# Patient Record
Sex: FEMALE | Race: WHITE | NOT HISPANIC OR LATINO | Employment: OTHER | URBAN - METROPOLITAN AREA
[De-identification: names, ages, dates, MRNs, and addresses within clinical notes are randomized per-mention and may not be internally consistent; named-entity substitution may affect disease eponyms.]

---

## 2009-03-06 LAB
EXTERNAL HIV CONFIRMATION: NORMAL
EXTERNAL HIV SCREEN: NORMAL
HCV AB SER-ACNC: NEGATIVE

## 2017-01-03 ENCOUNTER — HOSPITAL ENCOUNTER (OUTPATIENT)
Dept: NON INVASIVE DIAGNOSTICS | Facility: CLINIC | Age: 62
Discharge: HOME | End: 2017-01-03
Payer: COMMERCIAL

## 2017-01-03 DIAGNOSIS — I05.9 RHEUMATIC MITRAL VALVE DISEASE: ICD-10-CM

## 2017-01-03 DIAGNOSIS — Z01.810 ENCOUNTER FOR PREPROCEDURAL CARDIOVASCULAR EXAMINATION: ICD-10-CM

## 2017-01-03 PROCEDURE — 93306 TTE W/DOPPLER COMPLETE: CPT

## 2017-01-07 ENCOUNTER — APPOINTMENT (OUTPATIENT)
Dept: URGENT CARE | Facility: MEDICAL CENTER | Age: 62
End: 2017-01-07
Payer: COMMERCIAL

## 2017-01-07 PROCEDURE — 99213 OFFICE O/P EST LOW 20 MIN: CPT

## 2017-01-12 ENCOUNTER — OFFICE VISIT (OUTPATIENT)
Dept: URGENT CARE | Facility: MEDICAL CENTER | Age: 62
End: 2017-01-12
Payer: COMMERCIAL

## 2017-01-12 PROCEDURE — 99213 OFFICE O/P EST LOW 20 MIN: CPT

## 2017-01-13 ENCOUNTER — GENERIC CONVERSION - ENCOUNTER (OUTPATIENT)
Dept: OTHER | Facility: OTHER | Age: 62
End: 2017-01-13

## 2017-01-19 ENCOUNTER — HOSPITAL ENCOUNTER (OUTPATIENT)
Dept: INTERVENTIONAL RADIOLOGY/VASCULAR | Facility: HOSPITAL | Age: 62
Discharge: HOME | End: 2017-01-19
Attending: INTERNAL MEDICINE | Admitting: INTERNAL MEDICINE
Payer: COMMERCIAL

## 2017-01-19 ENCOUNTER — ANESTHESIA EVENT (OUTPATIENT)
Dept: INTERVENTIONAL RADIOLOGY/VASCULAR | Facility: HOSPITAL | Age: 62
End: 2017-01-19

## 2017-01-19 ENCOUNTER — ANESTHESIA (OUTPATIENT)
Dept: INTERVENTIONAL RADIOLOGY/VASCULAR | Facility: HOSPITAL | Age: 62
End: 2017-01-19

## 2017-01-19 VITALS
DIASTOLIC BLOOD PRESSURE: 64 MMHG | OXYGEN SATURATION: 100 % | HEART RATE: 66 BPM | HEIGHT: 60 IN | TEMPERATURE: 97.1 F | WEIGHT: 101 LBS | SYSTOLIC BLOOD PRESSURE: 147 MMHG | RESPIRATION RATE: 20 BRPM | BODY MASS INDEX: 19.83 KG/M2

## 2017-01-19 DIAGNOSIS — I34.1 MITRAL VALVE PROLAPSE: ICD-10-CM

## 2017-01-19 PROCEDURE — 93312 ECHO TRANSESOPHAGEAL: CPT

## 2017-01-19 RX ORDER — PROPOFOL 10 MG/ML
INJECTION, EMULSION INTRAVENOUS CONTINUOUS PRN
Status: DISCONTINUED | OUTPATIENT
Start: 2017-01-19 | End: 2017-01-19 | Stop reason: SURG

## 2017-01-19 RX ORDER — SODIUM CHLORIDE 9 MG/ML
INJECTION, SOLUTION INTRAVENOUS CONTINUOUS PRN
Status: DISCONTINUED | OUTPATIENT
Start: 2017-01-19 | End: 2017-01-19 | Stop reason: SURG

## 2017-01-19 RX ORDER — PROPOFOL 10 MG/ML
INJECTION, EMULSION INTRAVENOUS AS NEEDED
Status: DISCONTINUED | OUTPATIENT
Start: 2017-01-19 | End: 2017-01-19 | Stop reason: SURG

## 2017-01-19 RX ORDER — KETAMINE HYDROCHLORIDE 100 MG/ML
INJECTION, SOLUTION INTRAMUSCULAR; INTRAVENOUS AS NEEDED
Status: DISCONTINUED | OUTPATIENT
Start: 2017-01-19 | End: 2017-01-19 | Stop reason: SURG

## 2017-01-19 RX ADMIN — SODIUM CHLORIDE: 0.9 INJECTION, SOLUTION INTRAVENOUS at 09:31

## 2017-01-19 RX ADMIN — LIDOCAINE HYDROCHLORIDE 80 MG: 20 INJECTION, SOLUTION INTRAVENOUS at 09:31

## 2017-01-19 RX ADMIN — PROPOFOL 150 MCG/KG/MIN: 10 INJECTION, EMULSION INTRAVENOUS at 09:31

## 2017-01-19 RX ADMIN — KETAMINE HYDROCHLORIDE 25 MG: 100 INJECTION, SOLUTION, CONCENTRATE INTRAMUSCULAR; INTRAVENOUS at 09:31

## 2017-01-19 RX ADMIN — PROPOFOL 75 MG: 10 INJECTION, EMULSION INTRAVENOUS at 09:31

## 2017-02-03 ENCOUNTER — HOSPITAL ENCOUNTER (OUTPATIENT)
Dept: RADIOLOGY | Facility: MEDICAL CENTER | Age: 62
Discharge: HOME | End: 2017-02-03
Payer: COMMERCIAL

## 2017-02-03 ENCOUNTER — ALLSCRIPTS OFFICE VISIT (OUTPATIENT)
Dept: OTHER | Facility: OTHER | Age: 62
End: 2017-02-03

## 2017-02-03 DIAGNOSIS — I34.0 NONRHEUMATIC MITRAL VALVE INSUFFICIENCY: ICD-10-CM

## 2017-02-03 DIAGNOSIS — F41.9 ANXIETY DISORDER: ICD-10-CM

## 2017-02-03 DIAGNOSIS — Z47.89 ENCOUNTER FOR OTHER ORTHOPEDIC AFTERCARE: ICD-10-CM

## 2017-02-03 PROCEDURE — 73100 X-RAY EXAM OF WRIST: CPT

## 2017-02-21 ENCOUNTER — ALLSCRIPTS OFFICE VISIT (OUTPATIENT)
Dept: OTHER | Facility: OTHER | Age: 62
End: 2017-02-21

## 2017-02-28 ENCOUNTER — TRANSCRIBE ORDERS (OUTPATIENT)
Dept: LAB | Facility: OTHER | Age: 62
End: 2017-02-28

## 2017-03-02 ENCOUNTER — LAB (OUTPATIENT)
Dept: LAB | Facility: OTHER | Age: 62
End: 2017-03-02
Payer: COMMERCIAL

## 2017-03-02 ENCOUNTER — TRANSCRIBE ORDERS (OUTPATIENT)
Dept: LAB | Facility: OTHER | Age: 62
End: 2017-03-02

## 2017-03-02 DIAGNOSIS — I34.0 NONRHEUMATIC MITRAL VALVE INSUFFICIENCY: ICD-10-CM

## 2017-03-02 DIAGNOSIS — F41.9 ANXIETY DISORDER: ICD-10-CM

## 2017-03-02 LAB
ALBUMIN SERPL BCP-MCNC: 3.8 G/DL (ref 3.5–5)
ALP SERPL-CCNC: 77 U/L (ref 46–116)
ALT SERPL W P-5'-P-CCNC: 20 U/L (ref 12–78)
ANION GAP SERPL CALCULATED.3IONS-SCNC: 7 MMOL/L (ref 4–13)
AST SERPL W P-5'-P-CCNC: 12 U/L (ref 5–45)
BASOPHILS # BLD AUTO: 0.02 THOUSANDS/ΜL (ref 0–0.1)
BASOPHILS NFR BLD AUTO: 0 % (ref 0–1)
BILIRUB SERPL-MCNC: 0.48 MG/DL (ref 0.2–1)
BUN SERPL-MCNC: 16 MG/DL (ref 5–25)
CALCIUM SERPL-MCNC: 9.2 MG/DL (ref 8.3–10.1)
CHLORIDE SERPL-SCNC: 103 MMOL/L (ref 100–108)
CHOLEST SERPL-MCNC: 214 MG/DL (ref 50–200)
CO2 SERPL-SCNC: 30 MMOL/L (ref 21–32)
CREAT SERPL-MCNC: 0.81 MG/DL (ref 0.6–1.3)
EOSINOPHIL # BLD AUTO: 0.05 THOUSAND/ΜL (ref 0–0.61)
EOSINOPHIL NFR BLD AUTO: 1 % (ref 0–6)
ERYTHROCYTE [DISTWIDTH] IN BLOOD BY AUTOMATED COUNT: 13 % (ref 11.6–15.1)
GFR SERPL CREATININE-BSD FRML MDRD: >60 ML/MIN/1.73SQ M
GLUCOSE SERPL-MCNC: 96 MG/DL (ref 65–140)
HCT VFR BLD AUTO: 41.8 % (ref 34.8–46.1)
HDLC SERPL-MCNC: 59 MG/DL (ref 40–60)
HGB BLD-MCNC: 14.1 G/DL (ref 11.5–15.4)
LDLC SERPL CALC-MCNC: 131 MG/DL (ref 0–100)
LYMPHOCYTES # BLD AUTO: 2.44 THOUSANDS/ΜL (ref 0.6–4.47)
LYMPHOCYTES NFR BLD AUTO: 41 % (ref 14–44)
MCH RBC QN AUTO: 30.1 PG (ref 26.8–34.3)
MCHC RBC AUTO-ENTMCNC: 33.7 G/DL (ref 31.4–37.4)
MCV RBC AUTO: 89 FL (ref 82–98)
MONOCYTES # BLD AUTO: 0.55 THOUSAND/ΜL (ref 0.17–1.22)
MONOCYTES NFR BLD AUTO: 9 % (ref 4–12)
NEUTROPHILS # BLD AUTO: 2.83 THOUSANDS/ΜL (ref 1.85–7.62)
NEUTS SEG NFR BLD AUTO: 49 % (ref 43–75)
NRBC BLD AUTO-RTO: 0 /100 WBCS
PLATELET # BLD AUTO: 198 THOUSANDS/UL (ref 149–390)
PMV BLD AUTO: 11.8 FL (ref 8.9–12.7)
POTASSIUM SERPL-SCNC: 4.2 MMOL/L (ref 3.5–5.3)
PROT SERPL-MCNC: 7.5 G/DL (ref 6.4–8.2)
RBC # BLD AUTO: 4.69 MILLION/UL (ref 3.81–5.12)
SODIUM SERPL-SCNC: 140 MMOL/L (ref 136–145)
TRIGL SERPL-MCNC: 121 MG/DL
TSH SERPL DL<=0.05 MIU/L-ACNC: 1.09 UIU/ML (ref 0.36–3.74)
WBC # BLD AUTO: 5.9 THOUSAND/UL (ref 4.31–10.16)

## 2017-03-02 PROCEDURE — 36415 COLL VENOUS BLD VENIPUNCTURE: CPT

## 2017-03-02 PROCEDURE — 80053 COMPREHEN METABOLIC PANEL: CPT

## 2017-03-02 PROCEDURE — 85025 COMPLETE CBC W/AUTO DIFF WBC: CPT

## 2017-03-02 PROCEDURE — 84443 ASSAY THYROID STIM HORMONE: CPT

## 2017-03-02 PROCEDURE — 80061 LIPID PANEL: CPT

## 2017-06-16 DIAGNOSIS — Z00.00 ENCOUNTER FOR GENERAL ADULT MEDICAL EXAMINATION WITHOUT ABNORMAL FINDINGS: ICD-10-CM

## 2017-06-16 DIAGNOSIS — E78.5 HYPERLIPIDEMIA: ICD-10-CM

## 2017-06-16 DIAGNOSIS — I10 ESSENTIAL (PRIMARY) HYPERTENSION: ICD-10-CM

## 2017-06-20 ENCOUNTER — LAB (OUTPATIENT)
Dept: LAB | Facility: OTHER | Age: 62
End: 2017-06-20
Payer: COMMERCIAL

## 2017-06-20 ENCOUNTER — ALLSCRIPTS OFFICE VISIT (OUTPATIENT)
Dept: OTHER | Facility: OTHER | Age: 62
End: 2017-06-20

## 2017-06-20 PROCEDURE — 86901 BLOOD TYPING SEROLOGIC RH(D): CPT | Performed by: INTERNAL MEDICINE

## 2017-06-20 PROCEDURE — 36415 COLL VENOUS BLD VENIPUNCTURE: CPT | Performed by: INTERNAL MEDICINE

## 2017-06-20 PROCEDURE — 86900 BLOOD TYPING SEROLOGIC ABO: CPT | Performed by: INTERNAL MEDICINE

## 2017-06-20 PROCEDURE — 80061 LIPID PANEL: CPT | Performed by: INTERNAL MEDICINE

## 2017-09-14 ENCOUNTER — GENERIC CONVERSION - ENCOUNTER (OUTPATIENT)
Dept: OTHER | Facility: OTHER | Age: 62
End: 2017-09-14

## 2017-12-19 ENCOUNTER — ALLSCRIPTS OFFICE VISIT (OUTPATIENT)
Dept: OTHER | Facility: OTHER | Age: 62
End: 2017-12-19
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
## 2018-01-03 ENCOUNTER — OFFICE VISIT (OUTPATIENT)
Dept: URGENT CARE | Facility: MEDICAL CENTER | Age: 63
End: 2018-01-03
Payer: COMMERCIAL

## 2018-01-03 PROCEDURE — 99213 OFFICE O/P EST LOW 20 MIN: CPT
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
## 2018-01-13 VITALS
BODY MASS INDEX: 20.03 KG/M2 | DIASTOLIC BLOOD PRESSURE: 82 MMHG | HEIGHT: 60 IN | WEIGHT: 102 LBS | HEART RATE: 75 BPM | SYSTOLIC BLOOD PRESSURE: 137 MMHG

## 2018-01-14 VITALS
SYSTOLIC BLOOD PRESSURE: 112 MMHG | WEIGHT: 104.25 LBS | BODY MASS INDEX: 20.47 KG/M2 | HEIGHT: 60 IN | HEART RATE: 80 BPM | DIASTOLIC BLOOD PRESSURE: 76 MMHG

## 2018-01-14 VITALS
SYSTOLIC BLOOD PRESSURE: 132 MMHG | BODY MASS INDEX: 20.86 KG/M2 | WEIGHT: 106.25 LBS | DIASTOLIC BLOOD PRESSURE: 74 MMHG | HEART RATE: 68 BPM | HEIGHT: 60 IN

## 2018-01-23 VITALS
HEART RATE: 82 BPM | TEMPERATURE: 97.9 F | RESPIRATION RATE: 18 BRPM | BODY MASS INDEX: 20.81 KG/M2 | WEIGHT: 106 LBS | HEIGHT: 60 IN | DIASTOLIC BLOOD PRESSURE: 84 MMHG | OXYGEN SATURATION: 99 % | SYSTOLIC BLOOD PRESSURE: 126 MMHG

## 2018-01-23 VITALS
HEIGHT: 60 IN | DIASTOLIC BLOOD PRESSURE: 82 MMHG | HEART RATE: 88 BPM | BODY MASS INDEX: 20.91 KG/M2 | OXYGEN SATURATION: 95 % | SYSTOLIC BLOOD PRESSURE: 124 MMHG | WEIGHT: 106.5 LBS

## 2018-04-16 RX ORDER — BENZONATATE 100 MG/1
1 CAPSULE ORAL
COMMUNITY
Start: 2018-01-03 | End: 2018-04-17

## 2018-04-17 ENCOUNTER — TELEPHONE (OUTPATIENT)
Dept: CARDIOLOGY CLINIC | Facility: CLINIC | Age: 63
End: 2018-04-17

## 2018-04-17 ENCOUNTER — OFFICE VISIT (OUTPATIENT)
Dept: CARDIOLOGY CLINIC | Facility: CLINIC | Age: 63
End: 2018-04-17
Payer: COMMERCIAL

## 2018-04-17 ENCOUNTER — TELEPHONE (OUTPATIENT)
Dept: INTERNAL MEDICINE CLINIC | Facility: CLINIC | Age: 63
End: 2018-04-17

## 2018-04-17 VITALS
DIASTOLIC BLOOD PRESSURE: 80 MMHG | SYSTOLIC BLOOD PRESSURE: 126 MMHG | HEART RATE: 84 BPM | WEIGHT: 107 LBS | BODY MASS INDEX: 21.01 KG/M2 | OXYGEN SATURATION: 96 % | HEIGHT: 60 IN

## 2018-04-17 DIAGNOSIS — I34.1 MVP (MITRAL VALVE PROLAPSE): Primary | ICD-10-CM

## 2018-04-17 DIAGNOSIS — I34.0 MITRAL VALVE INSUFFICIENCY, UNSPECIFIED ETIOLOGY: ICD-10-CM

## 2018-04-17 PROCEDURE — 99214 OFFICE O/P EST MOD 30 MIN: CPT | Performed by: INTERNAL MEDICINE

## 2018-04-17 RX ORDER — ATENOLOL 25 MG/1
25 TABLET ORAL DAILY
Qty: 90 TABLET | Refills: 3 | Status: SHIPPED | OUTPATIENT
Start: 2018-04-17 | End: 2018-04-17 | Stop reason: SDUPTHER

## 2018-04-17 RX ORDER — CHLORAL HYDRATE 500 MG
1000 CAPSULE ORAL DAILY
COMMUNITY

## 2018-04-17 RX ORDER — ATENOLOL 25 MG/1
25 TABLET ORAL DAILY
Qty: 90 TABLET | Refills: 0 | Status: SHIPPED | OUTPATIENT
Start: 2018-04-17

## 2018-04-17 RX ORDER — LANOLIN ALCOHOL/MO/W.PET/CERES
CREAM (GRAM) TOPICAL DAILY
COMMUNITY

## 2018-04-17 NOTE — TELEPHONE ENCOUNTER
Left detailed message for patient - advised that atenolol was sent to [facility]  S/w Dr Meron Hook to verify Xanax - patient was advised by providers that we are not refilling Xanax and that medication needs to come from PCP, which she told me she sees one

## 2018-04-17 NOTE — TELEPHONE ENCOUNTER
PT WANTS TO KNOW IF HER ATENOLOL WENT TO [facility] AND HER XANEX WENT TO [facility] IN [place]  PT WOULD LIKE A CALL BACK TO LET HER KNOW   Winsome Saucedo

## 2018-04-17 NOTE — PROGRESS NOTES
[facility]  [address] [address]  Cardiology Consultation     Antonia Nuno  [id number]  1955      1  MVP (mitral valve prolapse)     2  Mitral valve insufficiency, unspecified etiology         Chief Complaint   Patient presents with    Follow-up     MVP       HPI:  Patient with history of MVP and mitral regurgitation presents for follow up visit  Was previously Dr Molina Jones patient  C/o palpitations occasionally and gets SOB with exertion  Denies chest pain, lightheadedness, leg swelling, syncope  Denies history of CAD  MVP- ECHO Jan 2017 shows moderately dilated LA, redundant MV with marked prolapse of posterior leaflet and severe MR   KWAME afterwards shows EF 60%, mild to moderate MVP of posterior leaflet  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, moderate MR seen  Mitral regurgitation- ECHO and KWAME findings as above  Past Medical History:   Diagnosis Date    Anxiety     Cardiac disease     mitral valve regurgitation    Mitral valve disorder      Social History     Social History    Marital status:      Spouse name: N/A    Number of children: N/A    Years of education: N/A     Occupational History    Not on file  Social History Main Topics    Smoking status: Never Smoker    Smokeless tobacco: Not on file    Alcohol use Yes      Comment: occ    Drug use: No    Sexual activity: Not on file     Other Topics Concern    Not on file     Social History Narrative    No narrative on file      No family history on file    Past Surgical History:   Procedure Laterality Date    ANKLE FRACTURE SURGERY      ORIF WRIST FRACTURE Right 11/21/2016    Procedure: DISTAL RADIUS O/R I/F;  Surgeon: Lucinda Izquierdo MD;  [facility];  Service:     WRIST SURGERY         Current Outpatient Prescriptions:     ALPRAZolam (XANAX) 2 MG tablet, Take 2 mg by mouth 2 (two) times a day, Disp: , Rfl:     atenolol (TENORMIN) 25 mg tablet, Take 25 mg by mouth daily, Disp: , Rfl:     Calcium-Vitamin D-Vitamin K (VIACTIV PO), Take by mouth, Disp: , Rfl:     cyanocobalamin (VITAMIN B-12) 1,000 mcg tablet, Take by mouth daily, Disp: , Rfl:     Omega-3 Fatty Acids (FISH OIL) 1,000 mg, Take 1,000 mg by mouth daily, Disp: , Rfl:   Allergies   Allergen Reactions    Codeine Other (See Comments)     Hallucinations    Sulfa Antibiotics Other (See Comments)     Yeast infections     Vitals:    04/17/18 1316   BP: 126/80   Pulse: 84   SpO2: 96%   Weight: 48 5 kg (107 lb)   Height: 5' (1 524 m)       Labs:  No visits with results within 2 Month(s) from this visit  Latest known visit with results is: Ancillary Orders on 06/16/2017   Component Date Value    Cholesterol 06/20/2017 212*    Triglycerides 06/20/2017 137     HDL, Direct 06/20/2017 49     LDL Calculated 06/20/2017 136*    ABO Grouping 06/20/2017 A     Rh Factor 06/20/2017 Positive      Imaging: No results found  Review of Systems:  Review of Systems   Constitutional: Negative for diaphoresis, fatigue and fever  HENT: Negative for congestion, ear discharge, hearing loss, sinus pain and sore throat  Eyes: Negative for pain, redness and visual disturbance  Respiratory: Negative for cough, chest tightness, shortness of breath and wheezing  Cardiovascular: Negative for chest pain, palpitations and leg swelling  Gastrointestinal: Negative for abdominal distention, abdominal pain, constipation, diarrhea, nausea and vomiting  Endocrine: Negative for cold intolerance and heat intolerance  Genitourinary: Negative for difficulty urinating and hematuria  Musculoskeletal: Negative for arthralgias, back pain, gait problem, joint swelling, myalgias, neck pain and neck stiffness  Skin: Negative for color change, pallor, rash and wound  Neurological: Negative for dizziness, syncope, weakness, numbness and headaches  Hematological: Does not bruise/bleed easily     Psychiatric/Behavioral: Negative for agitation, behavioral problems and confusion  The patient is not nervous/anxious  /80   Pulse 84   Ht 5' (1 524 m)   Wt 48 5 kg (107 lb)   SpO2 96%   BMI 20 90 kg/m²     Physical Exam:  Physical Exam   Constitutional: She is oriented to person, place, and time  She appears well-developed and well-nourished  HENT:   Head: Normocephalic and atraumatic  Eyes: Conjunctivae and EOM are normal  Pupils are equal, round, and reactive to light  Neck: Normal range of motion  Neck supple  Cardiovascular: Normal rate, regular rhythm and intact distal pulses  Exam reveals no gallop and no friction rub  Murmur heard   +midsystolic click   Pulmonary/Chest: Effort normal and breath sounds normal  No respiratory distress  She has no wheezes  She has no rales  She exhibits no tenderness  Abdominal: Soft  Bowel sounds are normal  She exhibits no distension  There is no rebound  Musculoskeletal: Normal range of motion  She exhibits no edema  Neurological: She is alert and oriented to person, place, and time  She has normal reflexes  Skin: Skin is warm and dry  Psychiatric: She has a normal mood and affect  Her behavior is normal  Judgment and thought content normal        Discussion/Summary:  1  Mitral valve prolapse, Mitral regurgitation:  -ECHO Jan 2017 shows moderately dilated LA, redundant MV with marked prolapse of posterior leaflet and severe MR     -KWAME afterwards shows EF 60%, mild to moderate MVP of posterior leaflet  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, moderate MR seen   -Will order ECHO to reassess current valve status  Recommend aggressive risk factor modification and therapeutic lifestyle changes  Low salt, low calorie, low fat, low cholesterol diet with regular exercise and to optimize weight  I will defer the ordering and monitoring of necessary lab studies to you, but I am available and happy to review and manage any of that data at your request in the future    Discussed concepts of atherosclerosis, including signs and symptoms of cardiac disease  Previous studies were reviewed  Safety measures were reviewed  Questions were entertained and answered  Patient was advised to report any problem requiring medical attention  Follow up with appropriate specialists and lab work as discussed  Return for follow up visit as scheduled or earlier, if needed  Further recommendations will be forthcoming after the above testing is performed and results available  Thank you for allowing me to participate in the care and evaluation of your patient  Should you have any questions, please feel free to contact me

## 2018-05-04 ENCOUNTER — OFFICE VISIT (OUTPATIENT)
Dept: URGENT CARE | Facility: MEDICAL CENTER | Age: 63
End: 2018-05-04
Payer: COMMERCIAL

## 2018-05-04 VITALS
OXYGEN SATURATION: 100 % | HEART RATE: 83 BPM | HEIGHT: 60 IN | TEMPERATURE: 98.1 F | DIASTOLIC BLOOD PRESSURE: 70 MMHG | WEIGHT: 102 LBS | BODY MASS INDEX: 20.03 KG/M2 | SYSTOLIC BLOOD PRESSURE: 140 MMHG | RESPIRATION RATE: 20 BRPM

## 2018-05-04 DIAGNOSIS — R25.1 TREMOR: Primary | ICD-10-CM

## 2018-05-04 PROCEDURE — 99213 OFFICE O/P EST LOW 20 MIN: CPT | Performed by: FAMILY MEDICINE

## 2018-05-04 RX ORDER — ALPRAZOLAM 2 MG/1
4 TABLET ORAL
Qty: 60 TABLET | Refills: 0 | Status: SHIPPED | OUTPATIENT
Start: 2018-05-04

## 2018-05-04 NOTE — PROGRESS NOTES
[facility]        NAME: Tatyana Guo is a 65 y o  female  : 1955    MRN: [id number]  DATE: May 4, 2018  TIME: 11:42 AM    Assessment and Plan   Tremor [R25 1]  1  Tremor  ALPRAZolam (XANAX) 2 MG tablet         Patient Instructions       Follow up with PCP in 3-5 days  Proceed to  ER if symptoms worsen  Chief Complaint     Chief Complaint   Patient presents with    Medication Refill         History of Present Illness         This is a 36-year-old female presenting for medication refill  She states that she has been on 4 milligrams of Xanax at night for sleep for "years"  This prescription came from Dr Destiny Garzon, who recently retired  The patient states that she tried to contact his office for refill and they refused  She is currently switching to a new primary care, was not able to get appointment till   She has been off her Xanax for about 2 weeks, she did try to taper on her own but did not have enough pills  Current symptoms include muscle spasms including left eye spasm which started about 2-3 days ago,  Nausea, intermittent dizziness, increased tremor, 1 episode of vomiting  She denies any confusion, decreased level of consciousness  She states that her plan is to be weaned off the Xanax, but she is concerned because she knows that she should not stop it abruptly  She uses the xanax for sleep and states that she has not been sleeping without it  We discussed at length that urgent care generally does not prescribe controlled medications  Given that she has a gap in her availability to see primary care, we will fill her prescription for 39 days to get her through safely until she has her first appointment with her new PCP  GLO PERRY checked along with Bassam Proctor  Medication Refill   Associated symptoms include fatigue, nausea, vertigo and vomiting   Pertinent negatives include no abdominal pain, anorexia, arthralgias, change in bowel habit, chest pain, chills, congestion, coughing, diaphoresis, fever, headaches, joint swelling, myalgias, neck pain, numbness, rash, sore throat, swollen glands, urinary symptoms, visual change or weakness  Review of Systems   Review of Systems   Constitutional: Positive for fatigue  Negative for chills, diaphoresis and fever  HENT: Negative for congestion and sore throat  Respiratory: Negative for cough  Cardiovascular: Negative for chest pain  Gastrointestinal: Positive for nausea and vomiting  Negative for abdominal pain, anorexia and change in bowel habit  Musculoskeletal: Negative for arthralgias, joint swelling, myalgias and neck pain  Skin: Negative for rash  Neurological: Positive for vertigo and tremors  Negative for dizziness, seizures, syncope, facial asymmetry, speech difficulty, weakness, light-headedness, numbness and headaches           Current Medications       Current Outpatient Prescriptions:     atenolol (TENORMIN) 25 mg tablet, Take 1 tablet (25 mg total) by mouth daily, Disp: 90 tablet, Rfl: 0    Calcium-Vitamin D-Vitamin K (VIACTIV PO), Take by mouth, Disp: , Rfl:     cyanocobalamin (VITAMIN B-12) 1,000 mcg tablet, Take by mouth daily, Disp: , Rfl:     Omega-3 Fatty Acids (FISH OIL) 1,000 mg, Take 1,000 mg by mouth daily, Disp: , Rfl:     ALPRAZolam (XANAX) 2 MG tablet, Take 2 mg by mouth 2 (two) times a day, Disp: , Rfl:     ALPRAZolam (XANAX) 2 MG tablet, Take 2 tablets (4 mg total) by mouth daily at bedtime as needed for anxiety or sleep, Disp: 60 tablet, Rfl: 0    Current Allergies     Allergies as of 05/04/2018 - Reviewed 05/04/2018   Allergen Reaction Noted    Codeine Other (See Comments) 01/22/2014    Penicillins Hives 05/04/2018    Sulfa antibiotics Other (See Comments) 11/18/2016            The following portions of the patient's history were reviewed and updated as appropriate: allergies, current medications, past family history, past medical history, past social history, past surgical history and problem list      Past Medical History:   Diagnosis Date    Anxiety     Cardiac disease     mitral valve regurgitation    Mitral valve disorder        Past Surgical History:   Procedure Laterality Date    ANKLE FRACTURE SURGERY      ORIF WRIST FRACTURE Right 11/21/2016    Procedure: DISTAL RADIUS O/R I/F;  Surgeon: Margaret Wilcox MD;  [facility];  Service:     WRIST SURGERY         No family history on file  Medications have been verified  Objective   /70   Pulse 83   Temp 98 1 °F (36 7 °C) (Temporal)   Resp 20   Ht 5' (1 524 m)   Wt 46 3 kg (102 lb)   SpO2 100%   BMI 19 92 kg/m²        Physical Exam     Physical Exam   Constitutional: She is oriented to person, place, and time  She appears well-developed and well-nourished  No distress  HENT:   Head: Normocephalic and atraumatic  Right Ear: External ear normal    Left Ear: External ear normal    Nose: Nose normal    Mouth/Throat: Oropharynx is clear and moist  No oropharyngeal exudate  Eyes: Conjunctivae and EOM are normal  Pupils are equal, round, and reactive to light  Cardiovascular: Normal rate, regular rhythm and normal heart sounds  Pulmonary/Chest: Effort normal and breath sounds normal  No respiratory distress  She has no wheezes  She has no rales  Musculoskeletal: Normal range of motion  Neurological: She is alert and oriented to person, place, and time  No cranial nerve deficit  Tremor of  Bilateral hands   Skin: Skin is warm and dry  She is not diaphoretic  Psychiatric: She has a normal mood and affect  Nursing note and vitals reviewed

## 2018-05-04 NOTE — PATIENT INSTRUCTIONS
Continue Xanax as you have been taking for the past few years  Keep your appointment with your new PCP for June and discuss tapering the medication at that time  For any worsening symptoms, go to the ER

## 2018-05-04 NOTE — PROGRESS NOTES
Pt stated that lorna her Cardiologist prescribed her Xanax  However he retired and her new cardiologist will not refill Xanax  Pt has been to PCP for 2 years and wont prescribe her xanax either  Pt also stated that she feels her left eye is drooping  c/o dizzy and nausea

## 2019-04-11 ENCOUNTER — OFFICE VISIT (OUTPATIENT)
Dept: URGENT CARE | Facility: MEDICAL CENTER | Age: 64
End: 2019-04-11
Payer: COMMERCIAL

## 2019-04-11 VITALS
TEMPERATURE: 98.6 F | SYSTOLIC BLOOD PRESSURE: 149 MMHG | BODY MASS INDEX: 22.45 KG/M2 | HEART RATE: 57 BPM | DIASTOLIC BLOOD PRESSURE: 79 MMHG | WEIGHT: 114.38 LBS | OXYGEN SATURATION: 99 % | RESPIRATION RATE: 18 BRPM | HEIGHT: 60 IN

## 2019-04-11 DIAGNOSIS — H61.23 BILATERAL IMPACTED CERUMEN: Primary | ICD-10-CM

## 2019-04-11 PROCEDURE — S9088 SERVICES PROVIDED IN URGENT: HCPCS | Performed by: PHYSICIAN ASSISTANT

## 2019-04-11 PROCEDURE — 99213 OFFICE O/P EST LOW 20 MIN: CPT | Performed by: PHYSICIAN ASSISTANT

## 2019-04-11 RX ORDER — PRAVASTATIN SODIUM 10 MG
10 TABLET ORAL
COMMUNITY
Start: 2018-09-18

## 2019-04-11 RX ORDER — SERTRALINE HCL 25 MG
25 TABLET ORAL
COMMUNITY
Start: 2018-11-01

## 2019-08-09 ENCOUNTER — OFFICE VISIT (OUTPATIENT)
Dept: URGENT CARE | Facility: MEDICAL CENTER | Age: 64
End: 2019-08-09
Payer: COMMERCIAL

## 2019-08-09 VITALS
OXYGEN SATURATION: 96 % | WEIGHT: 115.2 LBS | HEART RATE: 72 BPM | HEIGHT: 60 IN | TEMPERATURE: 98.3 F | SYSTOLIC BLOOD PRESSURE: 142 MMHG | DIASTOLIC BLOOD PRESSURE: 80 MMHG | BODY MASS INDEX: 22.62 KG/M2 | RESPIRATION RATE: 18 BRPM

## 2019-08-09 DIAGNOSIS — T26.90XA CHEMICAL BURN OF EYE: Primary | ICD-10-CM

## 2019-08-09 PROCEDURE — 99213 OFFICE O/P EST LOW 20 MIN: CPT | Performed by: PHYSICIAN ASSISTANT

## 2019-08-09 PROCEDURE — S9088 SERVICES PROVIDED IN URGENT: HCPCS | Performed by: PHYSICIAN ASSISTANT

## 2019-08-10 NOTE — PROGRESS NOTES
[facility]        NAME: Tatyana Guo is a 59 y o  female  : 1955    MRN: [id number]  DATE: 2019  TIME: 8:33 PM    Assessment and Plan   Chemical burn of eye [T26 90XA]  1  Chemical burn of eye           Patient Instructions     Chemical eye burn  Referred to ER for further evaluation  Follow up with PCP in 3-5 days  Proceed to  ER if symptoms worsen  Chief Complaint     Chief Complaint   Patient presents with    Eye Problem     Pt believes she got hair dye in her right eye today  Area around the eye is red and swollen  Pt states she normally has 20/20 vision  Pt's vision is currently 20/100  History of Present Illness       58 y/o female presents c/o chemical burn to eye  Patient states she was applying a dye to her hair and the dye came into her right eye  Complains of pain and decreased vision      Review of Systems   Review of Systems   Constitutional: Negative  HENT: Negative  Eyes: Positive for pain, redness and visual disturbance  Negative for photophobia, discharge and itching  Respiratory: Negative  Cardiovascular: Negative            Current Medications       Current Outpatient Medications:     ALPRAZolam (XANAX) 2 MG tablet, Take 1 5 mg by mouth daily at bedtime as needed , Disp: , Rfl:     atenolol (TENORMIN) 25 mg tablet, Take 1 tablet (25 mg total) by mouth daily, Disp: 90 tablet, Rfl: 0    Calcium-Vitamin D-Vitamin K (VIACTIV PO), Take by mouth, Disp: , Rfl:     Omega-3 Fatty Acids (FISH OIL) 1,000 mg, Take 1,000 mg by mouth daily, Disp: , Rfl:     pravastatin (PRAVACHOL) 10 mg tablet, Take 10 mg by mouth, Disp: , Rfl:     sertraline (ZOLOFT) 25 mg tablet, Take 25 mg by mouth , Disp: , Rfl:     ALPRAZolam (XANAX) 2 MG tablet, Take 2 tablets (4 mg total) by mouth daily at bedtime as needed for anxiety or sleep (Patient not taking: Reported on 2019), Disp: 60 tablet, Rfl: 0    cyanocobalamin (VITAMIN B-12) 1,000 mcg tablet, Take by mouth daily, Disp: , Rfl:     Current Allergies     Allergies as of 08/09/2019 - Reviewed 08/09/2019   Allergen Reaction Noted    Morphine and related Other (See Comments) 06/18/2008    Codeine Other (See Comments) 01/22/2014    Penicillins Hives 05/04/2018    Sulfa antibiotics Other (See Comments) 11/18/2016            The following portions of the patient's history were reviewed and updated as appropriate: allergies, current medications, past family history, past medical history, past social history, past surgical history and problem list      Past Medical History:   Diagnosis Date    Anxiety     Cardiac disease     mitral valve regurgitation    Mitral valve disorder        Past Surgical History:   Procedure Laterality Date    ANKLE FRACTURE SURGERY      ORIF WRIST FRACTURE Right 11/21/2016    Procedure: DISTAL RADIUS O/R I/F;  Surgeon: Barry Marie MD;  [facility];  Service:     WRIST SURGERY         History reviewed  No pertinent family history  Medications have been verified  Objective   /80   Pulse 72   Temp 98 3 °F (36 8 °C) (Temporal)   Resp 18   Ht 5' (1 524 m)   Wt 52 3 kg (115 lb 3 2 oz)   LMP  (LMP Unknown)   SpO2 96%   BMI 22 50 kg/m²          Physical Exam     Physical Exam   Constitutional: She appears well-developed and well-nourished  No distress  HENT:   Head: Normocephalic and atraumatic  Eyes: Pupils are equal, round, and reactive to light  EOM and lids are normal  Lids are everted and swept, no foreign bodies found  Right eye exhibits chemosis  Right eye exhibits no discharge, no exudate and no hordeolum  No foreign body present in the right eye  Left eye exhibits no chemosis, no discharge, no exudate and no hordeolum  No foreign body present in the left eye  Right conjunctiva is injected  Right conjunctiva has no hemorrhage  Left conjunctiva is not injected  Left conjunctiva has no hemorrhage     Pulmonary/Chest: Effort normal and breath sounds normal    Skin: She is not diaphoretic  Eye was irrigated with saline 100cc and patient referred to ER for further evaluation   [facility]

## 2019-08-10 NOTE — PATIENT INSTRUCTIONS
Chemical eye burn  Referred to ER for further evaluation  Follow up with PCP in 3-5 days  Proceed to  ER if symptoms worsen  Chemical Eye Burns   WHAT YOU NEED TO KNOW:   A chemical eye burn is an injury to any part of your eye that is exposed to chemicals  DISCHARGE INSTRUCTIONS:   Medicines:   · Antibiotic medicine: This medicine helps prevent infection caused by bacteria  It may be given as an eyedrop or ointment  · Pain medicine: You may be given medicine to take away or decrease pain  Do not wait until the pain is severe before you take your medicine  This medicine may be given as an eyedrop or pill  · Cycloplegic medicine: This medicine dilates your pupil and relaxes your eye muscles to help decrease pain and twitching  · Steroids: This medicine may be given to decrease inflammation  · Take your medicine as directed  Contact your healthcare provider if you think your medicine is not helping or if you have side effects  Tell him of her if you are allergic to any medicine  Keep a list of the medicines, vitamins, and herbs you take  Include the amounts, and when and why you take them  Bring the list or the pill bottles to follow-up visits  Carry your medicine list with you in case of an emergency  Follow up with your healthcare provider or ophthalmologist as directed: You will need to return to have your eye and vision checked  Write down your questions so you remember to ask them during your visits  Eye care: Your healthcare provider or ophthalmologist may give you artificial tears or an eye patch to protect your eye and help it heal    If you get chemicals in your eye:  Rinse your eye immediately  The sooner you begin to rinse your eye, the better your chances of healing  · Rinse your eye with a steady stream of water for at least 30 minutes   Use the cleanest water you can get to quickly  Never use other chemicals to rinse out your eye   Move your eyeball in all directions to make sure that all parts of your eye are rinsed  If possible, continue to rinse out your eye with water until you reach the treatment center  · Remove clothing that may still contain chemicals  Do not take out your contact lenses  · Bring the container to show healthcare providers, if possible  Do not bring the container if the chemical may burn you again  Prevent another chemical eye burn:   · Always wear protective eyewear, such as goggles, that fit closely around your eyes  · Do not touch your eyes when you work with chemicals  · Follow the instructions on the container when you use chemicals that may hurt your eyes  · Make a plan in case you or someone else gets burned  Know where the best water or liquid is located for rinsing your eyes  Check to see if your company has an eye wash station  Contact your healthcare provider or eye specialist if:   · Your eye feels dry  · Your eye is watery  · You have questions or concerns about your condition or care  Return to the emergency department if:   · Your eyesight is blurry or you lose vision  · You have cuts, bumps, or other damage on your eyeball  · Your eye becomes red or cloudy  © 2017 [employer]  Information is for End User's use only and may not be sold, redistributed or otherwise used for commercial purposes  All illustrations and images included in CareNotes® are the copyrighted property of [employer]  or [employer]  The above information is an  only  It is not intended as medical advice for individual conditions or treatments  Talk to your doctor, nurse or pharmacist before following any medical regimen to see if it is safe and effective for you

## 2020-10-19 ENCOUNTER — TELEPHONE (OUTPATIENT)
Dept: FAMILY MEDICINE CLINIC | Facility: CLINIC | Age: 65
End: 2020-10-19

## 2021-03-10 DIAGNOSIS — Z23 ENCOUNTER FOR IMMUNIZATION: ICD-10-CM

## 2021-03-19 ENCOUNTER — IMMUNIZATIONS (OUTPATIENT)
Dept: FAMILY MEDICINE CLINIC | Facility: HOSPITAL | Age: 66
End: 2021-03-19

## 2021-03-19 DIAGNOSIS — Z23 ENCOUNTER FOR IMMUNIZATION: Primary | ICD-10-CM

## 2021-03-19 PROCEDURE — 0011A SARS-COV-2 / COVID-19 MRNA VACCINE (MODERNA) 100 MCG: CPT

## 2021-03-19 PROCEDURE — 91301 SARS-COV-2 / COVID-19 MRNA VACCINE (MODERNA) 100 MCG: CPT

## 2021-04-21 ENCOUNTER — IMMUNIZATIONS (OUTPATIENT)
Dept: FAMILY MEDICINE CLINIC | Facility: HOSPITAL | Age: 66
End: 2021-04-21

## 2021-04-21 DIAGNOSIS — Z23 ENCOUNTER FOR IMMUNIZATION: Primary | ICD-10-CM

## 2021-04-21 PROCEDURE — 91301 SARS-COV-2 / COVID-19 MRNA VACCINE (MODERNA) 100 MCG: CPT

## 2021-04-21 PROCEDURE — 0012A SARS-COV-2 / COVID-19 MRNA VACCINE (MODERNA) 100 MCG: CPT

## 2021-05-22 ENCOUNTER — OFFICE VISIT (OUTPATIENT)
Dept: URGENT CARE | Facility: MEDICAL CENTER | Age: 66
End: 2021-05-22
Payer: COMMERCIAL

## 2021-05-22 VITALS
BODY MASS INDEX: 23.16 KG/M2 | DIASTOLIC BLOOD PRESSURE: 62 MMHG | SYSTOLIC BLOOD PRESSURE: 138 MMHG | RESPIRATION RATE: 18 BRPM | OXYGEN SATURATION: 96 % | HEIGHT: 60 IN | HEART RATE: 72 BPM | WEIGHT: 118 LBS | TEMPERATURE: 97.7 F

## 2021-05-22 DIAGNOSIS — T22.111A SUPERFICIAL BURN OF RIGHT FOREARM, INITIAL ENCOUNTER: Primary | ICD-10-CM

## 2021-05-22 PROCEDURE — 99213 OFFICE O/P EST LOW 20 MIN: CPT | Performed by: PHYSICIAN ASSISTANT

## 2021-05-22 PROCEDURE — S9088 SERVICES PROVIDED IN URGENT: HCPCS | Performed by: PHYSICIAN ASSISTANT

## 2021-05-22 RX ORDER — BACITRACIN ZINC AND POLYMYXIN B SULFATE 500; 1000 [USP'U]/G; [USP'U]/G
OINTMENT TOPICAL 2 TIMES DAILY
Qty: 15 G | Refills: 0 | Status: SHIPPED | OUTPATIENT
Start: 2021-05-22

## 2021-05-22 NOTE — PATIENT INSTRUCTIONS
1  Apply Bacitracin to skin twice daily until resolved  2  Keep ski clean and dry  3  Watch for S&S of infection (redness, swelling, drainage, fever)  4   Follow up with PCP in 3-5 days if symptoms persist

## 2021-05-22 NOTE — PROGRESS NOTES
[facility]        NAME: Guido Leslie is a 60 y o  female  : 1955    MRN: [id number]  DATE: May 22, 2021  TIME: 2:24 PM    Assessment and Plan   Superficial burn of right forearm, initial encounter [T22 111A]  1  Superficial burn of right forearm, initial encounter  bacitracin-polymyxin b (POLYSPORIN) ointment         Patient Instructions     1  Apply Bacitracin to skin twice daily until resolved  2  Keep ski clean and dry  3  Watch for S&S of infection (redness, swelling, drainage, fever)  4  Follow up with PCP in 3-5 days if symptoms persist       Chief Complaint     Chief Complaint   Patient presents with    burn     Pt  burned her right arm on an iron two days ago  History of Present Illness         Leigthon Wells is a 81-year-old female presents with a burn on her right forearm that developed 2 days prior  Patient reports she was using and iron when it slipped and struck her on the dorsal aspect of the right forearm  Patient denies any significant pain, numbness, tingling or weakness into her wrist and hand  Review of Systems   Review of Systems   Constitutional: Negative  HENT: Negative  Respiratory: Negative  Skin: Positive for wound           Current Medications       Current Outpatient Medications:     ALPRAZolam (XANAX) 2 MG tablet, Take 1 5 mg by mouth daily at bedtime as needed , Disp: , Rfl:     atenolol (TENORMIN) 25 mg tablet, Take 1 tablet (25 mg total) by mouth daily, Disp: 90 tablet, Rfl: 0    Calcium-Vitamin D-Vitamin K (VIACTIV PO), Take by mouth, Disp: , Rfl:     Omega-3 Fatty Acids (FISH OIL) 1,000 mg, Take 1,000 mg by mouth daily, Disp: , Rfl:     pravastatin (PRAVACHOL) 10 mg tablet, Take 10 mg by mouth, Disp: , Rfl:     sertraline (ZOLOFT) 25 mg tablet, Take 25 mg by mouth , Disp: , Rfl:     ALPRAZolam (XANAX) 2 MG tablet, Take 2 tablets (4 mg total) by mouth daily at bedtime as needed for anxiety or sleep (Patient not taking: Reported on 2019), Disp: 60 tablet, Rfl: 0    bacitracin-polymyxin b (POLYSPORIN) ointment, Apply topically 2 (two) times a day, Disp: 15 g, Rfl: 0    cyanocobalamin (VITAMIN B-12) 1,000 mcg tablet, Take by mouth daily, Disp: , Rfl:     Current Allergies     Allergies as of 05/22/2021 - Reviewed 05/22/2021   Allergen Reaction Noted    Morphine and related Other (See Comments) 06/18/2008    Codeine Other (See Comments) 01/22/2014    Penicillins Hives 05/04/2018    Sulfa antibiotics Other (See Comments) 11/18/2016            The following portions of the patient's history were reviewed and updated as appropriate: allergies, current medications, past family history, past medical history, past social history, past surgical history and problem list      Past Medical History:   Diagnosis Date    Anxiety     Cardiac disease     mitral valve regurgitation    Mitral valve disorder        Past Surgical History:   Procedure Laterality Date    ANKLE FRACTURE SURGERY      ORIF WRIST FRACTURE Right 11/21/2016    Procedure: DISTAL RADIUS O/R I/F;  Surgeon: Allie Anderson MD;  [facility]: [facility];  Service:     WRIST SURGERY         No family history on file  Medications have been verified  Objective   /62   Pulse 72   Temp 97 7 °F (36 5 °C)   Resp 18   Ht 5' (1 524 m)   Wt 53 5 kg (118 lb)   LMP  (LMP Unknown)   SpO2 96%   BMI 23 05 kg/m²   No LMP recorded (lmp unknown)  Patient is postmenopausal        Physical Exam     Physical Exam  Constitutional:       General: She is not in acute distress  Appearance: Normal appearance  She is not ill-appearing  Cardiovascular:      Rate and Rhythm: Normal rate and regular rhythm  Heart sounds: Normal heart sounds  No murmur  Pulmonary:      Effort: Pulmonary effort is normal       Breath sounds: Normal breath sounds  Skin:         Neurological:      Mental Status: She is alert

## 2022-10-05 ENCOUNTER — OFFICE VISIT (OUTPATIENT)
Dept: URGENT CARE | Facility: MEDICAL CENTER | Age: 67
End: 2022-10-05
Payer: COMMERCIAL

## 2022-10-05 ENCOUNTER — APPOINTMENT (OUTPATIENT)
Dept: RADIOLOGY | Facility: MEDICAL CENTER | Age: 67
End: 2022-10-05
Payer: COMMERCIAL

## 2022-10-05 VITALS
DIASTOLIC BLOOD PRESSURE: 72 MMHG | RESPIRATION RATE: 14 BRPM | SYSTOLIC BLOOD PRESSURE: 144 MMHG | HEART RATE: 115 BPM | WEIGHT: 126 LBS | BODY MASS INDEX: 24.61 KG/M2 | OXYGEN SATURATION: 95 %

## 2022-10-05 DIAGNOSIS — M94.0 COSTOCHONDRITIS: ICD-10-CM

## 2022-10-05 DIAGNOSIS — R07.9 CHEST PAIN, UNSPECIFIED TYPE: Primary | ICD-10-CM

## 2022-10-05 LAB
ATRIAL RATE: 76 BPM
ATRIAL RATE: 85 BPM
P AXIS: 46 DEGREES
P AXIS: 51 DEGREES
PR INTERVAL: 118 MS
PR INTERVAL: 120 MS
QRS AXIS: -10 DEGREES
QRS AXIS: -10 DEGREES
QRSD INTERVAL: 90 MS
QRSD INTERVAL: 94 MS
QT INTERVAL: 388 MS
QT INTERVAL: 394 MS
QTC INTERVAL: 443 MS
QTC INTERVAL: 461 MS
T WAVE AXIS: 25 DEGREES
T WAVE AXIS: 38 DEGREES
VENTRICULAR RATE: 76 BPM
VENTRICULAR RATE: 85 BPM

## 2022-10-05 PROCEDURE — 71046 X-RAY EXAM CHEST 2 VIEWS: CPT

## 2022-10-05 PROCEDURE — 93010 ELECTROCARDIOGRAM REPORT: CPT | Performed by: PHYSICIAN ASSISTANT

## 2022-10-05 PROCEDURE — 93005 ELECTROCARDIOGRAM TRACING: CPT | Performed by: PHYSICIAN ASSISTANT

## 2022-10-05 PROCEDURE — 99213 OFFICE O/P EST LOW 20 MIN: CPT | Performed by: PHYSICIAN ASSISTANT

## 2022-10-05 PROCEDURE — S9088 SERVICES PROVIDED IN URGENT: HCPCS | Performed by: PHYSICIAN ASSISTANT
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
## 2022-10-05 NOTE — PATIENT INSTRUCTIONS
Chest pain/ costochondritis  Over the counter pain medication  Go to ER if no improvement  Follow up with PCP in 3-5 days  Proceed to  ER if symptoms worsen

## 2023-10-25 ENCOUNTER — TELEPHONE (OUTPATIENT)
Age: 68
End: 2023-10-25

## 2023-11-18 ENCOUNTER — APPOINTMENT (OUTPATIENT)
Dept: RADIOLOGY | Facility: CLINIC | Age: 68
End: 2023-11-18
Payer: COMMERCIAL

## 2023-11-18 DIAGNOSIS — M79.674 PAIN IN TOE OF RIGHT FOOT: ICD-10-CM

## 2023-11-18 PROCEDURE — 73630 X-RAY EXAM OF FOOT: CPT

## 2024-01-08 ENCOUNTER — HOSPITAL ENCOUNTER (OUTPATIENT)
Age: 69
Discharge: HOME | End: 2024-01-08
Payer: COMMERCIAL

## 2024-01-08 VITALS — WEIGHT: 126 LBS | HEIGHT: 59 IN | BODY MASS INDEX: 25.4 KG/M2

## 2024-01-08 DIAGNOSIS — M81.0 AGE-RELATED OSTEOPOROSIS WITHOUT CURRENT PATHOLOGICAL FRACTURE: ICD-10-CM

## 2024-01-08 DIAGNOSIS — Z12.31 ENCOUNTER FOR SCREENING MAMMOGRAM FOR BREAST CANCER: ICD-10-CM

## 2024-01-08 PROCEDURE — 77063 BREAST TOMOSYNTHESIS BI: CPT

## 2024-01-08 PROCEDURE — 77067 SCR MAMMO BI INCL CAD: CPT

## 2024-01-08 PROCEDURE — 77080 DXA BONE DENSITY AXIAL: CPT

## 2024-01-10 ENCOUNTER — TELEPHONE (OUTPATIENT)
Age: 69
End: 2024-01-10

## 2024-05-08 RX ORDER — ALENDRONATE SODIUM 70 MG/1
70 TABLET ORAL
COMMUNITY
Start: 2024-01-31

## 2024-05-08 RX ORDER — DICLOFENAC SODIUM 75 MG/1
TABLET, DELAYED RELEASE ORAL
COMMUNITY
Start: 2023-12-05 | End: 2024-05-09 | Stop reason: ALTCHOICE

## 2024-05-08 RX ORDER — TRAZODONE HYDROCHLORIDE 100 MG/1
100 TABLET ORAL DAILY
COMMUNITY
Start: 2024-03-28

## 2024-05-08 RX ORDER — PROPYLENE GLYCOL 0.06 MG/ML
EMULSION OPHTHALMIC
COMMUNITY

## 2024-05-08 RX ORDER — RAMELTEON 8 MG/1
8 TABLET ORAL DAILY PRN
COMMUNITY
Start: 2024-03-28

## 2024-05-08 RX ORDER — EPINEPHRINE 0.3 MG/.3ML
INJECTION SUBCUTANEOUS
COMMUNITY
Start: 2023-11-18

## 2024-05-09 ENCOUNTER — OFFICE VISIT (OUTPATIENT)
Dept: FAMILY MEDICINE CLINIC | Facility: CLINIC | Age: 69
End: 2024-05-09
Payer: COMMERCIAL

## 2024-05-09 VITALS
WEIGHT: 132 LBS | HEIGHT: 59 IN | SYSTOLIC BLOOD PRESSURE: 144 MMHG | OXYGEN SATURATION: 95 % | BODY MASS INDEX: 26.61 KG/M2 | DIASTOLIC BLOOD PRESSURE: 72 MMHG | RESPIRATION RATE: 16 BRPM | HEART RATE: 82 BPM

## 2024-05-09 DIAGNOSIS — N18.31 STAGE 3A CHRONIC KIDNEY DISEASE (HCC): ICD-10-CM

## 2024-05-09 DIAGNOSIS — E78.5 HYPERLIPIDEMIA, UNSPECIFIED HYPERLIPIDEMIA TYPE: ICD-10-CM

## 2024-05-09 DIAGNOSIS — M80.00XS AGE-RELATED OSTEOPOROSIS WITH CURRENT PATHOLOGICAL FRACTURE, SEQUELA: ICD-10-CM

## 2024-05-09 DIAGNOSIS — I34.1 MITRAL VALVE PROLAPSE: ICD-10-CM

## 2024-05-09 DIAGNOSIS — F51.04 CHRONIC INSOMNIA: Primary | ICD-10-CM

## 2024-05-09 DIAGNOSIS — F41.0 PANIC DISORDER (EPISODIC PAROXYSMAL ANXIETY): ICD-10-CM

## 2024-05-09 DIAGNOSIS — R01.1 MURMUR, CARDIAC: ICD-10-CM

## 2024-05-09 DIAGNOSIS — L98.9 NODULAR LESION ON SURFACE OF SKIN: ICD-10-CM

## 2024-05-09 DIAGNOSIS — I10 BENIGN ESSENTIAL HYPERTENSION: ICD-10-CM

## 2024-05-09 PROBLEM — M80.00XA AGE-RELATED OSTEOPOROSIS WITH CURRENT PATHOLOGICAL FRACTURE: Status: ACTIVE | Noted: 2024-05-09

## 2024-05-09 PROBLEM — N18.30 KIDNEY DISEASE, CHRONIC, STAGE III (GFR 30-59 ML/MIN) (HCC): Chronic | Status: ACTIVE | Noted: 2020-06-11

## 2024-05-09 PROCEDURE — 99204 OFFICE O/P NEW MOD 45 MIN: CPT | Performed by: FAMILY MEDICINE

## 2024-05-09 RX ORDER — HYDROXYZINE HYDROCHLORIDE 25 MG/1
TABLET, FILM COATED ORAL
COMMUNITY
Start: 2024-02-19

## 2024-05-09 RX ORDER — ALPRAZOLAM 1 MG/1
1 TABLET, EXTENDED RELEASE ORAL
COMMUNITY
Start: 2024-04-25

## 2024-05-09 RX ORDER — PRAVASTATIN SODIUM 10 MG
10 TABLET ORAL DAILY
Qty: 90 TABLET | Refills: 1 | Status: SHIPPED | OUTPATIENT
Start: 2024-05-09 | End: 2024-11-05

## 2024-05-09 NOTE — ASSESSMENT & PLAN NOTE
Lab Results   Component Value Date    EGFR 65 10/19/2023    EGFR 70 01/06/2023    EGFR 67 04/28/2022    CREATININE 1.0 10/19/2023    CREATININE 0.9 01/06/2023    CREATININE 0.9 04/28/2022   She has seen the nephrologist in the past and was told this is due to dehydration

## 2024-05-09 NOTE — ASSESSMENT & PLAN NOTE
She has a heart murmur and mitral valve prolapse seen she was in her 20s, she had previous evaluation by cardiologist and she said everything was fine  , Advised to have establish care with a cardiologist

## 2024-05-09 NOTE — ASSESSMENT & PLAN NOTE
Stable, no recent panic attack and she take alprazolam and hydroxyzine and she says it was given by her sleep specialist

## 2024-05-09 NOTE — PROGRESS NOTES
Name: Carley Bonilla      : 1955      MRN: [id number]  Encounter Provider: Kerrie Barrios MD  Encounter Date: 2024   Encounter department: [facility]    Assessment & Plan     1. Chronic insomnia  Assessment & Plan:  Managed by sleep specialist      2. Benign essential hypertension  Assessment & Plan:  Blood pressure is stable, continue same medication    Orders:  -     CBC and differential; Future; Expected date: 2024  -     Comprehensive metabolic panel; Future; Expected date: 2024  -     Lipid panel; Future; Expected date: 2024  -     TSH, 3rd generation; Future; Expected date: 2024    3. Mitral valve prolapse  Assessment & Plan:  She has a heart murmur and mitral valve prolapse seen she was in her 20s, she had previous evaluation by cardiologist and she said everything was fine  , Advised to have establish care with a cardiologist    Orders:  -     Ambulatory Referral to Cardiology; Future    4. Murmur, cardiac  Assessment & Plan:  Chronic asymptomatic cardiac murmur    Orders:  -     Ambulatory Referral to Cardiology; Future    5. Age-related osteoporosis with current pathological fracture, sequela    6. Nodular lesion on surface of skin  -     Ambulatory Referral to Dermatology; Future    7. Stage 3a chronic kidney disease (HCC)  Assessment & Plan:  Lab Results   Component Value Date    EGFR 65 10/19/2023    EGFR 70 2023    EGFR 67 2022    CREATININE 1.0 10/19/2023    CREATININE 0.9 2023    CREATININE 0.9 2022   She has seen the nephrologist in the past and was told this is due to dehydration      8. Hyperlipidemia, unspecified hyperlipidemia type  -     Lipid panel; Future; Expected date: 2024  -     pravastatin (PRAVACHOL) 10 mg tablet; Take 1 tablet (10 mg total) by mouth daily    9. Panic disorder (episodic paroxysmal anxiety)  Assessment & Plan:  Stable, no recent panic attack and she take alprazolam and hydroxyzine and  she says it was given by her sleep specialist             Subjective     New patient, she says she has been seeing sleep specialist and she takes alprazolam 1 mg daily trazodone, ramelteon and hydroxyzine, and she does virtual visits with him and he gives the prescriptions,  She is concerned about her skin she has a raised rough skin on the left side of the forehead and she wants to be checked and she wants screening of her all skin.  She has hyperlipidemia on statins, Fosamax for osteoporosis.  Hypertension and on atenolol,  She lives with her boyfriend, she is not concerned about any hepatitis C      Review of Systems   Constitutional:  Negative for activity change, chills and fever.   HENT:  Negative for ear pain and sore throat.    Eyes:  Negative for pain and visual disturbance.   Respiratory:  Negative for cough and shortness of breath.    Cardiovascular:  Negative for chest pain and palpitations.   Gastrointestinal:  Negative for abdominal pain and vomiting.   Genitourinary:  Negative for dysuria and hematuria.   Musculoskeletal:  Negative for arthralgias and back pain.   Skin:  Negative for color change and rash.   Neurological: Negative.  Negative for seizures and syncope.   Psychiatric/Behavioral: Negative.     All other systems reviewed and are negative.      Past Medical History:   Diagnosis Date   • Anxiety    • Cardiac disease     mitral valve regurgitation   • Mitral valve disorder      Past Surgical History:   Procedure Laterality Date   • ANKLE FRACTURE SURGERY     • ORIF WRIST FRACTURE Right 11/21/2016    Procedure: DISTAL RADIUS O/R I/F;  Surgeon: Chuckie Nova MD;  [facility];  Service:    • WRIST SURGERY       Family History   Problem Relation Age of Onset   • No Known Problems Mother    • No Known Problems Daughter    • No Known Problems Maternal Grandmother    • No Known Problems Paternal Grandmother    • No Known Problems Maternal Aunt    • No Known Problems Paternal Aunt    •  Breast cancer Neg Hx      Social History     Socioeconomic History   • Marital status:      Spouse name: None   • Number of children: None   • Years of education: None   • Highest education level: None   Occupational History   • None   Tobacco Use   • Smoking status: Never   • Smokeless tobacco: Never   Substance and Sexual Activity   • Alcohol use: Yes     Comment: occ   • Drug use: No   • Sexual activity: Yes     Partners: Male     Comment: boy friend   Other Topics Concern   • None   Social History Narrative   • None     Social Determinants of Health     Financial Resource Strain: Not on file   Food Insecurity: No Food Insecurity (6/11/2020)    Received from [facility]    Hunger Vital Sign    • Worried About Running Out of Food in the Last Year: Never true    • Ran Out of Food in the Last Year: Never true   Transportation Needs: Not on file   Physical Activity: Not on file   Stress: Not on file   Social Connections: Not on file   Intimate Partner Violence: Not on file   Housing Stability: Not on file     Current Outpatient Medications on File Prior to Visit   Medication Sig   • alendronate (FOSAMAX) 70 mg tablet Take 70 mg by mouth   • ALPRAZolam ER (XANAX XR) 1 MG 24 hr tablet Take 1 mg by mouth daily at bedtime   • atenolol (TENORMIN) 25 mg tablet Take 1 tablet (25 mg total) by mouth daily   • Calcium-Vitamin D-Vitamin K (VIACTIV PO) Take by mouth   • cyanocobalamin (VITAMIN B-12) 1,000 mcg tablet Take by mouth daily   • EPINEPHrine (EpiPen 2-Miguel) 0.3 mg/0.3 mL SOAJ For a severe reaction: Place orange end against the outer thigh, press firmly,  hold in place for 10 seconds and go to the Emergency room.   • hydrOXYzine HCL (ATARAX) 25 mg tablet TAKE 1 TABLET BY MOUTH 3 TIMES A DAY AS NEEDED FOR ANXIETY OR OTHER (SLEEP).   • Omega-3 Fatty Acids (FISH OIL) 1,000 mg Take 1,000 mg by mouth daily   • Propylene Glycol (Systane Balance) 0.6 % SOLN drops: 0.6% 1 drop six times a day into both eyes   • ramelteon  (ROZEREM) 8 mg tablet Take 8 mg by mouth daily as needed   • traZODone (DESYREL) 100 mg tablet Take 100 mg by mouth daily   • [DISCONTINUED] pravastatin (PRAVACHOL) 10 mg tablet Take 10 mg by mouth   • ALPRAZolam (XANAX) 2 MG tablet Take 1.5 mg by mouth daily at bedtime as needed  (Patient not taking: Reported on 5/9/2024)   • ALPRAZolam (XANAX) 2 MG tablet Take 2 tablets (4 mg total) by mouth daily at bedtime as needed for anxiety or sleep (Patient not taking: Reported on 4/11/2019)   • [DISCONTINUED] bacitracin-polymyxin b (POLYSPORIN) ointment Apply topically 2 (two) times a day (Patient not taking: Reported on 10/5/2022)   • [DISCONTINUED] diclofenac (VOLTAREN) 75 mg EC tablet TAKE 1 TABLET ORALLY EVERY 12 HOURS AS NEEDED FOR PAIN. (Patient not taking: Reported on 5/9/2024)   • [DISCONTINUED] sertraline (ZOLOFT) 25 mg tablet Take 25 mg by mouth  (Patient not taking: Reported on 5/9/2024)     Allergies   Allergen Reactions   • Morphine And Related Other (See Comments)     hallucinations     • Bee Venom Hives   • Codeine Other (See Comments)     Hallucinations   • Penicillins Hives   • Sulfa Antibiotics Other (See Comments)     Yeast infections     Immunization History   Administered Date(s) Administered   • COVID-19 MODERNA VACC 0.5 ML IM 03/19/2021, 04/21/2021, 11/02/2021   • COVID-19 Pfizer Vac BIVALENT Aditya-sucrose 12 Yr+ IM 11/10/2022   • INFLUENZA 10/01/2020   • Influenza Injectable, MDCK, Preservative Free, Quadrivalent, 0.5 mL 10/24/2019   • Influenza Split High Dose Preservative Free IM 10/05/2021   • Influenza, Seasonal Vaccine, Quadrivalent, Adjuvanted, .5e 11/01/2022, 10/11/2023   • Influenza, injectable, quadrivalent, preservative free 0.5 mL 10/12/2018   • Influenza, seasonal, injectable 09/16/2015, 10/21/2018   • Pneumococcal Conjugate 13-Valent 12/09/2021   • Pneumococcal Conjugate Vaccine 20-valent (Pcv20), Polysace 02/02/2023   • Tdap 05/02/2019   • Zoster Vaccine Recombinant 06/13/2020,  "08/17/2020       Objective     /72   Pulse 82   Resp 16   Ht 4' 10.5\" (1.486 m)   Wt 59.9 kg (132 lb)   LMP  (LMP Unknown)   SpO2 95%   BMI 27.12 kg/m²     Physical Exam  Vitals and nursing note reviewed.   Constitutional:       Appearance: Normal appearance. She is well-developed. She is not ill-appearing.   Eyes:      Extraocular Movements: Extraocular movements intact.   Neck:      Thyroid: No thyromegaly.   Cardiovascular:      Rate and Rhythm: Normal rate and regular rhythm.      Heart sounds: Murmur heard.   Pulmonary:      Effort: Pulmonary effort is normal.      Breath sounds: Normal breath sounds. No wheezing or rales.   Abdominal:      Palpations: Abdomen is soft.   Musculoskeletal:      Cervical back: Normal range of motion and neck supple.   Skin:     Findings: Lesion present. No erythema or rash.      Comments: Half centimeter raised oval-shaped rough surface skin lesion on the left side of the forehead   Neurological:      General: No focal deficit present.      Mental Status: She is alert.   Psychiatric:         Mood and Affect: Mood normal.       Kerrie Barrios MD    "

## 2024-05-10 ENCOUNTER — APPOINTMENT (OUTPATIENT)
Dept: LAB | Facility: CLINIC | Age: 69
End: 2024-05-10
Payer: COMMERCIAL

## 2024-05-10 DIAGNOSIS — E78.5 HYPERLIPIDEMIA, UNSPECIFIED HYPERLIPIDEMIA TYPE: ICD-10-CM

## 2024-05-10 DIAGNOSIS — I10 BENIGN ESSENTIAL HYPERTENSION: ICD-10-CM

## 2024-05-10 LAB
ALBUMIN SERPL BCP-MCNC: 4.2 G/DL (ref 3.5–5)
ALP SERPL-CCNC: 54 U/L (ref 34–104)
ALT SERPL W P-5'-P-CCNC: 15 U/L (ref 7–52)
ANION GAP SERPL CALCULATED.3IONS-SCNC: 8 MMOL/L (ref 4–13)
AST SERPL W P-5'-P-CCNC: 21 U/L (ref 13–39)
BASOPHILS # BLD AUTO: 0.03 THOUSANDS/ÂΜL (ref 0–0.1)
BASOPHILS NFR BLD AUTO: 0 % (ref 0–1)
BILIRUB SERPL-MCNC: 0.51 MG/DL (ref 0.2–1)
BUN SERPL-MCNC: 28 MG/DL (ref 5–25)
CALCIUM SERPL-MCNC: 9.4 MG/DL (ref 8.4–10.2)
CHLORIDE SERPL-SCNC: 102 MMOL/L (ref 96–108)
CHOLEST SERPL-MCNC: 188 MG/DL
CO2 SERPL-SCNC: 31 MMOL/L (ref 21–32)
CREAT SERPL-MCNC: 0.94 MG/DL (ref 0.6–1.3)
EOSINOPHIL # BLD AUTO: 0.14 THOUSAND/ÂΜL (ref 0–0.61)
EOSINOPHIL NFR BLD AUTO: 2 % (ref 0–6)
ERYTHROCYTE [DISTWIDTH] IN BLOOD BY AUTOMATED COUNT: 13.2 % (ref 11.6–15.1)
GFR SERPL CREATININE-BSD FRML MDRD: 62 ML/MIN/1.73SQ M
GLUCOSE P FAST SERPL-MCNC: 102 MG/DL (ref 65–99)
HCT VFR BLD AUTO: 43.5 % (ref 34.8–46.1)
HDLC SERPL-MCNC: 52 MG/DL
HGB BLD-MCNC: 13.9 G/DL (ref 11.5–15.4)
IMM GRANULOCYTES # BLD AUTO: 0.03 THOUSAND/UL (ref 0–0.2)
IMM GRANULOCYTES NFR BLD AUTO: 0 % (ref 0–2)
LDLC SERPL CALC-MCNC: 108 MG/DL (ref 0–100)
LYMPHOCYTES # BLD AUTO: 2.87 THOUSANDS/ÂΜL (ref 0.6–4.47)
LYMPHOCYTES NFR BLD AUTO: 40 % (ref 14–44)
MCH RBC QN AUTO: 28.8 PG (ref 26.8–34.3)
MCHC RBC AUTO-ENTMCNC: 32 G/DL (ref 31.4–37.4)
MCV RBC AUTO: 90 FL (ref 82–98)
MONOCYTES # BLD AUTO: 0.7 THOUSAND/ÂΜL (ref 0.17–1.22)
MONOCYTES NFR BLD AUTO: 10 % (ref 4–12)
NEUTROPHILS # BLD AUTO: 3.39 THOUSANDS/ÂΜL (ref 1.85–7.62)
NEUTS SEG NFR BLD AUTO: 48 % (ref 43–75)
NONHDLC SERPL-MCNC: 136 MG/DL
NRBC BLD AUTO-RTO: 0 /100 WBCS
PLATELET # BLD AUTO: 198 THOUSANDS/UL (ref 149–390)
PMV BLD AUTO: 11.4 FL (ref 8.9–12.7)
POTASSIUM SERPL-SCNC: 4 MMOL/L (ref 3.5–5.3)
PROT SERPL-MCNC: 7.3 G/DL (ref 6.4–8.4)
RBC # BLD AUTO: 4.82 MILLION/UL (ref 3.81–5.12)
SODIUM SERPL-SCNC: 141 MMOL/L (ref 135–147)
TRIGL SERPL-MCNC: 138 MG/DL
TSH SERPL DL<=0.05 MIU/L-ACNC: 1.55 UIU/ML (ref 0.45–4.5)
WBC # BLD AUTO: 7.16 THOUSAND/UL (ref 4.31–10.16)

## 2024-05-10 PROCEDURE — 80061 LIPID PANEL: CPT

## 2024-05-10 PROCEDURE — 84443 ASSAY THYROID STIM HORMONE: CPT

## 2024-05-10 PROCEDURE — 36415 COLL VENOUS BLD VENIPUNCTURE: CPT

## 2024-05-10 PROCEDURE — 80053 COMPREHEN METABOLIC PANEL: CPT

## 2024-05-10 PROCEDURE — 85025 COMPLETE CBC W/AUTO DIFF WBC: CPT

## 2024-05-20 ENCOUNTER — TELEPHONE (OUTPATIENT)
Age: 69
End: 2024-05-20

## 2024-05-20 NOTE — TELEPHONE ENCOUNTER
Carley is calling in regards to her lab results form 5/10.  I advised that Dr. Vides will discuss her results with her at her visit in June. Carley is requesting a call back - she would like the results before her appointment.     Please advise,   Thank you!

## 2024-06-03 ENCOUNTER — RA CDI HCC (OUTPATIENT)
Dept: OTHER | Facility: HOSPITAL | Age: 69
End: 2024-06-03

## 2024-06-05 ENCOUNTER — RA CDI HCC (OUTPATIENT)
Dept: OTHER | Facility: HOSPITAL | Age: 69
End: 2024-06-05

## 2024-06-13 ENCOUNTER — OFFICE VISIT (OUTPATIENT)
Dept: FAMILY MEDICINE CLINIC | Facility: CLINIC | Age: 69
End: 2024-06-13
Payer: COMMERCIAL

## 2024-06-13 VITALS
DIASTOLIC BLOOD PRESSURE: 70 MMHG | HEIGHT: 59 IN | SYSTOLIC BLOOD PRESSURE: 120 MMHG | BODY MASS INDEX: 27.01 KG/M2 | OXYGEN SATURATION: 96 % | HEART RATE: 60 BPM | WEIGHT: 134 LBS | RESPIRATION RATE: 16 BRPM

## 2024-06-13 DIAGNOSIS — I34.0 MITRAL VALVE INSUFFICIENCY, UNSPECIFIED ETIOLOGY: ICD-10-CM

## 2024-06-13 DIAGNOSIS — I10 BENIGN ESSENTIAL HYPERTENSION: ICD-10-CM

## 2024-06-13 DIAGNOSIS — M80.00XS AGE-RELATED OSTEOPOROSIS WITH CURRENT PATHOLOGICAL FRACTURE, SEQUELA: ICD-10-CM

## 2024-06-13 DIAGNOSIS — Z00.00 MEDICARE ANNUAL WELLNESS VISIT, SUBSEQUENT: Primary | ICD-10-CM

## 2024-06-13 DIAGNOSIS — E78.5 HYPERLIPIDEMIA, UNSPECIFIED HYPERLIPIDEMIA TYPE: ICD-10-CM

## 2024-06-13 DIAGNOSIS — I34.1 MVP (MITRAL VALVE PROLAPSE): ICD-10-CM

## 2024-06-13 PROCEDURE — 99213 OFFICE O/P EST LOW 20 MIN: CPT | Performed by: FAMILY MEDICINE

## 2024-06-13 PROCEDURE — G0439 PPPS, SUBSEQ VISIT: HCPCS | Performed by: FAMILY MEDICINE

## 2024-06-13 RX ORDER — ATENOLOL 25 MG/1
25 TABLET ORAL DAILY
Qty: 90 TABLET | Refills: 1 | Status: SHIPPED | OUTPATIENT
Start: 2024-06-13

## 2024-06-13 RX ORDER — ALENDRONATE SODIUM 70 MG/1
70 TABLET ORAL
Qty: 12 TABLET | Refills: 1 | Status: SHIPPED | OUTPATIENT
Start: 2024-06-13

## 2024-06-13 NOTE — PATIENT INSTRUCTIONS
Medicare Preventive Visit Patient Instructions  Thank you for completing your Welcome to Medicare Visit or Medicare Annual Wellness Visit today. Your next wellness visit will be due in one year (6/14/2025).  The screening/preventive services that you may require over the next 5-10 years are detailed below. Some tests may not apply to you based off risk factors and/or age. Screening tests ordered at today's visit but not completed yet may show as past due. Also, please note that scanned in results may not display below.  Preventive Screenings:  Service Recommendations Previous Testing/Comments   Colorectal Cancer Screening  * Colonoscopy    * Fecal Occult Blood Test (FOBT)/Fecal Immunochemical Test (FIT)  * Fecal DNA/Cologuard Test  * Flexible Sigmoidoscopy Age: 45-75 years old   Colonoscopy: every 10 years (may be performed more frequently if at higher risk)  OR  FOBT/FIT: every 1 year  OR  Cologuard: every 3 years  OR  Sigmoidoscopy: every 5 years  Screening may be recommended earlier than age 45 if at higher risk for colorectal cancer. Also, an individualized decision between you and your healthcare provider will decide whether screening between the ages of 76-85 would be appropriate. Colonoscopy: Not on file  FOBT/FIT: Not on file  Cologuard: 05/16/2022  Sigmoidoscopy: Not on file    Screening Current     Breast Cancer Screening Age: 40+ years old  Frequency: every 1-2 years  Not required if history of left and right mastectomy Mammogram: 01/08/2024    Screening Current   Cervical Cancer Screening Between the ages of 21-29, pap smear recommended once every 3 years.   Between the ages of 30-65, can perform pap smear with HPV co-testing every 5 years.   Recommendations may differ for women with a history of total hysterectomy, cervical cancer, or abnormal pap smears in past. Pap Smear: Not on file    Screening Not Indicated   Hepatitis C Screening Once for adults born between 1945 and 1965  More frequently in  patients at high risk for Hepatitis C Hep C Antibody: Not on file        Diabetes Screening 1-2 times per year if you're at risk for diabetes or have pre-diabetes Fasting glucose: 102 mg/dL (5/10/2024)  A1C: No results in last 5 years (No results in last 5 years)  Screening Current   Cholesterol Screening Once every 5 years if you don't have a lipid disorder. May order more often based on risk factors. Lipid panel: 05/10/2024    Screening Not Indicated  History Lipid Disorder     Other Preventive Screenings Covered by Medicare:  Abdominal Aortic Aneurysm (AAA) Screening: covered once if your at risk. You're considered to be at risk if you have a family history of AAA.  Lung Cancer Screening: covers low dose CT scan once per year if you meet all of the following conditions: (1) Age 55-77; (2) No signs or symptoms of lung cancer; (3) Current smoker or have quit smoking within the last 15 years; (4) You have a tobacco smoking history of at least 20 pack years (packs per day multiplied by number of years you smoked); (5) You get a written order from a healthcare provider.  Glaucoma Screening: covered annually if you're considered high risk: (1) You have diabetes OR (2) Family history of glaucoma OR (3)  aged 50 and older OR (4)  American aged 65 and older  Osteoporosis Screening: covered every 2 years if you meet one of the following conditions: (1) You're estrogen deficient and at risk for osteoporosis based off medical history and other findings; (2) Have a vertebral abnormality; (3) On glucocorticoid therapy for more than 3 months; (4) Have primary hyperparathyroidism; (5) On osteoporosis medications and need to assess response to drug therapy.   Last bone density test (DXA Scan): 01/08/2024.  HIV Screening: covered annually if you're between the age of 15-65. Also covered annually if you are younger than 15 and older than 65 with risk factors for HIV infection. For pregnant patients, it is  covered up to 3 times per pregnancy.    Immunizations:  Immunization Recommendations   Influenza Vaccine Annual influenza vaccination during flu season is recommended for all persons aged >= 6 months who do not have contraindications   Pneumococcal Vaccine   * Pneumococcal conjugate vaccine = PCV13 (Prevnar 13), PCV15 (Vaxneuvance), PCV20 (Prevnar 20)  * Pneumococcal polysaccharide vaccine = PPSV23 (Pneumovax) Adults 19-63 yo with certain risk factors or if 65+ yo  If never received any pneumonia vaccine: recommend Prevnar 20 (PCV20)  Give PCV20 if previously received 1 dose of PCV13 or PPSV23   Hepatitis B Vaccine 3 dose series if at intermediate or high risk (ex: diabetes, end stage renal disease, liver disease)   Respiratory syncytial virus (RSV) Vaccine - COVERED BY MEDICARE PART D  * RSVPreF3 (Arexvy) CDC recommends that adults 60 years of age and older may receive a single dose of RSV vaccine using shared clinical decision-making (SCDM)   Tetanus (Td) Vaccine - COST NOT COVERED BY MEDICARE PART B Following completion of primary series, a booster dose should be given every 10 years to maintain immunity against tetanus. Td may also be given as tetanus wound prophylaxis.   Tdap Vaccine - COST NOT COVERED BY MEDICARE PART B Recommended at least once for all adults. For pregnant patients, recommended with each pregnancy.   Shingles Vaccine (Shingrix) - COST NOT COVERED BY MEDICARE PART B  2 shot series recommended in those 19 years and older who have or will have weakened immune systems or those 50 years and older     Health Maintenance Due:      Topic Date Due   • Hepatitis C Screening  Never done   • Breast Cancer Screening: Mammogram  01/08/2025   • Colorectal Cancer Screening  05/16/2025     Immunizations Due:      Topic Date Due   • COVID-19 Vaccine (5 - 2023-24 season) 09/01/2023     Advance Directives   What are advance directives?  Advance directives are legal documents that state your wishes and plans for  medical care. These plans are made ahead of time in case you lose your ability to make decisions for yourself. Advance directives can apply to any medical decision, such as the treatments you want, and if you want to donate organs.   What are the types of advance directives?  There are many types of advance directives, and each state has rules about how to use them. You may choose a combination of any of the following:  Living will:  This is a written record of the treatment you want. You can also choose which treatments you do not want, which to limit, and which to stop at a certain time. This includes surgery, medicine, IV fluid, and tube feedings.   Durable power of  for healthcare (DPAHC):  This is a written record that states who you want to make healthcare choices for you when you are unable to make them for yourself. This person, called a proxy, is usually a family member or a friend. You may choose more than 1 proxy.  Do not resuscitate (DNR) order:  A DNR order is used in case your heart stops beating or you stop breathing. It is a request not to have certain forms of treatment, such as CPR. A DNR order may be included in other types of advance directives.  Medical directive:  This covers the care that you want if you are in a coma, near death, or unable to make decisions for yourself. You can list the treatments you want for each condition. Treatment may include pain medicine, surgery, blood transfusions, dialysis, IV or tube feedings, and a ventilator (breathing machine).  Values history:  This document has questions about your views, beliefs, and how you feel and think about life. This information can help others choose the care that you would choose.  Why are advance directives important?  An advance directive helps you control your care. Although spoken wishes may be used, it is better to have your wishes written down. Spoken wishes can be misunderstood, or not followed. Treatments may be given  even if you do not want them. An advance directive may make it easier for your family to make difficult choices about your care.   Weight Management   Why it is important to manage your weight:  Being overweight increases your risk of health conditions such as heart disease, high blood pressure, type 2 diabetes, and certain types of cancer. It can also increase your risk for osteoarthritis, sleep apnea, and other respiratory problems. Aim for a slow, steady weight loss. Even a small amount of weight loss can lower your risk of health problems.  How to lose weight safely:  A safe and healthy way to lose weight is to eat fewer calories and get regular exercise. You can lose up about 1 pound a week by decreasing the number of calories you eat by 500 calories each day.   Healthy meal plan for weight management:  A healthy meal plan includes a variety of foods, contains fewer calories, and helps you stay healthy. A healthy meal plan includes the following:  Eat whole-grain foods more often.  A healthy meal plan should contain fiber. Fiber is the part of grains, fruits, and vegetables that is not broken down by your body. Whole-grain foods are healthy and provide extra fiber in your diet. Some examples of whole-grain foods are whole-wheat breads and pastas, oatmeal, brown rice, and bulgur.  Eat a variety of vegetables every day.  Include dark, leafy greens such as spinach, kale, halle greens, and mustard greens. Eat yellow and orange vegetables such as carrots, sweet potatoes, and winter squash.   Eat a variety of fruits every day.  Choose fresh or canned fruit (canned in its own juice or light syrup) instead of juice. Fruit juice has very little or no fiber.  Eat low-fat dairy foods.  Drink fat-free (skim) milk or 1% milk. Eat fat-free yogurt and low-fat cottage cheese. Try low-fat cheeses such as mozzarella and other reduced-fat cheeses.  Choose meat and other protein foods that are low in fat.  Choose beans or other  legumes such as split peas or lentils. Choose fish, skinless poultry (chicken or turkey), or lean cuts of red meat (beef or pork). Before you cook meat or poultry, cut off any visible fat.   Use less fat and oil.  Try baking foods instead of frying them. Add less fat, such as margarine, sour cream, regular salad dressing and mayonnaise to foods. Eat fewer high-fat foods. Some examples of high-fat foods include french fries, doughnuts, ice cream, and cakes.  Eat fewer sweets.  Limit foods and drinks that are high in sugar. This includes candy, cookies, regular soda, and sweetened drinks.  Exercise:  Exercise at least 30 minutes per day on most days of the week. Some examples of exercise include walking, biking, dancing, and swimming. You can also fit in more physical activity by taking the stairs instead of the elevator or parking farther away from stores. Ask your healthcare provider about the best exercise plan for you.      © Copyright [employer] 2018 Information is for End User's use only and may not be sold, redistributed or otherwise used for commercial purposes. All illustrations and images included in CareNotes® are the copyrighted property of [employer]., Inc. or [employer]

## 2024-06-13 NOTE — ASSESSMENT & PLAN NOTE
She had fracture on the right wrist and on the right ankle in the past, healed well, she is on Fosamax and up-to-date on DEXA scan, she says Prolia is not covered by her insurance

## 2024-06-13 NOTE — PROGRESS NOTES
Ambulatory Visit  Name: Carley Bonilla      : 1955      MRN: [id number]  Encounter Provider: Kerrie Barrios MD  Encounter Date: 2024   Encounter department: [facility]    Assessment & Plan   1. Medicare annual wellness visit, subsequent  2. MVP (mitral valve prolapse)  -     atenolol (TENORMIN) 25 mg tablet; Take 1 tablet (25 mg total) by mouth daily  3. Mitral valve insufficiency, unspecified etiology  Assessment & Plan:  Made appointment with cardiologist, she is asymptomatic, she has a heart murmur  Orders:  -     atenolol (TENORMIN) 25 mg tablet; Take 1 tablet (25 mg total) by mouth daily  4. Age-related osteoporosis with current pathological fracture, sequela  Assessment & Plan:  She had fracture on the right wrist and on the right ankle in the past, healed well, she is on Fosamax and up-to-date on DEXA scan, she says Prolia is not covered by her insurance  Orders:  -     alendronate (FOSAMAX) 70 mg tablet; Take 1 tablet (70 mg total) by mouth every 7 days  5. Benign essential hypertension  Assessment & Plan:  Continue atenolol, blood pressure is normal 120/70  Orders:  -     Lipid panel; Future; Expected date: 2024  -     Comprehensive metabolic panel; Future; Expected date: 2024  -     CBC and differential; Future; Expected date: 2024  6. Hyperlipidemia, unspecified hyperlipidemia type  Assessment & Plan:  Continue Pravachol and cholesterol improving  Orders:  -     Lipid panel; Future; Expected date: 2024  -     Comprehensive metabolic panel; Future; Expected date: 2024  -     CBC and differential; Future; Expected date: 2024       Preventive health issues were discussed with patient, and age appropriate screening tests were ordered as noted in patient's After Visit Summary. Personalized health advice and appropriate referrals for health education or preventive services given if needed, as noted in patient's After Visit Summary.    History  of Present Illness     AWV, follow-up on her labs, she is taking Fosamax every week, she has a mitral valve prolapse and heart murmur and she made appointment with the cardiologist,, she is asymptomatic  In 2022 she had positive Cologuard and then she said she had colonoscopy in [facility] and it was normal polyp was removed       Patient Care Team:  Kerrie Barrios MD as PCP - General (Family Medicine)  Carmen Leon MD as PCP - [facility]CP-[facility]  [facility]  Patricio Choi MD    Review of Systems   Constitutional:  Negative for activity change, appetite change, chills, fatigue, fever and unexpected weight change.   HENT:  Negative for congestion, ear discharge, ear pain, nosebleeds, postnasal drip, rhinorrhea, sinus pressure, sneezing, sore throat, trouble swallowing and voice change.    Eyes:  Negative for photophobia, pain, discharge, redness and itching.   Respiratory:  Negative for cough, chest tightness, shortness of breath and wheezing.    Cardiovascular:  Negative for chest pain, palpitations and leg swelling.   Gastrointestinal:  Negative for abdominal pain, constipation, diarrhea, nausea and vomiting.   Endocrine: Negative for polyuria.   Genitourinary:  Negative for dysuria, frequency and urgency.   Musculoskeletal:  Negative for arthralgias, back pain, myalgias and neck pain.   Skin:  Negative for color change, pallor and rash.   Allergic/Immunologic: Negative for environmental allergies and food allergies.   Neurological:  Negative for dizziness, weakness, light-headedness and headaches.   Hematological:  Negative for adenopathy. Does not bruise/bleed easily.   Psychiatric/Behavioral:  Negative for behavioral problems. The patient is not nervous/anxious.      Medical History Reviewed by provider this encounter:  Tobacco  Allergies  Meds  Problems  Med Hx  Surg Hx  Fam Hx       Annual Wellness Visit Questionnaire   Carley is here  for her Subsequent Wellness visit.     Health Risk Assessment:   Patient rates overall health as good. Patient feels that their physical health rating is same. Patient is satisfied with their life. Eyesight was rated as same. Hearing was rated as same. Patient feels that their emotional and mental health rating is same. Patients states they are never, rarely angry. Patient states they are never, rarely unusually tired/fatigued.     Depression Screening:   PHQ-2 Score: 0      Fall Risk Screening:   In the past year, patient has experienced: no history of falling in past year      Urinary Incontinence Screening:   Patient has not leaked urine accidently in the last six months.     Home Safety:  Patient does not have trouble with stairs inside or outside of their home. Patient has working smoke alarms and has working carbon monoxide detector. Home safety hazards include: none.     Nutrition:   Current diet is Regular.     Medications:   Patient is currently taking over-the-counter supplements. OTC medications include: see medication list. Patient is able to manage medications.     Activities of Daily Living (ADLs)/Instrumental Activities of Daily Living (IADLs):   Walk and transfer into and out of bed and chair?: Yes  Dress and groom yourself?: Yes    Bathe or shower yourself?: Yes    Feed yourself? Yes  Do your laundry/housekeeping?: Yes  Manage your money, pay your bills and track your expenses?: Yes  Make your own meals?: Yes    Do your own shopping?: Yes    Previous Hospitalizations:   Any hospitalizations or ED visits within the last 12 months?: No      Advance Care Planning:   Living will: Yes    Durable POA for healthcare: Yes    Advanced directive: Yes      Comments: Daughter has power of      Cognitive Screening:   Provider or family/friend/caregiver concerned regarding cognition?: No    PREVENTIVE SCREENINGS      Cardiovascular Screening:    General: Screening Not Indicated and History Lipid  "Disorder      Diabetes Screening:     General: Screening Current      Colorectal Cancer Screening:     General: Screening Current      Breast Cancer Screening:     General: Screening Current      Cervical Cancer Screening:    General: Screening Not Indicated      Osteoporosis Screening:    General: Screening Not Indicated and History Osteoporosis      Lung Cancer Screening:     General: Screening Not Indicated    Screening, Brief Intervention, and Referral to Treatment (SBIRT)    Screening  Typical number of drinks in a day: 0  Typical number of drinks in a week: 2  Interpretation: Low risk drinking behavior.    AUDIT-C Screenin) How often did you have a drink containing alcohol in the past year? never  2) How many drinks did you have on a typical day when you were drinking in the past year? 0  3) How often did you have 6 or more drinks on one occasion in the past year? never    AUDIT-C Score: 0  Interpretation: Score 0-2 (female): Negative screen for alcohol misuse    Other Counseling Topics:   Sunscreen and calcium and vitamin D intake and regular weightbearing exercise.     Social Determinants of Health     Food Insecurity: Unknown (2024)    Hunger Vital Sign    • Worried About Running Out of Food in the Last Year: Never true    • Ran Out of Food in the Last Year: Patient declined   Transportation Needs: No Transportation Needs (2024)    PRAPARE - Transportation    • Lack of Transportation (Medical): No    • Lack of Transportation (Non-Medical): No   Housing Stability: Unknown (2024)    Housing Stability Vital Sign    • Unable to Pay for Housing in the Last Year: No    • Homeless in the Last Year: No   Utilities: Not At Risk (2024)    [facility] Utilities    • Threatened with loss of utilities: No     No results found.    Objective     /70   Pulse 60   Resp 16   Ht 4' 10.5\" (1.486 m)   Wt 60.8 kg (134 lb)   LMP  (LMP Unknown)   SpO2 96%   BMI 27.53 kg/m²     Physical Exam  Vitals " and nursing note reviewed.   Constitutional:       General: She is not in acute distress.     Appearance: Normal appearance. She is not ill-appearing.   HENT:      Head: Normocephalic and atraumatic.      Nose: Nose normal. No congestion or rhinorrhea.      Mouth/Throat:      Pharynx: No posterior oropharyngeal erythema.   Eyes:      General:         Right eye: No discharge.   Cardiovascular:      Rate and Rhythm: Normal rate and regular rhythm.      Heart sounds: Murmur heard.   Pulmonary:      Effort: Pulmonary effort is normal.      Breath sounds: Normal breath sounds. No wheezing or rales.   Musculoskeletal:         General: No swelling, tenderness or deformity. Normal range of motion.      Cervical back: Normal range of motion and neck supple. No muscular tenderness.      Right lower leg: No edema.      Left lower leg: No edema.   Lymphadenopathy:      Cervical: No cervical adenopathy.   Skin:     Coloration: Skin is not jaundiced or pale.      Findings: No erythema, lesion or rash.   Neurological:      General: No focal deficit present.      Mental Status: She is alert and oriented to person, place, and time.   Psychiatric:         Mood and Affect: Mood normal.         Behavior: Behavior normal.       Administrative Statements

## 2024-06-18 ENCOUNTER — OFFICE VISIT (OUTPATIENT)
Dept: DERMATOLOGY | Facility: CLINIC | Age: 69
End: 2024-06-18
Payer: COMMERCIAL

## 2024-06-18 ENCOUNTER — TELEPHONE (OUTPATIENT)
Age: 69
End: 2024-06-18

## 2024-06-18 VITALS — BODY MASS INDEX: 22.69 KG/M2 | WEIGHT: 136.2 LBS | HEIGHT: 65 IN | TEMPERATURE: 96.1 F

## 2024-06-18 DIAGNOSIS — L82.1 SEBORRHEIC KERATOSIS: Primary | ICD-10-CM

## 2024-06-18 DIAGNOSIS — L98.9 NODULAR LESION ON SURFACE OF SKIN: ICD-10-CM

## 2024-06-18 PROCEDURE — 99203 OFFICE O/P NEW LOW 30 MIN: CPT | Performed by: DERMATOLOGY

## 2024-06-18 NOTE — PATIENT INSTRUCTIONS
"SEBORRHEIC KERATOSIS    Plan:  Reviewed that these lesions are NOT cancers and cannot harm a person directly.  Under Medicare guidelines, the removal of a seborrheic keratosis is NOT covered unless the specific lesion is of medical necessity (interferes with vision, hearing, breathing), or is symptomatic (bleeding, itching, infected, inflamed). Medicare does NOT cover removal simply if the lesions are unsightly. Medicare does cover the evaluation of any lesion to determine if a lesion is or is not cancerous (i.e. skin biopsy).  Asymptomatic.  No treatment is required.    Discussed Cryotherapy for cosmetic removal      MELANOCYTIC NEVI (\"Moles\")        Assessment and Plan:  Based on a thorough discussion of this condition and the management approach to it (including a comprehensive discussion of the known risks, side effects and potential benefits of treatment), the patient (family) agrees to implement the following specific plan:  When outside we recommend using a wide brim hat, sunglasses, long sleeve and pants, sunscreen with SPF 30+ with reapplication every 2 hours, or SPF specific clothing   Benign, reassured  Annual skin check        LENTIGO        Assessment and Plan:  Based on a thorough discussion of this condition and the management approach to it (including a comprehensive discussion of the known risks, side effects and potential benefits of treatment), the patient (family) agrees to implement the following specific plan:  When outside we recommend using a wide brim hat, sunglasses, long sleeve and pants, sunscreen with SPF 30+ with reapplication every 2 hours, or SPF specific clothing     What is a lentigo?  A lentigo is a pigmented flat or slightly raised lesion with a clearly defined edge. Unlike an ephelis (freckle), it does not fade in the winter months. There are several kinds of lentigo.  The name lentigo originally referred to its appearance resembling a small lentil. The plural of lentigo is " lentigines, although “lentigos” is also in common use.    CHERRY ANGIOMAS        Assessment and Plan:  Based on a thorough discussion of this condition and the management approach to it (including a comprehensive discussion of the known risks, side effects and potential benefits of treatment), the patient (family) agrees to implement the following specific plan:  Monitor for changes  Benign, reassured

## 2024-06-18 NOTE — PROGRESS NOTES
"[facility] Dermatology Clinic Note     Patient Name: Carley Bonilla  Encounter Date: 6/18/2024     Have you been cared for by a [facility] Dermatologist in the last 3 years and, if so, which description applies to you?    NO.   I am considered a \"new\" patient and must complete all patient intake questions. I am FEMALE/of child-bearing potential.    REVIEW OF SYSTEMS:  Have you recently had or currently have any of the following? Recent fever or chills? No  Any non-healing wound? No  Are you pregnant or planning to become pregnant? No  Are you currently or planning to be nursing or breast feeding? No   PAST MEDICAL HISTORY:  Have you personally ever had or currently have any of the following?  If \"YES,\" then please provide more detail. Skin cancer (such as Melanoma, Basal Cell Carcinoma, Squamous Cell Carcinoma?  No  Tuberculosis, HIV/AIDS, Hepatitis B or C: No  Radiation Treatment No   HISTORY OF IMMUNOSUPPRESSION:   Do you have a history of any of the following:  Systemic Immunosuppression such as Diabetes, Biologic or Immunotherapy, Chemotherapy, Organ Transplantation, Bone Marrow Transplantation?  No    Answering \"YES\" requires the addition of the dotphrase \"IMMUNOSUPPRESSED\" as the first diagnosis of the patient's visit.   FAMILY HISTORY:  Any \"first degree relatives\" (parent, brother, sister, or child) with the following?    Skin Cancer, Pancreatic or Other Cancer? YES, Mother and Father both have had SCC and BCC   PATIENT EXPERIENCE:    Do you want the Dermatologist to perform a COMPLETE skin exam today including a clinical examination under the \"bra and underwear\" areas?  Yes, Patient has makeup on face. Denied exam of underwear and feet  If necessary, do we have your permission to call and leave a detailed message on your Preferred Phone number that includes your specific medical information?  Yes      Allergies   Allergen Reactions    Morphine And Codeine Other (See Comments)     hallucinations      Bee " Venom Hives    Codeine Other (See Comments)     Hallucinations    Penicillins Hives    Sulfa Antibiotics Other (See Comments)     Yeast infections      Current Outpatient Medications:     ALPRAZolam (XANAX) 2 MG tablet, Take 2 tablets (4 mg total) by mouth daily at bedtime as needed for anxiety or sleep, Disp: 60 tablet, Rfl: 0    ALPRAZolam ER (XANAX XR) 1 MG 24 hr tablet, Take 1 mg by mouth daily at bedtime, Disp: , Rfl:     Calcium-Vitamin D-Vitamin K (VIACTIV PO), Take by mouth, Disp: , Rfl:     cyanocobalamin (VITAMIN B-12) 1,000 mcg tablet, Take by mouth daily, Disp: , Rfl:     EPINEPHrine (EpiPen 2-Miguel) 0.3 mg/0.3 mL SOAJ, For a severe reaction: Place orange end against the outer thigh, press firmly,  hold in place for 10 seconds and go to the Emergency room., Disp: , Rfl:     hydrOXYzine HCL (ATARAX) 25 mg tablet, TAKE 1 TABLET BY MOUTH 3 TIMES A DAY AS NEEDED FOR ANXIETY OR OTHER (SLEEP)., Disp: , Rfl:     Omega-3 Fatty Acids (FISH OIL) 1,000 mg, Take 1,000 mg by mouth daily, Disp: , Rfl:     pravastatin (PRAVACHOL) 10 mg tablet, Take 1 tablet (10 mg total) by mouth daily, Disp: 90 tablet, Rfl: 1    Propylene Glycol (Systane Balance) 0.6 % SOLN, drops: 0.6% 1 drop six times a day into both eyes, Disp: , Rfl:     ramelteon (ROZEREM) 8 mg tablet, Take 8 mg by mouth daily as needed, Disp: , Rfl:     traZODone (DESYREL) 100 mg tablet, Take 100 mg by mouth daily, Disp: , Rfl:     alendronate (FOSAMAX) 70 mg tablet, Take 1 tablet (70 mg total) by mouth every 7 days, Disp: 12 tablet, Rfl: 1    ALPRAZolam (XANAX) 2 MG tablet, Take 1.5 mg by mouth daily at bedtime as needed  (Patient not taking: Reported on 5/9/2024), Disp: , Rfl:     atenolol (TENORMIN) 25 mg tablet, Take 1 tablet (25 mg total) by mouth daily, Disp: 90 tablet, Rfl: 1          Whom besides the patient is providing clinical information about today's encounter?   NO ADDITIONAL HISTORIAN (patient alone provided history)    Physical Exam and  "Assessment/Plan by Diagnosis:    Patient is present with moles on her left and right temple as well as on her back; she states the moles on her back are itchy and do not bleed. She states they have been present for a long time.    SEBORRHEIC KERATOSIS    Physical Exam:  Anatomic Location: Right and left temple, back and extremities   Morphologic Description:  Waxy \"stuck-on\" discrete papule  Any active signs of \"inflamed\" status:  NONE  Pertinent Positives:  Pertinent Negatives:    Additional History of Present Condition:    Patient notes itchiness is present on her back,  areas of temple and back have carol present for years.    Plan:  Reviewed that these lesions are NOT cancers and cannot harm a person directly.  Under Medicare guidelines, the removal of a seborrheic keratosis is NOT covered unless the specific lesion is of medical necessity (interferes with vision, hearing, breathing), or is symptomatic (bleeding, itching, infected, inflamed). Medicare does NOT cover removal simply if the lesions are unsightly. Medicare does cover the evaluation of any lesion to determine if a lesion is or is not cancerous (i.e. skin biopsy).  Asymptomatic.  No treatment is required.    Discussed Cryotherapy for cosmetic removal    Medical Complexity:    SELF-LIMITED OR MINOR PROBLEM.  Problem runs a definite and prescribed course, is transient in nature, and is not likely to permanently alter health status.      MELANOCYTIC NEVI (\"Moles\")    Physical Exam:  Anatomic Location Affected:   Mostly on sun-exposed areas of the trunk and extremities  Morphological Description:  Scattered, 1-4mm round to ovoid, symmetrical-appearing, even bordered, skin colored to dark brown macules/papules, mostly in sun-exposed areas  Pertinent Positives:  Pertinent Negatives:    Additional History of Present Condition:  present on exam     Assessment and Plan:  Based on a thorough discussion of this condition and the management approach to it " "(including a comprehensive discussion of the known risks, side effects and potential benefits of treatment), the patient (family) agrees to implement the following specific plan:  When outside we recommend using a wide brim hat, sunglasses, long sleeve and pants, sunscreen with SPF 30+ with reapplication every 2 hours, or SPF specific clothing   Benign, reassured  Annual skin check     Melanocytic Nevi  Melanocytic nevi (\"moles\") are tan or brown, raised or flat areas of the skin which have an increased number of melanocytes. Melanocytes are the cells in our body which make pigment and account for skin color.    Some moles are present at birth (I.e., \"congenital nevi\"), while others come up later in life (i.e., \"acquired nevi\").  The sun can stimulate the body to make more moles.  Sunburns are not the only thing that triggers more moles.  Chronic sun exposure can do it too.     Clinically distinguishing a healthy mole from melanoma may be difficult, even for experienced dermatologists. The \"ABCDE's\" of moles have been suggested as a means of helping to alert a person to a suspicious mole and the possible increased risk of melanoma.  The suggestions for raising alert are as follows:    Asymmetry: Healthy moles tend to be symmetric, while melanomas are often asymmetric.  Asymmetry means if you draw a line through the mole, the two halves do not match in color, size, shape, or surface texture. Asymmetry can be a result of rapid enlargement of a mole, the development of a raised area on a previously flat lesion, scaling, ulceration, bleeding or scabbing within the mole.  Any mole that starts to demonstrate \"asymmetry\" should be examined promptly by a board certified dermatologist.     Border: Healthy moles tend to have discrete, even borders.  The border of a melanoma often blends into the normal skin and does not sharply delineate the mole from normal skin.  Any mole that starts to demonstrate \"uneven borders\" should " "be examined promptly by a board certified dermatologist.     Color: Healthy moles tend to be one color throughout.  Melanomas tend to be made up of different colors ranging from dark black, blue, white, or red.  Any mole that demonstrates a color change should be examined promptly by a board certified dermatologist.     Diameter: Healthy moles tend to be smaller than 0.6 cm in size; an exception are \"congenital nevi\" that can be larger.  Melanomas tend to grow and can often be greater than 0.6 cm (1/4 of an inch, or the size of a pencil eraser). This is only a guideline, and many normal moles may be larger than 0.6 cm without being unhealthy.  Any mole that starts to change in size (small to bigger or bigger to smaller) should be examined promptly by a board certified dermatologist.     Evolving: Healthy moles tend to \"stay the same.\"  Melanomas may often show signs of change or evolution such as a change in size, shape, color, or elevation.  Any mole that starts to itch, bleed, crust, burn, hurt, or ulcerate or demonstrate a change or evolution should be examined promptly by a board certified dermatologist.        LENTIGO    Physical Exam:  Anatomic Location Affected:  trunk, arms  Morphological Description:  Light brown macules  Pertinent Positives:  Pertinent Negatives:    Additional History of Present Condition:  present on exam    Assessment and Plan:  Based on a thorough discussion of this condition and the management approach to it (including a comprehensive discussion of the known risks, side effects and potential benefits of treatment), the patient (family) agrees to implement the following specific plan:  When outside we recommend using a wide brim hat, sunglasses, long sleeve and pants, sunscreen with SPF 30+ with reapplication every 2 hours, or SPF specific clothing       What is a lentigo?  A lentigo is a pigmented flat or slightly raised lesion with a clearly defined edge. Unlike an ephelis (freckle), " it does not fade in the winter months. There are several kinds of lentigo.  The name lentigo originally referred to its appearance resembling a small lentil. The plural of lentigo is lentigines, although “lentigos” is also in common use.    Who gets lentigines?  Lentigines can affect males and females of all ages and races. Solar lentigines are especially prevalent in fair skinned adults. Lentigines associated with syndromes are present at birth or arise during childhood.    What causes lentigines?  Common forms of lentigo are due to exposure to ultraviolet radiation:  Sun damage including sunburn   Indoor tanning   Phototherapy, especially photochemotherapy (PUVA)    Ionizing radiation, eg radiation therapy, can also cause lentigines.  Several familial syndromes associated with widespread lentigines originate from mutations in Brock-MAP kinase, mTOR signaling and PTEN pathways.    What is the treatment for lentigines?  Most lentigines are left alone. Attempts to lighten them may not be successful. The following approaches are used:  SPF 50+ broad-spectrum sunscreen   Hydroquinone bleaching cream   Alpha hydroxy acids   Vitamin C   Retinoids   Azelaic acid   Chemical peels  Individual lesions can be permanently removed using:  Cryotherapy   Intense pulsed light   Pigment lasers    How can lentigines be prevented?  Lentigines associated with exposure ultraviolet radiation can be prevented by very careful sun protection. Clothing is more successful at preventing new lentigines than are sunscreens.    What is the outlook for lentigines?  Lentigines usually persist. They may increase in number with age and sun exposure. Some in sun-protected sites may fade and disappear.    SMITH ANGIOMAS    Physical Exam:  Anatomic Location Affected:  trunk  Morphological Description:  Scattered cherry red, 1-4 mm papules.  Pertinent Positives:  Pertinent Negatives:    Additional History of Present Condition:   present on  "exam    Assessment and Plan:  Based on a thorough discussion of this condition and the management approach to it (including a comprehensive discussion of the known risks, side effects and potential benefits of treatment), the patient (family) agrees to implement the following specific plan:  Monitor for changes  Benign, reassured      Assessment and Plan:    Cherry angioma, also known as Nur de Jim spots, are benign vascular skin lesions. A \"cherry angioma\" is a firm red, blue or purple papule, 0.1-1 cm in diameter. When thrombosed, they can appear black in colour until evaluated with a dermatoscope when the red or purple colour is more easily seen. Cherry angioma may develop on any part of the body but most often appear on the scalp, face, lips and trunk.  An angioma is due to proliferating endothelial cells; these are the cells that line the inside of a blood vessel.    Angiomas can arise in early life or later in life; the most common type of angioma is a cherry angioma.  Cherry angiomas are very common in males and females of any age or race. They are more noticeable in white skin than in skin of colour. They markedly increase in number from about the age of 40. There may be a family history of similar lesions. Eruptive cherry angiomas have been rarely reported to be associated with internal malignancy. The cause of angiomas is unknown. Genetic analysis of cherry angiomas has shown that they frequently carry specific somatic missense mutations in the GNAQ and GNA11 (Q209H) genes, which are involved in other vascular and melanocytic proliferations.    Ruben Haddad MD  PGY-II Dermatology Resident     Scribe Attestation      I,:  Ninoska Siu am acting as a scribe while in the presence of the attending physician.:       I,:  Yolanda Campos MD personally performed the services described in this documentation    as scribed in my presence.:              "

## 2024-07-13 PROBLEM — Z00.00 MEDICARE ANNUAL WELLNESS VISIT, SUBSEQUENT: Status: RESOLVED | Noted: 2024-06-13 | Resolved: 2024-07-13

## 2024-07-24 ENCOUNTER — NURSE TRIAGE (OUTPATIENT)
Age: 69
End: 2024-07-24

## 2024-07-24 NOTE — TELEPHONE ENCOUNTER
"Reason for Disposition   Last bowel movement (BM) > 4 days ago    Answer Assessment - Initial Assessment Questions  1. STOOL PATTERN OR FREQUENCY: \"How often do you pass bowel movements (BMs)?\"  (Normal range: tid to q 3 days)  \"When was the last BM passed?\"        Everyday ,last BM 7/21  2. STRAINING: \"Do you have to strain to have a BM?\"       NO   3. RECTAL PAIN: \"Does your rectum hurt when the stool comes out?\" If Yes, ask: \"Do you have hemorrhoids? How bad is the pain?\"  (Scale 1-10; or mild, moderate, severe)      NO   4. STOOL COMPOSITION: \"Are the stools hard?\"       FORMED   5. BLOOD ON STOOLS: \"Has there been any blood on the toilet tissue or on the surface of the BM?\" If Yes, ask: \"When was the last time?\"       NO   6. CHRONIC CONSTIPATION: \"Is this a new problem for you?\"  If no, ask: \"How long have you had this problem?\" (days, weeks, months)       YES   7. CHANGES IN DIET OR HYDRATION: \"Have there been any recent changes in your diet?\" \"How much fluids are you drinking consuming on a daily basis?\"  \"How much have you had to drink today?\"      PATIENT STATES SHE IS DRINKING MORE FLUIDS,HAS BEEN EATING MODERATE AMOUNT OF CHEESE   8. MEDICATIONS: \"Have you been taking any new medications?\" \"Are you taking any narcotic pain medications?\" (e.g., Vicoden, Percocet, morphine, dilaudid)      NO NEW MEDICATIONS OR NARCOTICS   9. LAXATIVES: \"Have you been using any stool softeners, laxatives, or enemas?\"  If yes, ask \"What, how often, and when was the last time?\"  10.ACTIVITY:  \"How much walking do you do every day? on a daily basis?\"  \"Has your activity level decreased in the past week?\"         STOOL SOFTENER LAST NIGHT ,DUCULAX THIS MORNING    11. CAUSE: \"What do you think is causing the constipation?\"         UNKNOWN   12. OTHER SYMPTOMS: \"Do you have any other symptoms?\" (e.g., abdominal pain, bloating, fever, vomiting)       MILD ABD PAIN   13. MEDICAL HISTORY: \"Do you have a history of hemorrhoids, " "rectal fissures, or rectal surgery or rectal abscess?\"          NO  PATIENT DECLINED AN APPT WILL CALL BACK TOMORROW IF NO BM  CARE ADVISE GIVEN TO PATIENT ,WARM PRUNE JUICE,MOM,MIRALAX    Protocols used: Constipation-ADULT-[place]    "

## 2024-08-22 ENCOUNTER — OFFICE VISIT (OUTPATIENT)
Dept: FAMILY MEDICINE CLINIC | Facility: CLINIC | Age: 69
End: 2024-08-22
Payer: COMMERCIAL

## 2024-08-22 VITALS
OXYGEN SATURATION: 96 % | DIASTOLIC BLOOD PRESSURE: 78 MMHG | WEIGHT: 140 LBS | HEART RATE: 67 BPM | SYSTOLIC BLOOD PRESSURE: 132 MMHG | HEIGHT: 65 IN | BODY MASS INDEX: 23.32 KG/M2

## 2024-08-22 DIAGNOSIS — M80.00XS AGE-RELATED OSTEOPOROSIS WITH CURRENT PATHOLOGICAL FRACTURE, SEQUELA: ICD-10-CM

## 2024-08-22 DIAGNOSIS — K59.00 CONSTIPATION, UNSPECIFIED CONSTIPATION TYPE: Primary | ICD-10-CM

## 2024-08-22 PROCEDURE — 99214 OFFICE O/P EST MOD 30 MIN: CPT | Performed by: FAMILY MEDICINE

## 2024-08-22 PROCEDURE — G2211 COMPLEX E/M VISIT ADD ON: HCPCS | Performed by: FAMILY MEDICINE

## 2024-08-22 NOTE — ASSESSMENT & PLAN NOTE
For 1 month she has constipation she tried magnesium, Colace and Dulcolax, still problem going on she says Dulcolax gives diarrhea, advised to eat more fiber and take Metamucil daily basis, and take MiraLAX every other day and she will have follow-up in 2 to 3 weeks,  Her abdominal exam is normal she has normal bowel movements and her recent TSH was normal

## 2024-08-22 NOTE — PROGRESS NOTES
Ambulatory Visit  Name: Carley Bonilla      : 1955      MRN: [id number]  Encounter Provider: Kerrie Barrios MD  Encounter Date: 2024   Encounter department: [facility]    Assessment & Plan   1. Constipation, unspecified constipation type  Assessment & Plan:  For 1 month she has constipation she tried magnesium, Colace and Dulcolax, still problem going on she says Dulcolax gives diarrhea, advised to eat more fiber and take Metamucil daily basis, and take MiraLAX every other day and she will have follow-up in 2 to 3 weeks,  Her abdominal exam is normal she has normal bowel movements and her recent TSH was normal  2. Age-related osteoporosis with current pathological fracture, sequela  Assessment & Plan:  Her dentist told her she has some bone loss in her jaw, she is on Fosamax, advised to take calcium 1200 mg daily and vitamin D thousand daily and continue Fosamax  She says Prolia is not covered by insurance, advised to see the rheumatologist if they can do different medication but she is not interested, will repeat DEXA when it is due         History of Present Illness     She says for 1 month she has problem of constipation, she takes  Dulcolax and then she goes to bathroom but then his diarrhea, the Colace does not work,  She also was seen by dentist and has been told she has some bone loss, she has osteopenia and she had DEXA scan, she takes Fosamax and she says Prolia was not covered by the insurance    Constipation  This is a new problem. The current episode started more than 1 month ago. The problem is unchanged. Her stool frequency is 2 to 3 times per week. The stool is described as formed. She does not have bowel incontinence. She has a low fiber diet. She exercises regularly. She has adequate water intake. Pertinent negatives include no abdominal pain, anorexia, back pain, bloating, diarrhea, fever, melena, nausea, rectal pain, vomiting or weight loss. Past treatments  include stool softeners and laxatives. The treatment provided mild relief. There is no history of abdominal surgery. She has been eating and drinking normally. Urine output has been normal. She does not have a gait problem.     Review of Systems   Constitutional: Negative.  Negative for fever and weight loss.   HENT: Negative.     Eyes: Negative.    Respiratory: Negative.     Cardiovascular: Negative.    Gastrointestinal:  Positive for constipation. Negative for abdominal pain, anorexia, bloating, diarrhea, melena, nausea, rectal pain and vomiting.   Musculoskeletal:  Negative for back pain.     Past Medical History:   Diagnosis Date   • Anxiety    • Cardiac disease     mitral valve regurgitation   • Mitral valve disorder      Past Surgical History:   Procedure Laterality Date   • ANKLE FRACTURE SURGERY     • ORIF WRIST FRACTURE Right 11/21/2016    Procedure: DISTAL RADIUS O/R I/F;  Surgeon: Chuckie Nova MD;  Location: [facility];  Service:    • WRIST SURGERY       Family History   Problem Relation Age of Onset   • No Known Problems Mother    • No Known Problems Daughter    • No Known Problems Maternal Grandmother    • No Known Problems Paternal Grandmother    • No Known Problems Maternal Aunt    • No Known Problems Paternal Aunt    • Breast cancer Neg Hx      Social History     Tobacco Use   • Smoking status: Never   • Smokeless tobacco: Never   Substance and Sexual Activity   • Alcohol use: Yes     Comment: occ   • Drug use: No   • Sexual activity: Yes     Partners: Male     Comment: boy friend     Current Outpatient Medications on File Prior to Visit   Medication Sig   • alendronate (FOSAMAX) 70 mg tablet Take 1 tablet (70 mg total) by mouth every 7 days   • ALPRAZolam ER (XANAX XR) 1 MG 24 hr tablet Take 1 mg by mouth daily at bedtime   • atenolol (TENORMIN) 25 mg tablet Take 1 tablet (25 mg total) by mouth daily   • Calcium-Vitamin D-Vitamin K (VIACTIV PO) Take by mouth   • cyanocobalamin (VITAMIN B-12)  1,000 mcg tablet Take by mouth daily   • EPINEPHrine (EpiPen 2-Miguel) 0.3 mg/0.3 mL SOAJ For a severe reaction: Place orange end against the outer thigh, press firmly,  hold in place for 10 seconds and go to the Emergency room.   • hydrOXYzine HCL (ATARAX) 25 mg tablet TAKE 1 TABLET BY MOUTH 3 TIMES A DAY AS NEEDED FOR ANXIETY OR OTHER (SLEEP).   • Omega-3 Fatty Acids (FISH OIL) 1,000 mg Take 1,000 mg by mouth daily   • pravastatin (PRAVACHOL) 10 mg tablet Take 1 tablet (10 mg total) by mouth daily   • Propylene Glycol (Systane Balance) 0.6 % SOLN drops: 0.6% 1 drop six times a day into both eyes   • ramelteon (ROZEREM) 8 mg tablet Take 8 mg by mouth daily as needed   • traZODone (DESYREL) 100 mg tablet Take 100 mg by mouth daily   • ALPRAZolam (XANAX) 2 MG tablet Take 1.5 mg by mouth daily at bedtime as needed  (Patient not taking: Reported on 5/9/2024)   • ALPRAZolam (XANAX) 2 MG tablet Take 2 tablets (4 mg total) by mouth daily at bedtime as needed for anxiety or sleep (Patient not taking: Reported on 8/22/2024)     Allergies   Allergen Reactions   • Morphine And Codeine Other (See Comments)     hallucinations     • Bee Venom Hives   • Codeine Other (See Comments)     Hallucinations   • Penicillins Hives   • Sulfa Antibiotics Other (See Comments)     Yeast infections     Immunization History   Administered Date(s) Administered   • COVID-19 MODERNA VACC 0.5 ML IM 03/19/2021, 04/21/2021, 11/02/2021   • COVID-19 Pfizer Vac BIVALENT Aditya-sucrose 12 Yr+ IM 11/10/2022   • INFLUENZA 10/01/2020   • Influenza Injectable, MDCK, Preservative Free, Quadrivalent, 0.5 mL 10/24/2019   • Influenza Split High Dose Preservative Free IM 10/05/2021   • Influenza, Seasonal Vaccine, Quadrivalent, Adjuvanted, .5e 11/01/2022, 10/11/2023   • Influenza, injectable, quadrivalent, preservative free 0.5 mL 10/12/2018   • Influenza, seasonal, injectable 09/16/2015, 10/21/2018   • Pneumococcal Conjugate 13-Valent 12/09/2021   • Pneumococcal  "Conjugate Vaccine 20-valent (Pcv20), Polysace 02/02/2023   • Tdap 05/02/2019   • Zoster Vaccine Recombinant 06/13/2020, 08/17/2020     Objective     /78   Pulse 67   Ht 5' 5\" (1.651 m)   Wt 63.5 kg (140 lb)   LMP  (LMP Unknown)   SpO2 96%   BMI 23.30 kg/m²     Physical Exam  Vitals and nursing note reviewed.   Constitutional:       Appearance: Normal appearance.   HENT:      Mouth/Throat:      Mouth: Mucous membranes are moist.   Cardiovascular:      Rate and Rhythm: Normal rate.   Pulmonary:      Effort: Pulmonary effort is normal. No respiratory distress.   Abdominal:      General: There is no distension.      Palpations: Abdomen is soft. There is no mass.      Tenderness: There is no abdominal tenderness. There is no guarding.   Musculoskeletal:         General: Normal range of motion.      Cervical back: Normal range of motion.         "

## 2024-08-22 NOTE — ASSESSMENT & PLAN NOTE
Her dentist told her she has some bone loss in her jaw, she is on Fosamax, advised to take calcium 1200 mg daily and vitamin D thousand daily and continue Fosamax  She says Prolia is not covered by insurance, advised to see the rheumatologist if they can do different medication but she is not interested, will repeat DEXA when it is due

## 2024-08-26 DIAGNOSIS — E78.5 HYPERLIPIDEMIA, UNSPECIFIED HYPERLIPIDEMIA TYPE: ICD-10-CM

## 2024-08-27 RX ORDER — PRAVASTATIN SODIUM 10 MG
10 TABLET ORAL DAILY
Qty: 90 TABLET | Refills: 1 | Status: SHIPPED | OUTPATIENT
Start: 2024-08-27

## 2024-08-29 ENCOUNTER — NURSE TRIAGE (OUTPATIENT)
Age: 69
End: 2024-08-29

## 2024-08-29 NOTE — TELEPHONE ENCOUNTER
"Reason for Disposition   MILD constipation    Answer Assessment - Initial Assessment Questions  1. STOOL PATTERN OR FREQUENCY: \"How often do you pass bowel movements (BMs)?\"  (Normal range: tid to q 3 days)  \"When was the last BM passed?\"        Everyday ,last bm 8/27  2. STRAINING: \"Do you have to strain to have a BM?\"       No   3. RECTAL PAIN: \"Does your rectum hurt when the stool comes out?\" If Yes, ask: \"Do you have hemorrhoids? How bad is the pain?\"  (Scale 1-10; or mild, moderate, severe)      No   4. STOOL COMPOSITION: \"Are the stools hard?\"       Yes   5. BLOOD ON STOOLS: \"Has there been any blood on the toilet tissue or on the surface of the BM?\" If Yes, ask: \"When was the last time?\"       No   6. CHRONIC CONSTIPATION: \"Is this a new problem for you?\"  If no, ask: \"How long have you had this problem?\" (days, weeks, months)       Yes   7. CHANGES IN DIET OR HYDRATION: \"Have there been any recent changes in your diet?\" \"How much fluids are you drinking consuming on a daily basis?\"  \"How much have you had to drink today?\"      No   8. MEDICATIONS: \"Have you been taking any new medications?\" \"Are you taking any narcotic pain medications?\" (e.g., Vicoden, Percocet, morphine, dilaudid)      No   9. LAXATIVES: \"Have you been using any stool softeners, laxatives, or enemas?\"  If yes, ask \"What, how often, and when was the last time?\"  Patient states she takes Metamucil and  MiraLAX daily.   10.ACTIVITY:  \"How much walking do you do every day? on a daily basis?\"  \"Has your activity level decreased in the past week?\"         Yes walks daily   11. CAUSE: \"What do you think is causing the constipation?\"         Unknown   12. OTHER SYMPTOMS: \"Do you have any other symptoms?\" (e.g., abdominal pain, bloating, fever, vomiting)        Mild abd.pain ,patient denies bloating ,fever and vomiting  13. MEDICAL HISTORY: \"Do you have a history of hemorrhoids, rectal fissures, or rectal surgery or rectal abscess?\"          No "   Patient was seen in the office on 8/22 for constipation,patient stated that the recommendations of  fiber ,Metamucil  and  MiraLAX daily was working until two days  ago.   Patient states she is taking in enough fluids.   Patient called for additional  recommendations.    Protocols used: Constipation-ADULT-[place]

## 2024-08-30 ENCOUNTER — CONSULT (OUTPATIENT)
Dept: CARDIOLOGY CLINIC | Facility: CLINIC | Age: 69
End: 2024-08-30
Payer: COMMERCIAL

## 2024-08-30 VITALS
BODY MASS INDEX: 23.56 KG/M2 | HEART RATE: 68 BPM | DIASTOLIC BLOOD PRESSURE: 86 MMHG | WEIGHT: 141.4 LBS | SYSTOLIC BLOOD PRESSURE: 132 MMHG | TEMPERATURE: 96.5 F | OXYGEN SATURATION: 94 % | HEIGHT: 65 IN

## 2024-08-30 DIAGNOSIS — I34.1 MVP (MITRAL VALVE PROLAPSE): Primary | ICD-10-CM

## 2024-08-30 DIAGNOSIS — R01.1 MURMUR, CARDIAC: ICD-10-CM

## 2024-08-30 DIAGNOSIS — I34.0 NONRHEUMATIC MITRAL VALVE REGURGITATION: ICD-10-CM

## 2024-08-30 DIAGNOSIS — I10 BENIGN ESSENTIAL HYPERTENSION: ICD-10-CM

## 2024-08-30 DIAGNOSIS — I34.1 MITRAL VALVE PROLAPSE: ICD-10-CM

## 2024-08-30 PROCEDURE — 99204 OFFICE O/P NEW MOD 45 MIN: CPT | Performed by: INTERNAL MEDICINE

## 2024-08-30 PROCEDURE — 93000 ELECTROCARDIOGRAM COMPLETE: CPT | Performed by: INTERNAL MEDICINE

## 2024-08-30 NOTE — PROGRESS NOTES
Cardiology Consultation     Carley Bonilla  [id number]  1955  [facility] [place]  [address] [id number] [phone]      1. MVP (mitral valve prolapse)  POCT ECG    Echo complete w/ contrast if indicated      2. Nonrheumatic mitral valve regurgitation  POCT ECG    Echo complete w/ contrast if indicated      3. Benign essential hypertension  POCT ECG      4. Mitral valve prolapse  Ambulatory Referral to Cardiology    POCT ECG      5. Murmur, cardiac  Ambulatory Referral to Cardiology          Discussion/Summary:    History of mitral valve prolapse and mitral regurgitation which on her last KWAME in February 2022 was moderate. Does sound like the murmur is very prominent. She recently reported symptoms of shortness of breath with climbing up the hill in [place].    Discussed mitral regurgitation and mitral valve prolapse in detail. She is quite knowledgeable about this. She is also aware of indications for surgery. We specifically reviewed the indication regarding mitral valve repair in the absence of symptoms if the valve was felt to be high likelihood of repair versus replacement.    I will plan to get another transthoracic echo. If the valve appears severely regurgitant, would plan to do a KWAME for further evaluation.    Continue atenolol for hypertension. Blood pressure is controlled.    Continue pravastatin and over-the-counter fish oil for dyslipidemia.          History of Present Illness:      68-year-old female. Seeing me for the first time today.    She has a long history of mitral valve prolapse. She said it was diagnosed in her 20s by a dermatologist. She is followed with surveillance since that time. In total, it appears she has had 2 transesophageal echocardiograms. The most recent one was in early 2022. Does not think she saw her cardiologist last year.    She is well versed about mitral  regurgitation/MVP. She has been told based on the last KWAME that the MR was moderate, but it seems to be reported as worse on transthoracic.    LVEF has been preserved.    She has generally been asymptomatic. She does get symptoms of shortness of breath when walking up an incline, which was similar to the case when she was at music fast this summer and walking up [address].    She denies any PND, orthopnea, edema. She has been on atenolol 25 mg for many years and her blood pressure is typically controlled.    She takes pravastatin for dyslipidemia.      Patient Active Problem List   Diagnosis    Anxiety    Benign essential hypertension    Hyperlipidemia    Kidney disease, chronic, stage III (GFR 30-59 ml/min) ([facility])    MVP (mitral valve prolapse)    Panic disorder (episodic paroxysmal anxiety)    Chronic insomnia    Murmur, cardiac    Age-related osteoporosis with current pathological fracture    Nodular lesion on surface of skin    Mitral valve insufficiency    Constipation     Past Medical History:   Diagnosis Date    Anxiety     Cardiac disease     mitral valve regurgitation    Mitral valve disorder      Social History     Tobacco Use    Smoking status: Never    Smokeless tobacco: Never   Substance Use Topics    Alcohol use: Yes     Comment: occ    Drug use: No      Family History   Problem Relation Age of Onset    No Known Problems Mother     No Known Problems Daughter     No Known Problems Maternal Grandmother     No Known Problems Paternal Grandmother     No Known Problems Maternal Aunt     No Known Problems Paternal Aunt     Breast cancer Neg Hx      Past Surgical History:   Procedure Laterality Date    ANKLE FRACTURE SURGERY      ORIF WRIST FRACTURE Right 11/21/2016    Procedure: DISTAL RADIUS O/R I/F;  Surgeon: Chuckie Nova MD;  Location: [facility];  Service:     WRIST SURGERY         Current Outpatient Medications:     alendronate (FOSAMAX) 70 mg tablet, Take 1 tablet (70 mg total) by mouth every 7  days, Disp: 12 tablet, Rfl: 1    atenolol (TENORMIN) 25 mg tablet, Take 1 tablet (25 mg total) by mouth daily, Disp: 90 tablet, Rfl: 1    Calcium-Vitamin D-Vitamin K (VIACTIV PO), Take by mouth, Disp: , Rfl:     cyanocobalamin (VITAMIN B-12) 1,000 mcg tablet, Take by mouth daily, Disp: , Rfl:     hydrOXYzine HCL (ATARAX) 25 mg tablet, daily, Disp: , Rfl:     Omega-3 Fatty Acids (FISH OIL) 1,000 mg, Take 1,000 mg by mouth daily, Disp: , Rfl:     pravastatin (PRAVACHOL) 10 mg tablet, TAKE 1 TABLET BY MOUTH EVERY DAY, Disp: 90 tablet, Rfl: 1    Propylene Glycol (Systane Balance) 0.6 % SOLN, drops: 0.6% 1 drop six times a day into both eyes, Disp: , Rfl:     ramelteon (ROZEREM) 8 mg tablet, Take 8 mg by mouth daily, Disp: , Rfl:     traZODone (DESYREL) 100 mg tablet, Take 100 mg by mouth daily, Disp: , Rfl:     ALPRAZolam (XANAX) 2 MG tablet, Take 1.5 mg by mouth daily at bedtime as needed  (Patient not taking: Reported on 8/30/2024), Disp: , Rfl:     ALPRAZolam (XANAX) 2 MG tablet, Take 2 tablets (4 mg total) by mouth daily at bedtime as needed for anxiety or sleep (Patient not taking: Reported on 8/22/2024), Disp: 60 tablet, Rfl: 0    ALPRAZolam ER (XANAX XR) 1 MG 24 hr tablet, Take 1 mg by mouth daily at bedtime, Disp: , Rfl:     EPINEPHrine (EpiPen 2-Miguel) 0.3 mg/0.3 mL SOAJ, For a severe reaction: Place orange end against the outer thigh, press firmly,  hold in place for 10 seconds and go to the Emergency room. (Patient not taking: Reported on 8/30/2024), Disp: , Rfl:   Allergies   Allergen Reactions    Morphine And Codeine Other (See Comments)     hallucinations      Bee Venom Hives    Codeine Other (See Comments)     Hallucinations    Penicillins Hives    Sulfa Antibiotics Other (See Comments)     Yeast infections       Vitals:    08/30/24 1254   BP: 132/86   BP Location: Left arm   Patient Position: Sitting   Cuff Size: Adult   Pulse: 68   Temp: (!) 96.5 °F (35.8 °C)   TempSrc: Tympanic   SpO2: 94%   Weight: 64.1  "kg (141 lb 6.4 oz)   Height: 5' 5\" (1.651 m)     Vitals:    08/30/24 1254   Weight: 64.1 kg (141 lb 6.4 oz)      Height: 5' 5\" (165.1 cm)   Body mass index is 23.53 kg/m².    Physical Exam:  GENERAL: Alert, well appearing, and in no distress  HEENT:  PERRL, EOMI, no scleral icterus, no conjunctival pallor  NECK:  Supple, No elevated JVP, no thyromegaly, no carotid bruits  HEART:  regular. Loud, late peaking systolic murmur> heard loudest LUSB, but also heard posteriorly.  LUNGS:  Clear to auscultation bilaterally  ABDOMEN:  Soft, non-tender, positive bowel sounds, no rebound or guarding  EXTREMITIES:  no pitting edema  VASCULAR:  Normal pedal pulses   NEURO: No focal deficits,  SKIN: Normal without suspicious lesions on exposed skin      ROS:  Positive for shortness of breath.  Except as noted in HPI, is otherwise reviewed in detail and a 12 point review of systems is negative.    Labs:  Lab Results   Component Value Date    SODIUM 141 05/10/2024    K 4.0 05/10/2024     05/10/2024    CREATININE 0.94 05/10/2024    BUN 28 (H) 05/10/2024    CO2 31 05/10/2024    ALT 15 05/10/2024    AST 21 05/10/2024    TSH 1.06 10/19/2023    GLUF 102 (H) 05/10/2024    WBC 7.16 05/10/2024    HGB 13.9 05/10/2024    HCT 43.5 05/10/2024     05/10/2024       Lab Results   Component Value Date    CHOL 217 03/19/2015    CHOL 203 05/29/2014    CHOL 228 01/08/2014     Lab Results   Component Value Date    HDL 52 05/10/2024    HDL 49 06/20/2017    HDL 59 03/02/2017     Lab Results   Component Value Date    LDLCALC 108 (H) 05/10/2024    LDLCALC 136 (H) 06/20/2017    LDLCALC 131 (H) 03/02/2017     Lab Results   Component Value Date    TRIG 138 05/10/2024    TRIG 137 06/20/2017    TRIG 121 03/02/2017     Testing:  Reviewed in Care everywhere    EKG:  Sinus rhythm. 68 bpm. Left atrial enlargement.  "

## 2024-08-30 NOTE — TELEPHONE ENCOUNTER
Patient stated she received a call from the office today. No voicemail was left. Advised patient of Dr. Mccoy comments from yesterday. Please advise if patient needed further instructions. Thank you.

## 2024-10-02 ENCOUNTER — HOSPITAL ENCOUNTER (OUTPATIENT)
Dept: NON INVASIVE DIAGNOSTICS | Facility: CLINIC | Age: 69
Discharge: HOME | End: 2024-10-02
Payer: COMMERCIAL

## 2024-10-02 VITALS
SYSTOLIC BLOOD PRESSURE: 132 MMHG | HEIGHT: 65 IN | WEIGHT: 141 LBS | HEART RATE: 71 BPM | BODY MASS INDEX: 23.49 KG/M2 | DIASTOLIC BLOOD PRESSURE: 86 MMHG

## 2024-10-02 DIAGNOSIS — I34.0 NONRHEUMATIC MITRAL VALVE REGURGITATION: ICD-10-CM

## 2024-10-02 DIAGNOSIS — I34.1 MVP (MITRAL VALVE PROLAPSE): ICD-10-CM

## 2024-10-02 PROCEDURE — 93306 TTE W/DOPPLER COMPLETE: CPT | Performed by: INTERNAL MEDICINE

## 2024-10-02 PROCEDURE — 93306 TTE W/DOPPLER COMPLETE: CPT

## 2024-10-03 ENCOUNTER — TELEPHONE (OUTPATIENT)
Age: 69
End: 2024-10-03

## 2024-10-03 DIAGNOSIS — I34.1 MVP (MITRAL VALVE PROLAPSE): Primary | ICD-10-CM

## 2024-10-03 LAB
AORTIC ROOT: 2.5 CM
APICAL FOUR CHAMBER EJECTION FRACTION: 68 %
ASCENDING AORTA: 2.9 CM
AV PEAK GRADIENT: 10 MMHG
BSA FOR ECHO PROCEDURE: 1.71 M2
DOP CALC MV VTI: 30.68 CM
E WAVE DECELERATION TIME: 164 MS
E/A RATIO: 1.42
FRACTIONAL SHORTENING: 36 (ref 28–44)
INTERVENTRICULAR SEPTUM IN DIASTOLE (PARASTERNAL SHORT AXIS VIEW): 1.2 CM
INTERVENTRICULAR SEPTUM: 1.2 CM (ref 0.6–1.1)
LAAS-AP2: 32.3 CM2
LAAS-AP4: 31.7 CM2
LEFT ATRIUM SIZE: 4.5 CM
LEFT ATRIUM VOLUME (MOD BIPLANE): 119 ML
LEFT ATRIUM VOLUME INDEX (MOD BIPLANE): 69.6 ML/M2
LEFT INTERNAL DIMENSION IN SYSTOLE: 2.9 CM (ref 2.1–4)
LEFT VENTRICULAR INTERNAL DIMENSION IN DIASTOLE: 4.5 CM (ref 3.5–6)
LEFT VENTRICULAR POSTERIOR WALL IN END DIASTOLE: 1 CM
LEFT VENTRICULAR STROKE VOLUME: 61 ML
LVSV (TEICH): 61 ML
MV E'TISSUE VEL-SEP: 7 CM/S
MV MEAN GRADIENT: 2 MMHG
MV PEAK A VEL: 0.69 M/S
MV PEAK E VEL: 98 CM/S
MV PEAK GRADIENT: 7 MMHG
MV STENOSIS PRESSURE HALF TIME: 47 MS
MV VALVE AREA P 1/2 METHOD: 4.68
RA PRESSURE ESTIMATED: 5 MMHG
RIGHT ATRIUM AREA SYSTOLE A4C: 12.6 CM2
RIGHT VENTRICLE ID DIMENSION: 3.2 CM
RV PSP: 40 MMHG
SINOTUBULAR JUNCTION: 2.4 CM
SL CV LEFT ATRIUM LENGTH A2C: 6.8 CM
SL CV LV EF: 65
SL CV PED ECHO LEFT VENTRICLE DIASTOLIC VOLUME (MOD BIPLANE) 2D: 92 ML
SL CV PED ECHO LEFT VENTRICLE SYSTOLIC VOLUME (MOD BIPLANE) 2D: 31 ML
SL CV SINUS OF VALSALVA 2D: 2.8 CM
STJ: 2.4 CM
TR MAX PG: 35 MMHG
TR PEAK VELOCITY: 3 M/S
TRICUSPID ANNULAR PLANE SYSTOLIC EXCURSION: 2.3 CM
TRICUSPID VALVE PEAK REGURGITATION VELOCITY: 2.97 M/S

## 2024-10-03 NOTE — TELEPHONE ENCOUNTER
Received call from pt asking if the results of her echo were reviewed by the provider yet. Informed pt the results were not reviewed yet but will call pt once finalized. She voiced understanding.

## 2024-10-04 ENCOUNTER — TELEPHONE (OUTPATIENT)
Age: 69
End: 2024-10-04

## 2024-10-04 NOTE — TELEPHONE ENCOUNTER
Received call from pt wanting to set up the scheduled KWAME ordered by Dr. Hong. She stated she will be out most of the day but requested a voicemail be left with a scheduled date. Pt is requesting [facility]. She stated she is free any day this month except for 10/16. Please review and advise. Thanks!

## 2024-10-08 ENCOUNTER — ANESTHESIA EVENT (OUTPATIENT)
Dept: ANESTHESIOLOGY | Facility: HOSPITAL | Age: 69
End: 2024-10-08

## 2024-10-08 ENCOUNTER — ANESTHESIA (OUTPATIENT)
Dept: ANESTHESIOLOGY | Facility: HOSPITAL | Age: 69
End: 2024-10-08

## 2024-10-09 ENCOUNTER — ANESTHESIA (OUTPATIENT)
Dept: NON INVASIVE DIAGNOSTICS | Facility: HOSPITAL | Age: 69
End: 2024-10-09
Payer: COMMERCIAL

## 2024-10-09 ENCOUNTER — ANESTHESIA EVENT (OUTPATIENT)
Dept: NON INVASIVE DIAGNOSTICS | Facility: HOSPITAL | Age: 69
End: 2024-10-09
Payer: COMMERCIAL

## 2024-10-09 ENCOUNTER — HOSPITAL ENCOUNTER (OUTPATIENT)
Dept: NON INVASIVE DIAGNOSTICS | Facility: HOSPITAL | Age: 69
Discharge: HOME | End: 2024-10-09
Attending: INTERNAL MEDICINE
Payer: COMMERCIAL

## 2024-10-09 ENCOUNTER — TELEPHONE (OUTPATIENT)
Age: 69
End: 2024-10-09

## 2024-10-09 VITALS
SYSTOLIC BLOOD PRESSURE: 127 MMHG | HEIGHT: 60 IN | RESPIRATION RATE: 18 BRPM | TEMPERATURE: 96.8 F | WEIGHT: 141 LBS | DIASTOLIC BLOOD PRESSURE: 62 MMHG | OXYGEN SATURATION: 98 % | HEART RATE: 68 BPM | BODY MASS INDEX: 27.68 KG/M2

## 2024-10-09 DIAGNOSIS — I34.0 NONRHEUMATIC MITRAL VALVE REGURGITATION: Primary | ICD-10-CM

## 2024-10-09 DIAGNOSIS — I34.1 MVP (MITRAL VALVE PROLAPSE): ICD-10-CM

## 2024-10-09 PROBLEM — R11.2 PONV (POSTOPERATIVE NAUSEA AND VOMITING): Status: ACTIVE | Noted: 2024-10-09

## 2024-10-09 PROBLEM — Z98.890 PONV (POSTOPERATIVE NAUSEA AND VOMITING): Status: ACTIVE | Noted: 2024-10-09

## 2024-10-09 LAB — SL CV LV EF: 65

## 2024-10-09 PROCEDURE — 76376 3D RENDER W/INTRP POSTPROCES: CPT

## 2024-10-09 PROCEDURE — 93312 ECHO TRANSESOPHAGEAL: CPT | Performed by: INTERNAL MEDICINE

## 2024-10-09 PROCEDURE — 76376 3D RENDER W/INTRP POSTPROCES: CPT | Performed by: INTERNAL MEDICINE

## 2024-10-09 PROCEDURE — 93320 DOPPLER ECHO COMPLETE: CPT | Performed by: INTERNAL MEDICINE

## 2024-10-09 PROCEDURE — 93312 ECHO TRANSESOPHAGEAL: CPT

## 2024-10-09 PROCEDURE — 93325 DOPPLER ECHO COLOR FLOW MAPG: CPT | Performed by: INTERNAL MEDICINE

## 2024-10-09 RX ORDER — HYDROMORPHONE HCL IN WATER/PF 6 MG/30 ML
0.2 PATIENT CONTROLLED ANALGESIA SYRINGE INTRAVENOUS
Status: CANCELLED | OUTPATIENT
Start: 2024-10-09

## 2024-10-09 RX ORDER — PROPOFOL 10 MG/ML
INJECTION, EMULSION INTRAVENOUS CONTINUOUS PRN
Status: DISCONTINUED | OUTPATIENT
Start: 2024-10-09 | End: 2024-10-09

## 2024-10-09 RX ORDER — PROPOFOL 10 MG/ML
INJECTION, EMULSION INTRAVENOUS AS NEEDED
Status: DISCONTINUED | OUTPATIENT
Start: 2024-10-09 | End: 2024-10-09

## 2024-10-09 RX ORDER — ONDANSETRON 2 MG/ML
4 INJECTION INTRAMUSCULAR; INTRAVENOUS ONCE AS NEEDED
Status: CANCELLED | OUTPATIENT
Start: 2024-10-09

## 2024-10-09 RX ORDER — MIDAZOLAM HYDROCHLORIDE 2 MG/2ML
INJECTION, SOLUTION INTRAMUSCULAR; INTRAVENOUS AS NEEDED
Status: DISCONTINUED | OUTPATIENT
Start: 2024-10-09 | End: 2024-10-09

## 2024-10-09 RX ORDER — SODIUM CHLORIDE, SODIUM LACTATE, POTASSIUM CHLORIDE, CALCIUM CHLORIDE 600; 310; 30; 20 MG/100ML; MG/100ML; MG/100ML; MG/100ML
INJECTION, SOLUTION INTRAVENOUS CONTINUOUS PRN
Status: DISCONTINUED | OUTPATIENT
Start: 2024-10-09 | End: 2024-10-09

## 2024-10-09 RX ORDER — ONDANSETRON 2 MG/ML
INJECTION INTRAMUSCULAR; INTRAVENOUS AS NEEDED
Status: DISCONTINUED | OUTPATIENT
Start: 2024-10-09 | End: 2024-10-09

## 2024-10-09 RX ORDER — SODIUM CHLORIDE, SODIUM LACTATE, POTASSIUM CHLORIDE, CALCIUM CHLORIDE 600; 310; 30; 20 MG/100ML; MG/100ML; MG/100ML; MG/100ML
100 INJECTION, SOLUTION INTRAVENOUS CONTINUOUS
Status: DISCONTINUED | OUTPATIENT
Start: 2024-10-09 | End: 2024-10-10 | Stop reason: HOSPADM

## 2024-10-09 RX ADMIN — MIDAZOLAM 2 MG: 1 INJECTION INTRAMUSCULAR; INTRAVENOUS at 10:00

## 2024-10-09 RX ADMIN — ONDANSETRON 4 MG: 2 INJECTION INTRAMUSCULAR; INTRAVENOUS at 10:03

## 2024-10-09 RX ADMIN — SODIUM CHLORIDE, SODIUM LACTATE, POTASSIUM CHLORIDE, AND CALCIUM CHLORIDE: .6; .31; .03; .02 INJECTION, SOLUTION INTRAVENOUS at 10:00

## 2024-10-09 RX ADMIN — PROPOFOL 100 MCG/KG/MIN: 10 INJECTION, EMULSION INTRAVENOUS at 10:08

## 2024-10-09 RX ADMIN — PROPOFOL 80 MG: 10 INJECTION, EMULSION INTRAVENOUS at 10:06

## 2024-10-09 NOTE — ANESTHESIA POSTPROCEDURE EVALUATION
Post-Op Assessment Note    CV Status:  Stable  Pain Score: 0    Pain management: adequate       Mental Status:  Alert and awake   Hydration Status:  Euvolemic   PONV Controlled:  Controlled   Airway Patency:  Patent     Post Op Vitals Reviewed: Yes    No anethesia notable event occurred.    Staff: Anesthesiologist, CRNA           Last Filed PACU Vitals:  Vitals Value Taken Time   Temp 96.8 °F (36 °C) 10/09/24 1026   Pulse 57 10/09/24 1026   /58 10/09/24 1026   Resp 18 10/09/24 1026   SpO2 97 % 10/09/24 1026       Modified Vanessa:  Activity: 2 (10/9/2024 10:26 AM)  Respiration: 2 (10/9/2024 10:26 AM)  Circulation: 2 (10/9/2024 10:26 AM)  Consciousness: 1 (10/9/2024 10:26 AM)  Oxygen Saturation: 1 (10/9/2024 10:26 AM)  Modified Vanessa Score: 8 (10/9/2024 10:26 AM)

## 2024-10-09 NOTE — ANESTHESIA POSTPROCEDURE EVALUATION
Post-Op Assessment Note    CV Status:  Stable  Pain Score: 0    Pain management: adequate       Mental Status:  Alert and awake   Hydration Status:  Stable   PONV Controlled:  None   Airway Patency:  Patent     Post Op Vitals Reviewed: Yes    No anethesia notable event occurred.    Staff: Anesthesiologist           Last Filed PACU Vitals:  Vitals Value Taken Time   Temp     Pulse     BP     Resp     SpO2         Modified Vanessa:  Activity: 2 (10/9/2024 10:49 AM)  Respiration: 2 (10/9/2024 10:49 AM)  Circulation: 2 (10/9/2024 10:49 AM)  Consciousness: 2 (10/9/2024 10:49 AM)  Oxygen Saturation: 2 (10/9/2024 10:49 AM)  Modified Vanessa Score: 10 (10/9/2024 10:49 AM)

## 2024-10-09 NOTE — ANESTHESIA PREPROCEDURE EVALUATION
Procedure:  KWAME    Relevant Problems   ANESTHESIA   (+) PONV (postoperative nausea and vomiting)      CARDIO   (+) Benign essential hypertension   (+) Hyperlipidemia   (+) MVP (mitral valve prolapse)   (+) Mitral valve insufficiency   (+) Murmur, cardiac      /RENAL   (+) Kidney disease, chronic, stage III (GFR 30-59 ml/min) ([facility])      NEURO/PSYCH   (+) Anxiety   (+) Panic disorder (episodic paroxysmal anxiety)    ?potentially mild LANCE (some snoring, inconclusive sleep study)    Physical Exam    Airway    Mallampati score: II  TM Distance: >3 FB  Neck ROM: full     Dental   Comment: None loose, No notable dental hx     Cardiovascular      Pulmonary      Other Findings  post-pubertal.      ECHO (10/2024)    Left Ventricle: Left ventricular cavity size is normal. Wall thickness is normal. The left ventricular ejection fraction is 65%. Systolic function is normal. Wall motion is normal. Diastolic function is moderately abnormal, consistent with grade II (pseudonormal) relaxation.    Left Atrium: The atrium is severely dilated.    Atrial Septum: The septum bows into the right atrium, suggesting increased left atrial pressure.    Mitral Valve: The valve is myxomatous. There is moderate diffuse thickening. There is bileaflet prolapse, posterior greater than anterior. There is moderate to severe regurgitation with an eccentrically directed jet.    Tricuspid Valve: There is mild regurgitation. The right ventricular systolic pressure is mildly elevated. The estimated right ventricular systolic pressure is 40.00 mmHg.    Anesthesia Plan  ASA Score- 2     Anesthesia Type- IV sedation with anesthesia with ASA Monitors.         Additional Monitors:     Airway Plan:     Comment: NPO after MN  Sip of water with meds this morning.       Plan Factors-Exercise tolerance (METS): >4 METS.    Chart reviewed.    Patient summary reviewed.    Patient is not a current smoker.              Induction- intravenous.    Postoperative Plan-      Perioperative Resuscitation Plan - Level 1 - Full Code.       Informed Consent- Anesthetic plan and risks discussed with patient.  I personally reviewed this patient with the CRNA. Discussed and agreed on the Anesthesia Plan with the CRNA..

## 2024-10-09 NOTE — CONSULTS
History and Physical   General Cardiology  Carley Bonilla 69 y.o. female MRN: [id number]  Unit/Bed#:  Encounter: [id number]    OP Cardiologist:  PCP:    Principal Problem: mitral valve prolapse     OUTPATIENT NO CHARGE BED    HPI: Carley Bonilla is a 69 y.o. year old female who presents with a history of mitral valve prolapse here for KWAME for further evaluation.     Review of Systems   Constitutional: Negative.   HENT: Negative.     Eyes: Negative.    Cardiovascular: Negative.    Respiratory: Negative.     Endocrine: Negative.    Hematologic/Lymphatic: Negative.    Skin: Negative.    Musculoskeletal: Negative.    Gastrointestinal: Negative.    Genitourinary: Negative.    Neurological: Negative.    Psychiatric/Behavioral: Negative.     Allergic/Immunologic: Negative.          Historical Information   Past Medical History:   Diagnosis Date    Anxiety     Cardiac disease     mitral valve regurgitation    Mitral valve disorder      Past Surgical History:   Procedure Laterality Date    ANKLE FRACTURE SURGERY      ORIF WRIST FRACTURE Right 11/21/2016    Procedure: DISTAL RADIUS O/R I/F;  Surgeon: Chuckie Nova MD;  Location: [facility];  Service:     WRIST SURGERY       Social History     Substance and Sexual Activity   Alcohol Use Yes    Comment: occ     Social History     Substance and Sexual Activity   Drug Use No     Social History     Tobacco Use   Smoking Status Never   Smokeless Tobacco Never     Social History     Socioeconomic History    Marital status:      Spouse name: Not on file    Number of children: Not on file    Years of education: Not on file    Highest education level: Not on file   Occupational History    Not on file   Tobacco Use    Smoking status: Never    Smokeless tobacco: Never   Vaping Use    Vaping status: Never Used   Substance and Sexual Activity    Alcohol use: Yes     Comment: occ    Drug use: No    Sexual activity: Yes     Partners: Male     Comment: boy friend   Other  Topics Concern    Not on file   Social History Narrative    Not on file     Social Determinants of Health     Financial Resource Strain: Not on file   Food Insecurity: Unknown (2024)    Hunger Vital Sign     Worried About Running Out of Food in the Last Year: Never true     Ran Out of Food in the Last Year: Patient declined   Transportation Needs: No Transportation Needs (2024)    PRAPARE - Transportation     Lack of Transportation (Medical): No     Lack of Transportation (Non-Medical): No   Physical Activity: Not on file   Stress: Not on file   Social Connections: Not on file   Intimate Partner Violence: Not on file   Housing Stability: Unknown (2024)    Housing Stability Vital Sign     Unable to Pay for Housing in the Last Year: No     Number of Times Moved in the Last Year: Not on file     Homeless in the Last Year: No     .  Family History:  Family History   Problem Relation Age of Onset    No Known Problems Mother     No Known Problems Daughter     No Known Problems Maternal Grandmother     No Known Problems Paternal Grandmother     No Known Problems Maternal Aunt     No Known Problems Paternal Aunt     Breast cancer Neg Hx        Meds/Allergies    No current facility-administered medications for this encounter.     Cannot display prior to admission medications because the patient has not been admitted in this contact.       No current facility-administered medications for this encounter.    Allergies   Allergen Reactions    Morphine And Codeine Other (See Comments)     hallucinations      Bee Venom Hives    Codeine Other (See Comments)     Hallucinations    Penicillins Hives    Sulfa Antibiotics Other (See Comments)     Yeast infections           Objective   Vitals: Blood pressure 139/65, pulse 62, temperature (!) 97.3 °F (36.3 °C), temperature source Temporal, resp. rate 18, height 5' (1.524 m), weight 64 kg (141 lb), SpO2 95%.    Systolic (24hrs), Av , Min:139 , Max:139     Diastolic  "(24hrs), Av, Min:65, Max:65        No intake or output data in the 24 hours ending 10/09/24 0952    Invasive Devices       Peripheral Intravenous Line  Duration             Peripheral IV 10/09/24 Proximal;Right;Ventral (anterior) Forearm <1 day                  Weight (last 2 days)       Date/Time Weight    10/09/24 0923 64 (141)            Physical Exam  Vitals and nursing note reviewed.   Constitutional:       Appearance: Normal appearance.   HENT:      Head: Normocephalic.      Nose: Nose normal.      Mouth/Throat:      Mouth: Mucous membranes are moist.   Eyes:      General: No scleral icterus.     Conjunctiva/sclera: Conjunctivae normal.   Cardiovascular:      Rate and Rhythm: Normal rate and regular rhythm.      Heart sounds: No murmur heard.     No gallop.   Pulmonary:      Effort: Pulmonary effort is normal. No respiratory distress.      Breath sounds: Normal breath sounds. No wheezing or rales.   Abdominal:      General: Abdomen is flat. Bowel sounds are normal. There is no distension.      Palpations: Abdomen is soft.      Tenderness: There is no abdominal tenderness. There is no guarding.   Musculoskeletal:      Cervical back: Normal range of motion and neck supple.      Right lower leg: No edema.      Left lower leg: No edema.   Skin:     General: Skin is warm and dry.   Neurological:      General: No focal deficit present.      Mental Status: She is alert and oriented to person, place, and time.   Psychiatric:         Mood and Affect: Mood normal.         Behavior: Behavior normal.         LABORATORY RESULTS:        CBC with diff:       Invalid input(s): \"TOTALCELLSCOUNTED\", \"SEGS%\", \"GRANS%\", \"LYMPHS%\", \"EOS%\", \"BASO%\", \"ABNEUT\", \"ABGRANS\", \"ABLYMPHS\", \"ABMOMOS\", \"ABEOS\", \"ABBASO\"    CMP:      Invalid input(s): \"ALBUMIN\"    BMP:      Invalid input(s): \"LABGLOM\"      No results found for: \"NTBNP\"                                      Lipid Profile:   Lab Results   Component Value Date    CHOL 217 " 2015    CHOL 203 2014    CHOL 228 2014     Lab Results   Component Value Date    HDL 52 05/10/2024    HDL 49 2017    HDL 59 2017     Lab Results   Component Value Date    LDLCALC 108 (H) 05/10/2024    LDLCALC 136 (H) 2017    LDLCALC 131 (H) 2017     Lab Results   Component Value Date    TRIG 138 05/10/2024    TRIG 137 2017    TRIG 121 2017         Cardiac Testing:   Results for orders placed during the hospital encounter of 17    Echo complete with contrast if indicated    [facility]  [address], [place] [id number] [phone]    Transthoracic Echocardiogram  2D, M-mode, Doppler, and Color Doppler    Study date:  2017    Patient: BRENNA PUGH  MR number: [id number]  Account number: [id number]  : 1955  Age: 61 years  Gender: Female  Status: Outpatient  Location: [facility]  Height: 60 in  Weight: 101 lb  BP: 127/ 71 mmHg    Indications: MV disorder.    Diagnoses: I34.9 - Nonrheumatic mitral valve disorder, unspecified    Sonographer:  G[place] RCS  Primary Physician:  Parker Zuluaga MD  Referring Physician:  Patricio Choi MD  Group:  [facility]  Interpreting Physician:  Patricio Choi MD    SUMMARY    LEFT VENTRICLE:  There were no regional wall motion abnormalities.  Doppler parameters were consistent with high ventricular filling pressure.    LEFT ATRIUM:  The atrium was moderately dilated.    MITRAL VALVE:  redundant valve with marked prolapse of the posterior leaflet and severe  regurgitation    TRICUSPID VALVE:  There was trace regurgitation.    SUMMARY MEASUREMENTS  Unspecified Scan Mode measurements:  Aortic Valve:   HR was 68 /min.  Left Ventricle:   HR was 73 /min.    COMPARISONS:  left ventricular sized has increased slightly since     HISTORY: PRIOR HISTORY: Risk factors: hypertension and a family history of  coronary artery  disease.    PROCEDURE: The study was performed in the [facility]. This  was a routine study. The transthoracic approach was used. The study included  complete 2D imaging, M-mode, complete spectral Doppler, and color Doppler.  Image quality was adequate.    LEFT VENTRICLE: Size was normal. Systolic function was by visual assessment.  Ejection fraction was estimated to be 65 %. There were no regional wall motion  abnormalities. Wall thickness was normal. DOPPLER: Doppler parameters were  consistent with high ventricular filling pressure.    RIGHT VENTRICLE: The size was normal. Systolic function was normal. Wall  thickness was normal.    LEFT ATRIUM: The atrium was moderately dilated.    RIGHT ATRIUM: Size was normal.    MITRAL VALVE: redundant valve with marked prolapse of the posterior leaflet and  severe regurgitation    AORTIC VALVE: The valve was trileaflet. Leaflets exhibited normal thickness and  normal cuspal separation. DOPPLER: Transaortic velocity was within the normal  range. There was no evidence for stenosis. There was no regurgitation.    TRICUSPID VALVE: DOPPLER: There was trace regurgitation.    PERICARDIUM: There was no pericardial effusion. The pericardium was normal in  appearance.    AORTA: The root exhibited normal size.    PULMONARY ARTERY: DOPPLER: Systolic pressure was within the normal range.    SYSTEM MEASUREMENT TABLES    Apical four chamber  4 chamber Left Atrium Volume Index; Planimetry; End Systole; Apical four  chamber;: 24 cm2 Left Ventricular End Diastolic Volume; Method of Disks, Single  Plane; Apical four chamber;: 77.1 ml Left Ventricular End Systolic Volume;  Method of Disks, Single Plane; Apical four chamber;: 21.3 ml Right Atrium  Systolic Area; Planimetry; End Systole; Apical four chamber;: 11.52 cm2 Right  Ventricular Internal Diastolic Dimension; End Diastole; Apical four chamber;:  33.6 mm  TAPSE: 27.4 mm    Unspecified Scan Mode  Aortic Root Diameter;  End Systole;: 24.2 mm  Gradient Pressure, Peak; Simplified Bernoulli; Antegrade Flow; Systole;: 10.5  mm[Hg] Gradient pressure, average; Simplified Bernoulli; Antegrade Flow;  Systole;: 5.5 mm[Hg]  HR: 68 /min  Left atrial diameter; End Diastole;: 41.7 mm  Cardiac Output; Teichholz; Systole;: 6.76 L/min  HR: 73 /min  Interventricular Septum Diastolic Thickness; Teichholz; End Diastole;: 8.7 mm  Left Ventricle Internal End Diastolic Dimension; Teichholz;: 48.2 mm  Left Ventricle Internal Systolic Dimension; Teichholz; End Systole;: 24.3 mm  Left Ventricle Mass; Mass AVCube with Teichholz; End Diastole;: 137 g  Left Ventricle Posterior Wall Diastolic Thickness; Teichholz; End Diastole;:  8.2 mm  Left Ventricular Ejection Fraction; Teichholz;: 80.8 %  Left Ventricular End Diastolic Volume; Teichholz;: 108.6 ml  Left Ventricular End Systolic Volume; Teichholz;: 20.8 ml  Stroke volume; Teichholz; Systole;: 87.8 ml  Mitral Valve Area; Area by Pressure Half-Time; Systole;: 2.89 cm2  Mitral Valve E to A Ratio; Systole;: 1.72  Maximum Tricuspid valve regurgitation pressure gradient; Regurgitant Flow;  Systole;: 20.6 mm[Hg]    [facility]    Prepared and electronically signed by    Patricio Choi MD  Signed 04-Jan-2017 13:30:45    No valid procedures specified.  No results found for this or any previous visit.    Echo complete w/ contrast if indicated    Result Date: 10/3/2024  Narrative:   Left Ventricle: Left ventricular cavity size is normal. Wall thickness is normal. The left ventricular ejection fraction is 65%. Systolic function is normal. Wall motion is normal. Diastolic function is moderately abnormal, consistent with grade II (pseudonormal) relaxation.   Left Atrium: The atrium is severely dilated.   Atrial Septum: The septum bows into the right atrium, suggesting increased left atrial pressure.   Mitral Valve: The valve is myxomatous. There is moderate diffuse  thickening. There is bileaflet prolapse, posterior greater than anterior. There is moderate to severe regurgitation with an eccentrically directed jet.   Tricuspid Valve: There is mild regurgitation. The right ventricular systolic pressure is mildly elevated. The estimated right ventricular systolic pressure is 40.00 mmHg. Consider KWAME to further evaluate the severity of mitral regurgitation, if clinically indicated.       Results Review Statement: I personally reviewed the following image studies/reports in PACS and discussed pertinent findings with Radiology: Echocardiogram. My interpretation of the radiology images/reports is:  .    EKG:     Assessment & Plan     Mitral valve prolapse: plan for KWAME today.           VTE Prophylaxis:   Expected length of stay:        Code Status: No Order  Advance Directive and Living Will:      Power of :    POLST:      Counseling / Coordination of Care  Total floor / unit time spent today 25 minutes.  Greater than 50% of total time was spent with the patient and / or family counseling and / or coordination of care.      Lukas Myers MD      ** Please Note: Dragon 360 Dictation voice to text software may have been used in the creation of this document. **

## 2024-10-09 NOTE — TELEPHONE ENCOUNTER
Patient would like to know what Dr Tracey advises on micro valve repair ?     Pt had a KWAME today and Dr. Myers said it was up to  if the pt needs micro valve repair Sx or not, pt would like to know if needed and if it could be setup sooner then later if so. Please advise and give her a call back. Thanks        [phone] (Mobile)       initiation of breastfeeding/breast milk feeding

## 2024-10-09 NOTE — ANESTHESIA PREPROCEDURE EVALUATION
Procedure:  PRE-OP ONLY    Relevant Problems   CARDIO   (+) Benign essential hypertension   (+) Hyperlipidemia   (+) MVP (mitral valve prolapse)   (+) Mitral valve insufficiency   (+) Murmur, cardiac      /RENAL   (+) Kidney disease, chronic, stage III (GFR 30-59 ml/min) (HCC)      NEURO/PSYCH   (+) Anxiety   (+) Panic disorder (episodic paroxysmal anxiety)             Anesthesia Plan  ASA Score- 2     Anesthesia Type- IV sedation with anesthesia with ASA Monitors.         Additional Monitors:     Airway Plan:            Plan Factors-    Chart reviewed.    Patient summary reviewed.                  Induction- intravenous.    Postoperative Plan-         Informed Consent- Anesthetic plan and risks discussed with patient.  I personally reviewed this patient with the CRNA. Discussed and agreed on the Anesthesia Plan with the CRNA..

## 2024-10-09 NOTE — TELEPHONE ENCOUNTER
Patient had KWAME, severe MR. She has had MVP for years, been monitored and now symptoms. Saw me for first time just recently.    Could you evaluate her for surgery?      Thanks.  Zain

## 2024-10-10 ENCOUNTER — TELEPHONE (OUTPATIENT)
Age: 69
End: 2024-10-10

## 2024-10-17 ENCOUNTER — TELEPHONE (OUTPATIENT)
Dept: CARDIAC SURGERY | Facility: CLINIC | Age: 69
End: 2024-10-17

## 2024-10-17 ENCOUNTER — CONSULT (OUTPATIENT)
Dept: CARDIAC SURGERY | Facility: CLINIC | Age: 69
End: 2024-10-17
Payer: COMMERCIAL

## 2024-10-17 VITALS
WEIGHT: 146 LBS | HEART RATE: 81 BPM | OXYGEN SATURATION: 100 % | HEIGHT: 60 IN | DIASTOLIC BLOOD PRESSURE: 65 MMHG | BODY MASS INDEX: 28.66 KG/M2 | SYSTOLIC BLOOD PRESSURE: 138 MMHG

## 2024-10-17 DIAGNOSIS — Z13.6 ENCOUNTER FOR SCREENING FOR STENOSIS OF CAROTID ARTERY: ICD-10-CM

## 2024-10-17 DIAGNOSIS — I34.0 NONRHEUMATIC MITRAL VALVE REGURGITATION: Primary | ICD-10-CM

## 2024-10-17 DIAGNOSIS — I34.1 MVP (MITRAL VALVE PROLAPSE): ICD-10-CM

## 2024-10-17 DIAGNOSIS — E78.2 MIXED HYPERLIPIDEMIA: ICD-10-CM

## 2024-10-17 DIAGNOSIS — Z13.1 ENCOUNTER FOR SCREENING FOR DIABETES MELLITUS: ICD-10-CM

## 2024-10-17 PROCEDURE — 99205 OFFICE O/P NEW HI 60 MIN: CPT | Performed by: THORACIC SURGERY (CARDIOTHORACIC VASCULAR SURGERY)

## 2024-10-17 RX ORDER — CIPROFLOXACIN 2 MG/ML
400 INJECTION, SOLUTION INTRAVENOUS ONCE
OUTPATIENT
Start: 2024-10-17 | End: 2024-10-17

## 2024-10-17 RX ORDER — CHLORHEXIDINE GLUCONATE ORAL RINSE 1.2 MG/ML
15 SOLUTION DENTAL ONCE
OUTPATIENT
Start: 2024-10-17 | End: 2024-10-17

## 2024-10-17 RX ORDER — VANCOMYCIN HYDROCHLORIDE 1 G/200ML
1000 INJECTION, SOLUTION INTRAVENOUS ONCE
OUTPATIENT
Start: 2024-10-17 | End: 2024-10-17

## 2024-10-17 RX ORDER — MUPIROCIN 20 MG/G
OINTMENT TOPICAL 2 TIMES DAILY
Qty: 22 G | Refills: 0 | Status: SHIPPED | OUTPATIENT
Start: 2024-10-17 | End: 2024-10-22

## 2024-10-17 NOTE — H&P (VIEW-ONLY)
Pre-Op History & Physical - Cardiothoracic Surgery   Carley Bonilla 69 y.o. female MRN: [id number]    Physician Requesting Consult: Dr. Tracey  PCP: Dr. Barrios    Reason for Consult / Principal Problem: Mitral regurgitation    History of Present Illness: Carley Bonilla is a 69 y.o. year old female referred for surgical consultation for severe mitral regurgitation.  Her PMHx is notable for MVP, HTN, HLD, CKD2, panic disorder and chronic insomnia.   Patient gives a history of MVP, discovered in her 20's by her dermatologist and kept under surveillance over the years. Recent echo demonstrates a myxomatous mitral valve with bileaflet prolapse, moderate leaflet thickening, severe regurgitation, severely dilated left atrium and an EF 65%.    Patient is accompanied by her significant other. She reports developing HILL over the past few months., She normally takes daily walks and now needs to stop frequently to rest and catch her breath. She denies chest pian, palpitations, lightheadedness, weight gain or edema.     Patient lives in a condo with her significant other. She is independent with ADL's.    She denies tobacco uses, drinks occasionally, denies drug use.    She was last seen by her dentist in August for a cleaning and Panore[employer] films.     Past Medical History:  Past Medical History:   Diagnosis Date    Anxiety     Cardiac disease     mitral valve regurgitation    Mitral valve disorder          Past Surgical History:   Past Surgical History:   Procedure Laterality Date    ANKLE FRACTURE SURGERY      ORIF WRIST FRACTURE Right 11/21/2016    Procedure: DISTAL RADIUS O/R I/F;  Surgeon: Chuckie Nova MD;  Location: [facility];  Service:     WRIST SURGERY           Family History:  Family History   Problem Relation Age of Onset    No Known Problems Mother     No Known Problems Daughter     No Known Problems Maternal Grandmother     No Known Problems Paternal Grandmother     No Known Problems Maternal Aunt     No Known  Problems Paternal Aunt     Breast cancer Neg Hx          Social History:    Social History     Substance and Sexual Activity   Alcohol Use Yes    Comment: occ     Social History     Substance and Sexual Activity   Drug Use No     Social History     Tobacco Use   Smoking Status Never   Smokeless Tobacco Never       Home Medications:   Prior to Admission medications    Medication Sig Start Date End Date Taking? Authorizing Provider   alendronate (FOSAMAX) 70 mg tablet Take 1 tablet (70 mg total) by mouth every 7 days 6/13/24  Yes Kerrie Barrios MD   ALPRAZolam ER (XANAX XR) 1 MG 24 hr tablet Take 1 mg by mouth daily at bedtime 4/25/24  Yes Historical Provider, MD   atenolol (TENORMIN) 25 mg tablet Take 1 tablet (25 mg total) by mouth daily 6/13/24  Yes Kerrie Barrios MD   Calcium-Vitamin D-Vitamin K (VIACTIV PO) Take by mouth   Yes Historical Provider, MD   EPINEPHrine (EpiPen 2-Miguel) 0.3 mg/0.3 mL SOAJ  11/18/23  Yes Historical Provider, MD   hydrOXYzine HCL (ATARAX) 25 mg tablet daily 2/19/24  Yes Historical Provider, MD   Omega-3 Fatty Acids (FISH OIL) 1,000 mg Take 1,000 mg by mouth daily   Yes Historical Provider, MD   pravastatin (PRAVACHOL) 10 mg tablet TAKE 1 TABLET BY MOUTH EVERY DAY 8/27/24  Yes Kerrie Barrios MD   Propylene Glycol (Systane Balance) 0.6 % SOLN drops: 0.6% 1 drop six times a day into both eyes   Yes Historical Provider, MD   ramelteon (ROZEREM) 8 mg tablet Take 8 mg by mouth daily 3/28/24  Yes Historical Provider, MD   traZODone (DESYREL) 100 mg tablet Take 100 mg by mouth daily 3/28/24  Yes Historical Provider, MD   ALPRAZolam (XANAX) 2 MG tablet Take 1.5 mg by mouth daily at bedtime as needed   Patient not taking: Reported on 10/17/2024    Historical Provider, MD   ALPRAZolam (XANAX) 2 MG tablet Take 2 tablets (4 mg total) by mouth daily at bedtime as needed for anxiety or sleep  Patient not taking: Reported on 10/17/2024 5/4/18   Evelyn Avilez PA-C   cyanocobalamin (VITAMIN B-12) 1,000 mcg  tablet Take by mouth daily  Patient not taking: Reported on 10/17/2024    Historical Provider, MD       Allergies:  Allergies   Allergen Reactions    Morphine And Codeine Other (See Comments)     hallucinations      Bee Venom Hives    Codeine Other (See Comments)     Hallucinations    Penicillins Hives    Sulfa Antibiotics Other (See Comments)     Yeast infections       Review of Systems:  Review of Systems - History obtained from chart review and the patient  General ROS: positive for  - fatigue and change in activity tolerance   negative for - chills or fever  Psychological ROS: negative  Ophthalmic ROS: negative  ENT ROS: negative  Allergy and Immunology ROS: negative  Hematological and Lymphatic ROS: negative  Endocrine ROS: negative  Breast ROS: negative  Respiratory ROS: no cough, shortness of breath, or wheezing  Cardiovascular ROS: positive for - dyspnea on exertion and murmur  negative for - chest pain, edema, irregular heartbeat, orthopnea, palpitations, paroxysmal nocturnal dyspnea, or rapid heart rate  Gastrointestinal ROS: no abdominal pain, change in bowel habits, or black or bloody stools  Genito-Urinary ROS: no dysuria, trouble voiding, or hematuria  Musculoskeletal ROS: negative  Neurological ROS: no TIA or stroke symptoms  Dermatological ROS: negative    Vital Signs:     Vitals:    10/17/24 1042 10/17/24 1053   BP: 127/64 138/65   BP Location: Left arm Right arm   Patient Position: Sitting Sitting   Cuff Size: Standard Standard   Pulse: 81    SpO2: 100%    Weight: 66.2 kg (146 lb)    Height: 5' (1.524 m)        Physical Exam:    General: Alert, oriented, well developed, no acute distress  HEENT/NECK:  PERRLA.  No jugular venous distention.    Cardiac:Regular rate and rhythm, III holosystolic murmur.  Carotid arteries: 2+ pulses,  bruits vs radiation of cardiac murmur  Pulmonary:  Breath sounds clear bilaterally.  Abdomen:  Non-tender, Non-distended.  Positive bowel sounds.   Upper extremities: 2+  radial pulses; brisk capillary refill; RIGHT hand dominance  Lower extremities: Extremities warm/dry. PT/DP pulses 2+ bilaterally.  No edema B/L  Neuro: Alert and oriented X 3.  Sensation is grossly intact.  No focal deficits.  Musculoskeletal: MAEE, stable gait  Skin: Warm/Dry, without rashes or lesions.    Lab Results:     Lab Results   Component Value Date    WBC 7.16 05/10/2024    HGB 13.9 05/10/2024    HCT 43.5 05/10/2024    MCV 90 05/10/2024     05/10/2024     Lab Results   Component Value Date     03/19/2015    SODIUM 141 05/10/2024    K 4.0 05/10/2024     05/10/2024    CO2 31 05/10/2024    ANIONGAP 6 03/19/2015    AGAP 8 05/10/2024    BUN 28 (H) 05/10/2024    CREATININE 0.94 05/10/2024    GLUC 91 10/19/2023    GLUF 102 (H) 05/10/2024    CALCIUM 9.4 05/10/2024    AST 21 05/10/2024    ALT 15 05/10/2024    ALKPHOS 54 05/10/2024    PROT 7.6 03/19/2015    TP 7.3 05/10/2024    BILITOT 0.52 03/19/2015    TBILI 0.51 05/10/2024    EGFR 62 05/10/2024     Lab Results   Component Value Date    CHOLESTEROL 188 05/10/2024    CHOLESTEROL 212 (H) 06/20/2017    CHOLESTEROL 214 (H) 03/02/2017     Lab Results   Component Value Date    HDL 52 05/10/2024    HDL 49 06/20/2017    HDL 59 03/02/2017     Lab Results   Component Value Date    TRIG 138 05/10/2024    TRIG 137 06/20/2017    TRIG 121 03/02/2017     Lab Results   Component Value Date    NONHDLC 136 05/10/2024       Imaging Studies:     3D KWAME: 10/9/24      Left Ventricle: Left ventricular cavity size is normal. Wall thickness is normal. The left ventricular ejection fraction is 65%. Systolic function is vigorous. Wall motion is normal.    Left Atrium: The atrium is severely dilated.    Atrial Septum: No patent foramen ovale detected, confirmed at rest using color doppler.    Left Atrial Appendage: There is normal function. There is no thrombus.    Mitral Valve: The valve is myxomatous. There is moderate diffuse thickening. There is bileaflet prolapse.  There is severe regurgitation.    Tricuspid Valve: There is mild regurgitation.     3D was performed for further investigation, better visualization, and additional quantification of the mitral valve. Results from the utilization of 3D are listed in the report below.     Findings    Left Ventricle Left ventricular cavity size is normal. Wall thickness is normal. The left ventricular ejection fraction is 65%. Systolic function is vigorous. Wall motion is normal.   Right Ventricle Right ventricular cavity size is normal. Systolic function is normal. Wall thickness is normal.   Left Atrium The atrium is severely dilated.   Right Atrium The atrium is normal in size.   Atrial Septum No patent foramen ovale detected, confirmed at rest using color doppler.   Left Atrial Appendage Left atrial appendage is normal in size. There is normal function. There is no thrombus.   Aortic Valve The aortic valve is trileaflet. The leaflets are not thickened. The leaflets are not calcified. The leaflets exhibit normal mobility. There is no evidence of regurgitation. The aortic valve has no significant stenosis.   Mitral Valve The valve is myxomatous. There is moderate diffuse thickening. There is bileaflet prolapse. There is severe regurgitation. There is no evidence of stenosis.   Tricuspid Valve Tricuspid valve structure is normal. There is mild regurgitation. There is no evidence of stenosis.   Pulmonic Valve Pulmonic valve structure is normal. There is trace regurgitation. There is no evidence of stenosis.   Ascending Aorta The aortic root is normal in size. There is no evidence of aortic dissection. There is no aneurysm in the aorta.     Echocardiogram: 10/2/24      Left Ventricle: Left ventricular cavity size is normal. Wall thickness is normal. The left ventricular ejection fraction is 65%. Systolic function is normal. Wall motion is normal. Diastolic function is moderately abnormal, consistent with grade II (pseudonormal)  relaxation.    Left Atrium: The atrium is severely dilated.    Atrial Septum: The septum bows into the right atrium, suggesting increased left atrial pressure.    Mitral Valve: The valve is myxomatous. There is moderate diffuse thickening. There is bileaflet prolapse, posterior greater than anterior. There is moderate to severe regurgitation with an eccentrically directed jet.    Tricuspid Valve: There is mild regurgitation. The right ventricular systolic pressure is mildly elevated. The estimated right ventricular systolic pressure is 40.00 mmHg.     Consider KWAME to further evaluate the severity of mitral regurgitation, if clinically indicated.       EC24    Sinus rhythm. 68 bpm. Left atrial enlargement.     Results Review Statement: I personally reviewed the following image studies in PACS and associated radiology reports: Echocardiogram. My interpretation of the radiology images/reports is: severe MR.      Cardiology notes reviewed      Assessment:  Patient Active Problem List    Diagnosis Date Noted    PONV (postoperative nausea and vomiting) 10/09/2024    Constipation 2024    Mitral valve insufficiency 2024    Murmur, cardiac 2024    Age-related osteoporosis with current pathological fracture 2024    Nodular lesion on surface of skin 2024    Chronic insomnia 2023    Panic disorder (episodic paroxysmal anxiety) 2022    Kidney disease, chronic, stage III (GFR 30-59 ml/min) ([facility]) 2020    Hyperlipidemia 2014    Anxiety 2014    Benign essential hypertension 2014    MVP (mitral valve prolapse) 2008         Impression/Plan:    Severe mitral regurgitation, myxomatous mitral valve    Risks and benefits of mitral valve repair vs replacement (tissue) were discussed in detail today with the patient.  They understand and wish to proceed with further workup and ultimately surgical intervention.  We have ordered routine preoperative cardiac cath,  ECG, non-contrast chest CT scan, carotid duplex, dental clearance and  laboratory studies.  Pending the results of these tests, they will be scheduled for surgery on 11/11/24 with Dr. Parker Reddy M.D.    Pre-op instructions reviewed with patient and she was instructed to stop Omega-3 supplement now.    Carley Bonilla was comfortable with our recommendations, and their questions were answered to their satisfaction.  Thank you for allowing us to participate in the care of this patient.     LAURA 5/16/22  - abnormal- GI referral ordered by PCP    SIGNATURE: AZUL Mathias  DATE: October 17, 2024  TIME: 11:44 AM

## 2024-10-17 NOTE — LETTER
10/18/2024       Carley Bonilla              : 1955        MRN: [id number]  [address] [facility] [id number]       Procedure Name: RIGHT & LEFT  HEART CATHETERIZATION    Procedure date: 2024    Location: [facility]      The hospital will contact you the day prior to your procedure, usually between 4PM - 6PM to instruct you on the time and place to report. If you do not hear from a [facility]  by 6PM the evening prior to your procedure, please contact the campus that you are scheduled at.      [place]: [address] [place] [address] - [address] [place] [phone]    You may have NOTHING to eat or drink from midnight the night prior to your procedure. You may have a minimal amount of water with your morning medications.     DO NOT stop taking Plavix or Aspirin unless advised otherwise.    Arrange for a responsible person to drive you to and from the hospital.    Please shower/bathe the night before your procedure and do not use powders or lotions.    Please notify us if you have been admitted to the hospital within the past 30 days.    Bring a list of daily medications, vitamins, minerals, herbals and nutritional supplements you take. Include dosage and time you take them each day.    If packing an overnight bag, pack minimal clothing, you will be given hospital sleepwear. Do not bring money, valuables or jewelry. Wedding band is OK.    If you use CPAP machine, bring it to the hospital.      Have your Photo ID and Insurance cards with you.    DO NOT take any diabetic medication, including insulin, the morning of the procedure. Oral diabetic medications may include: Glucophage, Prandin, Glyburide, Micronase, Avandia, Glocovance, Precose, Glynase y Starlix.                You should continue to take your morning dose of heart and/or blood pressure medications with a sip of water UNLESS ADVISED OTHERWISE.    Special Instructions:    Medication  hold:   NO MEDICATION HOLDS         Blood work to be done NO LATER THAN 10/25/24   (FASTING)      Thank you,  aZ Baez  Surgery Coordinator  [facility]   [facility] [address] [facility]  [place] [address]  Teams: [phone]

## 2024-10-17 NOTE — H&P
Pre-Op History & Physical - Cardiothoracic Surgery   Carley Bonilla 69 y.o. female MRN: [id number]    Physician Requesting Consult: Dr. Tracey  PCP: Dr. Barrios    Reason for Consult / Principal Problem: Mitral regurgitation    History of Present Illness: Carley Bonilla is a 69 y.o. year old female referred for surgical consultation for severe mitral regurgitation.  Her PMHx is notable for MVP, HTN, HLD, CKD2, panic disorder and chronic insomnia.   Patient gives a history of MVP, discovered in her 20's by her dermatologist and kept under surveillance over the years. Recent echo demonstrates a myxomatous mitral valve with bileaflet prolapse, moderate leaflet thickening, severe regurgitation, severely dilated left atrium and an EF 65%.    Patient is accompanied by her significant other. She reports developing HILL over the past few months., She normally takes daily walks and now needs to stop frequently to rest and catch her breath. She denies chest pian, palpitations, lightheadedness, weight gain or edema.     Patient lives in a condo with her significant other. She is independent with ADL's.    She denies tobacco uses, drinks occasionally, denies drug use.    She was last seen by her dentist in August for a cleaning and [employer] films.     Past Medical History:  Past Medical History:   Diagnosis Date    Anxiety     Cardiac disease     mitral valve regurgitation    Mitral valve disorder          Past Surgical History:   Past Surgical History:   Procedure Laterality Date    ANKLE FRACTURE SURGERY      ORIF WRIST FRACTURE Right 11/21/2016    Procedure: DISTAL RADIUS O/R I/F;  Surgeon: Chuckie Nova MD;  Location: [facility];  Service:     WRIST SURGERY           Family History:  Family History   Problem Relation Age of Onset    No Known Problems Mother     No Known Problems Daughter     No Known Problems Maternal Grandmother     No Known Problems Paternal Grandmother     No Known Problems Maternal Aunt     No Known  Problems Paternal Aunt     Breast cancer Neg Hx          Social History:    Social History     Substance and Sexual Activity   Alcohol Use Yes    Comment: occ     Social History     Substance and Sexual Activity   Drug Use No     Social History     Tobacco Use   Smoking Status Never   Smokeless Tobacco Never       Home Medications:   Prior to Admission medications    Medication Sig Start Date End Date Taking? Authorizing Provider   alendronate (FOSAMAX) 70 mg tablet Take 1 tablet (70 mg total) by mouth every 7 days 6/13/24  Yes Kerrie Barrios MD   ALPRAZolam ER (XANAX XR) 1 MG 24 hr tablet Take 1 mg by mouth daily at bedtime 4/25/24  Yes Historical Provider, MD   atenolol (TENORMIN) 25 mg tablet Take 1 tablet (25 mg total) by mouth daily 6/13/24  Yes Kerrie Barrios MD   Calcium-Vitamin D-Vitamin K (VIACTIV PO) Take by mouth   Yes Historical Provider, MD   EPINEPHrine (EpiPen 2-Miguel) 0.3 mg/0.3 mL SOAJ  11/18/23  Yes Historical Provider, MD   hydrOXYzine HCL (ATARAX) 25 mg tablet daily 2/19/24  Yes Historical Provider, MD   Omega-3 Fatty Acids (FISH OIL) 1,000 mg Take 1,000 mg by mouth daily   Yes Historical Provider, MD   pravastatin (PRAVACHOL) 10 mg tablet TAKE 1 TABLET BY MOUTH EVERY DAY 8/27/24  Yes Kerrie Barrios MD   Propylene Glycol (Systane Balance) 0.6 % SOLN drops: 0.6% 1 drop six times a day into both eyes   Yes Historical Provider, MD   ramelteon (ROZEREM) 8 mg tablet Take 8 mg by mouth daily 3/28/24  Yes Historical Provider, MD   traZODone (DESYREL) 100 mg tablet Take 100 mg by mouth daily 3/28/24  Yes Historical Provider, MD   ALPRAZolam (XANAX) 2 MG tablet Take 1.5 mg by mouth daily at bedtime as needed   Patient not taking: Reported on 10/17/2024    Historical Provider, MD   ALPRAZolam (XANAX) 2 MG tablet Take 2 tablets (4 mg total) by mouth daily at bedtime as needed for anxiety or sleep  Patient not taking: Reported on 10/17/2024 5/4/18   Evelyn Avilez PA-C   cyanocobalamin (VITAMIN B-12) 1,000 mcg  tablet Take by mouth daily  Patient not taking: Reported on 10/17/2024    Historical Provider, MD       Allergies:  Allergies   Allergen Reactions    Morphine And Codeine Other (See Comments)     hallucinations      Bee Venom Hives    Codeine Other (See Comments)     Hallucinations    Penicillins Hives    Sulfa Antibiotics Other (See Comments)     Yeast infections       Review of Systems:  Review of Systems - History obtained from chart review and the patient  General ROS: positive for  - fatigue and change in activity tolerance   negative for - chills or fever  Psychological ROS: negative  Ophthalmic ROS: negative  ENT ROS: negative  Allergy and Immunology ROS: negative  Hematological and Lymphatic ROS: negative  Endocrine ROS: negative  Breast ROS: negative  Respiratory ROS: no cough, shortness of breath, or wheezing  Cardiovascular ROS: positive for - dyspnea on exertion and murmur  negative for - chest pain, edema, irregular heartbeat, orthopnea, palpitations, paroxysmal nocturnal dyspnea, or rapid heart rate  Gastrointestinal ROS: no abdominal pain, change in bowel habits, or black or bloody stools  Genito-Urinary ROS: no dysuria, trouble voiding, or hematuria  Musculoskeletal ROS: negative  Neurological ROS: no TIA or stroke symptoms  Dermatological ROS: negative    Vital Signs:     Vitals:    10/17/24 1042 10/17/24 1053   BP: 127/64 138/65   BP Location: Left arm Right arm   Patient Position: Sitting Sitting   Cuff Size: Standard Standard   Pulse: 81    SpO2: 100%    Weight: 66.2 kg (146 lb)    Height: 5' (1.524 m)        Physical Exam:    General: Alert, oriented, well developed, no acute distress  HEENT/NECK:  PERRLA.  No jugular venous distention.    Cardiac:Regular rate and rhythm, III holosystolic murmur.  Carotid arteries: 2+ pulses,  bruits vs radiation of cardiac murmur  Pulmonary:  Breath sounds clear bilaterally.  Abdomen:  Non-tender, Non-distended.  Positive bowel sounds.   Upper extremities: 2+  radial pulses; brisk capillary refill; RIGHT hand dominance  Lower extremities: Extremities warm/dry. PT/DP pulses 2+ bilaterally.  No edema B/L  Neuro: Alert and oriented X 3.  Sensation is grossly intact.  No focal deficits.  Musculoskeletal: MAEE, stable gait  Skin: Warm/Dry, without rashes or lesions.    Lab Results:     Lab Results   Component Value Date    WBC 7.16 05/10/2024    HGB 13.9 05/10/2024    HCT 43.5 05/10/2024    MCV 90 05/10/2024     05/10/2024     Lab Results   Component Value Date     03/19/2015    SODIUM 141 05/10/2024    K 4.0 05/10/2024     05/10/2024    CO2 31 05/10/2024    ANIONGAP 6 03/19/2015    AGAP 8 05/10/2024    BUN 28 (H) 05/10/2024    CREATININE 0.94 05/10/2024    GLUC 91 10/19/2023    GLUF 102 (H) 05/10/2024    CALCIUM 9.4 05/10/2024    AST 21 05/10/2024    ALT 15 05/10/2024    ALKPHOS 54 05/10/2024    PROT 7.6 03/19/2015    TP 7.3 05/10/2024    BILITOT 0.52 03/19/2015    TBILI 0.51 05/10/2024    EGFR 62 05/10/2024     Lab Results   Component Value Date    CHOLESTEROL 188 05/10/2024    CHOLESTEROL 212 (H) 06/20/2017    CHOLESTEROL 214 (H) 03/02/2017     Lab Results   Component Value Date    HDL 52 05/10/2024    HDL 49 06/20/2017    HDL 59 03/02/2017     Lab Results   Component Value Date    TRIG 138 05/10/2024    TRIG 137 06/20/2017    TRIG 121 03/02/2017     Lab Results   Component Value Date    NONHDLC 136 05/10/2024       Imaging Studies:     3D KWAME: 10/9/24      Left Ventricle: Left ventricular cavity size is normal. Wall thickness is normal. The left ventricular ejection fraction is 65%. Systolic function is vigorous. Wall motion is normal.    Left Atrium: The atrium is severely dilated.    Atrial Septum: No patent foramen ovale detected, confirmed at rest using color doppler.    Left Atrial Appendage: There is normal function. There is no thrombus.    Mitral Valve: The valve is myxomatous. There is moderate diffuse thickening. There is bileaflet prolapse.  There is severe regurgitation.    Tricuspid Valve: There is mild regurgitation.     3D was performed for further investigation, better visualization, and additional quantification of the mitral valve. Results from the utilization of 3D are listed in the report below.     Findings    Left Ventricle Left ventricular cavity size is normal. Wall thickness is normal. The left ventricular ejection fraction is 65%. Systolic function is vigorous. Wall motion is normal.   Right Ventricle Right ventricular cavity size is normal. Systolic function is normal. Wall thickness is normal.   Left Atrium The atrium is severely dilated.   Right Atrium The atrium is normal in size.   Atrial Septum No patent foramen ovale detected, confirmed at rest using color doppler.   Left Atrial Appendage Left atrial appendage is normal in size. There is normal function. There is no thrombus.   Aortic Valve The aortic valve is trileaflet. The leaflets are not thickened. The leaflets are not calcified. The leaflets exhibit normal mobility. There is no evidence of regurgitation. The aortic valve has no significant stenosis.   Mitral Valve The valve is myxomatous. There is moderate diffuse thickening. There is bileaflet prolapse. There is severe regurgitation. There is no evidence of stenosis.   Tricuspid Valve Tricuspid valve structure is normal. There is mild regurgitation. There is no evidence of stenosis.   Pulmonic Valve Pulmonic valve structure is normal. There is trace regurgitation. There is no evidence of stenosis.   Ascending Aorta The aortic root is normal in size. There is no evidence of aortic dissection. There is no aneurysm in the aorta.     Echocardiogram: 10/2/24      Left Ventricle: Left ventricular cavity size is normal. Wall thickness is normal. The left ventricular ejection fraction is 65%. Systolic function is normal. Wall motion is normal. Diastolic function is moderately abnormal, consistent with grade II (pseudonormal)  relaxation.    Left Atrium: The atrium is severely dilated.    Atrial Septum: The septum bows into the right atrium, suggesting increased left atrial pressure.    Mitral Valve: The valve is myxomatous. There is moderate diffuse thickening. There is bileaflet prolapse, posterior greater than anterior. There is moderate to severe regurgitation with an eccentrically directed jet.    Tricuspid Valve: There is mild regurgitation. The right ventricular systolic pressure is mildly elevated. The estimated right ventricular systolic pressure is 40.00 mmHg.     Consider KWAME to further evaluate the severity of mitral regurgitation, if clinically indicated.       EC24    Sinus rhythm. 68 bpm. Left atrial enlargement.     Results Review Statement: I personally reviewed the following image studies in PACS and associated radiology reports: Echocardiogram. My interpretation of the radiology images/reports is: severe MR.      Cardiology notes reviewed      Assessment:  Patient Active Problem List    Diagnosis Date Noted    PONV (postoperative nausea and vomiting) 10/09/2024    Constipation 2024    Mitral valve insufficiency 2024    Murmur, cardiac 2024    Age-related osteoporosis with current pathological fracture 2024    Nodular lesion on surface of skin 2024    Chronic insomnia 2023    Panic disorder (episodic paroxysmal anxiety) 2022    Kidney disease, chronic, stage III (GFR 30-59 ml/min) ([facility]) 2020    Hyperlipidemia 2014    Anxiety 2014    Benign essential hypertension 2014    MVP (mitral valve prolapse) 2008         Impression/Plan:    Severe mitral regurgitation, myxomatous mitral valve    Risks and benefits of mitral valve repair vs replacement (tissue) were discussed in detail today with the patient.  They understand and wish to proceed with further workup and ultimately surgical intervention.  We have ordered routine preoperative cardiac cath,  ECG, non-contrast chest CT scan, carotid duplex, dental clearance and  laboratory studies.  Pending the results of these tests, they will be scheduled for surgery on 11/11/24 with Dr. Parker Reddy M.D.    Pre-op instructions reviewed with patient and she was instructed to stop Omega-3 supplement now.    Carley Bonilla was comfortable with our recommendations, and their questions were answered to their satisfaction.  Thank you for allowing us to participate in the care of this patient.     LAURA 5/16/22  - abnormal- GI referral ordered by PCP    SIGNATURE: AZUL Mathias  DATE: October 17, 2024  TIME: 11:44 AM

## 2024-10-17 NOTE — TELEPHONE ENCOUNTER
Patient was seen today by Dr. Salcedo and scheduled for MVR on 11/11/24.  She will need a Cardiac cath done prior to surgery.    Please contact the patient to schedule.

## 2024-10-17 NOTE — PROGRESS NOTES
Consultation - Cardiothoracic Surgery   Carley Bonilla 69 y.o. female MRN: [id number]    Physician Requesting Consult: Dr. Tracey  PCP: Dr. Barrios    Reason for Consult / Principal Problem: Mitral regurgitation    History of Present Illness: Carley Bonilla is a 69 y.o. year old female referred for surgical consultation for severe mitral regurgitation.  Her PMHx is notable for MVP, HTN, HLD, CKD2, panic disorder and chronic insomnia.   Patient gives a history of MVP, discovered in her 20's by her dermatologist and kept under surveillance over the years. Recent echo demonstrates a myxomatous mitral valve with bileaflet prolapse, moderate leaflet thickening, severe regurgitation, severely dilated left atrium and an EF 65%.    Patient is accompanied by her significant other. She reports developing HILL over the past few months., She normally takes daily walks and now needs to stop frequently to rest and catch her breath. She denies chest pian, palpitations, lightheadedness, weight gain or edema.     Patient lives in a condo with her significant other. She is independent with ADL's.    She denies tobacco uses, drinks occasionally, denies drug use.    She was last seen by her dentist in August for a cleaning and Panore[employer] films.     Past Medical History:  Past Medical History:   Diagnosis Date    Anxiety     Cardiac disease     mitral valve regurgitation    Mitral valve disorder          Past Surgical History:   Past Surgical History:   Procedure Laterality Date    ANKLE FRACTURE SURGERY      ORIF WRIST FRACTURE Right 11/21/2016    Procedure: DISTAL RADIUS O/R I/F;  Surgeon: Chuckie Nova MD;  Location: [facility];  Service:     WRIST SURGERY           Family History:  Family History   Problem Relation Age of Onset    No Known Problems Mother     No Known Problems Daughter     No Known Problems Maternal Grandmother     No Known Problems Paternal Grandmother     No Known Problems Maternal Aunt     No Known Problems Paternal  Aunt     Breast cancer Neg Hx          Social History:    Social History     Substance and Sexual Activity   Alcohol Use Yes    Comment: occ     Social History     Substance and Sexual Activity   Drug Use No     Social History     Tobacco Use   Smoking Status Never   Smokeless Tobacco Never       Home Medications:   Prior to Admission medications    Medication Sig Start Date End Date Taking? Authorizing Provider   alendronate (FOSAMAX) 70 mg tablet Take 1 tablet (70 mg total) by mouth every 7 days 6/13/24  Yes Kerrie Barrios MD   ALPRAZolam ER (XANAX XR) 1 MG 24 hr tablet Take 1 mg by mouth daily at bedtime 4/25/24  Yes Historical Provider, MD   atenolol (TENORMIN) 25 mg tablet Take 1 tablet (25 mg total) by mouth daily 6/13/24  Yes Kerrie Barrios MD   Calcium-Vitamin D-Vitamin K (VIACTIV PO) Take by mouth   Yes Historical Provider, MD   EPINEPHrine (EpiPen 2-Miguel) 0.3 mg/0.3 mL SOAJ  11/18/23  Yes Historical Provider, MD   hydrOXYzine HCL (ATARAX) 25 mg tablet daily 2/19/24  Yes Historical Provider, MD   Omega-3 Fatty Acids (FISH OIL) 1,000 mg Take 1,000 mg by mouth daily   Yes Historical Provider, MD   pravastatin (PRAVACHOL) 10 mg tablet TAKE 1 TABLET BY MOUTH EVERY DAY 8/27/24  Yes Kerrie Barrios MD   Propylene Glycol (Systane Balance) 0.6 % SOLN drops: 0.6% 1 drop six times a day into both eyes   Yes Historical Provider, MD   ramelteon (ROZEREM) 8 mg tablet Take 8 mg by mouth daily 3/28/24  Yes Historical Provider, MD   traZODone (DESYREL) 100 mg tablet Take 100 mg by mouth daily 3/28/24  Yes Historical Provider, MD   ALPRAZolam (XANAX) 2 MG tablet Take 1.5 mg by mouth daily at bedtime as needed   Patient not taking: Reported on 10/17/2024    Historical Provider, MD   ALPRAZolam (XANAX) 2 MG tablet Take 2 tablets (4 mg total) by mouth daily at bedtime as needed for anxiety or sleep  Patient not taking: Reported on 10/17/2024 5/4/18   Evelyn Avilez PA-C   cyanocobalamin (VITAMIN B-12) 1,000 mcg tablet Take by mouth  daily  Patient not taking: Reported on 10/17/2024    Historical Provider, MD       Allergies:  Allergies   Allergen Reactions    Morphine And Codeine Other (See Comments)     hallucinations      Bee Venom Hives    Codeine Other (See Comments)     Hallucinations    Penicillins Hives    Sulfa Antibiotics Other (See Comments)     Yeast infections       Review of Systems:  Review of Systems - History obtained from chart review and the patient  General ROS: positive for  - fatigue and change in activity tolerance   negative for - chills or fever  Psychological ROS: negative  Ophthalmic ROS: negative  ENT ROS: negative  Allergy and Immunology ROS: negative  Hematological and Lymphatic ROS: negative  Endocrine ROS: negative  Breast ROS: negative  Respiratory ROS: no cough, shortness of breath, or wheezing  Cardiovascular ROS: positive for - dyspnea on exertion and murmur  negative for - chest pain, edema, irregular heartbeat, orthopnea, palpitations, paroxysmal nocturnal dyspnea, or rapid heart rate  Gastrointestinal ROS: no abdominal pain, change in bowel habits, or black or bloody stools  Genito-Urinary ROS: no dysuria, trouble voiding, or hematuria  Musculoskeletal ROS: negative  Neurological ROS: no TIA or stroke symptoms  Dermatological ROS: negative    Vital Signs:     Vitals:    10/17/24 1042 10/17/24 1053   BP: 127/64 138/65   BP Location: Left arm Right arm   Patient Position: Sitting Sitting   Cuff Size: Standard Standard   Pulse: 81    SpO2: 100%    Weight: 66.2 kg (146 lb)    Height: 5' (1.524 m)        Physical Exam:    General: Alert, oriented, well developed, no acute distress  HEENT/NECK:  PERRLA.  No jugular venous distention.    Cardiac:Regular rate and rhythm, III holosystolic murmur.  Carotid arteries: 2+ pulses,  bruits vs radiation of cardiac murmur  Pulmonary:  Breath sounds clear bilaterally.  Abdomen:  Non-tender, Non-distended.  Positive bowel sounds.   Upper extremities: 2+ radial pulses; brisk  capillary refill; RIGHT hand dominance  Lower extremities: Extremities warm/dry. PT/DP pulses 2+ bilaterally.  No edema B/L  Neuro: Alert and oriented X 3.  Sensation is grossly intact.  No focal deficits.  Musculoskeletal: MAEE, stable gait  Skin: Warm/Dry, without rashes or lesions.    Lab Results:     Lab Results   Component Value Date    WBC 7.16 05/10/2024    HGB 13.9 05/10/2024    HCT 43.5 05/10/2024    MCV 90 05/10/2024     05/10/2024     Lab Results   Component Value Date     03/19/2015    SODIUM 141 05/10/2024    K 4.0 05/10/2024     05/10/2024    CO2 31 05/10/2024    ANIONGAP 6 03/19/2015    AGAP 8 05/10/2024    BUN 28 (H) 05/10/2024    CREATININE 0.94 05/10/2024    GLUC 91 10/19/2023    GLUF 102 (H) 05/10/2024    CALCIUM 9.4 05/10/2024    AST 21 05/10/2024    ALT 15 05/10/2024    ALKPHOS 54 05/10/2024    PROT 7.6 03/19/2015    TP 7.3 05/10/2024    BILITOT 0.52 03/19/2015    TBILI 0.51 05/10/2024    EGFR 62 05/10/2024     Lab Results   Component Value Date    CHOLESTEROL 188 05/10/2024    CHOLESTEROL 212 (H) 06/20/2017    CHOLESTEROL 214 (H) 03/02/2017     Lab Results   Component Value Date    HDL 52 05/10/2024    HDL 49 06/20/2017    HDL 59 03/02/2017     Lab Results   Component Value Date    TRIG 138 05/10/2024    TRIG 137 06/20/2017    TRIG 121 03/02/2017     Lab Results   Component Value Date    NONHDLC 136 05/10/2024       Imaging Studies:     3D KWAME: 10/9/24      Left Ventricle: Left ventricular cavity size is normal. Wall thickness is normal. The left ventricular ejection fraction is 65%. Systolic function is vigorous. Wall motion is normal.    Left Atrium: The atrium is severely dilated.    Atrial Septum: No patent foramen ovale detected, confirmed at rest using color doppler.    Left Atrial Appendage: There is normal function. There is no thrombus.    Mitral Valve: The valve is myxomatous. There is moderate diffuse thickening. There is bileaflet prolapse. There is severe  regurgitation.    Tricuspid Valve: There is mild regurgitation.     3D was performed for further investigation, better visualization, and additional quantification of the mitral valve. Results from the utilization of 3D are listed in the report below.     Findings    Left Ventricle Left ventricular cavity size is normal. Wall thickness is normal. The left ventricular ejection fraction is 65%. Systolic function is vigorous. Wall motion is normal.   Right Ventricle Right ventricular cavity size is normal. Systolic function is normal. Wall thickness is normal.   Left Atrium The atrium is severely dilated.   Right Atrium The atrium is normal in size.   Atrial Septum No patent foramen ovale detected, confirmed at rest using color doppler.   Left Atrial Appendage Left atrial appendage is normal in size. There is normal function. There is no thrombus.   Aortic Valve The aortic valve is trileaflet. The leaflets are not thickened. The leaflets are not calcified. The leaflets exhibit normal mobility. There is no evidence of regurgitation. The aortic valve has no significant stenosis.   Mitral Valve The valve is myxomatous. There is moderate diffuse thickening. There is bileaflet prolapse. There is severe regurgitation. There is no evidence of stenosis.   Tricuspid Valve Tricuspid valve structure is normal. There is mild regurgitation. There is no evidence of stenosis.   Pulmonic Valve Pulmonic valve structure is normal. There is trace regurgitation. There is no evidence of stenosis.   Ascending Aorta The aortic root is normal in size. There is no evidence of aortic dissection. There is no aneurysm in the aorta.     Echocardiogram: 10/2/24      Left Ventricle: Left ventricular cavity size is normal. Wall thickness is normal. The left ventricular ejection fraction is 65%. Systolic function is normal. Wall motion is normal. Diastolic function is moderately abnormal, consistent with grade II (pseudonormal) relaxation.    Left  Atrium: The atrium is severely dilated.    Atrial Septum: The septum bows into the right atrium, suggesting increased left atrial pressure.    Mitral Valve: The valve is myxomatous. There is moderate diffuse thickening. There is bileaflet prolapse, posterior greater than anterior. There is moderate to severe regurgitation with an eccentrically directed jet.    Tricuspid Valve: There is mild regurgitation. The right ventricular systolic pressure is mildly elevated. The estimated right ventricular systolic pressure is 40.00 mmHg.     Consider KWAME to further evaluate the severity of mitral regurgitation, if clinically indicated.       EC24    Sinus rhythm. 68 bpm. Left atrial enlargement.     Results Review Statement: I personally reviewed the following image studies in PACS and associated radiology reports: Echocardiogram. My interpretation of the radiology images/reports is: severe MR.      Cardiology notes reviewed      Assessment:  Patient Active Problem List    Diagnosis Date Noted    PONV (postoperative nausea and vomiting) 10/09/2024    Constipation 2024    Mitral valve insufficiency 2024    Murmur, cardiac 2024    Age-related osteoporosis with current pathological fracture 2024    Nodular lesion on surface of skin 2024    Chronic insomnia 2023    Panic disorder (episodic paroxysmal anxiety) 2022    Kidney disease, chronic, stage III (GFR 30-59 ml/min) ([facility]) 2020    Hyperlipidemia 2014    Anxiety 2014    Benign essential hypertension 2014    MVP (mitral valve prolapse) 2008         Impression/Plan:    Severe mitral regurgitation, myxomatous mitral valve    Risks and benefits of mitral valve repair vs replacement (tissue) were discussed in detail today with the patient.  They understand and wish to proceed with further workup and ultimately surgical intervention.  We have ordered routine preoperative cardiac cath, ECG, non-contrast  chest CT scan, carotid duplex, dental clearance and  laboratory studies.  Pending the results of these tests, they will be scheduled for surgery on 11/11/24 with Dr. Parker Reddy M.D.    Pre-op instructions reviewed with patient and she was instructed to stop Omega-3 supplement now.    Carley KARIMI Irene was comfortable with our recommendations, and their questions were answered to their satisfaction.  Thank you for allowing us to participate in the care of this patient.     LAURA 5/16/22  - abnormal- GI referral ordered by PCP    SIGNATURE: AZUL Mathias  DATE: October 17, 2024  TIME: 11:44 AM

## 2024-10-17 NOTE — H&P (VIEW-ONLY)
Pre-Op History & Physical - Cardiothoracic Surgery   Carley Bonilla 69 y.o. female MRN: [id number]    Physician Requesting Consult: Dr. Tracey  PCP: Dr. Barrios    Reason for Consult / Principal Problem: Mitral regurgitation    History of Present Illness: Carley Bonilla is a 69 y.o. year old female referred for surgical consultation for severe mitral regurgitation.  Her PMHx is notable for MVP, HTN, HLD, CKD2, panic disorder and chronic insomnia.   Patient gives a history of MVP, discovered in her 20's by her dermatologist and kept under surveillance over the years. Recent echo demonstrates a myxomatous mitral valve with bileaflet prolapse, moderate leaflet thickening, severe regurgitation, severely dilated left atrium and an EF 65%.    Patient is accompanied by her significant other. She reports developing HILL over the past few months., She normally takes daily walks and now needs to stop frequently to rest and catch her breath. She denies chest pian, palpitations, lightheadedness, weight gain or edema.     Patient lives in a condo with her significant other. She is independent with ADL's.    She denies tobacco uses, drinks occasionally, denies drug use.    She was last seen by her dentist in August for a cleaning and Panore[employer] films.     Past Medical History:  Past Medical History:   Diagnosis Date    Anxiety     Cardiac disease     mitral valve regurgitation    Mitral valve disorder          Past Surgical History:   Past Surgical History:   Procedure Laterality Date    ANKLE FRACTURE SURGERY      ORIF WRIST FRACTURE Right 11/21/2016    Procedure: DISTAL RADIUS O/R I/F;  Surgeon: Chuckie Nova MD;  Location: [facility];  Service:     WRIST SURGERY           Family History:  Family History   Problem Relation Age of Onset    No Known Problems Mother     No Known Problems Daughter     No Known Problems Maternal Grandmother     No Known Problems Paternal Grandmother     No Known Problems Maternal Aunt     No Known  Problems Paternal Aunt     Breast cancer Neg Hx          Social History:    Social History     Substance and Sexual Activity   Alcohol Use Yes    Comment: occ     Social History     Substance and Sexual Activity   Drug Use No     Social History     Tobacco Use   Smoking Status Never   Smokeless Tobacco Never       Home Medications:   Prior to Admission medications    Medication Sig Start Date End Date Taking? Authorizing Provider   alendronate (FOSAMAX) 70 mg tablet Take 1 tablet (70 mg total) by mouth every 7 days 6/13/24  Yes Kerrie Barrios MD   ALPRAZolam ER (XANAX XR) 1 MG 24 hr tablet Take 1 mg by mouth daily at bedtime 4/25/24  Yes Historical Provider, MD   atenolol (TENORMIN) 25 mg tablet Take 1 tablet (25 mg total) by mouth daily 6/13/24  Yes Kerrie Barrios MD   Calcium-Vitamin D-Vitamin K (VIACTIV PO) Take by mouth   Yes Historical Provider, MD   EPINEPHrine (EpiPen 2-Miguel) 0.3 mg/0.3 mL SOAJ  11/18/23  Yes Historical Provider, MD   hydrOXYzine HCL (ATARAX) 25 mg tablet daily 2/19/24  Yes Historical Provider, MD   Omega-3 Fatty Acids (FISH OIL) 1,000 mg Take 1,000 mg by mouth daily   Yes Historical Provider, MD   pravastatin (PRAVACHOL) 10 mg tablet TAKE 1 TABLET BY MOUTH EVERY DAY 8/27/24  Yes Kerrie Barrios MD   Propylene Glycol (Systane Balance) 0.6 % SOLN drops: 0.6% 1 drop six times a day into both eyes   Yes Historical Provider, MD   ramelteon (ROZEREM) 8 mg tablet Take 8 mg by mouth daily 3/28/24  Yes Historical Provider, MD   traZODone (DESYREL) 100 mg tablet Take 100 mg by mouth daily 3/28/24  Yes Historical Provider, MD   ALPRAZolam (XANAX) 2 MG tablet Take 1.5 mg by mouth daily at bedtime as needed   Patient not taking: Reported on 10/17/2024    Historical Provider, MD   ALPRAZolam (XANAX) 2 MG tablet Take 2 tablets (4 mg total) by mouth daily at bedtime as needed for anxiety or sleep  Patient not taking: Reported on 10/17/2024 5/4/18   Evelyn Avilez PA-C   cyanocobalamin (VITAMIN B-12) 1,000 mcg  tablet Take by mouth daily  Patient not taking: Reported on 10/17/2024    Historical Provider, MD       Allergies:  Allergies   Allergen Reactions    Morphine And Codeine Other (See Comments)     hallucinations      Bee Venom Hives    Codeine Other (See Comments)     Hallucinations    Penicillins Hives    Sulfa Antibiotics Other (See Comments)     Yeast infections       Review of Systems:  Review of Systems - History obtained from chart review and the patient  General ROS: positive for  - fatigue and change in activity tolerance   negative for - chills or fever  Psychological ROS: negative  Ophthalmic ROS: negative  ENT ROS: negative  Allergy and Immunology ROS: negative  Hematological and Lymphatic ROS: negative  Endocrine ROS: negative  Breast ROS: negative  Respiratory ROS: no cough, shortness of breath, or wheezing  Cardiovascular ROS: positive for - dyspnea on exertion and murmur  negative for - chest pain, edema, irregular heartbeat, orthopnea, palpitations, paroxysmal nocturnal dyspnea, or rapid heart rate  Gastrointestinal ROS: no abdominal pain, change in bowel habits, or black or bloody stools  Genito-Urinary ROS: no dysuria, trouble voiding, or hematuria  Musculoskeletal ROS: negative  Neurological ROS: no TIA or stroke symptoms  Dermatological ROS: negative    Vital Signs:     Vitals:    10/17/24 1042 10/17/24 1053   BP: 127/64 138/65   BP Location: Left arm Right arm   Patient Position: Sitting Sitting   Cuff Size: Standard Standard   Pulse: 81    SpO2: 100%    Weight: 66.2 kg (146 lb)    Height: 5' (1.524 m)        Physical Exam:    General: Alert, oriented, well developed, no acute distress  HEENT/NECK:  PERRLA.  No jugular venous distention.    Cardiac:Regular rate and rhythm, III holosystolic murmur.  Carotid arteries: 2+ pulses,  bruits vs radiation of cardiac murmur  Pulmonary:  Breath sounds clear bilaterally.  Abdomen:  Non-tender, Non-distended.  Positive bowel sounds.   Upper extremities: 2+  radial pulses; brisk capillary refill; RIGHT hand dominance  Lower extremities: Extremities warm/dry. PT/DP pulses 2+ bilaterally.  No edema B/L  Neuro: Alert and oriented X 3.  Sensation is grossly intact.  No focal deficits.  Musculoskeletal: MAEE, stable gait  Skin: Warm/Dry, without rashes or lesions.    Lab Results:     Lab Results   Component Value Date    WBC 7.16 05/10/2024    HGB 13.9 05/10/2024    HCT 43.5 05/10/2024    MCV 90 05/10/2024     05/10/2024     Lab Results   Component Value Date     03/19/2015    SODIUM 141 05/10/2024    K 4.0 05/10/2024     05/10/2024    CO2 31 05/10/2024    ANIONGAP 6 03/19/2015    AGAP 8 05/10/2024    BUN 28 (H) 05/10/2024    CREATININE 0.94 05/10/2024    GLUC 91 10/19/2023    GLUF 102 (H) 05/10/2024    CALCIUM 9.4 05/10/2024    AST 21 05/10/2024    ALT 15 05/10/2024    ALKPHOS 54 05/10/2024    PROT 7.6 03/19/2015    TP 7.3 05/10/2024    BILITOT 0.52 03/19/2015    TBILI 0.51 05/10/2024    EGFR 62 05/10/2024     Lab Results   Component Value Date    CHOLESTEROL 188 05/10/2024    CHOLESTEROL 212 (H) 06/20/2017    CHOLESTEROL 214 (H) 03/02/2017     Lab Results   Component Value Date    HDL 52 05/10/2024    HDL 49 06/20/2017    HDL 59 03/02/2017     Lab Results   Component Value Date    TRIG 138 05/10/2024    TRIG 137 06/20/2017    TRIG 121 03/02/2017     Lab Results   Component Value Date    NONHDLC 136 05/10/2024       Imaging Studies:     3D KWAME: 10/9/24      Left Ventricle: Left ventricular cavity size is normal. Wall thickness is normal. The left ventricular ejection fraction is 65%. Systolic function is vigorous. Wall motion is normal.    Left Atrium: The atrium is severely dilated.    Atrial Septum: No patent foramen ovale detected, confirmed at rest using color doppler.    Left Atrial Appendage: There is normal function. There is no thrombus.    Mitral Valve: The valve is myxomatous. There is moderate diffuse thickening. There is bileaflet prolapse.  There is severe regurgitation.    Tricuspid Valve: There is mild regurgitation.     3D was performed for further investigation, better visualization, and additional quantification of the mitral valve. Results from the utilization of 3D are listed in the report below.     Findings    Left Ventricle Left ventricular cavity size is normal. Wall thickness is normal. The left ventricular ejection fraction is 65%. Systolic function is vigorous. Wall motion is normal.   Right Ventricle Right ventricular cavity size is normal. Systolic function is normal. Wall thickness is normal.   Left Atrium The atrium is severely dilated.   Right Atrium The atrium is normal in size.   Atrial Septum No patent foramen ovale detected, confirmed at rest using color doppler.   Left Atrial Appendage Left atrial appendage is normal in size. There is normal function. There is no thrombus.   Aortic Valve The aortic valve is trileaflet. The leaflets are not thickened. The leaflets are not calcified. The leaflets exhibit normal mobility. There is no evidence of regurgitation. The aortic valve has no significant stenosis.   Mitral Valve The valve is myxomatous. There is moderate diffuse thickening. There is bileaflet prolapse. There is severe regurgitation. There is no evidence of stenosis.   Tricuspid Valve Tricuspid valve structure is normal. There is mild regurgitation. There is no evidence of stenosis.   Pulmonic Valve Pulmonic valve structure is normal. There is trace regurgitation. There is no evidence of stenosis.   Ascending Aorta The aortic root is normal in size. There is no evidence of aortic dissection. There is no aneurysm in the aorta.     Echocardiogram: 10/2/24      Left Ventricle: Left ventricular cavity size is normal. Wall thickness is normal. The left ventricular ejection fraction is 65%. Systolic function is normal. Wall motion is normal. Diastolic function is moderately abnormal, consistent with grade II (pseudonormal)  relaxation.    Left Atrium: The atrium is severely dilated.    Atrial Septum: The septum bows into the right atrium, suggesting increased left atrial pressure.    Mitral Valve: The valve is myxomatous. There is moderate diffuse thickening. There is bileaflet prolapse, posterior greater than anterior. There is moderate to severe regurgitation with an eccentrically directed jet.    Tricuspid Valve: There is mild regurgitation. The right ventricular systolic pressure is mildly elevated. The estimated right ventricular systolic pressure is 40.00 mmHg.     Consider KWAME to further evaluate the severity of mitral regurgitation, if clinically indicated.       EC24    Sinus rhythm. 68 bpm. Left atrial enlargement.     Results Review Statement: I personally reviewed the following image studies in PACS and associated radiology reports: Echocardiogram. My interpretation of the radiology images/reports is: severe MR.      Cardiology notes reviewed      Assessment:  Patient Active Problem List    Diagnosis Date Noted    PONV (postoperative nausea and vomiting) 10/09/2024    Constipation 2024    Mitral valve insufficiency 2024    Murmur, cardiac 2024    Age-related osteoporosis with current pathological fracture 2024    Nodular lesion on surface of skin 2024    Chronic insomnia 2023    Panic disorder (episodic paroxysmal anxiety) 2022    Kidney disease, chronic, stage III (GFR 30-59 ml/min) ([facility]) 2020    Hyperlipidemia 2014    Anxiety 2014    Benign essential hypertension 2014    MVP (mitral valve prolapse) 2008         Impression/Plan:    Severe mitral regurgitation, myxomatous mitral valve    Risks and benefits of mitral valve repair vs replacement (tissue) were discussed in detail today with the patient.  They understand and wish to proceed with further workup and ultimately surgical intervention.  We have ordered routine preoperative cardiac cath,  ECG, non-contrast chest CT scan, carotid duplex, dental clearance and  laboratory studies.  Pending the results of these tests, they will be scheduled for surgery on 11/11/24 with Dr. Parker Reddy M.D.    Pre-op instructions reviewed with patient and she was instructed to stop Omega-3 supplement now.    Carley Bonilla was comfortable with our recommendations, and their questions were answered to their satisfaction.  Thank you for allowing us to participate in the care of this patient.     LAURA 5/16/22  - abnormal- GI referral ordered by PCP    SIGNATURE: AZUL Mathias  DATE: October 17, 2024  TIME: 11:44 AM

## 2024-10-17 NOTE — LETTER
October 17, 2024     Kerrie Barrios MD  [address] [facility] [id number]    Patient: Carley Bonilla   YOB: 1955   Date of Visit: 10/17/2024       Dear Dr. Barrios:    Thank you for referring Carley Bonilla to me for evaluation. Below are my notes for this consultation.    If you have questions, please do not hesitate to call me. I look forward to following your patient along with you.         Sincerely,        Parker Reddy MD        CC: MD Hilary Pradhan CRNP  10/17/2024 12:20 PM  Attested  Consultation - Cardiothoracic Surgery   Carley Bonilla 69 y.o. female MRN: [id number]    Physician Requesting Consult: Dr. Tracey  PCP: Dr. Barrios    Reason for Consult / Principal Problem: Mitral regurgitation    History of Present Illness: Carley Bonilla is a 69 y.o. year old female referred for surgical consultation for severe mitral regurgitation.  Her PMHx is notable for MVP, HTN, HLD, CKD2, panic disorder and chronic insomnia.   Patient gives a history of MVP, discovered in her 20's by her dermatologist and kept under surveillance over the years. Recent echo demonstrates a myxomatous mitral valve with bileaflet prolapse, moderate leaflet thickening, severe regurgitation, severely dilated left atrium and an EF 65%.    Patient is accompanied by her significant other. She reports developing HILL over the past few months., She normally takes daily walks and now needs to stop frequently to rest and catch her breath. She denies chest pian, palpitations, lightheadedness, weight gain or edema.     Patient lives in a condo with her significant other. She is independent with ADL's.    She denies tobacco uses, drinks occasionally, denies drug use.    She was last seen by her dentist in August for a cleaning and Panorex films.     Past Medical History:  Past Medical History:   Diagnosis Date   • Anxiety    • Cardiac disease     mitral valve regurgitation   • Mitral valve disorder          Past Surgical  History:   Past Surgical History:   Procedure Laterality Date   • ANKLE FRACTURE SURGERY     • ORIF WRIST FRACTURE Right 11/21/2016    Procedure: DISTAL RADIUS O/R I/F;  Surgeon: Chuckie Nova MD;  Location: [facility];  Service:    • WRIST SURGERY           Family History:  Family History   Problem Relation Age of Onset   • No Known Problems Mother    • No Known Problems Daughter    • No Known Problems Maternal Grandmother    • No Known Problems Paternal Grandmother    • No Known Problems Maternal Aunt    • No Known Problems Paternal Aunt    • Breast cancer Neg Hx          Social History:    Social History     Substance and Sexual Activity   Alcohol Use Yes    Comment: occ     Social History     Substance and Sexual Activity   Drug Use No     Social History     Tobacco Use   Smoking Status Never   Smokeless Tobacco Never       Home Medications:   Prior to Admission medications    Medication Sig Start Date End Date Taking? Authorizing Provider   alendronate (FOSAMAX) 70 mg tablet Take 1 tablet (70 mg total) by mouth every 7 days 6/13/24  Yes Kerrie Barrios MD   ALPRAZolam ER (XANAX XR) 1 MG 24 hr tablet Take 1 mg by mouth daily at bedtime 4/25/24  Yes Historical Provider, MD   atenolol (TENORMIN) 25 mg tablet Take 1 tablet (25 mg total) by mouth daily 6/13/24  Yes Kerrie Barrios MD   Calcium-Vitamin D-Vitamin K (VIACTIV PO) Take by mouth   Yes Historical Provider, MD   EPINEPHrine (EpiPen 2-Miguel) 0.3 mg/0.3 mL SOAJ  11/18/23  Yes Historical Provider, MD   hydrOXYzine HCL (ATARAX) 25 mg tablet daily 2/19/24  Yes Historical Provider, MD   Omega-3 Fatty Acids (FISH OIL) 1,000 mg Take 1,000 mg by mouth daily   Yes Historical Provider, MD   pravastatin (PRAVACHOL) 10 mg tablet TAKE 1 TABLET BY MOUTH EVERY DAY 8/27/24  Yes Kerrie Barrios MD   Propylene Glycol (Systane Balance) 0.6 % SOLN drops: 0.6% 1 drop six times a day into both eyes   Yes Historical Provider, MD   ramelteon (ROZEREM) 8 mg tablet Take 8 mg by mouth daily  3/28/24  Yes Historical Provider, MD   traZODone (DESYREL) 100 mg tablet Take 100 mg by mouth daily 3/28/24  Yes Historical Provider, MD   ALPRAZolam (XANAX) 2 MG tablet Take 1.5 mg by mouth daily at bedtime as needed   Patient not taking: Reported on 10/17/2024    Historical Provider, MD   ALPRAZolam (XANAX) 2 MG tablet Take 2 tablets (4 mg total) by mouth daily at bedtime as needed for anxiety or sleep  Patient not taking: Reported on 10/17/2024 5/4/18   Evelyn Avilez PA-C   cyanocobalamin (VITAMIN B-12) 1,000 mcg tablet Take by mouth daily  Patient not taking: Reported on 10/17/2024    Historical Provider, MD       Allergies:  Allergies   Allergen Reactions   • Morphine And Codeine Other (See Comments)     hallucinations     • Bee Venom Hives   • Codeine Other (See Comments)     Hallucinations   • Penicillins Hives   • Sulfa Antibiotics Other (See Comments)     Yeast infections       Review of Systems:  Review of Systems - History obtained from chart review and the patient  General ROS: positive for  - fatigue and change in activity tolerance   negative for - chills or fever  Psychological ROS: negative  Ophthalmic ROS: negative  ENT ROS: negative  Allergy and Immunology ROS: negative  Hematological and Lymphatic ROS: negative  Endocrine ROS: negative  Breast ROS: negative  Respiratory ROS: no cough, shortness of breath, or wheezing  Cardiovascular ROS: positive for - dyspnea on exertion and murmur  negative for - chest pain, edema, irregular heartbeat, orthopnea, palpitations, paroxysmal nocturnal dyspnea, or rapid heart rate  Gastrointestinal ROS: no abdominal pain, change in bowel habits, or black or bloody stools  Genito-Urinary ROS: no dysuria, trouble voiding, or hematuria  Musculoskeletal ROS: negative  Neurological ROS: no TIA or stroke symptoms  Dermatological ROS: negative    Vital Signs:     Vitals:    10/17/24 1042 10/17/24 1053   BP: 127/64 138/65   BP Location: Left arm Right arm   Patient  Position: Sitting Sitting   Cuff Size: Standard Standard   Pulse: 81    SpO2: 100%    Weight: 66.2 kg (146 lb)    Height: 5' (1.524 m)        Physical Exam:    General: Alert, oriented, well developed, no acute distress  HEENT/NECK:  PERRLA.  No jugular venous distention.    Cardiac:Regular rate and rhythm, III holosystolic murmur.  Carotid arteries: 2+ pulses,  bruits vs radiation of cardiac murmur  Pulmonary:  Breath sounds clear bilaterally.  Abdomen:  Non-tender, Non-distended.  Positive bowel sounds.   Upper extremities: 2+ radial pulses; brisk capillary refill; RIGHT hand dominance  Lower extremities: Extremities warm/dry. PT/DP pulses 2+ bilaterally.  No edema B/L  Neuro: Alert and oriented X 3.  Sensation is grossly intact.  No focal deficits.  Musculoskeletal: MAEE, stable gait  Skin: Warm/Dry, without rashes or lesions.    Lab Results:     Lab Results   Component Value Date    WBC 7.16 05/10/2024    HGB 13.9 05/10/2024    HCT 43.5 05/10/2024    MCV 90 05/10/2024     05/10/2024     Lab Results   Component Value Date     03/19/2015    SODIUM 141 05/10/2024    K 4.0 05/10/2024     05/10/2024    CO2 31 05/10/2024    ANIONGAP 6 03/19/2015    AGAP 8 05/10/2024    BUN 28 (H) 05/10/2024    CREATININE 0.94 05/10/2024    GLUC 91 10/19/2023    GLUF 102 (H) 05/10/2024    CALCIUM 9.4 05/10/2024    AST 21 05/10/2024    ALT 15 05/10/2024    ALKPHOS 54 05/10/2024    PROT 7.6 03/19/2015    TP 7.3 05/10/2024    BILITOT 0.52 03/19/2015    TBILI 0.51 05/10/2024    EGFR 62 05/10/2024     Lab Results   Component Value Date    CHOLESTEROL 188 05/10/2024    CHOLESTEROL 212 (H) 06/20/2017    CHOLESTEROL 214 (H) 03/02/2017     Lab Results   Component Value Date    HDL 52 05/10/2024    HDL 49 06/20/2017    HDL 59 03/02/2017     Lab Results   Component Value Date    TRIG 138 05/10/2024    TRIG 137 06/20/2017    TRIG 121 03/02/2017     Lab Results   Component Value Date    NONHDLC 136 05/10/2024       Imaging  Studies:     3D KWAME: 10/9/24    •  Left Ventricle: Left ventricular cavity size is normal. Wall thickness is normal. The left ventricular ejection fraction is 65%. Systolic function is vigorous. Wall motion is normal.  •  Left Atrium: The atrium is severely dilated.  •  Atrial Septum: No patent foramen ovale detected, confirmed at rest using color doppler.  •  Left Atrial Appendage: There is normal function. There is no thrombus.  •  Mitral Valve: The valve is myxomatous. There is moderate diffuse thickening. There is bileaflet prolapse. There is severe regurgitation.  •  Tricuspid Valve: There is mild regurgitation.     3D was performed for further investigation, better visualization, and additional quantification of the mitral valve. Results from the utilization of 3D are listed in the report below.     Findings    Left Ventricle Left ventricular cavity size is normal. Wall thickness is normal. The left ventricular ejection fraction is 65%. Systolic function is vigorous. Wall motion is normal.   Right Ventricle Right ventricular cavity size is normal. Systolic function is normal. Wall thickness is normal.   Left Atrium The atrium is severely dilated.   Right Atrium The atrium is normal in size.   Atrial Septum No patent foramen ovale detected, confirmed at rest using color doppler.   Left Atrial Appendage Left atrial appendage is normal in size. There is normal function. There is no thrombus.   Aortic Valve The aortic valve is trileaflet. The leaflets are not thickened. The leaflets are not calcified. The leaflets exhibit normal mobility. There is no evidence of regurgitation. The aortic valve has no significant stenosis.   Mitral Valve The valve is myxomatous. There is moderate diffuse thickening. There is bileaflet prolapse. There is severe regurgitation. There is no evidence of stenosis.   Tricuspid Valve Tricuspid valve structure is normal. There is mild regurgitation. There is no evidence of stenosis.    Pulmonic Valve Pulmonic valve structure is normal. There is trace regurgitation. There is no evidence of stenosis.   Ascending Aorta The aortic root is normal in size. There is no evidence of aortic dissection. There is no aneurysm in the aorta.     Echocardiogram: 10/2/24    •  Left Ventricle: Left ventricular cavity size is normal. Wall thickness is normal. The left ventricular ejection fraction is 65%. Systolic function is normal. Wall motion is normal. Diastolic function is moderately abnormal, consistent with grade II (pseudonormal) relaxation.  •  Left Atrium: The atrium is severely dilated.  •  Atrial Septum: The septum bows into the right atrium, suggesting increased left atrial pressure.  •  Mitral Valve: The valve is myxomatous. There is moderate diffuse thickening. There is bileaflet prolapse, posterior greater than anterior. There is moderate to severe regurgitation with an eccentrically directed jet.  •  Tricuspid Valve: There is mild regurgitation. The right ventricular systolic pressure is mildly elevated. The estimated right ventricular systolic pressure is 40.00 mmHg.     Consider KWAME to further evaluate the severity of mitral regurgitation, if clinically indicated.       EC24    Sinus rhythm. 68 bpm. Left atrial enlargement.     Results Review Statement: I personally reviewed the following image studies in PACS and associated radiology reports: Echocardiogram. My interpretation of the radiology images/reports is: severe MR.      Cardiology notes reviewed      Assessment:  Patient Active Problem List    Diagnosis Date Noted   • PONV (postoperative nausea and vomiting) 10/09/2024   • Constipation 2024   • Mitral valve insufficiency 2024   • Murmur, cardiac 2024   • Age-related osteoporosis with current pathological fracture 2024   • Nodular lesion on surface of skin 2024   • Chronic insomnia 2023   • Panic disorder (episodic paroxysmal anxiety) 2022    • Kidney disease, chronic, stage III (GFR 30-59 ml/min) ([facility]) 06/11/2020   • Hyperlipidemia 05/27/2014   • Anxiety 01/22/2014   • Benign essential hypertension 01/22/2014   • MVP (mitral valve prolapse) 01/09/2008         Impression/Plan:    Severe mitral regurgitation, myxomatous mitral valve    Risks and benefits of mitral valve repair vs replacement (tissue) were discussed in detail today with the patient.  They understand and wish to proceed with further workup and ultimately surgical intervention.  We have ordered routine preoperative cardiac cath, ECG, non-contrast chest CT scan, carotid duplex, dental clearance and  laboratory studies.  Pending the results of these tests, they will be scheduled for surgery on 11/11/24 with Dr. Parker Reddy M.D.    Pre-op instructions reviewed with patient and she was instructed to stop Omega-3 supplement now.    Carley Bonilla was comfortable with our recommendations, and their questions were answered to their satisfaction.  Thank you for allowing us to participate in the care of this patient.     LAURA 5/16/22  - abnormal- GI referral ordered by PCP    SIGNATURE: AZUL Mathias  DATE: October 17, 2024  TIME: 11:44 AM  Attestation signed by Parker Reddy MD at 10/17/2024 12:30 PM:  Attending Attestation:    I supervised the Advanced Practitioner on 10/17/2024.  I discussed the case with the Advanced Practitioner, reviewed the note and agree.    The patient is a 69-year-old female referred for evaluation of symptomatic severe mitral regurgitation.  She has history of mitral valve prolapse, HTN, HLD and CKD2.  She complains of dyspnea with exertion.  I personally reviewed her diagnostic images, a TTE on 10/2/2024 and a KWAME on 10/9/2024 shows EF 65%, there is severe mitral regurgitation secondary to bileaflet prolapse in the setting of a myxomatous valve.  The prolapse is more significant in the posterior leaflet.  There is mild TR.  I reviewed the diagnosis  with the patient and the treatment options, I do recommend mitral valve surgery, in this case I think the probability of successful repair is greater than 90%.  I explained the procedure, benefits, risk and possible complications.  She understands and agrees to proceed with surgery.  She will return for an elective mitral valve repair.

## 2024-10-18 ENCOUNTER — PREP FOR PROCEDURE (OUTPATIENT)
Dept: CARDIOLOGY CLINIC | Facility: CLINIC | Age: 69
End: 2024-10-18

## 2024-10-18 DIAGNOSIS — I34.0 NONRHEUMATIC MITRAL VALVE REGURGITATION: Primary | ICD-10-CM

## 2024-10-18 NOTE — TELEPHONE ENCOUNTER
Patient scheduled for RIGHT & LEFT  Heart Cath on 10/30/24 at [facility] with Dr. SAINI.        10/18/24 Mailed patient instructions.     Patient aware of all general instructions.    Medication holds:  NO MED HOLDS     Labs to be done NO LATER THAN 10/25/24  CMP / CBC (fasting 8 hours)        Dr. Tracey, can you please place prep for procedure.     Thank you,   Za

## 2024-10-22 ENCOUNTER — HOSPITAL ENCOUNTER (OUTPATIENT)
Dept: VASCULAR ULTRASOUND | Facility: HOSPITAL | Age: 69
Discharge: HOME | End: 2024-10-22
Payer: COMMERCIAL

## 2024-10-22 DIAGNOSIS — I34.0 NONRHEUMATIC MITRAL VALVE REGURGITATION: ICD-10-CM

## 2024-10-22 DIAGNOSIS — Z13.6 ENCOUNTER FOR SCREENING FOR STENOSIS OF CAROTID ARTERY: ICD-10-CM

## 2024-10-22 DIAGNOSIS — I34.1 MVP (MITRAL VALVE PROLAPSE): ICD-10-CM

## 2024-10-22 PROCEDURE — 93880 EXTRACRANIAL BILAT STUDY: CPT | Performed by: INTERNAL MEDICINE

## 2024-10-22 PROCEDURE — 93880 EXTRACRANIAL BILAT STUDY: CPT

## 2024-10-23 ENCOUNTER — HOSPITAL ENCOUNTER (OUTPATIENT)
Dept: CT IMAGING | Facility: HOSPITAL | Age: 69
Discharge: HOME | End: 2024-10-23
Payer: COMMERCIAL

## 2024-10-23 DIAGNOSIS — I34.0 NONRHEUMATIC MITRAL VALVE REGURGITATION: ICD-10-CM

## 2024-10-23 DIAGNOSIS — I34.1 MVP (MITRAL VALVE PROLAPSE): ICD-10-CM

## 2024-10-23 PROCEDURE — 71250 CT THORAX DX C-: CPT

## 2024-10-24 ENCOUNTER — APPOINTMENT (OUTPATIENT)
Dept: LAB | Facility: CLINIC | Age: 69
End: 2024-10-24
Payer: COMMERCIAL

## 2024-10-24 ENCOUNTER — LAB REQUISITION (OUTPATIENT)
Dept: LAB | Facility: HOSPITAL | Age: 69
End: 2024-10-24
Payer: COMMERCIAL

## 2024-10-24 DIAGNOSIS — I34.0 NONRHEUMATIC MITRAL VALVE REGURGITATION: ICD-10-CM

## 2024-10-24 DIAGNOSIS — E78.2 MIXED HYPERLIPIDEMIA: ICD-10-CM

## 2024-10-24 DIAGNOSIS — I34.1 MVP (MITRAL VALVE PROLAPSE): ICD-10-CM

## 2024-10-24 DIAGNOSIS — I34.0 NONRHEUMATIC MITRAL (VALVE) INSUFFICIENCY: ICD-10-CM

## 2024-10-24 DIAGNOSIS — Z13.1 ENCOUNTER FOR SCREENING FOR DIABETES MELLITUS: ICD-10-CM

## 2024-10-24 LAB
ABO GROUP BLD: NORMAL
ALBUMIN SERPL BCG-MCNC: 3.9 G/DL (ref 3.5–5)
ALP SERPL-CCNC: 56 U/L (ref 34–104)
ALT SERPL W P-5'-P-CCNC: 18 U/L (ref 7–52)
ANION GAP SERPL CALCULATED.3IONS-SCNC: 5 MMOL/L (ref 4–13)
AST SERPL W P-5'-P-CCNC: 21 U/L (ref 13–39)
ATRIAL RATE: 71 BPM
BILIRUB SERPL-MCNC: 0.45 MG/DL (ref 0.2–1)
BLD GP AB SCN SERPL QL: NEGATIVE
BUN SERPL-MCNC: 23 MG/DL (ref 5–25)
CALCIUM SERPL-MCNC: 9.4 MG/DL (ref 8.4–10.2)
CHLORIDE SERPL-SCNC: 103 MMOL/L (ref 96–108)
CHOLEST SERPL-MCNC: 188 MG/DL
CO2 SERPL-SCNC: 30 MMOL/L (ref 21–32)
CREAT SERPL-MCNC: 0.92 MG/DL (ref 0.6–1.3)
ERYTHROCYTE [DISTWIDTH] IN BLOOD BY AUTOMATED COUNT: 13.2 % (ref 11.6–15.1)
EST. AVERAGE GLUCOSE BLD GHB EST-MCNC: 123 MG/DL
GFR SERPL CREATININE-BSD FRML MDRD: 64 ML/MIN/1.73SQ M
GLUCOSE P FAST SERPL-MCNC: 94 MG/DL (ref 65–99)
HBA1C MFR BLD: 5.9 %
HCT VFR BLD AUTO: 41.7 % (ref 34.8–46.1)
HDLC SERPL-MCNC: 47 MG/DL
HGB BLD-MCNC: 13.7 G/DL (ref 11.5–15.4)
INR PPP: 0.92 (ref 0.85–1.19)
LDLC SERPL CALC-MCNC: 115 MG/DL (ref 0–100)
MCH RBC QN AUTO: 29.5 PG (ref 26.8–34.3)
MCHC RBC AUTO-ENTMCNC: 32.9 G/DL (ref 31.4–37.4)
MCV RBC AUTO: 90 FL (ref 82–98)
NONHDLC SERPL-MCNC: 141 MG/DL
P AXIS: 59 DEGREES
PLATELET # BLD AUTO: 206 THOUSANDS/UL (ref 149–390)
PMV BLD AUTO: 10.7 FL (ref 8.9–12.7)
POTASSIUM SERPL-SCNC: 4.4 MMOL/L (ref 3.5–5.3)
PR INTERVAL: 138 MS
PROT SERPL-MCNC: 7.2 G/DL (ref 6.4–8.4)
PROTHROMBIN TIME: 13.1 SECONDS (ref 12.3–15)
QRS AXIS: -10 DEGREES
QRSD INTERVAL: 86 MS
QT INTERVAL: 406 MS
QTC INTERVAL: 441 MS
RBC # BLD AUTO: 4.64 MILLION/UL (ref 3.81–5.12)
RH BLD: POSITIVE
SODIUM SERPL-SCNC: 138 MMOL/L (ref 135–147)
SPECIMEN EXPIRATION DATE: NORMAL
T WAVE AXIS: 41 DEGREES
TRIGL SERPL-MCNC: 130 MG/DL
VENTRICULAR RATE: 71 BPM
WBC # BLD AUTO: 6.82 THOUSAND/UL (ref 4.31–10.16)

## 2024-10-24 PROCEDURE — 80061 LIPID PANEL: CPT

## 2024-10-24 PROCEDURE — 93005 ELECTROCARDIOGRAM TRACING: CPT

## 2024-10-24 PROCEDURE — 80053 COMPREHEN METABOLIC PANEL: CPT

## 2024-10-24 PROCEDURE — 93010 ELECTROCARDIOGRAM REPORT: CPT | Performed by: INTERNAL MEDICINE

## 2024-10-24 PROCEDURE — 87081 CULTURE SCREEN ONLY: CPT

## 2024-10-24 PROCEDURE — 86850 RBC ANTIBODY SCREEN: CPT | Performed by: NURSE PRACTITIONER

## 2024-10-24 PROCEDURE — 85610 PROTHROMBIN TIME: CPT

## 2024-10-24 PROCEDURE — 85027 COMPLETE CBC AUTOMATED: CPT

## 2024-10-24 PROCEDURE — 86900 BLOOD TYPING SEROLOGIC ABO: CPT | Performed by: NURSE PRACTITIONER

## 2024-10-24 PROCEDURE — 86901 BLOOD TYPING SEROLOGIC RH(D): CPT | Performed by: NURSE PRACTITIONER

## 2024-10-24 PROCEDURE — 36415 COLL VENOUS BLD VENIPUNCTURE: CPT

## 2024-10-24 PROCEDURE — 83036 HEMOGLOBIN GLYCOSYLATED A1C: CPT

## 2024-10-25 LAB — MRSA NOSE QL CULT: NORMAL

## 2024-10-30 ENCOUNTER — HOSPITAL ENCOUNTER (OUTPATIENT)
Facility: HOSPITAL | Age: 69
Setting detail: OUTPATIENT SURGERY
Discharge: HOME | End: 2024-10-30
Attending: INTERNAL MEDICINE | Admitting: INTERNAL MEDICINE
Payer: COMMERCIAL

## 2024-10-30 VITALS
TEMPERATURE: 97.9 F | BODY MASS INDEX: 26.5 KG/M2 | HEART RATE: 66 BPM | SYSTOLIC BLOOD PRESSURE: 97 MMHG | RESPIRATION RATE: 16 BRPM | WEIGHT: 135 LBS | OXYGEN SATURATION: 95 % | HEIGHT: 60 IN | DIASTOLIC BLOOD PRESSURE: 37 MMHG

## 2024-10-30 DIAGNOSIS — I34.0 NONRHEUMATIC MITRAL VALVE REGURGITATION: ICD-10-CM

## 2024-10-30 LAB
ANION GAP SERPL CALCULATED.3IONS-SCNC: 4 MMOL/L (ref 4–13)
ATRIAL RATE: 70 BPM
BUN SERPL-MCNC: 24 MG/DL (ref 5–25)
CALCIUM SERPL-MCNC: 9.2 MG/DL (ref 8.4–10.2)
CHLORIDE SERPL-SCNC: 104 MMOL/L (ref 96–108)
CO2 SERPL-SCNC: 31 MMOL/L (ref 21–32)
CREAT SERPL-MCNC: 0.97 MG/DL (ref 0.6–1.3)
GFR SERPL CREATININE-BSD FRML MDRD: 60 ML/MIN/1.73SQ M
GLUCOSE P FAST SERPL-MCNC: 90 MG/DL (ref 65–99)
GLUCOSE SERPL-MCNC: 90 MG/DL (ref 65–140)
P AXIS: 48 DEGREES
POTASSIUM SERPL-SCNC: 4.3 MMOL/L (ref 3.5–5.3)
PR INTERVAL: 134 MS
QRS AXIS: -7 DEGREES
QRSD INTERVAL: 92 MS
QT INTERVAL: 416 MS
QTC INTERVAL: 449 MS
SODIUM SERPL-SCNC: 139 MMOL/L (ref 135–147)
T WAVE AXIS: 31 DEGREES
VENTRICULAR RATE: 70 BPM

## 2024-10-30 PROCEDURE — 80048 BASIC METABOLIC PNL TOTAL CA: CPT | Performed by: INTERNAL MEDICINE

## 2024-10-30 PROCEDURE — 93458 L HRT ARTERY/VENTRICLE ANGIO: CPT | Performed by: INTERNAL MEDICINE

## 2024-10-30 PROCEDURE — C1769 GUIDE WIRE: HCPCS | Performed by: INTERNAL MEDICINE

## 2024-10-30 PROCEDURE — 99152 MOD SED SAME PHYS/QHP 5/>YRS: CPT | Performed by: INTERNAL MEDICINE

## 2024-10-30 PROCEDURE — 93005 ELECTROCARDIOGRAM TRACING: CPT

## 2024-10-30 PROCEDURE — 93010 ELECTROCARDIOGRAM REPORT: CPT | Performed by: INTERNAL MEDICINE

## 2024-10-30 PROCEDURE — 99153 MOD SED SAME PHYS/QHP EA: CPT | Performed by: INTERNAL MEDICINE

## 2024-10-30 PROCEDURE — C1894 INTRO/SHEATH, NON-LASER: HCPCS | Performed by: INTERNAL MEDICINE

## 2024-10-30 RX ORDER — SODIUM CHLORIDE 9 MG/ML
125 INJECTION, SOLUTION INTRAVENOUS CONTINUOUS
Status: DISCONTINUED | OUTPATIENT
Start: 2024-10-30 | End: 2024-10-30 | Stop reason: HOSPADM

## 2024-10-30 RX ORDER — ASPIRIN 81 MG/1
324 TABLET, CHEWABLE ORAL ONCE
Status: COMPLETED | OUTPATIENT
Start: 2024-10-30 | End: 2024-10-30

## 2024-10-30 RX ORDER — VERAPAMIL HYDROCHLORIDE 2.5 MG/ML
INJECTION, SOLUTION INTRAVENOUS CODE/TRAUMA/SEDATION MEDICATION
Status: DISCONTINUED | OUTPATIENT
Start: 2024-10-30 | End: 2024-10-30 | Stop reason: HOSPADM

## 2024-10-30 RX ORDER — ONDANSETRON 2 MG/ML
4 INJECTION INTRAMUSCULAR; INTRAVENOUS ONCE
Status: COMPLETED | OUTPATIENT
Start: 2024-10-30 | End: 2024-10-30

## 2024-10-30 RX ORDER — FENTANYL CITRATE 50 UG/ML
INJECTION, SOLUTION INTRAMUSCULAR; INTRAVENOUS CODE/TRAUMA/SEDATION MEDICATION
Status: DISCONTINUED | OUTPATIENT
Start: 2024-10-30 | End: 2024-10-30 | Stop reason: HOSPADM

## 2024-10-30 RX ORDER — MIDAZOLAM HYDROCHLORIDE 2 MG/2ML
INJECTION, SOLUTION INTRAMUSCULAR; INTRAVENOUS CODE/TRAUMA/SEDATION MEDICATION
Status: DISCONTINUED | OUTPATIENT
Start: 2024-10-30 | End: 2024-10-30 | Stop reason: HOSPADM

## 2024-10-30 RX ORDER — HEPARIN SODIUM 1000 [USP'U]/ML
INJECTION, SOLUTION INTRAVENOUS; SUBCUTANEOUS CODE/TRAUMA/SEDATION MEDICATION
Status: DISCONTINUED | OUTPATIENT
Start: 2024-10-30 | End: 2024-10-30 | Stop reason: HOSPADM

## 2024-10-30 RX ORDER — LIDOCAINE HYDROCHLORIDE 10 MG/ML
INJECTION, SOLUTION EPIDURAL; INFILTRATION; INTRACAUDAL; PERINEURAL CODE/TRAUMA/SEDATION MEDICATION
Status: DISCONTINUED | OUTPATIENT
Start: 2024-10-30 | End: 2024-10-30 | Stop reason: HOSPADM

## 2024-10-30 RX ORDER — NITROGLYCERIN 20 MG/100ML
INJECTION INTRAVENOUS CODE/TRAUMA/SEDATION MEDICATION
Status: DISCONTINUED | OUTPATIENT
Start: 2024-10-30 | End: 2024-10-30 | Stop reason: HOSPADM

## 2024-10-30 RX ADMIN — SODIUM CHLORIDE 125 ML/HR: 0.9 INJECTION, SOLUTION INTRAVENOUS at 09:31

## 2024-10-30 RX ADMIN — ASPIRIN 81 MG CHEWABLE TABLET 324 MG: 81 TABLET CHEWABLE at 09:29

## 2024-10-30 RX ADMIN — ONDANSETRON 4 MG: 2 INJECTION INTRAMUSCULAR; INTRAVENOUS at 09:30

## 2024-10-30 NOTE — INTERVAL H&P NOTE
H&P reviewed. After examining the patient, I find no changed to the H&P since it had been written.       /60 (BP Location: Left arm)   Pulse 73   Temp 97.9 °F (36.6 °C) (Temporal)   Resp 16   Ht 5' (1.524 m)   Wt 61.2 kg (135 lb)   LMP  (LMP Unknown)   SpO2 95%   BMI 26.37 kg/m²       CATH FOR PRE SURGICAL EVAL FOR MVR.  Patient re-evaluated. Accept as history and physical.    AZUL Church/October 30, 2024/9:22 AM

## 2024-10-30 NOTE — DISCHARGE INSTR - AVS FIRST PAGE
1. Please see the post cardiac catheterization dishcarge instructions.   No heavy lifting, greater than 10 lbs. or strenuous  activity for 48 hrs.    2.Remove band aid tomorrow.  Shower and wash area- wrist gently with soap and water- beginning tomorrow. Rinse and pat dry.  Apply new water seal band aid.  Repeat this process for 5 days. No powders, creams lotions or antibiotic ointments  for 5 days.  No tub baths, hot tubs or swimming for 5 days.     3. Please call our office ([phone]) if you have any fever, redness, swelling, discharge from your wrist access site.    4.No driving for 1 day

## 2024-10-30 NOTE — INTERVAL H&P NOTE
H&P reviewed. After examining the patient I find no changes in the patients condition since the H&P had been written.    Vitals:    10/30/24 0829   BP: 126/60   Pulse: 73   Resp: 16   Temp: 97.9 °F (36.6 °C)   SpO2: 95%     Patient seen and examined at bedside.    Brief overview of procedure discussed with patient.  Indication, risk, benefits and alternatives discussed with patient who verbalized understanding.  All questions addressed fully.  Patient elected to proceed with planned procedure, consent completed.    Patient remains clinically stable.  Renal function, coagulation profile, and hemoglobin all within normal limits.  Patient has no planned upcoming surgical procedures at this time.    We will proceed with planned procedure.    Terrell Gonzalez

## 2024-10-31 ENCOUNTER — TELEPHONE (OUTPATIENT)
Dept: CARDIAC SURGERY | Facility: CLINIC | Age: 69
End: 2024-10-31

## 2024-10-31 NOTE — TELEPHONE ENCOUNTER
Called patient and reviewed pre-op testing. Specifically, incidental findings of small pulmonary nodules that is recommended followup imaging in 6-12 months. Will notify PCP.

## 2024-11-05 DIAGNOSIS — I34.0 MITRAL VALVE INSUFFICIENCY, UNSPECIFIED ETIOLOGY: ICD-10-CM

## 2024-11-05 DIAGNOSIS — I34.1 MVP (MITRAL VALVE PROLAPSE): ICD-10-CM

## 2024-11-06 ENCOUNTER — TELEPHONE (OUTPATIENT)
Age: 69
End: 2024-11-06

## 2024-11-06 RX ORDER — ATENOLOL 25 MG/1
25 TABLET ORAL DAILY
Qty: 90 TABLET | Refills: 1 | Status: ON HOLD | OUTPATIENT
Start: 2024-11-06

## 2024-11-06 NOTE — TELEPHONE ENCOUNTER
Pt calling int to make sure the correct number was listed in her chart because she never received a pre-op phone call the night before her cardiac cath to let her know what time to report to the hospital.  Pt was told the wrong number was listed in the chart.  Verified pt's correct phone number as [phone].

## 2024-11-11 ENCOUNTER — TELEPHONE (OUTPATIENT)
Age: 69
End: 2024-11-11

## 2024-11-11 DIAGNOSIS — M80.00XS AGE-RELATED OSTEOPOROSIS WITH CURRENT PATHOLOGICAL FRACTURE, SEQUELA: Primary | ICD-10-CM

## 2024-11-11 RX ORDER — IBANDRONATE SODIUM 150 MG/1
150 TABLET, FILM COATED ORAL
Qty: 3 TABLET | Refills: 3 | Status: SHIPPED | OUTPATIENT
Start: 2024-11-11

## 2024-11-11 NOTE — TELEPHONE ENCOUNTER
Pt. Was prescribed Alendronate (FOSAMAX) AND it gives her headaches. [facility] from [employer] called and she said there are alternatives she can try. She named two you can try for her.  The first is Ibandronate 150 sodium Tablet and  Risterdronate 150 sodium tablet.

## 2024-11-14 ENCOUNTER — ANESTHESIA EVENT (INPATIENT)
Dept: PERIOP | Facility: HOSPITAL | Age: 69
DRG: 219 | End: 2024-11-14
Payer: COMMERCIAL

## 2024-11-14 LAB
ABO GROUP BLD: NORMAL
BLD GP AB SCN SERPL QL: NEGATIVE
RH BLD: POSITIVE
SPECIMEN EXPIRATION DATE: NORMAL

## 2024-11-14 NOTE — ANESTHESIA PREPROCEDURE EVALUATION
Procedure:  REPLACEMENT VALVE MITRAL (MVR) (Chest)    Relevant Problems   ANESTHESIA   (+) PONV (postoperative nausea and vomiting)      CARDIO   (+) Benign essential hypertension   (+) Hyperlipidemia   (+) MVP (mitral valve prolapse)   (+) Mitral valve insufficiency   (+) Murmur, cardiac      /RENAL   (+) Kidney disease, chronic, stage III (GFR 30-59 ml/min) ([facility])      NEURO/PSYCH   (+) Anxiety   (+) Panic disorder (episodic paroxysmal anxiety)      Findings    Left Ventricle Left ventricular cavity size is normal. Wall thickness is normal. The left ventricular ejection fraction is 65%. Systolic function is vigorous. Wall motion is normal.   Right Ventricle Right ventricular cavity size is normal. Systolic function is normal. Wall thickness is normal.   Left Atrium The atrium is severely dilated.   Right Atrium The atrium is normal in size.   Atrial Septum No patent foramen ovale detected, confirmed at rest using color doppler.   Left Atrial Appendage Left atrial appendage is normal in size. There is normal function. There is no thrombus.   Aortic Valve The aortic valve is trileaflet. The leaflets are not thickened. The leaflets are not calcified. The leaflets exhibit normal mobility. There is no evidence of regurgitation. The aortic valve has no significant stenosis.   Mitral Valve The valve is myxomatous. There is moderate diffuse thickening. There is bileaflet prolapse. There is severe regurgitation. There is no evidence of stenosis.   Tricuspid Valve Tricuspid valve structure is normal. There is mild regurgitation. There is no evidence of stenosis.   Pulmonic Valve Pulmonic valve structure is normal. There is trace regurgitation. There is no evidence of stenosis.   Ascending Aorta The aortic root is normal in size. There is no evidence of aortic dissection. There is no aneurysm in the aorta.       Normal sinus rhythm  Minimal voltage criteria for LVH, may be normal variant  Borderline ECG  When compared with  ECG of 24-OCT-2024 09:10,  No significant change was found    Left Main   The vessel is angiographically normal.   Left Anterior Descending   The vessel is angiographically normal. Focal short segment Mid portion appears to be intramyocardial.   Left Circumflex   The vessel is angiographically normal and dominant.   Right Coronary Artery   The vessel is angiographically normal and non-dominant.   Intervention    No interventions have been documented.  Physical Exam    Airway    Mallampati score: III  TM Distance: >3 FB  Neck ROM: full     Dental   Comment: Chipped teeth. No loose     Cardiovascular  Rhythm: regular, Rate: normal, Murmur    Pulmonary   Breath sounds clear to auscultation    Other Findings  post-pubertal.      Anesthesia Plan  ASA Score- 4     Anesthesia Type- general with ASA Monitors.         Additional Monitors: arterial line, central venous line and pulmonary artery catheter.    Airway Plan: ETT.    Comment: KWAME.       Plan Factors-Exercise tolerance (METS): <4 METS.    Chart reviewed. EKG reviewed.  Existing labs reviewed. Patient summary reviewed.    Patient is not a current smoker.              Induction- intravenous.    Postoperative Plan-         Informed Consent- Anesthetic plan and risks discussed with patient.

## 2024-11-15 ENCOUNTER — APPOINTMENT (OUTPATIENT)
Dept: NON INVASIVE DIAGNOSTICS | Facility: HOSPITAL | Age: 69
DRG: 219 | End: 2024-11-15
Attending: THORACIC SURGERY (CARDIOTHORACIC VASCULAR SURGERY)
Payer: COMMERCIAL

## 2024-11-15 ENCOUNTER — ANESTHESIA (INPATIENT)
Dept: PERIOP | Facility: HOSPITAL | Age: 69
DRG: 219 | End: 2024-11-15
Payer: COMMERCIAL

## 2024-11-15 ENCOUNTER — APPOINTMENT (INPATIENT)
Dept: RADIOLOGY | Facility: HOSPITAL | Age: 69
DRG: 219 | End: 2024-11-15
Payer: COMMERCIAL

## 2024-11-15 ENCOUNTER — HOSPITAL ENCOUNTER (INPATIENT)
Facility: HOSPITAL | Age: 69
LOS: 6 days | Discharge: HOME HEALTH | DRG: 219 | End: 2024-11-21
Attending: THORACIC SURGERY (CARDIOTHORACIC VASCULAR SURGERY) | Admitting: THORACIC SURGERY (CARDIOTHORACIC VASCULAR SURGERY)
Payer: COMMERCIAL

## 2024-11-15 DIAGNOSIS — I34.1 MVP (MITRAL VALVE PROLAPSE): ICD-10-CM

## 2024-11-15 DIAGNOSIS — I34.9 NONRHEUMATIC MITRAL VALVE DISORDER: ICD-10-CM

## 2024-11-15 DIAGNOSIS — Z98.890 S/P MITRAL VALVE REPAIR: Primary | ICD-10-CM

## 2024-11-15 DIAGNOSIS — I49.8 JUNCTIONAL CARDIAC ARRHYTHMIA: ICD-10-CM

## 2024-11-15 DIAGNOSIS — I48.0 PAF (PAROXYSMAL ATRIAL FIBRILLATION) (HCC): ICD-10-CM

## 2024-11-15 DIAGNOSIS — I34.0 NONRHEUMATIC MITRAL VALVE REGURGITATION: ICD-10-CM

## 2024-11-15 DIAGNOSIS — I48.92 ATRIAL FLUTTER, UNSPECIFIED TYPE (HCC): ICD-10-CM

## 2024-11-15 PROBLEM — Z95.3 S/P MITRAL VALVE REPLACEMENT WITH BIOPROSTHETIC VALVE: Status: ACTIVE | Noted: 2024-11-15

## 2024-11-15 LAB
ANION GAP SERPL CALCULATED.3IONS-SCNC: 6 MMOL/L (ref 4–13)
BASE EXCESS BLDA CALC-SCNC: -1 MMOL/L (ref -2–3)
BASE EXCESS BLDA CALC-SCNC: -2 MMOL/L (ref -2–3)
BASE EXCESS BLDA CALC-SCNC: -6 MMOL/L (ref -2–3)
BASE EXCESS BLDA CALC-SCNC: 0 MMOL/L (ref -2–3)
BASE EXCESS BLDA CALC-SCNC: 2 MMOL/L (ref -2–3)
BUN SERPL-MCNC: 22 MG/DL (ref 5–25)
CA-I BLD-SCNC: 0.88 MMOL/L (ref 1.12–1.32)
CA-I BLD-SCNC: 1 MMOL/L (ref 1.12–1.32)
CA-I BLD-SCNC: 1.01 MMOL/L (ref 1.12–1.32)
CA-I BLD-SCNC: 1.03 MMOL/L (ref 1.12–1.32)
CA-I BLD-SCNC: 1.03 MMOL/L (ref 1.12–1.32)
CA-I BLD-SCNC: 1.06 MMOL/L (ref 1.12–1.32)
CA-I BLD-SCNC: 1.07 MMOL/L (ref 1.12–1.32)
CA-I BLD-SCNC: 1.09 MMOL/L (ref 1.12–1.32)
CA-I BLD-SCNC: 1.17 MMOL/L (ref 1.12–1.32)
CA-I BLD-SCNC: 1.2 MMOL/L (ref 1.12–1.32)
CA-I BLD-SCNC: 1.28 MMOL/L (ref 1.12–1.32)
CA-I BLD-SCNC: 1.35 MMOL/L (ref 1.12–1.32)
CALCIUM SERPL-MCNC: 6.9 MG/DL (ref 8.4–10.2)
CHLORIDE SERPL-SCNC: 112 MMOL/L (ref 96–108)
CO2 SERPL-SCNC: 22 MMOL/L (ref 21–32)
CREAT SERPL-MCNC: 0.81 MG/DL (ref 0.6–1.3)
GFR SERPL CREATININE-BSD FRML MDRD: 74 ML/MIN/1.73SQ M
GLUCOSE SERPL-MCNC: 100 MG/DL (ref 65–140)
GLUCOSE SERPL-MCNC: 103 MG/DL (ref 65–140)
GLUCOSE SERPL-MCNC: 108 MG/DL (ref 65–140)
GLUCOSE SERPL-MCNC: 109 MG/DL (ref 65–140)
GLUCOSE SERPL-MCNC: 113 MG/DL (ref 65–140)
GLUCOSE SERPL-MCNC: 117 MG/DL (ref 65–140)
GLUCOSE SERPL-MCNC: 125 MG/DL (ref 65–140)
GLUCOSE SERPL-MCNC: 125 MG/DL (ref 65–140)
GLUCOSE SERPL-MCNC: 136 MG/DL (ref 65–140)
GLUCOSE SERPL-MCNC: 137 MG/DL (ref 65–140)
GLUCOSE SERPL-MCNC: 139 MG/DL (ref 65–140)
GLUCOSE SERPL-MCNC: 145 MG/DL (ref 65–140)
GLUCOSE SERPL-MCNC: 146 MG/DL (ref 65–140)
GLUCOSE SERPL-MCNC: 148 MG/DL (ref 65–140)
GLUCOSE SERPL-MCNC: 149 MG/DL (ref 65–140)
GLUCOSE SERPL-MCNC: 149 MG/DL (ref 65–140)
GLUCOSE SERPL-MCNC: 151 MG/DL (ref 65–140)
GLUCOSE SERPL-MCNC: 153 MG/DL (ref 65–140)
GLUCOSE SERPL-MCNC: 187 MG/DL (ref 65–140)
GLUCOSE SERPL-MCNC: 188 MG/DL (ref 65–140)
HCO3 BLDA-SCNC: 18 MMOL/L (ref 22–28)
HCO3 BLDA-SCNC: 22 MMOL/L (ref 22–28)
HCO3 BLDA-SCNC: 22.5 MMOL/L (ref 22–28)
HCO3 BLDA-SCNC: 23.6 MMOL/L (ref 22–28)
HCO3 BLDA-SCNC: 23.6 MMOL/L (ref 22–28)
HCO3 BLDA-SCNC: 23.7 MMOL/L (ref 22–28)
HCO3 BLDA-SCNC: 23.7 MMOL/L (ref 24–30)
HCO3 BLDA-SCNC: 23.8 MMOL/L (ref 22–28)
HCO3 BLDA-SCNC: 23.8 MMOL/L (ref 22–28)
HCO3 BLDA-SCNC: 24.7 MMOL/L (ref 22–28)
HCO3 BLDA-SCNC: 25.4 MMOL/L (ref 22–28)
HCO3 BLDA-SCNC: 28.5 MMOL/L (ref 24–30)
HCT VFR BLD AUTO: 39.7 % (ref 34.8–46.1)
HCT VFR BLD AUTO: 41.1 % (ref 34.8–46.1)
HCT VFR BLD CALC: 22 % (ref 34.8–46.1)
HCT VFR BLD CALC: 23 % (ref 34.8–46.1)
HCT VFR BLD CALC: 23 % (ref 34.8–46.1)
HCT VFR BLD CALC: 25 % (ref 34.8–46.1)
HCT VFR BLD CALC: 25 % (ref 34.8–46.1)
HCT VFR BLD CALC: 26 % (ref 34.8–46.1)
HCT VFR BLD CALC: 27 % (ref 34.8–46.1)
HCT VFR BLD CALC: 28 % (ref 34.8–46.1)
HCT VFR BLD CALC: 32 % (ref 34.8–46.1)
HCT VFR BLD CALC: 34 % (ref 34.8–46.1)
HCT VFR BLD CALC: 34 % (ref 34.8–46.1)
HCT VFR BLD CALC: 37 % (ref 34.8–46.1)
HGB BLD-MCNC: 13.5 G/DL (ref 11.5–15.4)
HGB BLD-MCNC: 14 G/DL (ref 11.5–15.4)
HGB BLDA-MCNC: 10.9 G/DL (ref 11.5–15.4)
HGB BLDA-MCNC: 11.6 G/DL (ref 11.5–15.4)
HGB BLDA-MCNC: 11.6 G/DL (ref 11.5–15.4)
HGB BLDA-MCNC: 12.6 G/DL (ref 11.5–15.4)
HGB BLDA-MCNC: 7.5 G/DL (ref 11.5–15.4)
HGB BLDA-MCNC: 7.8 G/DL (ref 11.5–15.4)
HGB BLDA-MCNC: 7.8 G/DL (ref 11.5–15.4)
HGB BLDA-MCNC: 8.5 G/DL (ref 11.5–15.4)
HGB BLDA-MCNC: 8.5 G/DL (ref 11.5–15.4)
HGB BLDA-MCNC: 8.8 G/DL (ref 11.5–15.4)
HGB BLDA-MCNC: 9.2 G/DL (ref 11.5–15.4)
HGB BLDA-MCNC: 9.5 G/DL (ref 11.5–15.4)
KCT BLD-ACNC: 116 SEC (ref 89–137)
KCT BLD-ACNC: 135 SEC (ref 89–137)
KCT BLD-ACNC: 409 SEC (ref 89–137)
KCT BLD-ACNC: 501 SEC (ref 89–137)
KCT BLD-ACNC: 508 SEC (ref 89–137)
KCT BLD-ACNC: 508 SEC (ref 89–137)
KCT BLD-ACNC: 527 SEC (ref 89–137)
KCT BLD-ACNC: 546 SEC (ref 89–137)
KCT BLD-ACNC: 553 SEC (ref 89–137)
KCT BLD-ACNC: 559 SEC (ref 89–137)
KCT BLD-ACNC: 584 SEC (ref 89–137)
KCT BLD-ACNC: 628 SEC (ref 89–137)
KCT BLD-ACNC: 646 SEC (ref 89–137)
PCO2 BLD: 19 MMOL/L (ref 21–32)
PCO2 BLD: 23 MMOL/L (ref 21–32)
PCO2 BLD: 24 MMOL/L (ref 21–32)
PCO2 BLD: 25 MMOL/L (ref 21–32)
PCO2 BLD: 26 MMOL/L (ref 21–32)
PCO2 BLD: 27 MMOL/L (ref 21–32)
PCO2 BLD: 28.6 MM HG (ref 36–44)
PCO2 BLD: 30 MMOL/L (ref 21–32)
PCO2 BLD: 32.5 MM HG (ref 36–44)
PCO2 BLD: 32.7 MM HG (ref 36–44)
PCO2 BLD: 33.7 MM HG (ref 36–44)
PCO2 BLD: 37.6 MM HG (ref 36–44)
PCO2 BLD: 37.9 MM HG (ref 36–44)
PCO2 BLD: 38.2 MM HG (ref 36–44)
PCO2 BLD: 39.7 MM HG (ref 36–44)
PCO2 BLD: 40.3 MM HG (ref 36–44)
PCO2 BLD: 40.8 MM HG (ref 42–50)
PCO2 BLD: 44.1 MM HG (ref 36–44)
PCO2 BLD: 52 MM HG (ref 42–50)
PH BLD: 7.35 [PH] (ref 7.3–7.4)
PH BLD: 7.37 [PH] (ref 7.35–7.45)
PH BLD: 7.37 [PH] (ref 7.3–7.4)
PH BLD: 7.38 [PH] (ref 7.35–7.45)
PH BLD: 7.38 [PH] (ref 7.35–7.45)
PH BLD: 7.4 [PH] (ref 7.35–7.45)
PH BLD: 7.41 [PH] (ref 7.35–7.45)
PH BLD: 7.41 [PH] (ref 7.35–7.45)
PH BLD: 7.43 [PH] (ref 7.35–7.45)
PH BLD: 7.43 [PH] (ref 7.35–7.45)
PH BLD: 7.44 [PH] (ref 7.35–7.45)
PH BLD: 7.47 [PH] (ref 7.35–7.45)
PLATELET # BLD AUTO: 121 THOUSANDS/UL (ref 149–390)
PLATELET # BLD AUTO: 91 THOUSANDS/UL (ref 149–390)
PMV BLD AUTO: 10.4 FL (ref 8.9–12.7)
PMV BLD AUTO: 10.6 FL (ref 8.9–12.7)
PO2 BLD: 124 MM HG (ref 75–129)
PO2 BLD: 140 MM HG (ref 75–129)
PO2 BLD: 163 MM HG (ref 75–129)
PO2 BLD: 300 MM HG (ref 75–129)
PO2 BLD: 328 MM HG (ref 75–129)
PO2 BLD: 329 MM HG (ref 75–129)
PO2 BLD: 342 MM HG (ref 75–129)
PO2 BLD: 352 MM HG (ref 75–129)
PO2 BLD: 358 MM HG (ref 75–129)
PO2 BLD: 37 MM HG (ref 35–45)
PO2 BLD: 373 MM HG (ref 75–129)
PO2 BLD: 42 MM HG (ref 35–45)
POTASSIUM BLD-SCNC: 3.7 MMOL/L (ref 3.5–5.3)
POTASSIUM BLD-SCNC: 4.2 MMOL/L (ref 3.5–5.3)
POTASSIUM BLD-SCNC: 4.2 MMOL/L (ref 3.5–5.3)
POTASSIUM BLD-SCNC: 4.3 MMOL/L (ref 3.5–5.3)
POTASSIUM BLD-SCNC: 4.3 MMOL/L (ref 3.5–5.3)
POTASSIUM BLD-SCNC: 4.4 MMOL/L (ref 3.5–5.3)
POTASSIUM BLD-SCNC: 4.5 MMOL/L (ref 3.5–5.3)
POTASSIUM BLD-SCNC: 4.9 MMOL/L (ref 3.5–5.3)
POTASSIUM BLD-SCNC: 5.4 MMOL/L (ref 3.5–5.3)
POTASSIUM SERPL-SCNC: 3.8 MMOL/L (ref 3.5–5.3)
POTASSIUM SERPL-SCNC: 4 MMOL/L (ref 3.5–5.3)
POTASSIUM SERPL-SCNC: 4.4 MMOL/L (ref 3.5–5.3)
SAO2 % BLD FROM PO2: 100 % (ref 60–85)
SAO2 % BLD FROM PO2: 67 % (ref 60–85)
SAO2 % BLD FROM PO2: 76 % (ref 60–85)
SAO2 % BLD FROM PO2: 99 % (ref 60–85)
SODIUM BLD-SCNC: 130 MMOL/L (ref 136–145)
SODIUM BLD-SCNC: 134 MMOL/L (ref 136–145)
SODIUM BLD-SCNC: 134 MMOL/L (ref 136–145)
SODIUM BLD-SCNC: 135 MMOL/L (ref 136–145)
SODIUM BLD-SCNC: 136 MMOL/L (ref 136–145)
SODIUM BLD-SCNC: 136 MMOL/L (ref 136–145)
SODIUM BLD-SCNC: 137 MMOL/L (ref 136–145)
SODIUM BLD-SCNC: 137 MMOL/L (ref 136–145)
SODIUM BLD-SCNC: 138 MMOL/L (ref 136–145)
SODIUM BLD-SCNC: 138 MMOL/L (ref 136–145)
SODIUM BLD-SCNC: 139 MMOL/L (ref 136–145)
SODIUM BLD-SCNC: 143 MMOL/L (ref 136–145)
SODIUM SERPL-SCNC: 140 MMOL/L (ref 135–147)
SPECIMEN SOURCE: ABNORMAL
SPECIMEN SOURCE: NORMAL
SPECIMEN SOURCE: NORMAL

## 2024-11-15 PROCEDURE — 85049 AUTOMATED PLATELET COUNT: CPT | Performed by: THORACIC SURGERY (CARDIOTHORACIC VASCULAR SURGERY)

## 2024-11-15 PROCEDURE — 99223 1ST HOSP IP/OBS HIGH 75: CPT | Performed by: STUDENT IN AN ORGANIZED HEALTH CARE EDUCATION/TRAINING PROGRAM

## 2024-11-15 PROCEDURE — 02UG0JZ SUPPLEMENT MITRAL VALVE WITH SYNTHETIC SUBSTITUTE, OPEN APPROACH: ICD-10-PCS | Performed by: THORACIC SURGERY (CARDIOTHORACIC VASCULAR SURGERY)

## 2024-11-15 PROCEDURE — 76376 3D RENDER W/INTRP POSTPROCES: CPT

## 2024-11-15 PROCEDURE — 85018 HEMOGLOBIN: CPT | Performed by: PHYSICIAN ASSISTANT

## 2024-11-15 PROCEDURE — 82948 REAGENT STRIP/BLOOD GLUCOSE: CPT

## 2024-11-15 PROCEDURE — 80048 BASIC METABOLIC PNL TOTAL CA: CPT | Performed by: PHYSICIAN ASSISTANT

## 2024-11-15 PROCEDURE — 84132 ASSAY OF SERUM POTASSIUM: CPT | Performed by: PHYSICIAN ASSISTANT

## 2024-11-15 PROCEDURE — B24BZZ4 ULTRASONOGRAPHY OF HEART WITH AORTA, TRANSESOPHAGEAL: ICD-10-PCS | Performed by: ANESTHESIOLOGY

## 2024-11-15 PROCEDURE — 85014 HEMATOCRIT: CPT | Performed by: PHYSICIAN ASSISTANT

## 2024-11-15 PROCEDURE — 02L70CK OCCLUSION OF LEFT ATRIAL APPENDAGE WITH EXTRALUMINAL DEVICE, OPEN APPROACH: ICD-10-PCS | Performed by: THORACIC SURGERY (CARDIOTHORACIC VASCULAR SURGERY)

## 2024-11-15 PROCEDURE — 86923 COMPATIBILITY TEST ELECTRIC: CPT

## 2024-11-15 PROCEDURE — 85347 COAGULATION TIME ACTIVATED: CPT

## 2024-11-15 PROCEDURE — 84295 ASSAY OF SERUM SODIUM: CPT

## 2024-11-15 PROCEDURE — P9016 RBC LEUKOCYTES REDUCED: HCPCS

## 2024-11-15 PROCEDURE — 30233N0 TRANSFUSION OF AUTOLOGOUS RED BLOOD CELLS INTO PERIPHERAL VEIN, PERCUTANEOUS APPROACH: ICD-10-PCS | Performed by: THORACIC SURGERY (CARDIOTHORACIC VASCULAR SURGERY)

## 2024-11-15 PROCEDURE — 84132 ASSAY OF SERUM POTASSIUM: CPT

## 2024-11-15 PROCEDURE — A7041 WATER SEAL DRAIN CONTAINER: HCPCS | Performed by: THORACIC SURGERY (CARDIOTHORACIC VASCULAR SURGERY)

## 2024-11-15 PROCEDURE — 88305 TISSUE EXAM BY PATHOLOGIST: CPT | Performed by: PATHOLOGY

## 2024-11-15 PROCEDURE — 82803 BLOOD GASES ANY COMBINATION: CPT

## 2024-11-15 PROCEDURE — 30233N1 TRANSFUSION OF NONAUTOLOGOUS RED BLOOD CELLS INTO PERIPHERAL VEIN, PERCUTANEOUS APPROACH: ICD-10-PCS | Performed by: ANESTHESIOLOGY

## 2024-11-15 PROCEDURE — 5A1223Z PERFORMANCE OF CARDIAC PACING, CONTINUOUS: ICD-10-PCS | Performed by: THORACIC SURGERY (CARDIOTHORACIC VASCULAR SURGERY)

## 2024-11-15 PROCEDURE — 88311 DECALCIFY TISSUE: CPT | Performed by: PATHOLOGY

## 2024-11-15 PROCEDURE — 94760 N-INVAS EAR/PLS OXIMETRY 1: CPT

## 2024-11-15 PROCEDURE — 85014 HEMATOCRIT: CPT

## 2024-11-15 PROCEDURE — 93005 ELECTROCARDIOGRAM TRACING: CPT

## 2024-11-15 PROCEDURE — 5A1221Z PERFORMANCE OF CARDIAC OUTPUT, CONTINUOUS: ICD-10-PCS | Performed by: THORACIC SURGERY (CARDIOTHORACIC VASCULAR SURGERY)

## 2024-11-15 PROCEDURE — 82947 ASSAY GLUCOSE BLOOD QUANT: CPT

## 2024-11-15 PROCEDURE — 71045 X-RAY EXAM CHEST 1 VIEW: CPT

## 2024-11-15 PROCEDURE — 93355 ECHO TRANSESOPHAGEAL (TEE): CPT

## 2024-11-15 PROCEDURE — 94002 VENT MGMT INPAT INIT DAY: CPT

## 2024-11-15 PROCEDURE — 82330 ASSAY OF CALCIUM: CPT

## 2024-11-15 PROCEDURE — 33430 REPLACEMENT OF MITRAL VALVE: CPT | Performed by: PHYSICIAN ASSISTANT

## 2024-11-15 PROCEDURE — 02RG08Z REPLACEMENT OF MITRAL VALVE WITH ZOOPLASTIC TISSUE, OPEN APPROACH: ICD-10-PCS | Performed by: THORACIC SURGERY (CARDIOTHORACIC VASCULAR SURGERY)

## 2024-11-15 PROCEDURE — 33430 REPLACEMENT OF MITRAL VALVE: CPT | Performed by: THORACIC SURGERY (CARDIOTHORACIC VASCULAR SURGERY)

## 2024-11-15 PROCEDURE — 85049 AUTOMATED PLATELET COUNT: CPT | Performed by: PHYSICIAN ASSISTANT

## 2024-11-15 PROCEDURE — 02HV33Z INSERTION OF INFUSION DEVICE INTO SUPERIOR VENA CAVA, PERCUTANEOUS APPROACH: ICD-10-PCS | Performed by: ANESTHESIOLOGY

## 2024-11-15 DEVICE — CLIP PENDITURE LAAC40
Type: IMPLANTABLE DEVICE | Site: HEART | Status: FUNCTIONAL
Brand: PENDITURE™

## 2024-11-15 DEVICE — RESILIA MITRAL VALVE, SIZE 29 MM
Type: IMPLANTABLE DEVICE | Site: HEART | Status: FUNCTIONAL
Brand: MITRIS RESILIA MITRAL VALVE

## 2024-11-15 RX ORDER — HEPARIN SODIUM 1000 [USP'U]/ML
10000 INJECTION, SOLUTION INTRAVENOUS; SUBCUTANEOUS ONCE
Status: COMPLETED | OUTPATIENT
Start: 2024-11-15 | End: 2024-11-15

## 2024-11-15 RX ORDER — SODIUM CHLORIDE 450 MG/100ML
20 INJECTION, SOLUTION INTRAVENOUS CONTINUOUS
Status: DISCONTINUED | OUTPATIENT
Start: 2024-11-15 | End: 2024-11-16

## 2024-11-15 RX ORDER — MANNITOL 250 MG/ML
INJECTION, SOLUTION INTRAVENOUS AS NEEDED
Status: DISCONTINUED | OUTPATIENT
Start: 2024-11-15 | End: 2024-11-15

## 2024-11-15 RX ORDER — MILRINONE LACTATE 0.2 MG/ML
0.13 INJECTION, SOLUTION INTRAVENOUS CONTINUOUS
Status: DISCONTINUED | OUTPATIENT
Start: 2024-11-15 | End: 2024-11-19

## 2024-11-15 RX ORDER — ALPRAZOLAM 0.5 MG
0.5 TABLET ORAL
Status: DISCONTINUED | OUTPATIENT
Start: 2024-11-15 | End: 2024-11-21 | Stop reason: HOSPADM

## 2024-11-15 RX ORDER — FENTANYL CITRATE 50 UG/ML
INJECTION, SOLUTION INTRAMUSCULAR; INTRAVENOUS AS NEEDED
Status: DISCONTINUED | OUTPATIENT
Start: 2024-11-15 | End: 2024-11-15

## 2024-11-15 RX ORDER — LIDOCAINE HCL/PF 100 MG/5ML
SYRINGE (ML) INJECTION AS NEEDED
Status: DISCONTINUED | OUTPATIENT
Start: 2024-11-15 | End: 2024-11-15

## 2024-11-15 RX ORDER — ALBUMIN HUMAN 50 G/1000ML
12.5 SOLUTION INTRAVENOUS ONCE
Status: COMPLETED | OUTPATIENT
Start: 2024-11-15 | End: 2024-11-15

## 2024-11-15 RX ORDER — HEPARIN SODIUM 5000 [USP'U]/ML
5000 INJECTION, SOLUTION INTRAVENOUS; SUBCUTANEOUS EVERY 8 HOURS SCHEDULED
Status: DISCONTINUED | OUTPATIENT
Start: 2024-11-15 | End: 2024-11-15

## 2024-11-15 RX ORDER — CALCIUM GLUCONATE 20 MG/ML
2 INJECTION, SOLUTION INTRAVENOUS ONCE AS NEEDED
Status: COMPLETED | OUTPATIENT
Start: 2024-11-15 | End: 2024-11-15

## 2024-11-15 RX ORDER — PHENYLEPHRINE HCL IN 0.9% NACL 1 MG/10 ML
200-2000 SYRINGE (ML) INTRAVENOUS ONCE
Status: DISCONTINUED | OUTPATIENT
Start: 2024-11-15 | End: 2024-11-15 | Stop reason: HOSPADM

## 2024-11-15 RX ORDER — NEOSTIGMINE METHYLSULFATE 1 MG/ML
INJECTION INTRAVENOUS AS NEEDED
Status: DISCONTINUED | OUTPATIENT
Start: 2024-11-15 | End: 2024-11-15

## 2024-11-15 RX ORDER — CHLORHEXIDINE GLUCONATE ORAL RINSE 1.2 MG/ML
15 SOLUTION DENTAL ONCE
Status: COMPLETED | OUTPATIENT
Start: 2024-11-15 | End: 2024-11-15

## 2024-11-15 RX ORDER — ACETAMINOPHEN 325 MG/1
975 TABLET ORAL ONCE
Status: COMPLETED | OUTPATIENT
Start: 2024-11-15 | End: 2024-11-15

## 2024-11-15 RX ORDER — PROTAMINE SULFATE 10 MG/ML
INJECTION, SOLUTION INTRAVENOUS AS NEEDED
Status: DISCONTINUED | OUTPATIENT
Start: 2024-11-15 | End: 2024-11-15

## 2024-11-15 RX ORDER — HEPARIN SODIUM 1000 [USP'U]/ML
400 INJECTION, SOLUTION INTRAVENOUS; SUBCUTANEOUS ONCE
Status: COMPLETED | OUTPATIENT
Start: 2024-11-15 | End: 2024-11-15

## 2024-11-15 RX ORDER — POLYETHYLENE GLYCOL 3350 17 G/17G
17 POWDER, FOR SOLUTION ORAL DAILY
Status: DISCONTINUED | OUTPATIENT
Start: 2024-11-16 | End: 2024-11-21 | Stop reason: HOSPADM

## 2024-11-15 RX ORDER — MIDAZOLAM HYDROCHLORIDE 2 MG/2ML
INJECTION, SOLUTION INTRAMUSCULAR; INTRAVENOUS AS NEEDED
Status: DISCONTINUED | OUTPATIENT
Start: 2024-11-15 | End: 2024-11-15

## 2024-11-15 RX ORDER — DEXMEDETOMIDINE HYDROCHLORIDE 4 UG/ML
.1-.7 INJECTION, SOLUTION INTRAVENOUS
Status: DISCONTINUED | OUTPATIENT
Start: 2024-11-15 | End: 2024-11-16

## 2024-11-15 RX ORDER — SODIUM CHLORIDE, SODIUM GLUCONATE, SODIUM ACETATE, POTASSIUM CHLORIDE, MAGNESIUM CHLORIDE, SODIUM PHOSPHATE, DIBASIC, AND POTASSIUM PHOSPHATE .53; .5; .37; .037; .03; .012; .00082 G/100ML; G/100ML; G/100ML; G/100ML; G/100ML; G/100ML; G/100ML
INJECTION, SOLUTION INTRAVENOUS AS NEEDED
Status: DISCONTINUED | OUTPATIENT
Start: 2024-11-15 | End: 2024-11-15

## 2024-11-15 RX ORDER — SODIUM CHLORIDE 9 MG/ML
INJECTION, SOLUTION INTRAVENOUS CONTINUOUS PRN
Status: DISCONTINUED | OUTPATIENT
Start: 2024-11-15 | End: 2024-11-15

## 2024-11-15 RX ORDER — CHLORHEXIDINE GLUCONATE ORAL RINSE 1.2 MG/ML
15 SOLUTION DENTAL 2 TIMES DAILY
Status: DISCONTINUED | OUTPATIENT
Start: 2024-11-15 | End: 2024-11-21 | Stop reason: HOSPADM

## 2024-11-15 RX ORDER — ALBUMIN HUMAN 50 G/1000ML
12.5 SOLUTION INTRAVENOUS ONCE
Status: COMPLETED | OUTPATIENT
Start: 2024-11-15 | End: 2024-11-16

## 2024-11-15 RX ORDER — VANCOMYCIN HYDROCHLORIDE 1 G/200ML
15 INJECTION, SOLUTION INTRAVENOUS EVERY 12 HOURS
Status: COMPLETED | OUTPATIENT
Start: 2024-11-15 | End: 2024-11-16

## 2024-11-15 RX ORDER — HYDROXYZINE HYDROCHLORIDE 10 MG/1
10 TABLET, FILM COATED ORAL DAILY
Status: DISCONTINUED | OUTPATIENT
Start: 2024-11-15 | End: 2024-11-21 | Stop reason: HOSPADM

## 2024-11-15 RX ORDER — LIDOCAINE HYDROCHLORIDE 10 MG/ML
0.5 INJECTION, SOLUTION EPIDURAL; INFILTRATION; INTRACAUDAL; PERINEURAL ONCE AS NEEDED
Status: DISCONTINUED | OUTPATIENT
Start: 2024-11-15 | End: 2024-11-15 | Stop reason: HOSPADM

## 2024-11-15 RX ORDER — TRAZODONE HYDROCHLORIDE 100 MG/1
100 TABLET ORAL DAILY
Status: DISCONTINUED | OUTPATIENT
Start: 2024-11-15 | End: 2024-11-17

## 2024-11-15 RX ORDER — HEPARIN SODIUM 1000 [USP'U]/ML
INJECTION, SOLUTION INTRAVENOUS; SUBCUTANEOUS AS NEEDED
Status: DISCONTINUED | OUTPATIENT
Start: 2024-11-15 | End: 2024-11-15

## 2024-11-15 RX ORDER — BISACODYL 10 MG
10 SUPPOSITORY, RECTAL RECTAL DAILY PRN
Status: DISCONTINUED | OUTPATIENT
Start: 2024-11-15 | End: 2024-11-21 | Stop reason: HOSPADM

## 2024-11-15 RX ORDER — KETAMINE HCL IN NACL, ISO-OSM 100MG/10ML
SYRINGE (ML) INJECTION AS NEEDED
Status: DISCONTINUED | OUTPATIENT
Start: 2024-11-15 | End: 2024-11-15

## 2024-11-15 RX ORDER — POTASSIUM CHLORIDE 29.8 MG/ML
40 INJECTION INTRAVENOUS ONCE AS NEEDED
Status: DISCONTINUED | OUTPATIENT
Start: 2024-11-15 | End: 2024-11-16

## 2024-11-15 RX ORDER — FONDAPARINUX SODIUM 2.5 MG/.5ML
2.5 INJECTION SUBCUTANEOUS EVERY 24 HOURS
Status: DISCONTINUED | OUTPATIENT
Start: 2024-11-16 | End: 2024-11-21 | Stop reason: HOSPADM

## 2024-11-15 RX ORDER — ATORVASTATIN CALCIUM 80 MG/1
80 TABLET, FILM COATED ORAL
Status: DISCONTINUED | OUTPATIENT
Start: 2024-11-15 | End: 2024-11-16

## 2024-11-15 RX ORDER — OXYCODONE HYDROCHLORIDE 5 MG/1
5 TABLET ORAL EVERY 4 HOURS PRN
Status: DISCONTINUED | OUTPATIENT
Start: 2024-11-15 | End: 2024-11-21 | Stop reason: HOSPADM

## 2024-11-15 RX ORDER — EPHEDRINE SULFATE 50 MG/ML
INJECTION INTRAVENOUS AS NEEDED
Status: DISCONTINUED | OUTPATIENT
Start: 2024-11-15 | End: 2024-11-15

## 2024-11-15 RX ORDER — AMIODARONE HYDROCHLORIDE 200 MG/1
200 TABLET ORAL EVERY 8 HOURS SCHEDULED
Status: DISCONTINUED | OUTPATIENT
Start: 2024-11-15 | End: 2024-11-18

## 2024-11-15 RX ORDER — PANTOPRAZOLE SODIUM 40 MG/1
40 TABLET, DELAYED RELEASE ORAL DAILY
Status: DISCONTINUED | OUTPATIENT
Start: 2024-11-15 | End: 2024-11-21 | Stop reason: HOSPADM

## 2024-11-15 RX ORDER — ONDANSETRON 2 MG/ML
INJECTION INTRAMUSCULAR; INTRAVENOUS AS NEEDED
Status: DISCONTINUED | OUTPATIENT
Start: 2024-11-15 | End: 2024-11-15

## 2024-11-15 RX ORDER — ONDANSETRON 2 MG/ML
4 INJECTION INTRAMUSCULAR; INTRAVENOUS EVERY 6 HOURS PRN
Status: DISCONTINUED | OUTPATIENT
Start: 2024-11-15 | End: 2024-11-21 | Stop reason: HOSPADM

## 2024-11-15 RX ORDER — ACETAMINOPHEN 650 MG/1
650 SUPPOSITORY RECTAL EVERY 4 HOURS PRN
Status: DISCONTINUED | OUTPATIENT
Start: 2024-11-15 | End: 2024-11-21 | Stop reason: HOSPADM

## 2024-11-15 RX ORDER — CEFAZOLIN SODIUM 2 G/50ML
2000 SOLUTION INTRAVENOUS EVERY 8 HOURS
Status: DISCONTINUED | OUTPATIENT
Start: 2024-11-15 | End: 2024-11-15

## 2024-11-15 RX ORDER — VECURONIUM BROMIDE 1 MG/ML
INJECTION, POWDER, LYOPHILIZED, FOR SOLUTION INTRAVENOUS AS NEEDED
Status: DISCONTINUED | OUTPATIENT
Start: 2024-11-15 | End: 2024-11-15

## 2024-11-15 RX ORDER — LIDOCAINE HYDROCHLORIDE 10 MG/ML
INJECTION, SOLUTION EPIDURAL; INFILTRATION; INTRACAUDAL; PERINEURAL
Status: COMPLETED
Start: 2024-11-15 | End: 2024-11-15

## 2024-11-15 RX ORDER — ACETAMINOPHEN 325 MG/1
975 TABLET ORAL
Status: DISCONTINUED | OUTPATIENT
Start: 2024-11-15 | End: 2024-11-21 | Stop reason: HOSPADM

## 2024-11-15 RX ORDER — FENTANYL CITRATE 50 UG/ML
50 INJECTION, SOLUTION INTRAMUSCULAR; INTRAVENOUS
Status: DISCONTINUED | OUTPATIENT
Start: 2024-11-15 | End: 2024-11-16

## 2024-11-15 RX ORDER — ALBUMIN HUMAN 50 G/1000ML
SOLUTION INTRAVENOUS CONTINUOUS PRN
Status: DISCONTINUED | OUTPATIENT
Start: 2024-11-15 | End: 2024-11-15

## 2024-11-15 RX ORDER — ALBUMIN HUMAN 50 G/1000ML
SOLUTION INTRAVENOUS
Status: COMPLETED
Start: 2024-11-15 | End: 2024-11-15

## 2024-11-15 RX ORDER — FONDAPARINUX SODIUM 2.5 MG/.5ML
2.5 INJECTION SUBCUTANEOUS DAILY
Status: DISCONTINUED | OUTPATIENT
Start: 2024-11-16 | End: 2024-11-15

## 2024-11-15 RX ORDER — FAMOTIDINE 10 MG/ML
INJECTION, SOLUTION INTRAVENOUS AS NEEDED
Status: DISCONTINUED | OUTPATIENT
Start: 2024-11-15 | End: 2024-11-15

## 2024-11-15 RX ORDER — CIPROFLOXACIN 2 MG/ML
400 INJECTION, SOLUTION INTRAVENOUS ONCE
Status: COMPLETED | OUTPATIENT
Start: 2024-11-15 | End: 2024-11-15

## 2024-11-15 RX ORDER — SODIUM CHLORIDE, SODIUM LACTATE, POTASSIUM CHLORIDE, CALCIUM CHLORIDE 600; 310; 30; 20 MG/100ML; MG/100ML; MG/100ML; MG/100ML
125 INJECTION, SOLUTION INTRAVENOUS CONTINUOUS
Status: DISCONTINUED | OUTPATIENT
Start: 2024-11-15 | End: 2024-11-15

## 2024-11-15 RX ORDER — HYDROMORPHONE HCL/PF 1 MG/ML
0.5 SYRINGE (ML) INJECTION EVERY 2 HOUR PRN
Status: DISCONTINUED | OUTPATIENT
Start: 2024-11-15 | End: 2024-11-18

## 2024-11-15 RX ORDER — ASPIRIN 325 MG
325 TABLET ORAL DAILY
Status: DISCONTINUED | OUTPATIENT
Start: 2024-11-15 | End: 2024-11-17

## 2024-11-15 RX ORDER — MAGNESIUM HYDROXIDE 1200 MG/15ML
LIQUID ORAL AS NEEDED
Status: DISCONTINUED | OUTPATIENT
Start: 2024-11-15 | End: 2024-11-15 | Stop reason: HOSPADM

## 2024-11-15 RX ORDER — POTASSIUM CHLORIDE 29.8 MG/ML
40 INJECTION INTRAVENOUS ONCE AS NEEDED
Status: COMPLETED | OUTPATIENT
Start: 2024-11-15 | End: 2024-11-16

## 2024-11-15 RX ORDER — POTASSIUM CHLORIDE 14.9 MG/ML
20 INJECTION INTRAVENOUS ONCE AS NEEDED
Status: DISCONTINUED | OUTPATIENT
Start: 2024-11-15 | End: 2024-11-16

## 2024-11-15 RX ORDER — GABAPENTIN 300 MG/1
300 CAPSULE ORAL ONCE
Status: COMPLETED | OUTPATIENT
Start: 2024-11-15 | End: 2024-11-15

## 2024-11-15 RX ORDER — VANCOMYCIN HYDROCHLORIDE 1 G/20ML
INJECTION, POWDER, LYOPHILIZED, FOR SOLUTION INTRAVENOUS AS NEEDED
Status: DISCONTINUED | OUTPATIENT
Start: 2024-11-15 | End: 2024-11-15 | Stop reason: HOSPADM

## 2024-11-15 RX ORDER — MAGNESIUM SULFATE HEPTAHYDRATE 40 MG/ML
2 INJECTION, SOLUTION INTRAVENOUS ONCE
Status: COMPLETED | OUTPATIENT
Start: 2024-11-15 | End: 2024-11-15

## 2024-11-15 RX ORDER — VANCOMYCIN HYDROCHLORIDE 1 G/200ML
1000 INJECTION, SOLUTION INTRAVENOUS ONCE
Status: COMPLETED | OUTPATIENT
Start: 2024-11-15 | End: 2024-11-15

## 2024-11-15 RX ORDER — CALCIUM CHLORIDE 100 MG/ML
INJECTION INTRAVENOUS; INTRAVENTRICULAR AS NEEDED
Status: DISCONTINUED | OUTPATIENT
Start: 2024-11-15 | End: 2024-11-15

## 2024-11-15 RX ORDER — FENTANYL CITRATE 50 UG/ML
50 INJECTION, SOLUTION INTRAMUSCULAR; INTRAVENOUS ONCE
Status: DISCONTINUED | OUTPATIENT
Start: 2024-11-15 | End: 2024-11-16

## 2024-11-15 RX ORDER — GLYCOPYRROLATE 0.2 MG/ML
INJECTION INTRAMUSCULAR; INTRAVENOUS AS NEEDED
Status: DISCONTINUED | OUTPATIENT
Start: 2024-11-15 | End: 2024-11-15

## 2024-11-15 RX ORDER — LIDOCAINE HYDROCHLORIDE 20 MG/ML
INJECTION, SOLUTION EPIDURAL; INFILTRATION; INTRACAUDAL; PERINEURAL AS NEEDED
Status: DISCONTINUED | OUTPATIENT
Start: 2024-11-15 | End: 2024-11-15

## 2024-11-15 RX ORDER — LIDOCAINE HYDROCHLORIDE 10 MG/ML
INJECTION, SOLUTION EPIDURAL; INFILTRATION; INTRACAUDAL; PERINEURAL
Status: COMPLETED | OUTPATIENT
Start: 2024-11-15 | End: 2024-11-15

## 2024-11-15 RX ADMIN — PROTAMINE SULFATE 180 MG: 10 INJECTION, SOLUTION INTRAVENOUS at 12:14

## 2024-11-15 RX ADMIN — HEPARIN SODIUM 5000 UNITS: 1000 INJECTION, SOLUTION INTRAVENOUS; SUBCUTANEOUS at 11:33

## 2024-11-15 RX ADMIN — HYDROMORPHONE HYDROCHLORIDE 0.5 MG: 1 INJECTION, SOLUTION INTRAMUSCULAR; INTRAVENOUS; SUBCUTANEOUS at 23:35

## 2024-11-15 RX ADMIN — CHLORHEXIDINE GLUCONATE 0.12% ORAL RINSE 15 ML: 1.2 LIQUID ORAL at 06:17

## 2024-11-15 RX ADMIN — SODIUM CHLORIDE, SODIUM GLUCONATE, SODIUM ACETATE, POTASSIUM CHLORIDE, MAGNESIUM CHLORIDE, SODIUM PHOSPHATE, DIBASIC, AND POTASSIUM PHOSPHATE 200 ML: .53; .5; .37; .037; .03; .012; .00082 INJECTION, SOLUTION INTRAVENOUS at 10:01

## 2024-11-15 RX ADMIN — SODIUM CHLORIDE: 0.9 INJECTION, SOLUTION INTRAVENOUS at 08:00

## 2024-11-15 RX ADMIN — MANNITOL 25 G: 12.5 INJECTION, SOLUTION INTRAVENOUS at 08:51

## 2024-11-15 RX ADMIN — CALCIUM CHLORIDE 1 G: 100 INJECTION INTRAVENOUS; INTRAVENTRICULAR at 10:45

## 2024-11-15 RX ADMIN — NICARDIPINE HYDROCHLORIDE 1 MG/HR: 25 INJECTION, SOLUTION INTRAVENOUS at 12:29

## 2024-11-15 RX ADMIN — SODIUM BICARBONATE 50 MEQ: 84 INJECTION, SOLUTION INTRAVENOUS at 07:45

## 2024-11-15 RX ADMIN — HEPARIN SODIUM 5000 UNITS: 1000 INJECTION, SOLUTION INTRAVENOUS; SUBCUTANEOUS at 08:52

## 2024-11-15 RX ADMIN — SODIUM CHLORIDE: 9 INJECTION, SOLUTION INTRAVENOUS at 07:28

## 2024-11-15 RX ADMIN — LIDOCAINE HYDROCHLORIDE 0.5 ML: 10 INJECTION, SOLUTION EPIDURAL; INFILTRATION; INTRACAUDAL; PERINEURAL at 07:00

## 2024-11-15 RX ADMIN — MIDAZOLAM 2 MG: 1 INJECTION INTRAMUSCULAR; INTRAVENOUS at 08:52

## 2024-11-15 RX ADMIN — AMIODARONE HYDROCHLORIDE 200 MG: 200 TABLET ORAL at 22:07

## 2024-11-15 RX ADMIN — CIPROFLOXACIN: 2 INJECTION, SOLUTION INTRAVENOUS at 08:00

## 2024-11-15 RX ADMIN — GLYCOPYRROLATE 0.4 MG: 0.2 INJECTION, SOLUTION INTRAMUSCULAR; INTRAVENOUS at 13:07

## 2024-11-15 RX ADMIN — ONDANSETRON 4 MG: 2 INJECTION INTRAMUSCULAR; INTRAVENOUS at 19:51

## 2024-11-15 RX ADMIN — HEPARIN SODIUM 1 EACH: 5000 INJECTION, SOLUTION INTRAVENOUS; SUBCUTANEOUS at 07:45

## 2024-11-15 RX ADMIN — ONDANSETRON 4 MG: 2 INJECTION INTRAMUSCULAR; INTRAVENOUS at 07:30

## 2024-11-15 RX ADMIN — SODIUM CHLORIDE, SODIUM GLUCONATE, SODIUM ACETATE, POTASSIUM CHLORIDE, MAGNESIUM CHLORIDE, SODIUM PHOSPHATE, DIBASIC, AND POTASSIUM PHOSPHATE 300 ML: .53; .5; .37; .037; .03; .012; .00082 INJECTION, SOLUTION INTRAVENOUS at 10:49

## 2024-11-15 RX ADMIN — HEPARIN SODIUM 24480 UNITS: 1000 INJECTION, SOLUTION INTRAVENOUS; SUBCUTANEOUS at 08:37

## 2024-11-15 RX ADMIN — VECURONIUM BROMIDE 2 MG: 10 INJECTION, POWDER, LYOPHILIZED, FOR SOLUTION INTRAVENOUS at 11:59

## 2024-11-15 RX ADMIN — ALBUMIN (HUMAN) 12.5 G: 12.5 INJECTION, SOLUTION INTRAVENOUS at 19:51

## 2024-11-15 RX ADMIN — ACETAMINOPHEN 975 MG: 325 TABLET, FILM COATED ORAL at 06:14

## 2024-11-15 RX ADMIN — AMIODARONE HYDROCHLORIDE 200 MG: 200 TABLET ORAL at 14:15

## 2024-11-15 RX ADMIN — ACETAMINOPHEN 975 MG: 325 TABLET, FILM COATED ORAL at 14:15

## 2024-11-15 RX ADMIN — HYDROXYZINE HYDROCHLORIDE 10 MG: 10 TABLET, FILM COATED ORAL at 14:15

## 2024-11-15 RX ADMIN — HEPARIN SODIUM 5000 UNITS: 1000 INJECTION, SOLUTION INTRAVENOUS; SUBCUTANEOUS at 10:00

## 2024-11-15 RX ADMIN — VANCOMYCIN HYDROCHLORIDE 1000 MG: 1 INJECTION, SOLUTION INTRAVENOUS at 19:56

## 2024-11-15 RX ADMIN — VECURONIUM BROMIDE 10 MG: 10 INJECTION, POWDER, LYOPHILIZED, FOR SOLUTION INTRAVENOUS at 07:39

## 2024-11-15 RX ADMIN — ALBUMIN (HUMAN) 12.5 G: 12.5 INJECTION, SOLUTION INTRAVENOUS at 16:04

## 2024-11-15 RX ADMIN — SODIUM CHLORIDE, SODIUM GLUCONATE, SODIUM ACETATE, POTASSIUM CHLORIDE, MAGNESIUM CHLORIDE, SODIUM PHOSPHATE, DIBASIC, AND POTASSIUM PHOSPHATE 800 ML: .53; .5; .37; .037; .03; .012; .00082 INJECTION, SOLUTION INTRAVENOUS at 07:45

## 2024-11-15 RX ADMIN — Medication 300 ML: at 10:27

## 2024-11-15 RX ADMIN — MUPIROCIN 1 APPLICATION: 20 OINTMENT TOPICAL at 22:06

## 2024-11-15 RX ADMIN — ASPIRIN 325 MG ORAL TABLET 325 MG: 325 PILL ORAL at 14:15

## 2024-11-15 RX ADMIN — HEPARIN SODIUM 5000 UNITS: 1000 INJECTION, SOLUTION INTRAVENOUS; SUBCUTANEOUS at 11:03

## 2024-11-15 RX ADMIN — MAGNESIUM SULFATE HEPTAHYDRATE 2 G: 40 INJECTION, SOLUTION INTRAVENOUS at 13:22

## 2024-11-15 RX ADMIN — SODIUM CHLORIDE, SODIUM GLUCONATE, SODIUM ACETATE, POTASSIUM CHLORIDE, MAGNESIUM CHLORIDE, SODIUM PHOSPHATE, DIBASIC, AND POTASSIUM PHOSPHATE 200 ML: .53; .5; .37; .037; .03; .012; .00082 INJECTION, SOLUTION INTRAVENOUS at 09:41

## 2024-11-15 RX ADMIN — AMIODARONE HYDROCHLORIDE 150 MG: 50 INJECTION, SOLUTION INTRAVENOUS at 10:34

## 2024-11-15 RX ADMIN — ALPRAZOLAM 0.5 MG: 0.5 TABLET ORAL at 19:04

## 2024-11-15 RX ADMIN — POTASSIUM CHLORIDE 40 MEQ: 29.8 INJECTION, SOLUTION INTRAVENOUS at 20:51

## 2024-11-15 RX ADMIN — EPINEPHRINE 2 MCG/MIN: 1 INJECTION INTRAMUSCULAR; INTRAVENOUS; SUBCUTANEOUS at 08:00

## 2024-11-15 RX ADMIN — EPHEDRINE SULFATE 5 MG: 50 INJECTION, SOLUTION INTRAVENOUS at 08:02

## 2024-11-15 RX ADMIN — AMINOCAPROIC ACID 5 G: 250 INJECTION, SOLUTION INTRAVENOUS at 08:00

## 2024-11-15 RX ADMIN — ALBUMIN (HUMAN) 12.5 G: 12.5 INJECTION, SOLUTION INTRAVENOUS at 23:14

## 2024-11-15 RX ADMIN — PHENYLEPHRINE HYDROCHLORIDE 6000 MCG: 10 INJECTION INTRAVENOUS at 12:15

## 2024-11-15 RX ADMIN — FAMOTIDINE 20 MG: 10 INJECTION, SOLUTION INTRAVENOUS at 07:30

## 2024-11-15 RX ADMIN — DEXMEDETOMIDINE 0.3 MCG/KG/HR: 100 INJECTION, SOLUTION INTRAVENOUS at 07:29

## 2024-11-15 RX ADMIN — SODIUM CHLORIDE, SODIUM LACTATE, POTASSIUM CHLORIDE, AND CALCIUM CHLORIDE 125 ML/HR: .6; .31; .03; .02 INJECTION, SOLUTION INTRAVENOUS at 06:59

## 2024-11-15 RX ADMIN — SODIUM CHLORIDE 5 ML: 9 INJECTION, SOLUTION INTRAVENOUS at 08:47

## 2024-11-15 RX ADMIN — MILRINONE LACTATE IN DEXTROSE 0.25 MCG/KG/MIN: 200 INJECTION, SOLUTION INTRAVENOUS at 15:01

## 2024-11-15 RX ADMIN — TRAZODONE HYDROCHLORIDE 100 MG: 100 TABLET ORAL at 14:15

## 2024-11-15 RX ADMIN — MAGNESIUM SULFATE HEPTAHYDRATE 2 G: 500 INJECTION, SOLUTION INTRAMUSCULAR; INTRAVENOUS at 10:37

## 2024-11-15 RX ADMIN — CALCIUM GLUCONATE 2 G: 20 INJECTION, SOLUTION INTRAVENOUS at 17:08

## 2024-11-15 RX ADMIN — Medication 12.5 MG: at 06:15

## 2024-11-15 RX ADMIN — VECURONIUM BROMIDE 3 MG: 10 INJECTION, POWDER, LYOPHILIZED, FOR SOLUTION INTRAVENOUS at 10:45

## 2024-11-15 RX ADMIN — Medication 1000 ML: at 08:55

## 2024-11-15 RX ADMIN — ACETAMINOPHEN 975 MG: 325 TABLET, FILM COATED ORAL at 19:04

## 2024-11-15 RX ADMIN — SODIUM BICARBONATE 50 MEQ: 84 INJECTION, SOLUTION INTRAVENOUS at 11:39

## 2024-11-15 RX ADMIN — LIDOCAINE HYDROCHLORIDE 100 MG: 20 INJECTION INTRAVENOUS at 12:01

## 2024-11-15 RX ADMIN — MUPIROCIN 1 APPLICATION: 20 OINTMENT TOPICAL at 06:16

## 2024-11-15 RX ADMIN — Medication 750 ML: at 10:58

## 2024-11-15 RX ADMIN — AMINOCAPROIC ACID 1 G/HR: 250 INJECTION, SOLUTION INTRAVENOUS at 08:21

## 2024-11-15 RX ADMIN — SODIUM CHLORIDE, SODIUM GLUCONATE, SODIUM ACETATE, POTASSIUM CHLORIDE, MAGNESIUM CHLORIDE, SODIUM PHOSPHATE, DIBASIC, AND POTASSIUM PHOSPHATE 100 ML: .53; .5; .37; .037; .03; .012; .00082 INJECTION, SOLUTION INTRAVENOUS at 09:27

## 2024-11-15 RX ADMIN — ALBUMIN (HUMAN) 12.5 G: 12.5 INJECTION, SOLUTION INTRAVENOUS at 17:24

## 2024-11-15 RX ADMIN — Medication 50 MG: at 07:39

## 2024-11-15 RX ADMIN — FENTANYL CITRATE 1000 MCG: 0.05 INJECTION, SOLUTION INTRAMUSCULAR; INTRAVENOUS at 07:39

## 2024-11-15 RX ADMIN — SODIUM CHLORIDE 20 ML/HR: 0.45 INJECTION, SOLUTION INTRAVENOUS at 13:05

## 2024-11-15 RX ADMIN — LIDOCAINE HYDROCHLORIDE 100 MG: 20 INJECTION, SOLUTION EPIDURAL; INFILTRATION; INTRACAUDAL; PERINEURAL at 07:39

## 2024-11-15 RX ADMIN — LIDOCAINE HYDROCHLORIDE 0.5 ML: 10 INJECTION, SOLUTION EPIDURAL; INFILTRATION; INTRACAUDAL; PERINEURAL at 07:34

## 2024-11-15 RX ADMIN — MIDAZOLAM 4 MG: 1 INJECTION INTRAMUSCULAR; INTRAVENOUS at 07:30

## 2024-11-15 RX ADMIN — GABAPENTIN 300 MG: 300 CAPSULE ORAL at 06:15

## 2024-11-15 RX ADMIN — VANCOMYCIN HYDROCHLORIDE 1000 MG: 1 INJECTION, SOLUTION INTRAVENOUS at 07:00

## 2024-11-15 RX ADMIN — LIDOCAINE HYDROCHLORIDE 100 MG: 20 INJECTION INTRAVENOUS at 10:43

## 2024-11-15 RX ADMIN — MIDAZOLAM 4 MG: 1 INJECTION INTRAMUSCULAR; INTRAVENOUS at 10:45

## 2024-11-15 RX ADMIN — ALBUMIN HUMAN 12.5 G: 50 SOLUTION INTRAVENOUS at 17:24

## 2024-11-15 RX ADMIN — NEOSTIGMINE METHYLSULFATE 4 MG: 1 INJECTION INTRAVENOUS at 13:07

## 2024-11-15 RX ADMIN — ALBUMIN (HUMAN): 12.5 INJECTION, SOLUTION INTRAVENOUS at 12:25

## 2024-11-15 RX ADMIN — Medication 50 MG: at 10:45

## 2024-11-15 NOTE — RESPIRATORY THERAPY NOTE
Resp Therapy   11/15/24 1309   Respiratory Assessment   Assessment Type Assess only   General Appearance Sedated   Respiratory Pattern Assisted   Chest Assessment Chest expansion symmetrical   Bilateral Breath Sounds Diminished   Resp Comments Pt received intubated from OR following mitral valve repair surgery, pt currently on S(cmv) rr 12 vt 450 peep 6 fio2 40%.pt appears to be tolerating these settings well, will continue to monitor pt per resp protocol.   O2 Device Vent   Vent Information   Vent ID [id number]   Vent type Aden C3   Aden Vent Mode (S)CMV   $ Vent Charge-INITIAL Yes   Ventilator Start Yes   $ Pulse Oximetry Spot Check Charge Completed   SpO2 98 %   (S)CMV Settings   Resp Rate (BPM) 12 BPM   VT (mL) 450 mL   FIO2 (%) 40 %   PEEP (cmH2O) 6 cmH2O   I:E Ratio 1:4   Insp Time (%) 1 %   Flow Trigger (LPM) 3   Humidification Heat and moisture exchanger   (S)CMV Actuals   Resp Rate (BPM) 21 BPM   VT (mL) 451   MV 9.3   MAP (cmH2O) 12 cmH2O   Peak Pressure (cmH2O) 26 cmH2O   I:E Ratio (Obs) 1:3.3   Insp Resistance 20   (S)CMV Alarms   High Peak Pressure (cmH2O) 40   Low Pressure (cmH2O) 6 cm H2O   High Resp Rate (BPM) 40 BPM   Low Resp Rate (BPM) 8 BPM   High MV (L/min) 20 L/min   Low MV (L/min) 3 L/min   High VT (mL) 1000 mL   Low VT (mL) 250 mL   Apnea Time (s) 20 S   Maintenance   Alarm (pink) cable attached No   Resuscitation bag with peep valve at bedside Yes   Water bag changed No   Circuit changed No   Daily Screen   Patient safety screen outcome: Failed   Not Ready for Weaning due to: Underline problem not resolved   ETT  Hi-Lo;Cuffed;Oral 7.5 mm   Placement Date/Time: 11/15/24 0742   Mask Ventilation: Ventilated by mask (1)  Preoxygenated: Yes  Technique: Direct laryngoscopy;Stylet  Type: Hi-Lo;Cuffed;Oral  Tube Size: 7.5 mm  Laryngoscope: Mac  Blade Size: 3  Location: Oral  Grade View: Full vi...   Secured at (cm) 20   Measured from Lips   Secured Location Right   Secured by Twill tape   Site  Condition Dry   Cuff Pressure (color) Green   HI-LO Suction  Intermittent suction   HI-LO Secretions Scant   HI-LO Intervention Patent

## 2024-11-15 NOTE — ANESTHESIA PROCEDURE NOTES
Procedure Performed: KWAME Anesthesia  Start Time:  11/15/2024 7:59 AM        Preanesthesia Checklist    Patient identified, IV assessed, risks and benefits discussed, monitors and equipment assessed, procedure being performed at surgeon's request and anesthesia consent obtained.      Procedure    Diagnostic Indications for KWAME:  assessment of ascending aorta, assessment of surgical repair and hemodynamic monitoring. Type of KWAME: complete KWAME with interpretation. Images Saved: ultrasound permanent image saved. Physician Requesting Echo: Parker Reddy MD.  Location performed: OR. Intubated.  Heart visualized. Insertion of KWAME Probe:  Easy. Probe Type:  Epiaortic and multiplane. Modalities:  Color flow mapping, 3D, pulse wave Doppler and continuous wave Doppler.      Echocardiographic and Doppler Measurements    PREPROCEDURE    LEFT VENTRICLE:  Systolic Function: normal. Ejection Fraction: 50-55%. Cavity size: normal.   Regional Wall Motion Abnormalities: none.    RIGHT VENTRICLE:  Systolic Function: normal.  Cavity size normal. No hypertrophy              AORTIC VALVE:  Leaflets: normal and trileaflet. Leaflet motions normal and normal. Stenosis: none.     Regurgitation: none.      MITRAL VALVE:  Leaflets: myxomatous and thickened. Leaflet Motions: prolapse and Bileaflet prolapse. Worse in A3, P2 and P3. Regurgitation: severe and Centrally directed.   Stenosis: none.       TRICUSPID VALVE:  Leaflets: normal. Leaflet Motions: normal. Stenosis: none. Regurgitation: mild.      PULMONIC VALVE:  Leaflets: normal. Regurgitation: none. Stenosis: none.        ASCENDING AORTA:  Size:  normal.  Dissection not present.      AORTIC ARCH:  Size:  normal.  dissection not present. Grade 2: severe intimal thickening without protruding atheroma.    DESCENDING AORTA:  Size: normal.  Dissection not present. Grade 3: atheroma protruding < 0.5 cm into lumen.        RIGHT ATRIUM:  Size:  dilated. No spontaneous echo contrast.    LEFT  ATRIUM:  Size: dilated. No spontaneous echo contrast.    LEFT ATRIAL APPENDAGE:  Size: dilated. No spontaneous echo contrast         ATRIAL SEPTUM:  Intra-atrial septal morphology: normal.          VENTRICULAR SEPTUM:  Intra-ventricular septum morphology: normal.         EPIAORTIC:  Plaque Thickness: 0-5 mm.    OTHER FINDINGS:  Pericardium:  pericardial effusion and Trace. Pleural Effusion:  none.        POSTPROCEDURE    LEFT VENTRICLE: Unchanged .         RIGHT VENTRICLE: Unchanged .           AORTIC VALVE: Unchanged .           MITRAL VALVE:   Leaflets: bioprosthetic. Regurgitation: none. Stenosis: none. Mean Gradient: 2.Valve/Ring Size: 29 mm.    TRICUSPID VALVE: Unchanged .        PULMONIC VALVE: Unchanged             ATRIA:   Left Atrial Appendage Ligate: Yes. Residual Flow in Left Atrial Appendage by Color Flow Doppler: No.       AORTA: Unchanged .        REMOVAL:  Probe Removal: atraumatic.

## 2024-11-15 NOTE — ANESTHESIA PROCEDURE NOTES
"Central Line Insertion    Performed by: Karli Ibarra MD  Authorized by: Karli Ibarra MD    Date/Time: 11/15/2024 7:56 AM  Catheter Type:  triple lumen  Consent: Verbal consent obtained.  Risks and benefits: risks, benefits and alternatives were discussed  Consent given by: patient  Patient understanding: patient states understanding of the procedure being performed  Patient consent: the patient's understanding of the procedure matches consent given  Required items: required blood products, implants, devices, and special equipment available  Patient identity confirmed: arm band, provided demographic data and hospital-assigned identification number  Time out: Immediately prior to procedure a \"time out\" was called to verify the correct patient, procedure, equipment, support staff and site/side marked as required.  Indications: vascular access and central pressure monitoring  Catheter size: 7 Fr  Patient position: Trendelenburg  Assessment: blood return through all ports and free fluid flow  Preparation: skin prepped with 2% chlorhexidine  Skin prep agent dried: skin prep agent completely dried prior to procedure  Sterile barriers: all five maximum sterile barriers used - cap, mask, sterile gown, sterile gloves, and large sterile sheet  Hand hygiene: hand hygiene performed prior to central venous catheter insertion  sterile gel and probe cover used in ultrasound-guided central venous catheter insertionultrasound permanent image saved  Vessel of Catheter Tip End: svc  Number of attempts: 1  Successful placement: yes  Post-procedure: line sutured, dressing applied and chlorhexidine patch applied  Patient tolerance: Patient tolerated the procedure well with no immediate complications and patient tolerated the procedure well with no immediate complications        "

## 2024-11-15 NOTE — ANESTHESIA PROCEDURE NOTES
"Arterial Line Insertion    Performed by: Karli Ibarra MD  Authorized by: Karli Ibarra MD  Consent: Verbal consent obtained. Written consent obtained.  Risks and benefits: risks, benefits and alternatives were discussed  Consent given by: patient  Patient understanding: patient states understanding of the procedure being performed  Patient consent: the patient's understanding of the procedure matches consent given  Required items: required blood products, implants, devices, and special equipment available  Patient identity confirmed: arm band and verbally with patient  Time out: Immediately prior to procedure a \"time out\" was called to verify the correct patient, procedure, equipment, support staff and site/side marked as required.  Preparation: Patient was prepped and draped in the usual sterile fashion.  Indications: multiple ABGs and hemodynamic monitoring  Orientation:  Left  Location: radial arterylidocaine (PF) (XYLOCAINE-MPF) 1 % - Infiltration   0.5 mL - 11/15/2024 7:34:00 AM  Sedation:  Patient sedated: yes  Sedation type: anxiolysis  Sedatives: midazolam and see MAR for details  Vitals: Vital signs were monitored during sedation.    Procedure Details:  Needle gauge: 20  Seldinger technique: Seldinger technique used  Number of attempts: 1    Post-procedure:  Post-procedure: dressing applied  Waveform: good waveform  Post-procedure CNS: unchanged  Patient tolerance: Patient tolerated the procedure well with no immediate complications and patient tolerated the procedure well with no immediate complications          "

## 2024-11-15 NOTE — CONSULTS
Consultation - Critical Care/ICU   Name: Carley Bonilla 69 y.o. female I MRN: [id number]  Unit/Bed#: PPHP-311-01 I Date of Admission: 11/15/2024   Date of Service: 11/15/2024 I Hospital Day: 0   Inpatient consult to Medical Critical Care  Consult performed by: Paola Cruz PA-C  Consult ordered by: Cedric David PA-C        Physician Requesting Evaluation: Parker Reddy MD   Reason for Evaluation / Principal Problem: MVR        Assessment & Plan   Impressions:  MVP, severe MR s/p mitral valve replacement (11/15/2024)  HTN  HLD  CKD2  Panic disorder, anxiety    Neuro:   Discontinue continuous sedation.   ATC tylenol, PRN oxycodone for pain  Trend neuro exam  Delirium precautions    CV:   Goals:   Cardiac Surgery Hemodynamic Monitoring goals: MAP goal >65 CI >2.2 SBP < 130 Continue pulmonary artery catheter for hemodynamic monitoring  Continue central line due to vasoactive medications Continue arterial line for blood pressure monitoring and/or frequent blood gas draws  Volume resuscitation as needed.   Epicardial pacing wire plan : Maintain for pacing needs currently AV paced  Monitor rhythm on telemetry.        Lung:   Check STAT post-op ABG and CXR  Wean vent with spontaneous breathing trial with goal to extubate today    GI:   GI prophylaxis with PPI  Bowel regimen  Zofran PRN for nausea.     FEN:   NPO Replenish K >4.0, mag >2.0 and calcium >7.0.     :   Check STAT post-op BMP  Cruz in place.   Monitor UOP with goal >0.5cc/kg/hour.  Lasix versus volume resuscitate as needed depending on hemodynamics and volume status.    ID:   Prophylactic post-op abx. Maintain normothermia. Trend temps.     Heme:   Check STAT post-op H/H and platelets.  Monitor incision site, invasive lines, and chest tube outputs for bleeding. Send coag panel if needed.    Endo:   Insulin gtt for blood sugar control.     Disposition: ICU Care     History of Present Illness   HPI: Carley Bonilla is a 69 y.o. female PMhx MVP, HTN, HLD,  CKD2, who is presenting for scheduled mitral valve repair. She started developing HILL over the past few months, recently had a repeat echo in the setting of known MVP and was found to have bileaflet prolapse with severe mitral regurgitation, severely dilated left atrium. LVEF 65%. She was referred for CT surgery and recommended for mitral valve repair vs replacement.   Patient presents to the ICU post-op from mitral valve replacement.    Intra-op KWAME LVEF 50-55%  Post-op medications: Critical Care Infusions : Epinephrine 1 mcg/min    ROS unable to be obtained due to patient being intubated and sedated.   History obtained from chart review due to patient being intubated and sedated.   Historical Information   Past Medical History:  No date: Anxiety  No date: Cardiac disease      Comment:  mitral valve regurgitation  No date: Mitral valve disorder Past Surgical History:  No date: ANKLE FRACTURE SURGERY  10/30/2024: CARDIAC CATHETERIZATION; Left      Comment:  Procedure: Cardiac Left Heart Cath;  Surgeon:                René England DO;  Location: [facility];               Service: Cardiology  11/21/2016: ORIF WRIST FRACTURE; Right      Comment:  Procedure: DISTAL RADIUS O/R I/F;  Surgeon: Chuckie Nova MD;  Location: [facility];  Service:   No date: WRIST SURGERY   Current Outpatient Medications   Medication Instructions    ALPRAZolam ER (XANAX XR) 1 mg, Daily at bedtime    atenolol (TENORMIN) 25 mg, Oral, Daily    Calcium-Vitamin D-Vitamin K (VIACTIV PO) Take by mouth    cyanocobalamin (VITAMIN B-12) 1,000 mcg tablet Daily    EPINEPHrine (EpiPen 2-Miguel) 0.3 mg/0.3 mL SOAJ     hydrOXYzine HCL (ATARAX) 25 mg tablet Daily    ibandronate (BONIVA) 150 mg, Oral, Every 30 days    mupirocin (BACTROBAN) 2 % ointment Topical, 2 times daily, Apply to each nostril twice daily for five days before your operation.    pravastatin (PRAVACHOL) 10 mg, Oral, Daily    Propylene Glycol (Systane Balance)  0.6 % SOLN drops: 0.6% 1 drop six times a day into both eyes    ramelteon (ROZEREM) 8 mg, Daily    traZODone (DESYREL) 100 mg, Daily    Allergies   Allergen Reactions    Morphine And Codeine Other (See Comments)     hallucinations      Bee Venom Hives    Codeine Other (See Comments)     Hallucinations    Penicillins Hives    Sulfa Antibiotics Other (See Comments)     Yeast infections      Social History     Tobacco Use    Smoking status: Never    Smokeless tobacco: Never   Vaping Use    Vaping status: Never Used   Substance Use Topics    Alcohol use: Yes     Comment: occ    Drug use: No    Family History   Problem Relation Age of Onset    No Known Problems Mother     No Known Problems Daughter     No Known Problems Maternal Grandmother     No Known Problems Paternal Grandmother     No Known Problems Maternal Aunt     No Known Problems Paternal Aunt     Breast cancer Neg Hx             Objective  :     Vitals: Invasive Monitoring      /59   Pulse 73   Resp 18   O2 Sat 96 %   O2 Device None (Room air)   Temp (!) 97.3 °F (36.3 °C)    Arterial Line  [place] BP    No data recorded   MAP    No data recorded     PA Catheter   Most Recent  Min/Max in 24hrs    PAP   No data recorded   CVP   No data recorded   CI    No data recorded   SVR   No data recorded          Physical Exam   Physical Exam  Vitals and nursing note reviewed.   Eyes:      General: No scleral icterus.  Skin:     General: Skin is warm.      Capillary Refill: Capillary refill takes less than 2 seconds.   HENT:      Head: Normocephalic and atraumatic.   Neck:      Vascular: Central line present.   Cardiovascular:      Rate and Rhythm: Normal rate and regular rhythm.      Pulses: Normal pulses.   Musculoskeletal:      Right lower leg: No edema.      Left lower leg: No edema.   Constitutional:       General: She is not in acute distress.     Appearance: She is well-developed and well-nourished.      Interventions: She is intubated and restrained.    Pulmonary:      Effort: No accessory muscle usage, respiratory distress or accessory muscle usage. She is intubated.      Breath sounds: No wheezing.      Comments: Mechanical breath sounds b/l  Neurological:      GCS: GCS eye subscore is 1. GCS verbal subscore is 1. GCS motor subscore is 1.          Diagnostic Studies      11/15/24 CXR: lines and drains in position, no pneumothorax noted.   This was personally reviewed by myself in PACS.  11/15/24 EKG: AV paced, sinus pause underneath.   This was personally reviewed by myself.     Medications:  Scheduled PRN   acetaminophen, 975 mg, Q6H While awake  amiodarone, 200 mg, Q8H AMANUEL  aspirin, 325 mg, Daily  atorvastatin, 80 mg, Daily With Dinner  chlorhexidine, 15 mL, BID  fentaNYL, 50 mcg, Once  [START ON 11/16/2024] fondaparinux, 2.5 mg, Daily  hydrOXYzine HCL, 10 mg, Daily  magnesium sulfate, 2 g, Once  mupirocin, 1 Application, Q12H AMANUEL  pantoprazole, 40 mg, Daily  [START ON 11/16/2024] polyethylene glycol, 17 g, Daily  traZODone, 100 mg, Daily  vancomycin, 15 mg/kg, Q12H      acetaminophen, 650 mg, Q4H PRN  ALPRAZolam, 0.5 mg, HS PRN  bisacodyl, 10 mg, Daily PRN  calcium gluconate, 2 g, Once PRN  fentaNYL, 50 mcg, Q1H PRN  HYDROmorphone, 0.5 mg, Q2H PRN  lactated ringers, 500 mL, Once PRN  ondansetron, 4 mg, Q6H PRN  oxyCODONE, 2.5 mg, Q4H PRN   Or  oxyCODONE, 5 mg, Q4H PRN  potassium chloride, 20 mEq, Once PRN  potassium chloride, 40 mEq, Once PRN  potassium chloride, 40 mEq, Once PRN       Continuous    insulin regular (HumuLIN R,NovoLIN R) 1 Units/mL in sodium chloride 0.9 % 100 mL infusion, 0.3-21 Units/hr  lactated ringers, 125 mL/hr, Last Rate: 125 mL/hr (11/15/24 0659)  niCARdipine, 2.5-15 mg/hr  phenylephine,  mcg/min  sodium chloride, 20 mL/hr         Labs:  CBC  Recent Labs     11/15/24  1027 11/15/24  1059 11/15/24  1156 11/15/24  1230   HGB 8.5*   < > 9.5* 10.9*   HCT 25*   < > 28* 32*   *  --   --   --     < > = values in this interval  not displayed.     BMP  Recent Labs     11/15/24  1156 11/15/24  1230   CO2 26 24       Coags  No recent results     Additional Electrolytes  Recent Labs     11/15/24  1156 11/15/24  1230   CAIONIZED 1.09* 1.07*          Blood Gas  No recent results  No recent results LFTs  No recent results    Infectious  No recent results     No recent results Glucose  No recent results     Invasive lines and devices:  Invasive Devices       Central Venous Catheter Line  Duration             CVC Central Lines 11/15/24 <1 day    Introducer 11/15/24 <1 day              Peripheral Intravenous Line  Duration             Peripheral IV 11/15/24 Right Antecubital <1 day              Arterial Line  Duration             Arterial Line 11/15/24 Left Radial <1 day              Line  Duration             Pacer Wires <1 day    Pacer Wires <1 day              Drain  Duration             Chest Tube 1 Anterior;Mediastinal 32 Fr. <1 day    Chest Tube 2 Posterior;Mediastinal 32 Fr. <1 day    Urethral Catheter Latex 14 Fr. <1 day              Airway  Duration             ETT  Hi-Lo;Cuffed;Oral 7.5 mm <1 day

## 2024-11-15 NOTE — ANESTHESIA POSTPROCEDURE EVALUATION
Post-Op Assessment Note    CV Status:  Stable    Pain management: adequate       Mental Status:  Sleepy and somnolent   PONV Controlled:  None   Airway Patency:  Patent  Airway: intubated  Two or more mitigation strategies used for obstructive sleep apnea  No anethesia notable event occurred.    Staff: Anesthesiologist           Last Filed PACU Vitals:  Vitals Value Taken Time   Temp 97.5F    Pulse 69 11/15/24 1309   /57    Resp 20 11/15/24 1309   SpO2 98 % 11/15/24 1309   Vitals shown include unfiled device data.    Modified Vanessa:  No data recorded

## 2024-11-15 NOTE — OP NOTE
OPERATIVE REPORT  PATIENT NAME: Carley Bonilla    :  1955  MRN: [id number]      SURGERY DATE: 11/15/2024    SURGEON: Parker Reddy MD    ASSISTANT: Cedric David PA-C    ADDITIONAL ASSISTANT: N/A    PREOPERATIVE DIAGNOSIS: Severe mitral regurgitation    POSTOPERATIVE DIAGNOSES: Severe mitral regurgitation    NYHA CLASS: 2    CCS CLASS: 1    PROCEDURE:  1. Mitral valve replacement with  a 27 mm Reyes Mitris Resilia pericardial tissue valve  2. Ligation of the left atrial appendage with a 40 mm Penditure device     CARDIOPULMONARY BYPASS TIME: 198 minutes.   CROSSCLAMP TIME: 174 minutes.    ANESTHESIA: General endotracheal anesthesia with transesophageal echocardiogram guidance, Dr. Karli Ibarra     INDICATIONS:  The patient is a 69 y.o. year-old female with clinical and echocardiographic findings consistent with severe mitral regurgitation. I saw the patient in consultation and recommended the procedure described previously.    FINDINGS:  Intraoperative transesophageal echocardiogram revealed EF 50%, severe MR, Olson's valve with bileaflet prolapse. Narrow LVOT without flow acceleration increasing the risk of GIANNI. No clot in the TYLER  Epiaortic ultrasound showed less than 5 mm non-mobile plaque  Inspection of the mitral valve reveals type II mitral valve disease with bileaflet prolapse, very large posterior leaflet, most of the prolapse at P2 level, the anterior leaflet was short.  Final transesophageal echocardiogram demonstrated prosthetic valve with normal function without evidence of perivalvular leak, no other changes    OPERATIVE TECHNIQUE:   The patient was taken to the operating room and placed supine on the operating table. Following the satisfactory induction of general anesthesia and the placement of monitoring lines the patient was prepped and draped in the usual sterile fashion. A time-out procedure was performed.    The patient underwent median sternotomy, pericardiotomy, and systemic  heparinization. Epiaortic ultrasound showed less than 5 mm non-mobile plaque. Cannulation for cardiopulmonary bypass was performed with an arterial dispersion cannula, bicaval single stage venous cannulas and a vented antegrade cardioplegia cannula. Once the ACT was greater than 480 seconds we placed the patient on cardiopulmonary bypass, the ascending aorta was crossclamped and cardiac arrest was obtained without complications with [place] cardioplegia.  A 40 mm Penditure left atrial appendage exclusion device was deployed without complications.  A CO2 flooded field was used during the entire case.    Left atriotomy was performed and the mitral valve was exposed with a self-retaining retractor. Thorough mitral valve analysis was performed. 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 I felt that the valve was therefore repairable. Annuloplasty sutures were placed with 2-0 Ethibond. The following techniques were used for valve repair: P2 quadrangular ressection, the posterior leaflet height was greater than 20 mm therefore I did a wedge resection at the base of the leaflet to decrease the height, then I performed a sliding plasty, the resultant defect was closed with 2 layers of running 5-0 Prolene suture followed by placement of a true-size 38 mm Isaak/LivaNova Zurdo 4D Rechord mitral annuloplasty ring . The 2-0 Ethibond sutures were passed through the ring, the ring was seated and the sutures were tied down. The valve was tested and found to be competent. While de-airing the heart the left atriotomy was closed with  2 layers of running 4-0 Prolene suture. The heart was extensively de-aired and the crossclamp was removed. Atrial and ventricular pacing wires were placed. Following a period of reperfusion the patient was weaned from cardiopulmonary bypass.   KWAME demonstrated still significant mitral regurgitation, even though I decreased the height of the posterior leaflet, it appeared that the coaptation line had moved anteriorly, there was also GIANNI, and some leaflet prolapse at the A2 level.  The patient was placed back in cardiopulmonary bypass, the ascending aorta was crossclamped and cardiac arrest was obtained without complications with [place] cardioplegia.  The left atriotomy was reopened, the mitral valve was exposed, I retested the valve and it appeared to be competent but based on the KWAME findings I decided to move the coaptation more posteriorly, I placed 2 sets of [place] neochords to the P2 area partially restricting the posterior leaflet, I also put a set of [place] neochords to A2, I tested the valve and I was not able to have a competent valve therefore I decided to move forward with a replacement.  The anterior leaflet was excised, the posterior leaflet was preserved with attachment to both papillary muscles. The annulus was sized, I choose a 27 mm Reyes Mitris Resilia pericardial tissue valve. Pledgeted 2-0 Ethibond sutures were placed around the mitral valve annulus. The prosthetic valve was brought to the field, the sutures were passed through the sewing ring, the prosthetic valve was seated in a supra annular fashion and the sutures were tied down. Extensive irrigation of the left atrium was performed to remove any debris, the left atriotomy was closed with  2 layers of running 4-0 Prolene suture. The heart was extensively de-aired and the crossclamp was removed. Atrial and ventricular pacing wires were placed. Following a period of reperfusion the patient was weaned from cardiopulmonary bypass and decannulated. Protamine was administered with normalization of the ACT. Hemostasis was confirmed in all fields. Thoracostomy tubes were placed. The sternum was closed with stainless steel wires. The fascia, subdermis and skin were closed with  multiple layers of running absorbable suture.    COMPLICATIONS: None    PACKS/TUBES/DRAINS: Chest tubes x 2.     EBL: 200 cc.    TRANSFUSION: 2u PRBC.     SPECIMENS: Mitral valve    As the attending surgeon, I was present and scrubbed for all critical portions of this procedure. Sponge, needle and instrument counts were reported as correct by the nursing staff. There is no cardiac residency program at this facility and for complex procedures such as this, it is vital to have a physician assistant experienced in cardiac surgery.  The assistance of Cedric David PA-C was also necessary during mitral valve replacement for retraction of the heart with correct tissue handling techniques during exposure and excision of the mitral valve, proper following of the suture during valve replacement,  seating of the mitral valve prosthesis, retraction while the valve replacement sutures were tied and left atriotomy closure,      SIGNATURE: Parker Reddy MD  DATE: November 15, 2024  TIME: 3:50 PM

## 2024-11-15 NOTE — ANESTHESIA PROCEDURE NOTES
"Introducer/Rolla-Ciara    Performed by: Karli Ibarra MD  Authorized by: Karli Ibarra MD    Date/Time: 11/15/2024 7:56 AM  Consent: Verbal consent obtained. Written consent obtained.  Risks and benefits: risks, benefits and alternatives were discussed  Consent given by: patient  Patient understanding: patient states understanding of the procedure being performed  Patient consent: the patient's understanding of the procedure matches consent given  Required items: required blood products, implants, devices, and special equipment available  Patient identity confirmed: arm band, provided demographic data and hospital-assigned identification number  Time out: Immediately prior to procedure a \"time out\" was called to verify the correct patient, procedure, equipment, support staff and site/side marked as required.  Indications: vascular access and central pressure monitoring  Location details: right internal jugular  Catheter size: 9 Fr  Patient position: Trendelenburg  Assessment: blood return through all ports and free fluid flow  Preparation: skin prepped with 2% chlorhexidine  Skin prep agent dried: skin prep agent completely dried prior to procedure  Sterile barriers: all five maximum sterile barriers used - cap, mask, sterile gown, sterile gloves, and large sterile sheet  Hand hygiene: hand hygiene performed prior to central venous catheter insertion  Ultrasound guidance: yes  ultrasound permanent image saved  Number of attempts: 1  Successful placement: yes  Post-procedure: line sutured and dressing applied  Patient tolerance: Patient tolerated the procedure well with no immediate complications and patient tolerated the procedure well with no immediate complications        "

## 2024-11-15 NOTE — INTERVAL H&P NOTE
H&P reviewed. After examining the patient I find no changes in the patients condition since the H&P had been written.    Vitals:    11/15/24 0552   BP: 136/59   Pulse: 73   Resp: 18   Temp: (!) 97.3 °F (36.3 °C)   SpO2: 96%     Anticipated Length of Stay:  Patient will be admitted on an Inpatient basis with an anticipated length of stay of  greater than 2 midnights.       Justification for Hospital Stay:  Post surgical recovery following open heart surgery.

## 2024-11-16 ENCOUNTER — APPOINTMENT (INPATIENT)
Dept: RADIOLOGY | Facility: HOSPITAL | Age: 69
DRG: 219 | End: 2024-11-16
Payer: COMMERCIAL

## 2024-11-16 LAB
ABO GROUP BLD BPU: NORMAL
ANION GAP SERPL CALCULATED.3IONS-SCNC: 4 MMOL/L (ref 4–13)
ANION GAP SERPL CALCULATED.3IONS-SCNC: 6 MMOL/L (ref 4–13)
ARTERIAL PATENCY WRIST A: YES
ATRIAL RATE: 36 BPM
ATRIAL RATE: 53 BPM
ATRIAL RATE: 69 BPM
ATRIAL RATE: 74 BPM
BASE EX.OXY STD BLDV CALC-SCNC: 63.8 % (ref 60–80)
BASE EXCESS BLDA CALC-SCNC: -4.2 MMOL/L
BASE EXCESS BLDV CALC-SCNC: -1.3 MMOL/L
BODY TEMPERATURE: 98.6 DEGREES FEHRENHEIT
BPU ID: NORMAL
BUN SERPL-MCNC: 23 MG/DL (ref 5–25)
BUN SERPL-MCNC: 24 MG/DL (ref 5–25)
CALCIUM SERPL-MCNC: 7.6 MG/DL (ref 8.4–10.2)
CALCIUM SERPL-MCNC: 8 MG/DL (ref 8.4–10.2)
CHLORIDE SERPL-SCNC: 108 MMOL/L (ref 96–108)
CHLORIDE SERPL-SCNC: 110 MMOL/L (ref 96–108)
CO2 SERPL-SCNC: 24 MMOL/L (ref 21–32)
CO2 SERPL-SCNC: 24 MMOL/L (ref 21–32)
CREAT SERPL-MCNC: 0.94 MG/DL (ref 0.6–1.3)
CREAT SERPL-MCNC: 1.07 MG/DL (ref 0.6–1.3)
CROSSMATCH: NORMAL
ERYTHROCYTE [DISTWIDTH] IN BLOOD BY AUTOMATED COUNT: 14 % (ref 11.6–15.1)
GFR SERPL CREATININE-BSD FRML MDRD: 53 ML/MIN/1.73SQ M
GFR SERPL CREATININE-BSD FRML MDRD: 62 ML/MIN/1.73SQ M
GLUCOSE SERPL-MCNC: 106 MG/DL (ref 65–140)
GLUCOSE SERPL-MCNC: 131 MG/DL (ref 65–140)
GLUCOSE SERPL-MCNC: 139 MG/DL (ref 65–140)
GLUCOSE SERPL-MCNC: 141 MG/DL (ref 65–140)
GLUCOSE SERPL-MCNC: 142 MG/DL (ref 65–140)
GLUCOSE SERPL-MCNC: 148 MG/DL (ref 65–140)
GLUCOSE SERPL-MCNC: 154 MG/DL (ref 65–140)
GLUCOSE SERPL-MCNC: 159 MG/DL (ref 65–140)
HCO3 BLDA-SCNC: 21.5 MMOL/L (ref 22–28)
HCO3 BLDV-SCNC: 24.9 MMOL/L (ref 24–30)
HCT VFR BLD AUTO: 38.4 % (ref 34.8–46.1)
HGB BLD-MCNC: 12.7 G/DL (ref 11.5–15.4)
MAGNESIUM SERPL-MCNC: 2.5 MG/DL (ref 1.9–2.7)
MCH RBC QN AUTO: 29.4 PG (ref 26.8–34.3)
MCHC RBC AUTO-ENTMCNC: 33.1 G/DL (ref 31.4–37.4)
MCV RBC AUTO: 89 FL (ref 82–98)
NASAL CANNULA: 3
NASAL CANNULA: 3
O2 CT BLDA-SCNC: 18.3 ML/DL (ref 16–23)
O2 CT BLDV-SCNC: 11.2 ML/DL
OXYHGB MFR BLDA: 96.3 % (ref 94–97)
P AXIS: 62 DEGREES
P AXIS: 87 DEGREES
PCO2 BLDA: 41.7 MM HG (ref 36–44)
PCO2 BLDV: 47.5 MM HG (ref 42–50)
PCO2 TEMP ADJ BLDA: 41.7 MM HG (ref 36–44)
PH BLD: 7.33 [PH] (ref 7.35–7.45)
PH BLDA: 7.33 [PH] (ref 7.35–7.45)
PH BLDV: 7.34 [PH] (ref 7.3–7.4)
PLATELET # BLD AUTO: 75 THOUSANDS/UL (ref 149–390)
PMV BLD AUTO: 10.5 FL (ref 8.9–12.7)
PO2 BLD: 101.6 MM HG (ref 75–129)
PO2 BLDA: 101.6 MM HG (ref 75–129)
PO2 BLDV: 32.8 MM HG (ref 35–45)
POTASSIUM SERPL-SCNC: 4.4 MMOL/L (ref 3.5–5.3)
POTASSIUM SERPL-SCNC: 5 MMOL/L (ref 3.5–5.3)
PR INTERVAL: 208 MS
PR INTERVAL: 244 MS
QRS AXIS: -49 DEGREES
QRS AXIS: 13 DEGREES
QRS AXIS: 19 DEGREES
QRS AXIS: 41 DEGREES
QRSD INTERVAL: 146 MS
QRSD INTERVAL: 78 MS
QRSD INTERVAL: 84 MS
QRSD INTERVAL: 86 MS
QT INTERVAL: 418 MS
QT INTERVAL: 428 MS
QT INTERVAL: 444 MS
QT INTERVAL: 520 MS
QTC INTERVAL: 348 MS
QTC INTERVAL: 401 MS
QTC INTERVAL: 463 MS
QTC INTERVAL: 483 MS
RBC # BLD AUTO: 4.32 MILLION/UL (ref 3.81–5.12)
SODIUM SERPL-SCNC: 136 MMOL/L (ref 135–147)
SODIUM SERPL-SCNC: 140 MMOL/L (ref 135–147)
SPECIMEN SOURCE: ABNORMAL
T WAVE AXIS: -3 DEGREES
T WAVE AXIS: 112 DEGREES
T WAVE AXIS: 15 DEGREES
T WAVE AXIS: 38 DEGREES
UNIT DISPENSE STATUS: NORMAL
UNIT PRODUCT CODE: NORMAL
UNIT PRODUCT VOLUME: 350 ML
UNIT RH: NORMAL
VENTRICULAR RATE: 37 BPM
VENTRICULAR RATE: 52 BPM
VENTRICULAR RATE: 53 BPM
VENTRICULAR RATE: 74 BPM
WBC # BLD AUTO: 15.16 THOUSAND/UL (ref 4.31–10.16)

## 2024-11-16 PROCEDURE — 85027 COMPLETE CBC AUTOMATED: CPT | Performed by: PHYSICIAN ASSISTANT

## 2024-11-16 PROCEDURE — 99024 POSTOP FOLLOW-UP VISIT: CPT | Performed by: THORACIC SURGERY (CARDIOTHORACIC VASCULAR SURGERY)

## 2024-11-16 PROCEDURE — 94760 N-INVAS EAR/PLS OXIMETRY 1: CPT

## 2024-11-16 PROCEDURE — 82948 REAGENT STRIP/BLOOD GLUCOSE: CPT

## 2024-11-16 PROCEDURE — 94664 DEMO&/EVAL PT USE INHALER: CPT

## 2024-11-16 PROCEDURE — 97163 PT EVAL HIGH COMPLEX 45 MIN: CPT

## 2024-11-16 PROCEDURE — 99233 SBSQ HOSP IP/OBS HIGH 50: CPT | Performed by: STUDENT IN AN ORGANIZED HEALTH CARE EDUCATION/TRAINING PROGRAM

## 2024-11-16 PROCEDURE — 80048 BASIC METABOLIC PNL TOTAL CA: CPT | Performed by: PHYSICIAN ASSISTANT

## 2024-11-16 PROCEDURE — 83735 ASSAY OF MAGNESIUM: CPT | Performed by: PHYSICIAN ASSISTANT

## 2024-11-16 PROCEDURE — 93010 ELECTROCARDIOGRAM REPORT: CPT | Performed by: INTERNAL MEDICINE

## 2024-11-16 PROCEDURE — 93005 ELECTROCARDIOGRAM TRACING: CPT

## 2024-11-16 PROCEDURE — 82805 BLOOD GASES W/O2 SATURATION: CPT | Performed by: PHYSICIAN ASSISTANT

## 2024-11-16 PROCEDURE — 71045 X-RAY EXAM CHEST 1 VIEW: CPT

## 2024-11-16 PROCEDURE — 97167 OT EVAL HIGH COMPLEX 60 MIN: CPT

## 2024-11-16 RX ORDER — INSULIN LISPRO 100 [IU]/ML
1-5 INJECTION, SOLUTION INTRAVENOUS; SUBCUTANEOUS
Status: DISCONTINUED | OUTPATIENT
Start: 2024-11-16 | End: 2024-11-21 | Stop reason: HOSPADM

## 2024-11-16 RX ORDER — ALBUMIN HUMAN 50 G/1000ML
12.5 SOLUTION INTRAVENOUS ONCE
Status: COMPLETED | OUTPATIENT
Start: 2024-11-16 | End: 2024-11-17

## 2024-11-16 RX ORDER — FUROSEMIDE 10 MG/ML
20 INJECTION INTRAMUSCULAR; INTRAVENOUS ONCE
Status: COMPLETED | OUTPATIENT
Start: 2024-11-16 | End: 2024-11-16

## 2024-11-16 RX ORDER — ATORVASTATIN CALCIUM 40 MG/1
40 TABLET, FILM COATED ORAL
Status: DISCONTINUED | OUTPATIENT
Start: 2024-11-16 | End: 2024-11-21 | Stop reason: HOSPADM

## 2024-11-16 RX ORDER — ALBUMIN HUMAN 50 G/1000ML
12.5 SOLUTION INTRAVENOUS ONCE
Status: COMPLETED | OUTPATIENT
Start: 2024-11-16 | End: 2024-11-16

## 2024-11-16 RX ORDER — DOCUSATE SODIUM 100 MG/1
100 CAPSULE, LIQUID FILLED ORAL 2 TIMES DAILY
Status: DISCONTINUED | OUTPATIENT
Start: 2024-11-16 | End: 2024-11-21 | Stop reason: HOSPADM

## 2024-11-16 RX ORDER — CALCIUM GLUCONATE 20 MG/ML
2 INJECTION, SOLUTION INTRAVENOUS ONCE
Status: COMPLETED | OUTPATIENT
Start: 2024-11-16 | End: 2024-11-16

## 2024-11-16 RX ORDER — FUROSEMIDE 10 MG/ML
40 INJECTION INTRAMUSCULAR; INTRAVENOUS ONCE
Status: COMPLETED | OUTPATIENT
Start: 2024-11-16 | End: 2024-11-16

## 2024-11-16 RX ADMIN — OXYCODONE HYDROCHLORIDE 5 MG: 5 TABLET ORAL at 21:29

## 2024-11-16 RX ADMIN — ONDANSETRON 4 MG: 2 INJECTION INTRAMUSCULAR; INTRAVENOUS at 16:38

## 2024-11-16 RX ADMIN — VANCOMYCIN HYDROCHLORIDE 1000 MG: 1 INJECTION, SOLUTION INTRAVENOUS at 06:28

## 2024-11-16 RX ADMIN — PANTOPRAZOLE SODIUM 40 MG: 40 TABLET, DELAYED RELEASE ORAL at 09:02

## 2024-11-16 RX ADMIN — HYDROMORPHONE HYDROCHLORIDE 0.5 MG: 1 INJECTION, SOLUTION INTRAMUSCULAR; INTRAVENOUS; SUBCUTANEOUS at 06:09

## 2024-11-16 RX ADMIN — ONDANSETRON 4 MG: 2 INJECTION INTRAMUSCULAR; INTRAVENOUS at 06:05

## 2024-11-16 RX ADMIN — ASPIRIN 325 MG ORAL TABLET 325 MG: 325 PILL ORAL at 09:02

## 2024-11-16 RX ADMIN — MILRINONE LACTATE IN DEXTROSE 0.25 MCG/KG/MIN: 200 INJECTION, SOLUTION INTRAVENOUS at 05:22

## 2024-11-16 RX ADMIN — POLYETHYLENE GLYCOL 3350 17 G: 17 POWDER, FOR SOLUTION ORAL at 09:02

## 2024-11-16 RX ADMIN — AMIODARONE HYDROCHLORIDE 200 MG: 200 TABLET ORAL at 21:29

## 2024-11-16 RX ADMIN — ALBUMIN (HUMAN) 12.5 G: 12.5 INJECTION, SOLUTION INTRAVENOUS at 23:07

## 2024-11-16 RX ADMIN — CHLORHEXIDINE GLUCONATE 0.12% ORAL RINSE 15 ML: 1.2 LIQUID ORAL at 09:02

## 2024-11-16 RX ADMIN — ACETAMINOPHEN 975 MG: 325 TABLET, FILM COATED ORAL at 09:02

## 2024-11-16 RX ADMIN — FUROSEMIDE 20 MG: 10 INJECTION, SOLUTION INTRAMUSCULAR; INTRAVENOUS at 14:17

## 2024-11-16 RX ADMIN — TRAZODONE HYDROCHLORIDE 100 MG: 100 TABLET ORAL at 22:15

## 2024-11-16 RX ADMIN — ACETAMINOPHEN 975 MG: 325 TABLET, FILM COATED ORAL at 20:15

## 2024-11-16 RX ADMIN — OXYCODONE HYDROCHLORIDE 5 MG: 5 TABLET ORAL at 12:31

## 2024-11-16 RX ADMIN — FUROSEMIDE 40 MG: 10 INJECTION, SOLUTION INTRAMUSCULAR; INTRAVENOUS at 02:17

## 2024-11-16 RX ADMIN — AMIODARONE HYDROCHLORIDE 200 MG: 200 TABLET ORAL at 13:24

## 2024-11-16 RX ADMIN — MILRINONE LACTATE IN DEXTROSE 0.38 MCG/KG/MIN: 200 INJECTION, SOLUTION INTRAVENOUS at 19:44

## 2024-11-16 RX ADMIN — MUPIROCIN 1 APPLICATION: 20 OINTMENT TOPICAL at 09:02

## 2024-11-16 RX ADMIN — HYDROMORPHONE HYDROCHLORIDE 0.5 MG: 1 INJECTION, SOLUTION INTRAMUSCULAR; INTRAVENOUS; SUBCUTANEOUS at 13:29

## 2024-11-16 RX ADMIN — CHLORHEXIDINE GLUCONATE 0.12% ORAL RINSE 15 ML: 1.2 LIQUID ORAL at 17:37

## 2024-11-16 RX ADMIN — CALCIUM GLUCONATE 2 G: 20 INJECTION, SOLUTION INTRAVENOUS at 14:17

## 2024-11-16 RX ADMIN — OXYCODONE HYDROCHLORIDE 5 MG: 5 TABLET ORAL at 05:21

## 2024-11-16 RX ADMIN — DOCUSATE SODIUM 100 MG: 100 CAPSULE, LIQUID FILLED ORAL at 17:37

## 2024-11-16 RX ADMIN — ALBUMIN (HUMAN) 12.5 G: 12.5 INJECTION, SOLUTION INTRAVENOUS at 11:27

## 2024-11-16 RX ADMIN — ATORVASTATIN CALCIUM 40 MG: 40 TABLET, FILM COATED ORAL at 17:37

## 2024-11-16 RX ADMIN — ALPRAZOLAM 0.5 MG: 0.5 TABLET ORAL at 22:16

## 2024-11-16 RX ADMIN — HYDROXYZINE HYDROCHLORIDE 10 MG: 10 TABLET, FILM COATED ORAL at 22:15

## 2024-11-16 RX ADMIN — FONDAPARINUX SODIUM 2.5 MG: 2.5 INJECTION SUBCUTANEOUS at 09:02

## 2024-11-16 RX ADMIN — MUPIROCIN 1 APPLICATION: 20 OINTMENT TOPICAL at 20:15

## 2024-11-16 RX ADMIN — DOCUSATE SODIUM 100 MG: 100 CAPSULE, LIQUID FILLED ORAL at 09:02

## 2024-11-16 RX ADMIN — ACETAMINOPHEN 975 MG: 325 TABLET, FILM COATED ORAL at 13:24

## 2024-11-16 RX ADMIN — ALBUMIN (HUMAN) 12.5 G: 12.5 INJECTION, SOLUTION INTRAVENOUS at 13:31

## 2024-11-16 RX ADMIN — ALBUMIN (HUMAN) 12.5 G: 12.5 INJECTION, SOLUTION INTRAVENOUS at 06:32

## 2024-11-16 RX ADMIN — AMIODARONE HYDROCHLORIDE 200 MG: 200 TABLET ORAL at 05:22

## 2024-11-16 RX ADMIN — FUROSEMIDE 20 MG: 10 INJECTION, SOLUTION INTRAMUSCULAR; INTRAVENOUS at 23:07

## 2024-11-16 RX ADMIN — OXYCODONE HYDROCHLORIDE 5 MG: 5 TABLET ORAL at 17:37

## 2024-11-16 RX ADMIN — INSULIN LISPRO 1 UNITS: 100 INJECTION, SOLUTION INTRAVENOUS; SUBCUTANEOUS at 23:11

## 2024-11-16 NOTE — PLAN OF CARE
Problem: OCCUPATIONAL THERAPY ADULT  Goal: Performs self-care activities at highest level of function for planned discharge setting.  See evaluation for individualized goals.  Description: Treatment Interventions: ADL retraining, Functional transfer training, Endurance training, Patient/family training, Equipment evaluation/education, Compensatory technique education, Cardiac education, Energy conservation          See flowsheet documentation for full assessment, interventions and recommendations.   11/16/2024 1052 by Etta Daigle OT  Note: Limitation: Decreased ADL status, Decreased endurance, Decreased self-care trans, Decreased high-level ADLs  Prognosis: Good  Assessment: Pt is a 69 y.o. female who was admitted to [facility] on 11/15/2024 with MVP (mitral valve prolapse). Pt is s/p mitral replacement. Patient  has a past medical history of Anxiety, Cardiac disease, and Mitral valve disorder. At baseline pt was I w/ ADLs, IADLs, and mobility. No DME use. Pt lives with s/o in 1 . Currently pt requires mod A for overall ADLS and min-mod Ax1 for functional mobility/transfers w/ RW. Mobility limited this session 2* SWAN. Pt currently presents with impairments in the following categories -difficulty performing ADLS and difficulty performing IADLS  activity tolerance, endurance, standing balance/tolerance, and sitting balance/tolerance. These impairments, as well as pt's fatigue, pain, cardiac/sternal precautions, and risk for falls  limit pt's ability to safely engage in all baseline areas of occupation, including grooming, bathing, dressing, toileting, functional mobility/transfers, and leisure activities. The patient's raw score on the -PAC Daily Activity Inpatient Short Form is 12. A raw score of less than 19 suggests the patient may benefit from discharge to post-acute rehabilitation services. Please refer to the recommendation of the Occupational Therapist for safe discharge planning. From OT  standpoint, recommend Level III upon D/C. Anticipate pt will progress over hospital stay to be able to return home.  OT will continue to follow to address the below stated goals.     Rehab Resource Intensity Level, OT: III (Minimum Resource Intensity)       11/16/2024 1052 by Etta Daigle OT  Note: Limitation: Decreased ADL status, Decreased endurance, Decreased self-care trans, Decreased high-level ADLs  Prognosis: Good  Assessment: Pt is a 69 y.o. female who was admitted to [facility] on 11/15/2024 with MVP (mitral valve prolapse). Pt is s/p mitral replacement. Patient  has a past medical history of Anxiety, Cardiac disease, and Mitral valve disorder. At baseline pt was I w/ ADLs, IADLs, and mobility. No DME use. Pt lives with s/o in 1 . Currently pt requires mod A for overall ADLS and min-mod Ax1 for functional mobility/transfers w/ RW. Mobility limited this session 2* SWAN. Pt currently presents with impairments in the following categories -difficulty performing ADLS and difficulty performing IADLS  activity tolerance, endurance, standing balance/tolerance, and sitting balance/tolerance. These impairments, as well as pt's fatigue, pain, cardiac/sternal precautions, and risk for falls  limit pt's ability to safely engage in all baseline areas of occupation, including grooming, bathing, dressing, toileting, functional mobility/transfers, and leisure activities. The patient's raw score on the -PAC Daily Activity Inpatient Short Form is 12. A raw score of less than 19 suggests the patient may benefit from discharge to post-acute rehabilitation services. Please refer to the recommendation of the Occupational Therapist for safe discharge planning. From OT standpoint, recommend Level III upon D/C. Anticipate pt will progress over hospital stay to be able to return home.  OT will continue to follow to address the below stated goals.     Rehab Resource Intensity Level, OT: III (Minimum Resource  Intensity)

## 2024-11-16 NOTE — PLAN OF CARE
Problem: NEUROSENSORY - ADULT  Goal: Achieves stable or improved neurological status  Description: INTERVENTIONS  - Monitor and report changes in neurological status  - Monitor vital signs such as temperature, blood pressure, glucose, and any other labs ordered   - Initiate measures to prevent increased intracranial pressure  - Monitor for seizure activity and implement precautions if appropriate      Outcome: Progressing  Goal: Remains free of injury related to seizures activity  Description: INTERVENTIONS  - Maintain airway, patient safety  and administer oxygen as ordered  - Monitor patient for seizure activity, document and report duration and description of seizure to physician/advanced practitioner  - If seizure occurs,  ensure patient safety during seizure  - Reorient patient post seizure  - Seizure pads on all 4 side rails  - Instruct patient/family to notify RN of any seizure activity including if an aura is experienced  - Instruct patient/family to call for assistance with activity based on nursing assessment  - Administer anti-seizure medications if ordered    Outcome: Progressing     Problem: CARDIOVASCULAR - ADULT  Goal: Maintains optimal cardiac output and hemodynamic stability  Description: INTERVENTIONS:  - Monitor I/O, vital signs and rhythm  - Monitor for S/S and trends of decreased cardiac output  - Administer and titrate ordered vasoactive medications to optimize hemodynamic stability  - Assess quality of pulses, skin color and temperature  - Assess for signs of decreased coronary artery perfusion  - Instruct patient to report change in severity of symptoms  Outcome: Progressing  Goal: Absence of cardiac dysrhythmias or at baseline rhythm  Description: INTERVENTIONS:  - Continuous cardiac monitoring, vital signs, obtain 12 lead EKG if ordered  - Administer antiarrhythmic and heart rate control medications as ordered  - Monitor electrolytes and administer replacement therapy as ordered  Outcome:  Progressing     Problem: RESPIRATORY - ADULT  Goal: Achieves optimal ventilation and oxygenation  Description: INTERVENTIONS:  - Assess for changes in respiratory status  - Assess for changes in mentation and behavior  - Position to facilitate oxygenation and minimize respiratory effort  - Oxygen administered by appropriate delivery if ordered  - Initiate smoking cessation education as indicated  - Encourage broncho-pulmonary hygiene including cough, deep breathe, Incentive Spirometry  - Assess the need for suctioning and aspirate as needed  - Assess and instruct to report SOB or any respiratory difficulty  - Respiratory Therapy support as indicated  Outcome: Progressing     Problem: GENITOURINARY - ADULT  Goal: Maintains or returns to baseline urinary function  Description: INTERVENTIONS:  - Assess urinary function  - Encourage oral fluids to ensure adequate hydration if ordered  - Administer IV fluids as ordered to ensure adequate hydration  - Administer ordered medications as needed  - Offer frequent toileting  - Follow urinary retention protocol if ordered  Outcome: Progressing  Goal: Urinary catheter remains patent  Description: INTERVENTIONS:  - Assess patency of urinary catheter  - If patient has a chronic guo, consider changing catheter if non-functioning  - Follow guidelines for intermittent irrigation of non-functioning urinary catheter  Outcome: Progressing     Problem: METABOLIC, FLUID AND ELECTROLYTES - ADULT  Goal: Electrolytes maintained within normal limits  Description: INTERVENTIONS:  - Monitor labs and assess patient for signs and symptoms of electrolyte imbalances  - Administer electrolyte replacement as ordered  - Monitor response to electrolyte replacements, including repeat lab results as appropriate  - Instruct patient on fluid and nutrition as appropriate  Outcome: Progressing  Goal: Fluid balance maintained  Description: INTERVENTIONS:  - Monitor labs   - Monitor I/O and WT  - Instruct  patient on fluid and nutrition as appropriate  - Assess for signs & symptoms of volume excess or deficit  Outcome: Progressing

## 2024-11-16 NOTE — UTILIZATION REVIEW
Initial Clinical Review    Elective  Inpatient  surgical procedure  Age/Sex: 69 y.o. female  Surgery Date: 11/15/2024  8:23 am     Procedure: 1. Mitral valve replacement with  a 27 mm Reyes Mitris Resilia pericardial tissue valve  2. Ligation of the left atrial appendage with a 40 mm Penditure device   Anesthesia: General     Operative Findings:         Intraoperative transesophageal echocardiogram revealed EF 50%, severe MR, Olson's valve with bileaflet prolapse. Narrow LVOT without flow acceleration increasing the risk of GIANNI. No clot in the TYLER  Epiaortic ultrasound showed less than 5 mm non-mobile plaque  Inspection of the mitral valve reveals type II mitral valve disease with bileaflet prolapse, very large posterior leaflet, most of the prolapse at P2 level, the anterior leaflet was short.  Final transesophageal echocardiogram demonstrated prosthetic valve with normal function without evidence of perivalvular leak, no other changes         POD #0 -  Critical Care  - Note - Assessment:  MVP, severe MR s/p mitral valve replacement (11/15/2024)  HTN  HLD  CKD2  Panic disorder, anxiety     Plan:  Largely uncomplicated mitral valve replacement.   Normal biventricular function pre and post bypass   Mean Gradient: 2.Valve/Ring Size: 29 mm.   Came up on low dose epinephrine which I anticipate can be weaned off. Otherwise no cardiovascular infusions.   No immediate barriers to extubation.   Normal post op BMP and CBC.   Appropriate hemostasis.   PO amiodarone/statin and aspirin.   Beta blocker when able.   Multimodal analgesia  Advance diet as tolerated, DVT prophylaxis.         POD#1 Progress Note:  She was extubated yesterday, CL low most recent 1.7 with SVT on 1790 requiring milrinone 0.38 mcg/kg/min  Likely some residual cardioplegia from two bypass runs. Normal biventricular function per and post bypass  Urine out put robust, Normal CR function and normal BMP- CBC  Continue ASA and statin, Keep lines, chest  tubes and epicardial pacing wires. Multimodal analgesia. Diet as tolerated. DVT ppx.     Admission Orders: Date/Time/Statement:   Admission Orders (From admission, onward)       Ordered        11/15/24 0652  Inpatient Admission  Once                          Orders Placed This Encounter   Procedures    Inpatient Admission     Standing Status:   Standing     Number of Occurrences:   1     Level of Care:   Critical Care [15]     Estimated length of stay:   More than 2 Midnights     Certification:   I certify that inpatient services are medically necessary for this patient for a duration of greater than two midnights. See H&P and MD Progress Notes for additional information about the patient's course of treatment.     Diet:  Cardiac , fluid restriction 1800 ml  Mobility:  Up with assistance, Turn q 2   DVT Prophylaxis: Arixtra sc - SCD's to le's    Medications/Pain Control:   Scheduled Medications:  acetaminophen, 975 mg, Oral, Q6H While awake  amiodarone, 200 mg, Oral, Q8H AMANUEL  aspirin, 325 mg, Oral, Daily  atorvastatin, 40 mg, Oral, Daily With Dinner  chlorhexidine, 15 mL, Mouth/Throat, BID  docusate sodium, 100 mg, Oral, BID  fondaparinux, 2.5 mg, Subcutaneous, Q24H  hydrOXYzine HCL, 10 mg, Oral, Daily  insulin lispro, 1-5 Units, Subcutaneous, TID AC  insulin lispro, 1-5 Units, Subcutaneous, HS  mupirocin, 1 Application, Nasal, Q12H AMANUEL  pantoprazole, 40 mg, Oral, Daily  polyethylene glycol, 17 g, Oral, Daily  traZODone, 100 mg, Oral, Daily  Albumin 12.5g IVPB once - 11/15 x 4 - 11/16 x 1   Vancomycin 1000mg iv once - 11/15 x 1  Cipro 400mg iv once - 11/15 x 1   Lasix 40mg iv once - 11/16 x 1   Vancomycin 1000mg iv q12 x 2 doses 11/15 x 1 - 11/16 x 1       Continuous IV Infusions:  milrinone (Primacor) infusion, 0.38 mcg/kg/min, Intravenous, Continuous  niCARdipine, 2.5-15 mg/hr, Intravenous, Titrated  norepinephrine, 1-30 mcg/min, Intravenous, Titrated  EPINEPHrine 5,000 mcg (STANDARD CONCENTRATION) IV in sodium  chloride 0.9% 250 mL  Rate: 3-30 mL/hr Dose: 1-10 mcg/min  Freq: Titrated Route: IV  Last Dose: Stopped (11/15/24 1309)  Start: 11/15/24 0730 End: 11/15/24 1307  insulin regular (HumuLIN R,NovoLIN R) 1 Units/mL in sodium chloride 0.9 % 100 mL infusion  Rate: 0.3-21 mL/hr Dose: 0.3-21 Units/hr  Freq: Titrated Route: IV  Last Dose: Stopped (11/16/24 0623)  Start: 11/15/24 1315 End: 11/16/24 0604    insulin regular 100 units in sodium chloride 0.9% 100 mL  Dose: 100 Units  Freq: Titrated Route: IV  Last Dose: Stopped (11/15/24 1332)  Start: 11/15/24 0730 End: 11/15/24 1307    PRN Meds:  acetaminophen, 650 mg, Rectal, Q4H PRN  ALPRAZolam, 0.5 mg, Oral, HS PRN-1/15 x 1   bisacodyl, 10 mg, Rectal, Daily PRN  HYDROmorphone, 0.5 mg, Intravenous, Q2H PRN-11/15 x1 - 11/16 x 1   ondansetron, 4 mg, Intravenous, Q6H PRN-11/15 x1 - 11/16 x 1   oxyCODONE, 2.5 mg, Oral, Q4H PRN   Or  oxyCODONE, 5 mg, Oral, Q4H PRN-11/16 x 1       Vital Signs (last 3 days)       Date/Time Temp Pulse Resp BP MAP (mmHg) Arterial Line BP MAP SpO2 FiO2 (%) Calculated FIO2 (%) - Nasal Cannula Nasal Cannula O2 Flow Rate (L/min) O2 Device CO CI Patient Position - Orthostatic VS CVP (mean) [place] Coma Scale Score Pain    11/16/24 0900 98.6 °F (37 °C) 80 15 -- -- 101/55 69 mmHg 95 % -- -- -- -- 3.1 L/min 1.9 L/min/m2 -- 12 mmHg -- --    11/16/24 0750 -- -- -- -- -- -- -- -- -- 40 5 L/min Nasal cannula -- -- -- -- -- --    11/16/24 0740 98.6 °F (37 °C) 80 14 -- -- 101/57 71 mmHg 96 % -- -- -- -- 2.9 L/min 1.8 L/min/m2 -- 16 mmHg -- --    11/16/24 0630 98.6 °F (37 °C) 80 27 -- -- 105/55 69 mmHg 91 % -- -- -- -- 2.8 L/min 1.7 L/min/m2 -- 11 mmHg -- --    11/16/24 0609 -- -- -- -- -- -- -- -- -- -- -- -- -- -- -- -- -- 8    11/16/24 0521 -- -- -- -- -- -- -- -- -- -- -- -- -- -- -- -- -- 8    11/16/24 0500 98.6 °F (37 °C) 80 25 -- -- 101/50 67 mmHg 94 % -- -- -- -- 3.7 L/min 2.3 L/min/m2 Lying 10 mmHg -- --    11/16/24 0415 -- 80 26 108/53 71 108/53 71 mmHg 96  % -- -- -- -- -- -- -- 10 mmHg -- --    11/16/24 0400 98.6 °F (37 °C) 80 32 -- -- 107/53 71 mmHg 96 % -- 32 3 L/min Nasal cannula 3.4 L/min 2.1 L/min/m2 -- 11 mmHg 15 No Pain    11/16/24 0356 98.6 °F (37 °C) 80 30 -- -- 113/55 74 mmHg 97 % -- 32 3 L/min -- 3.4 L/min 2.1 L/min/m2 -- 11 mmHg -- --    11/16/24 0300 -- 80 25 -- -- 123/63 83 mmHg 99 % -- -- -- -- -- -- -- 8 mmHg -- --    11/16/24 0200 -- 80 18 -- -- 99/49 65 mmHg 100 % -- -- -- -- -- -- -- 13 mmHg -- --    11/16/24 0140 97.7 °F (36.5 °C) 80 30 -- -- 94/47 63 mmHg 97 % -- 36 4 L/min Nasal cannula 3.9 L/min 2.4 L/min/m2 -- 12 mmHg -- --    11/16/24 0120 -- 80 16 88/46 -- 88/46 61 mmHg 97 % -- -- -- -- -- -- -- 11 mmHg -- --    11/16/24 0106 -- 80 -- 81/44 -- -- 57 mmHg -- -- -- -- -- -- -- -- -- -- --    11/16/24 0100 -- 80 19 -- -- 87/46 60 mmHg 94 % -- -- -- -- -- -- -- 11 mmHg -- --    11/16/24 0000 97.7 °F (36.5 °C) 80 19 -- -- 111/54 72 mmHg 97 % -- 44 6 L/min Nasal cannula 3.6 L/min 2.2 L/min/m2 Lying 12 mmHg 15 --    11/15/24 2359 -- 80 22 -- -- 117/56 76 mmHg 96 % -- 44 6 L/min Nasal cannula -- -- -- 11 mmHg -- --    11/15/24 2358 -- -- -- -- -- -- -- 96 % -- 44 6 L/min Nasal cannula -- -- -- -- -- --    11/15/24 2300 -- 80 17 -- -- 102/50 66 mmHg 95 % -- -- -- -- -- -- -- 7 mmHg -- --    11/15/24 2200 97.2 °F (36.2 °C) 80 18 -- -- 107/50 67 mmHg 98 % 40 -- -- Ventilator 3.9 L/min 2.4 L/min/m2 -- 9 mmHg -- --    11/15/24 2119 97 °F (36.1 °C) 80 21 -- -- 110/50 69 mmHg 99 % -- -- -- -- -- -- -- 9 mmHg -- --    11/15/24 2056 96.8 °F (36 °C) 80 16 -- -- 101/47 64 mmHg 99 % -- -- -- -- 3.7 L/min 2.3 L/min/m2 -- 8 mmHg -- --    11/15/24 2010 96.6 °F (35.9 °C) 80 20 -- -- 115/53 73 mmHg 97 % -- -- -- -- -- -- Lying 8 mmHg -- --    11/15/24 2005 -- 80 20 -- -- 118/54 75 mmHg 97 % -- -- -- -- 4.5 L/min 2.7 L/min/m2 -- 9 mmHg -- --    11/15/24 2000 -- -- -- -- -- -- -- -- -- -- -- -- -- -- -- -- 10 --    11/15/24 1917 96.6 °F (35.9 °C) 69 19 -- -- 116/53 74  mmHg 97 % 40 -- -- Ventilator 3.5 L/min 2.1 L/min/m2 -- 8 mmHg -- --    11/15/24 1800 96.4 °F (35.8 °C) 69 20 -- -- 118/54 76 mmHg 97 % -- -- -- -- 3.4 L/min 2.1 L/min/m2 -- 10 mmHg -- --    11/15/24 1700 97 °F (36.1 °C) 69 24 -- -- 105/55 73 mmHg 97 % -- -- -- -- 3.2 L/min 1.9 L/min/m2 -- 4 mmHg -- --    11/15/24 1610 97.4 °F (36.3 °C) 69 21 -- -- 114/61 80 mmHg 99 % -- -- -- Ventilator 2.9 L/min 1.7 L/min/m2 -- 12 mmHg -- --    11/15/24 1600 -- -- -- -- -- -- -- -- -- -- -- -- -- -- -- -- 11 --    11/15/24 1525 -- -- -- -- -- -- -- 97 % -- -- -- -- -- -- -- -- -- --    11/15/24 1455 97.2 °F (36.2 °C) 69 19 -- -- 94/55 71 mmHg 100 % -- -- -- -- 2.4 L/min 1.5 L/min/m2 -- 8 mmHg -- --    11/15/24 1400 96.8 °F (36 °C) 69 19 -- -- 92/54 69 mmHg 98 % -- -- -- -- 2.4 L/min 1.5 L/min/m2 -- 17 mmHg 11 --    11/15/24 1345 -- 69 17 -- -- 94/55 70 mmHg 96 % -- -- -- -- -- -- -- 10 mmHg 3 --    11/15/24 1330 -- 69 19 -- -- 113/63 82 mmHg 98 % -- -- -- -- -- -- -- 9 mmHg 3 --    11/15/24 1315 -- 69 21 -- -- 114/58 79 mmHg 98 % -- -- -- -- -- -- -- 12 mmHg 3 --    11/15/24 1309 96.8 °F (36 °C) 69 20 -- -- 104/55 72 mmHg 98 % 50 -- -- Ventilator 2.7 L/min 1.6 L/min/m2 -- 13 mmHg -- --    11/15/24 1305 -- -- -- -- -- -- -- -- -- -- -- -- -- -- -- -- 3 --    11/15/24 0607 -- -- -- -- -- -- -- -- -- -- -- -- -- -- -- -- -- No Pain    11/15/24 0552 97.3 °F (36.3 °C) 73 18 136/59 -- -- -- 96 % -- -- -- None (Room air) -- -- -- -- -- --          Weight (last 2 days)       Date/Time Weight    11/16/24 0600 72.4 (159.61)    11/15/24 0552 66.7 (147.1)            Pertinent Labs/Diagnostic Test Results:   Radiology:  XR chest portable ICU   Final Interpretation by Jose Linda MD (11/15 9800)      No acute cardiopulmonary disease.            Workstation performed: [id number]         XR chest portable    (Results Pending)     Cardiology:  No orders to display     GI:  No orders to display           Results from last 7 days   Lab  "Units 11/16/24  0404 11/15/24  1921 11/15/24  1321 11/15/24  1316 11/15/24  1230 11/15/24  1059 11/15/24  1027   WBC Thousand/uL 15.16*  --   --   --   --   --   --    HEMOGLOBIN g/dL 12.7 14.0  --  13.5  --   --   --    I STAT HEMOGLOBIN g/dl  --   --  11.6  --  10.9*   < > 8.5*   HEMATOCRIT % 38.4 41.1  --  39.7  --   --   --    HEMATOCRIT, ISTAT %  --   --  34*  --  32*   < > 25*   PLATELETS Thousands/uL 75*  --   --  91*  --   --  121*    < > = values in this interval not displayed.         Results from last 7 days   Lab Units 11/16/24 0404 11/15/24  1921 11/15/24  1703 11/15/24  1321 11/15/24  1316 11/15/24  1230 11/15/24  1156 11/15/24  1128 11/15/24  1059   SODIUM mmol/L 140  --   --   --  140  --   --   --   --    POTASSIUM mmol/L 5.0 3.8 4.4  --  4.0  --   --   --   --    CHLORIDE mmol/L 110*  --   --   --  112*  --   --   --   --    CO2 mmol/L 24  --   --   --  22  --   --   --   --    CO2, I-STAT mmol/L  --   --   --  19*  --  24 26 27 25   ANION GAP mmol/L 6  --   --   --  6  --   --   --   --    BUN mg/dL 23  --   --   --  22  --   --   --   --    CREATININE mg/dL 0.94  --   --   --  0.81  --   --   --   --    EGFR ml/min/1.73sq m 62  --   --   --  74  --   --   --   --    CALCIUM mg/dL 8.0*  --   --   --  6.9*  --   --   --   --    CALCIUM, IONIZED, ISTAT mmol/L  --   --   --  1.00*  --  1.07* 1.09* 1.17 1.35*   MAGNESIUM mg/dL 2.5  --   --   --   --   --   --   --   --          Results from last 7 days   Lab Units 11/16/24  0626 11/16/24  0414 11/16/24  0207 11/16/24  0015 11/15/24  2348 11/15/24  2212 11/15/24  2016 11/15/24  1801 11/15/24  1556 11/15/24  1404 11/15/24  1315   POC GLUCOSE mg/dl 141* 148* 142* 106 108 145* 139 146* 187* 149* 149*     Results from last 7 days   Lab Units 11/16/24  0404 11/15/24  1316   GLUCOSE RANDOM mg/dL 154* 151*             No results found for: \"BETA-HYDROXYBUTYRATE\"   Results from last 7 days   Lab Units 11/16/24  0404   PH ART  7.330*   PCO2 ART mm Hg 41.7 "   PO2 ART mm Hg 101.6   HCO3 ART mmol/L 21.5*   BASE EXC ART mmol/L -4.2   O2 CONTENT ART mL/dL 18.3   O2 HGB, ARTERIAL % 96.3   ABG SOURCE  Line, Arterial         Results from last 7 days   Lab Units 11/15/24  1321 11/15/24  1230 11/15/24  1156 11/15/24  0927 11/15/24  0911 11/15/24  0845 11/15/24  0755   PH, GURDEEP I-STAT   --   --   --   --  7.372  --  7.347   PCO2, GURDEEP ISTAT mm HG  --   --   --   --  40.8*  --  52.0*   PO2, GURDEEP ISTAT mm HG  --   --   --   --  42.0  --  37.0   HCO3, GURDEEP ISTAT mmol/L  --   --   --   --  23.7*  --  28.5   I STAT BASE EXC mmol/L -6* -1 0   < > -1   < > 2   I STAT O2 SAT % 99* 99* 100*   < > 76   < > 67   ISTAT PH ART  7.406 7.433 7.426   < >  --    < >  --    I STAT ART PCO2 mm HG 28.6* 33.7* 37.6   < >  --    < >  --    I STAT ART PO2 mm .0* 124.0 358.0*   < >  --    < >  --    I STAT ART HCO3 mmol/L 18.0* 22.5 24.7   < >  --    < >  --     < > = values in this interval not displayed.                                                     Results from last 7 days   Lab Units 11/16/24  0555   UNIT PRODUCT CODE  [id number]  [id number]  [id number]  [id number]   UNIT NUMBER  [id number]  [id number]  [id number]  [id number]   UNITABO  A  A  A  A   UNITRH  POS  POS  POS  POS   CROSSMATCH  Compatible  Compatible  Compatible  Compatible   UNIT DISPENSE STATUS  Return to Inv  Return to Inv  Presumed Trans  Presumed Trans   UNIT PRODUCT VOL mL 350  350  350  350                                                                           Network Utilization Review Department  ATTENTION: Please call with any questions or concerns to [phone] and carefully listen to the prompts so that you are directed to the right person. All voicemails are confidential.   For Discharge needs, contact Care Management DC Support Team at [phone] opt. 2  Send all requests for admission clinical reviews, approved or denied determinations and any other requests to dedicated fax  number below belonging to the campus where the patient is receiving treatment. List of dedicated fax numbers for the Facilities:  FACILITY NAME UR FAX NUMBER   ADMISSION DENIALS (Administrative/Medical Necessity) [phone]   DISCHARGE SUPPORT TEAM (NETWORK) [phone]   PARENT CHILD HEALTH (Maternity/NICU/Pediatrics) [phone]   [facility] [phone]   [facility] [phone]   Critical access hospital [phone]   [facility] [phone]   [facility] [phone]   [facility] [phone]   [facility] [phone]   [facility] [phone]   [facility] [phone]   [facility] [phone]   [facility] [phone]   [facility] [phone]

## 2024-11-16 NOTE — PROGRESS NOTES
Progress Note - Critical Care/ICU   Name: Carley Bonilla 69 y.o. female I MRN: [id number]  Unit/Bed#: PPHP-311-01 I Date of Admission: 11/15/2024   Date of Service: 11/16/2024 I Hospital Day: 1      Assessment & Plan   Impressions:  MVP, severe MR s/p mitral valve replacement (11/15/2024)  HTN  HLD  CKD2  Panic disorder, anxiety  Junctional bradycardia  Pre-DM    Neuro:   Analgesia:    ATC tylenol  PRN oxycodone 2.5-5 mg  Delirium precautions  Regulate sleep/wake cycle  Restoril 15mg qHS PRN  CAM-ICU daily  Routine neuro checks     CV:   Hemodynamic monitoring goals:  MAP > 65   SBP < 130  CI > 2.2  Continue to monitor and trend cardiac output and filling pressures   Continue pulmonary artery catheter for hemodynamic monitoring   Current cardiac infusions:    Milrinone 0.25 mcg/kg/min  Beta Blocker:    Hold beta blocker secondary to bradycardia  Epicardial Pacing Wires:    Epicardial pacing wire plan : Epicardial pacing wires in place. Will pace as needed for bradycardia or cardiac output   Maintain for pacing needs  currently A paced  Post op cardiac surgery medications:     mg daily  Atorvastatin 40 mg qHS  Amiodarone 200 mg PO q8 hours   Continue telemetry monitoring     Lung:   Expected post operative oxygen requirement of 3 liters/min via nasal cannula  Chest tube output remains persistently high; Continue chest tubes to suction today  Continue incentive spirometry  Encourage coughing and deep breathing     GI:   Continue PPI for stress ulcer prophylaxis  Bowel regimen:  Colace BID  Miralax QD  Dulcolax QD PRN  Monitor abdominal exam and bowel function    FEN:   Diuresis Plan: PRN  Nutrition plan: Cardiac PO diet as tolerated.   Replenish electrolytes with goals: K >4.0, Mag >2.0, and Phos >3.0    :   Strict I/O monitoring   Continue to trend renal function tests   Cruz: Remove after 48 hrs of I/O monitoring     ID:   Monitor WBC and temps   Maintain normothermia    Heme:   Trend Hgb and  plts  Transfuse as needed     Endo:   Sliding Scale Insulin coverage.   Adjust insulin regimen as needed to maintain -180    MSK/Skin:  Early mobility and skin protection:   PT/OT consult as medically appropriate   Frequent turning and pressure off-loading  Local wound care as needed    Disposition:  Critical care    ICU Core Measures     A: Assess, Prevent, and Manage Pain Has pain been assessed? Yes  Need for changes to pain regimen? No   B: Both SAT/SAT  N/A   C: Choice of Sedation RASS Goal: N/A patient not on sedation  Need for changes to sedation or analgesia regimen? NA   D: Delirium CAM-ICU: Negative   E: Early Mobility  Plan for early mobility? Yes   F: Family Engagement Plan for family engagement today? Yes       Antibiotic Review: Post op requirements     Review of Invasive Devices:    Cruz Plan: Continue for accurate I/O monitoring for 48 hours  Central access plan: Patient has multiple central venous catheters.   [place] Plan: Keep arterial line for hemodynamic monitoring    Prophylaxis:  VTE VTE covered by:  fondaparinux, Subcutaneous       Stress Ulcer  covered bypantoprazole (PROTONIX) EC tablet 40 mg [[id number]]            24 Hour Events    24 Hour Events/HPI: POD # 1 s/p MV replacement and TYLER ligation. Started on milrinone for low CI with improvement. Epi and levophed weaned off. Slept well. Feels shaky this AM.    Subjective   Review of Systems: See HPI for Review of Systems  Objective :    Medications:  Scheduled Continuous   acetaminophen, 975 mg, Q6H While awake  Albumin 5%, 12.5 g, Once  amiodarone, 200 mg, Q8H AMANUEL  aspirin, 325 mg, Daily  atorvastatin, 40 mg, Daily With Dinner  chlorhexidine, 15 mL, BID  docusate sodium, 100 mg, BID  fondaparinux, 2.5 mg, Q24H  hydrOXYzine HCL, 10 mg, Daily  insulin lispro, 1-5 Units, TID AC  insulin lispro, 1-5 Units, HS  mupirocin, 1 Application, Q12H AMANUEL  pantoprazole, 40 mg, Daily  polyethylene glycol, 17 g, Daily  traZODone, 100 mg,  Daily  vancomycin, 15 mg/kg, Q12H      milrinone (Primacor) infusion, 0.25 mcg/kg/min, Last Rate: 0.25 mcg/kg/min (11/16/24 6152)  niCARdipine, 2.5-15 mg/hr, Last Rate: Stopped (11/15/24 4319)  norepinephrine, 1-30 mcg/min, Last Rate: Stopped (11/16/24 9713)         Vitals: Invasive Monitoring       Most Recent Min/Max in 24hrs   Temp 98.6 °F (37 °C) Temp  Min: 96.4 °F (35.8 °C)  Max: 98.6 °F (37 °C)   Pulse 80 Pulse  Min: 69  Max: 80   Resp (!) 25 Resp  Min: 16  Max: 32   /53 BP  Min: 81/44  Max: 108/53   O2 Sat 94 % SpO2  Min: 94 %  Max: 100 %   O2 Device: Nasal cannula  Nasal Cannula O2 Flow Rate (L/min): 3 L/min Arterial Line  [place] /50  Arterial Line BP  Min: 87/46  Max: 123/63   MAP 67 mmHg  Arterial Line MAP (mmHg)  Min: 57 mmHg  Max: 83 mmHg     PA Catheter   Most Recent  Min/Max in 24hrs    PAP 25/7 PAP  Min: 23/11  Max: 36/14   CVP 10 mmHg CVP (mean)  Min: 4 mmHg  Max: 17 mmHg   CI 2.3 L/min/m2  CI (L/min/m2)  Min: 1.5 L/min/m2  Max: 2.7 L/min/m2   SVR 1274 (dyne*sec)/cm5 SVR (dyne*sec)/cm5  Min: 1008 (dyne*sec)/cm5  Max: 2155 (dyne*sec)/cm5        I/O Chest tube output (if present)     Intake/Output Summary (Last 24 hours) at 11/16/2024 0631  Last data filed at 11/16/2024 0601  Gross per 24 hour   Intake 5112.73 ml   Output 5750 ml   Net -637.27 ml     UOP: /hour    BM: 0 in the last 24 hours  Weight change: 5.676 kg (12 lb 8.2 oz)     Mediastinal tubes:  80 mL/8hr  265 mL/24hr   Pleural tubes: -- mL/8hr  -- mL/24hr        Physical Exam   Physical Exam  Vitals reviewed.   Skin:     General: Skin is warm and dry.   HENT:      Head: Atraumatic.   Neck:      Vascular: Central line present.   Cardiovascular:      Rate and Rhythm: Normal rate and regular rhythm.      Heart sounds: Murmur heard.      No friction rub.   Abdominal:      Palpations: Abdomen is soft.   Constitutional:       General: She is not in acute distress.     Appearance: She is well-developed.      Interventions: Nasal  cannula in place.   Pulmonary:      Effort: Pulmonary effort is normal. No respiratory distress.      Breath sounds: Normal breath sounds.   Neurological:      General: No focal deficit present.      Mental Status: She is alert. Mental status is at baseline.   Genitourinary/Anorectal:  Cruz present.        Diagnostic Studies      11/16/24 CXR: No large pTX. Mild pulm edema.    This was personally reviewed by myself in PACS.  11/16/24 EKG: Junctional bradycardia HR 37 bpm.   This was personally reviewed by myself.           Labs:  CBC  Recent Labs     11/15/24  1316 11/15/24  1321 11/15/24  1921 11/16/24  0404   WBC  --   --   --  15.16*   HGB 13.5   < > 14.0 12.7   HCT 39.7   < > 41.1 38.4   PLT 91*  --   --  75*    < > = values in this interval not displayed.     BMP  Recent Labs     11/15/24  1316 11/15/24  1321 11/15/24  1703 11/15/24  1921 11/16/24  0404   SODIUM 140  --   --   --  140   K 4.0  --    < > 3.8 5.0   *  --   --   --  110*   CO2 22 19*  --   --  24   AGAP 6  --   --   --  6   BUN 22  --   --   --  23   CREATININE 0.81  --   --   --  0.94   CALCIUM 6.9*  --   --   --  8.0*    < > = values in this interval not displayed.      Baseline Creat: 0.9   Coags  No recent results           Additional Electrolytes  Recent Labs     11/15/24  1230 11/15/24  1321 11/16/24  0404   MG  --   --  2.5   CAIONIZED 1.07* 1.00*  --           Blood Gas  Recent Labs     11/16/24  0404   PHART 7.330*   [id number] 41.7   PO2ART 101.6   [id number] 21.5*   BEART -4.2   SOURCE Line, Arterial     Recent Labs     11/16/24  0404   SOURCE Line, Arterial    LFTs  No recent results    Infectious  No recent results     No recent results Glucose  Recent Labs     11/15/24  1316 11/16/24  0404   GLUC 151* 154*        Collaborative bedside rounds performed with cardiac surgery attending, critical care attending and bedside RN.

## 2024-11-16 NOTE — PHYSICAL THERAPY NOTE
PHYSICAL THERAPY EVALUATION  NAME:  Carley Bonilla  DATE: 11/16/24    AGE:   69 y.o.  Mrn:   [id number]  ADMIT DX:  Nonrheumatic mitral valve regurgitation [I34.0]  MVP (mitral valve prolapse) [I34.1]    Past Medical History:   Diagnosis Date    Anxiety     Cardiac disease     mitral valve regurgitation    Mitral valve disorder      Past Surgical History:   Procedure Laterality Date    ANKLE FRACTURE SURGERY      CARDIAC CATHETERIZATION Left 10/30/2024    Procedure: Cardiac Left Heart Cath;  Surgeon: René England DO;  Location: [facility];  Service: Cardiology    ORIF WRIST FRACTURE Right 11/21/2016    Procedure: DISTAL RADIUS O/R I/F;  Surgeon: Chuckie Nova MD;  Location: [facility];  Service:     WRIST SURGERY         Length Of Stay: 1  PHYSICAL THERAPY EVALUATION :    11/16/24 0922   PT Last Visit   PT Visit Date 11/16/24   Note Type   Note type Evaluation   Pain Assessment   Pain Assessment Tool 0-10   Pain Score 3   Pain Location/Orientation Location: Generalized   Restrictions/Precautions   Weight Bearing Precautions Per Order No   Other Precautions Cardiac/sternal;Chair Alarm;Bed Alarm;Multiple lines;Telemetry;O2;Fall Risk;Pain  (CT/ swan/ guo/ O2 /)   Home Living   Type of Home House  (1 MOIZ 0 rail)   Home Layout One level   Bathroom Shower/Tub Walk-in shower   Bathroom Toilet Standard   Bathroom Equipment Grab bars in shower   Bathroom Accessibility Accessible   Home Equipment   (denies)   Additional Comments At baseline, pt reports living w/ her SO in a single story home w/ 1 MOIZ 0 HR/ has 2 beds/ baths on FF. Pt was indep w/o AD PTA; drives and reports 0 falls. SO is retired and can A on d/c. 0 falls. 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 .   Prior Function   Level of [place] Independent with ADLs;Independent with functional mobility;Independent with IADLS   Lives With Significant other   Receives Help From Family   IADLs Independent with driving;Independent with meal  "prep;Independent with medication management   Falls in the last 6 months 0   Vocational Retired   Cognition   Overall Cognitive Status WFL   Arousal/Participation Alert   Attention Within functional limits   Orientation Level Oriented X4   Memory Within functional limits   Following Commands Follows all commands and directions without difficulty   Comments motivated and cooperative   Subjective   Subjective \"I feel alright\"   RUE Assessment   RUE Assessment WFL   LUE Assessment   LUE Assessment WFL   RLE Assessment   RLE Assessment WFL   LLE Assessment   LLE Assessment WFL   Coordination   Movements are Fluid and Coordinated 1   Sensation WFL   Light Touch   RLE Light Touch Grossly intact   LLE Light Touch Grossly intact   Proprioception   RLE Proprioception Grossly intact   LLE Proprioception Grossly Intact   Bed Mobility   Additional Comments OOB on presentation   Transfers   Sit to Stand 4  Minimal assistance   Additional items Assist x 1;Increased time required;Verbal cues   Stand to Sit 4  Minimal assistance   Additional items Assist x 1;Increased time required;Verbal cues   Ambulation/Elevation   Gait pattern Decreased foot clearance;Foward flexed   Gait Assistance 4  Minimal assist  (+ additional A for lines/ safety)   Assistive Device Rolling walker   Distance 2x30 marching and forward backward steps x10 w/ RW support. Pt w/ mild dizziness on initial stand that resolved w/ activity.   Balance   Static Sitting Fair +   Dynamic Sitting Fair   Static Standing Fair -   Dynamic Standing Poor +   Ambulatory Poor +  (rw)   Activity Tolerance   Activity Tolerance Patient tolerated treatment well;Patient limited by pain;Patient limited by fatigue   Medical Staff Made Aware SHLOMO Quiles   Nurse Made Aware yes- RN cleared adn updated post   Assessment   Prognosis Good   Problem List Decreased endurance;Impaired balance;Decreased mobility;Pain;Decreased skin integrity   Assessment Pt is 69 y.o. female seen for PT evaluation " s/p admit to [facility] on [facility]/15/2024  w/ MVP (mitral valve prolapse).  Pt is s/p mitral valve replacement (11/15/2024). PT consulted for post op mobility and to assist w/ d/c planning recommendations.      Pt  has a past medical history of Anxiety, Cardiac disease, and Mitral valve disorder.    Due to acute medical issues, ongoing medical management in ICU post op; new precautions; pain,  increased reliance on more restrictive AD compared to baseline;  decreased activity tolerance compared to baseline, increased assistance needed from caregiver at current time, continuous monitoring, trending labs;  multiple lines, decline in overall functional mobility status; health management issues; note unstable clinical picture (high complexity).      At baseline, pt reports living w/ her SO in a single story home w/ 1 MOIZ 0 HR/ has 2 beds/ baths on FF. Pt was indep w/o AD PTA; drives and reports 0 falls. SO is retired and can A on d/c. 0 falls. 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 . Currently,  pt presents OOB in recliner- cleared for ambulation/ marching in front of chair 2* lines. And is requiring Tyler x1 for STS and Tyler x1 for 2x30 marching and forward backward steps x10 w/ RW support. Pt w/ mild dizziness on initial stand that resolved w/ activity. Pt presents functioning below baseline and currently w/ overall mobility deficits 2* to:  cardiac precautions; generalized weakness/ deconditioning; decreased endurance; decreased activity tolerance;  impaired balance; gait deviations;  fatigue;  bed/ chair alarms; multiple lines;  Pt currently at risk for falls.  (Please find additional objective findings from PT assessment regarding body systems outlined above.)     Pt will continue to benefit from skilled PT interventions to address stated impairments; to maximize functional potential; for ongoing pt/ family training; and DME needs.  PT is recommending PT Resource Intensity Level  3 (pending ongoing  progress) on d/c.   PT to see next session for mobility progression.  PT will follow for STG as outlined on eval. Pt/ family agreeable to PT POC and goals as stated.   Goals   Patient Goals to go home for Thanksgiving   STG Expiration Date 11/30/24   Short Term Goal #1 In 14 days pt will:  Complete bed mobility skills with MI to facilitate safe return to previous living environment and decrease burden on caregivers.  Perform all functional transfers with MI  to facilitate safe return to previous living environment.    Ambulate  with least restrictive AD MI > 350' ' without LOB and stable vitals for safe ambulation in home/ community environment.   Complete stair training up/ down 1 step/s w/o HR (curb steps)  for safe access to previous living environment and to increase community access.    Improve balance by 1 grade in order to decrease fall risk.     Actively participate w/ PT for ongoing pt and family education; DME needs and D/C planning to promote highest level of function in least restrictive environment.   PT Treatment Day 0   Plan   Treatment/Interventions ADL retraining;Functional transfer training;LE strengthening/ROM;Elevations;Therapeutic exercise;Endurance training;Patient/family training;Equipment eval/education;Bed mobility;Gait training;Continued evaluation;Spoke to nursing;OT;Family   PT Frequency 3-5x/wk   Discharge Recommendation   Rehab Resource Intensity Level, PT III (Minimum Resource Intensity)  (pending ongoing progress)   Equipment Recommended Walker   Walker Package Recommended Wheeled walker   AM-PAC Basic Mobility Inpatient   Turning in Flat Bed Without Bedrails 2   Lying on Back to Sitting on Edge of Flat Bed Without Bedrails 2   Moving Bed to Chair 2   Standing Up From Chair Using Arms 3   Walk in Room 2   Climb 3-5 Stairs With Railing 1   Basic Mobility Inpatient Raw Score 12   Basic Mobility Standardized Score 32.23   [facility] Highest Level Of Mobility   -[facility] Goal 4: Move to  chair/commode   -[facility] Achieved 5: Stand (1 or more minutes)   End of Consult   Patient Position at End of Consult Bedside chair;Bed/Chair alarm activated;All needs within reach     The patient's AM-PAC Basic Mobility Inpatient Short Form Raw Score is 12. A Raw score of less than or equal to 16 suggests the patient may benefit from discharge to post-acute rehabilitation services. Please also refer to the recommendation of the Physical Therapist for safe discharge planning.

## 2024-11-16 NOTE — PROGRESS NOTES
Progress Note - Cardiothoracic Surgery   Carley Bonilla 69 y.o. female MRN: [id number]  Unit/Bed#: PPHP-311-01 Encounter: [id number]      POD # 1 s/p Mitral replacement     Pt seen/examined.  Interval history and data reviewed with critical care team.  Pt doing well.  No specific complaints.    Milrinone 0.38  Borderline CI  Good UOP      Medications:   Scheduled Meds:  Current Facility-Administered Medications   Medication Dose Route Frequency Provider Last Rate    acetaminophen  650 mg Rectal Q4H PRN Cedric David PA-C      acetaminophen  975 mg Oral Q6H While awake Cedric David PA-C      ALPRAZolam  0.5 mg Oral HS PRN Cedric David PA-C      amiodarone  200 mg Oral Q8H [facility] Cedric David PA-C      aspirin  325 mg Oral Daily Cedric David PA-C      atorvastatin  40 mg Oral Daily With Dinner Sheeba Salomon PA-C      bisacodyl  10 mg Rectal Daily PRN Cedric David PA-C      chlorhexidine  15 mL Mouth/Throat BID Cedric David PA-C      docusate sodium  100 mg Oral BID Sheeba Salomon PA-C      fondaparinux  2.5 mg Subcutaneous Q24H Paola Cruz PA-C      HYDROmorphone  0.5 mg Intravenous Q2H PRN Cedric David PA-C      hydrOXYzine HCL  10 mg Oral Daily Cedric David PA-C      insulin lispro  1-5 Units Subcutaneous TID AC Sheeba Salomon PA-C      insulin lispro  1-5 Units Subcutaneous HS Sheeba Salomon PA-C      milrinone (Primacor) infusion  0.38 mcg/kg/min Intravenous Continuous Sheeba Salomon PA-C 0.38 mcg/kg/min (11/16/24 0741)    mupirocin  1 Application Nasal Q12H [facility] Cedric David PA-C      niCARdipine  2.5-15 mg/hr Intravenous Titrated Cedric David PA-C Stopped (11/15/24 1539)    norepinephrine  1-30 mcg/min Intravenous Titrated Paola Cruz PA-C Stopped (11/16/24 3672)    ondansetron  4 mg Intravenous Q6H PRN Cedric David PA-C      oxyCODONE  2.5 mg Oral Q4H PRN Cedric David PA-C      Or    oxyCODONE  5 mg Oral Q4H PRN Cedric David PA-C      pantoprazole  40 mg Oral Daily  Cedric David PA-C      polyethylene glycol  17 g Oral Daily Cedric David PA-C      traZODone  100 mg Oral Daily Cedric David PA-C       Continuous Infusions:milrinone (Primacor) infusion, 0.38 mcg/kg/min, Last Rate: 0.38 mcg/kg/min (11/16/24 3135)  niCARdipine, 2.5-15 mg/hr, Last Rate: Stopped (11/15/24 8719)  norepinephrine, 1-30 mcg/min, Last Rate: Stopped (11/16/24 0905)      PRN Meds:.  acetaminophen    ALPRAZolam    bisacodyl    HYDROmorphone    ondansetron    oxyCODONE **OR** oxyCODONE    Vitals: Blood pressure 108/53, pulse 80, temperature 98.6 °F (37 °C), temperature source Core, resp. rate 15, height 5' (1.524 m), weight 72.4 kg (159 lb 9.8 oz), SpO2 95%.,Body mass index is 31.17 kg/m².  I/O last 24 hours:  In: 5414.7 [P.O.:300; I.V.:1494.7; Blood:700; NG/GT:50; IV Piggyback:2120]  Out: 5980 [Urine:4895; Blood:750; Chest Tube:335]  Invasive Devices       Central Venous Catheter Line  Duration             CVC Central Lines 11/15/24 Triple Right Internal jugular 1 day    Introducer 11/15/24 1 day              Peripheral Intravenous Line  Duration             Peripheral IV 11/15/24 Right Antecubital 1 day              Arterial Line  Duration             Arterial Line 11/15/24 Left Radial 1 day              Line  Duration             Pacer Wires <1 day    Pacer Wires <1 day              Drain  Duration             Urethral Catheter Latex 14 Fr. 1 day    Chest Tube 1 Anterior;Mediastinal 32 Fr. <1 day    Chest Tube 2 Posterior;Mediastinal 32 Fr. <1 day                      Lab, Imaging and other studies:   Results from last 7 days   Lab Units 11/16/24  0404 11/15/24  1921 11/15/24  1321 11/15/24  1316 11/15/24  1059 11/15/24  1027   WBC Thousand/uL 15.16*  --   --   --   --   --    HEMOGLOBIN g/dL 12.7 14.0  --  13.5  --   --    I STAT HEMOGLOBIN g/dl  --   --  11.6  --    < > 8.5*   HEMATOCRIT % 38.4 41.1  --  39.7  --   --    HEMATOCRIT, ISTAT %  --   --  34*  --    < > 25*   PLATELETS Thousands/uL  75*  --   --  91*  --  121*    < > = values in this interval not displayed.     Results from last 7 days   Lab Units 11/16/24  0404 11/15/24  1921 11/15/24  1703 11/15/24  1321 11/15/24  1316   POTASSIUM mmol/L 5.0 3.8 4.4  --  4.0   CHLORIDE mmol/L 110*  --   --   --  112*   CO2 mmol/L 24  --   --   --  22   CO2, I-STAT mmol/L  --   --   --  19*  --    BUN mg/dL 23  --   --   --  22   CREATININE mg/dL 0.94  --   --   --  0.81   GLUCOSE, ISTAT mg/dl  --   --   --  137  --    CALCIUM mg/dL 8.0*  --   --   --  6.9*         Recent Labs     11/16/24  0404   PHART 7.330*   NLT9SCB 21.5*   PO2ART 101.6   KNU6NSC 41.7   BEART -4.2           Plan:    Wean milrinone as tolerated  Continue chest tubes, pacing wires.  OOBTC  Incentive spirometry.  Diuresis.  PO ASA/Statin/hold B blocke for now.  ICU      SIGNATURE: Carlos Manuel Parker DO  DATE: November 16, 2024  TIME: 10:17 AM

## 2024-11-16 NOTE — PLAN OF CARE
Problem: PHYSICAL THERAPY ADULT  Goal: Performs mobility at highest level of function for planned discharge setting.  See evaluation for individualized goals.  Description: Treatment/Interventions: ADL retraining, Functional transfer training, LE strengthening/ROM, Elevations, Therapeutic exercise, Endurance training, Patient/family training, Equipment eval/education, Bed mobility, Gait training, Continued evaluation, Spoke to nursing, OT, Family  Equipment Recommended: Walker       See flowsheet documentation for full assessment, interventions and recommendations.  Note: Prognosis: Good  Problem List: Decreased endurance, Impaired balance, Decreased mobility, Pain, Decreased skin integrity  Assessment: Pt is 69 y.o. female seen for PT evaluation s/p admit to [facility] on 11/15/2024  w/ MVP (mitral valve prolapse).  Pt is s/p mitral valve replacement (11/15/2024). PT consulted for post op mobility and to assist w/ d/c planning recommendations.  Pt  has a past medical history of Anxiety, Cardiac disease, and Mitral valve disorder.    Due to acute medical issues, ongoing medical management in ICU post op; new precautions; pain,  increased reliance on more restrictive AD compared to baseline;  decreased activity tolerance compared to baseline, increased assistance needed from caregiver at current time, continuous monitoring, trending labs;  multiple lines, decline in overall functional mobility status; health management issues; note unstable clinical picture (high complexity).  At baseline, pt reports living w/ her SO in a single story home w/ 1 MOIZ 0 HR/ has 2 beds/ baths on FF. Pt was indep w/o AD PTA; drives and reports 0 falls. SO is retired and can A on d/c. 0 falls. 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 . Currently,  pt presents OOB in recliner- cleared for ambulation/ marching in front of chair 2* lines. And is requiring Tyler x1 for STS and Tyler x1 for 2x30 marching and forward backward steps  x10 w/ RW support. Pt w/ mild dizziness on initial stand that resolved w/ activity. Pt presents functioning below baseline and currently w/ overall mobility deficits 2* to:  cardiac precautions; generalized weakness/ deconditioning; decreased endurance; decreased activity tolerance;  impaired balance; gait deviations;  fatigue;  bed/ chair alarms; multiple lines;  Pt currently at risk for falls.  (Please find additional objective findings from PT assessment regarding body systems outlined above.) Pt will continue to benefit from skilled PT interventions to address stated impairments; to maximize functional potential; for ongoing pt/ family training; and DME needs.  PT is recommending PT Resource Intensity Level  3 (pending ongoing progress) on d/c.   PT to see next session for mobility progression.  PT will follow for STG as outlined on eval. Pt/ family agreeable to PT POC and goals as stated.        Rehab Resource Intensity Level, PT: III (Minimum Resource Intensity) (pending ongoing progress)    See flowsheet documentation for full assessment.

## 2024-11-16 NOTE — OCCUPATIONAL THERAPY NOTE
Occupational Therapy Evaluation     Patient Name: Carley Bonilla  Today's Date: 11/16/2024  Problem List  Principal Problem:    MVP (mitral valve prolapse)  Active Problems:    Anxiety    Benign essential hypertension    Hyperlipidemia    Kidney disease, chronic, stage III (GFR 30-59 ml/min) (Formerly [facility])    Panic disorder (episodic paroxysmal anxiety)    S/P left atrial appendage ligation    S/P mitral valve replacement with bioprosthetic valve    Past Medical History  Past Medical History:   Diagnosis Date    Anxiety     Cardiac disease     mitral valve regurgitation    Mitral valve disorder      Past Surgical History  Past Surgical History:   Procedure Laterality Date    ANKLE FRACTURE SURGERY      CARDIAC CATHETERIZATION Left 10/30/2024    Procedure: Cardiac Left Heart Cath;  Surgeon: René England DO;  [facility]: [facility];  Service: Cardiology    ORIF WRIST FRACTURE Right 11/21/2016    Procedure: DISTAL RADIUS O/R I/F;  Surgeon: Chuckie Nova MD;  Location: [facility];  Service:     WRIST SURGERY             11/16/24 0920   OT Last Visit   OT Visit Date 11/16/24   Note Type   Note type Evaluation   Pain Assessment   Pain Assessment Tool FLACC   Pain Rating: FLACC (Rest) - Face 0   Pain Rating: FLACC (Rest) - Legs 0   Pain Rating: FLACC (Rest) - Activity 0   Pain Rating: FLACC (Rest) - Cry 0   Pain Rating: FLACC (Rest) - Consolability 0   Score: FLACC (Rest) 0   Pain Rating: FLACC (Activity) - Face 1   Pain Rating: FLACC (Activity) - Legs 0   Pain Rating: FLACC (Activity) - Activity 0   Pain Rating: FLACC (Activity) - Cry 0   Pain Rating: FLACC (Activity) - Consolability 0   Score: FLACC (Activity) 1   Restrictions/Precautions   Weight Bearing Precautions Per Order No   Other Precautions Cardiac/sternal;Multiple lines;Telemetry;O2;Fall Risk;Pain   Home Living   Type of Home House   Home Layout One level   Bathroom Shower/Tub Walk-in shower   Bathroom Toilet Standard   Bathroom Equipment Grab bars  in shower   Bathroom Accessibility Accessible   Prior Function   Level of [place] Independent with ADLs;Independent with functional mobility;Independent with IADLS   Lives With Significant other   Receives Help From Family   IADLs Independent with driving;Independent with meal prep;Independent with medication management   Falls in the last 6 months 0   Vocational Retired   Lifestyle   Autonomy Pt I w/ ADLs, ADLs, and mobility. No DME use. +   Reciprocal Relationships Supportive s/o who is retired   Service to Others retired   Intrinsic Gratification enjoys bowling and watching the Steelers.   ADL   Where Assessed Chair   Eating Assistance 5  Supervision/Setup  (s/u)   Grooming Assistance 3  Moderate Assistance   UB Bathing Assistance 3  Moderate Assistance   LB Bathing Assistance 3  Moderate Assistance   UB Dressing Assistance 3  Moderate Assistance   LB Dressing Assistance 3  Moderate Assistance   Toileting Assistance  3  Moderate Assistance   Functional Assistance 3  Moderate Assistance   Bed Mobility   Additional Comments Pt OOB in recliner chair at start and end of session.   Transfers   Sit to Stand 4  Minimal assistance   Additional items Assist x 1;Increased time required   Stand to Sit 4  Minimal assistance   Additional items Assist x 1;Increased time required   Functional Mobility   Functional Mobility 3  Moderate assistance   Additional Comments Pt marched in place at chair 2* lines   Additional items Rolling walker   Balance   Static Sitting Good   Dynamic Sitting Fair +   Static Standing Fair   Dynamic Standing Fair -   Activity Tolerance   Activity Tolerance Patient tolerated treatment well;Patient limited by pain;Patient limited by fatigue   Medical Staff Made Aware Renée BOBO 2* medical complexity   Nurse Made Aware cleared with RN   RUE Assessment   RUE Assessment WFL   LUE Assessment   LUE Assessment WFL   Hand Function   Gross Motor Coordination Functional   Fine Motor Coordination  Functional   Cognition   Overall Cognitive Status WFL   Arousal/Participation Alert;Responsive;Cooperative   Attention Within functional limits   Orientation Level Oriented X4   Memory Within functional limits   Following Commands Follows all commands and directions without difficulty   Comments Pt pleasant and agreeable to session. Provided pt with cardiac edu pckt to review independently.   Assessment   Limitation Decreased ADL status;Decreased endurance;Decreased self-care trans;Decreased high-level ADLs   Prognosis Good   Assessment Pt is a 69 y.o. female who was admitted to [facility] on 11/15/2024 with MVP (mitral valve prolapse). Pt is s/p mitral replacement. Patient  has a past medical history of Anxiety, Cardiac disease, and Mitral valve disorder. At baseline pt was I w/ ADLs, IADLs, and mobility. No DME use. Pt lives with s/o in 1 . Currently pt requires mod A for overall ADLS and min-mod Ax1 for functional mobility/transfers w/ RW. Mobility limited this session 2* SWAN. Pt currently presents with impairments in the following categories -difficulty performing ADLS and difficulty performing IADLS  activity tolerance, endurance, standing balance/tolerance, and sitting balance/tolerance. These impairments, as well as pt's fatigue, pain, cardiac/sternal precautions, and risk for falls  limit pt's ability to safely engage in all baseline areas of occupation, including grooming, bathing, dressing, toileting, functional mobility/transfers, and leisure activities. The patient's raw score on the -PAC Daily Activity Inpatient Short Form is 12. A raw score of less than 19 suggests the patient may benefit from discharge to post-acute rehabilitation services. Please refer to the recommendation of the Occupational Therapist for safe discharge planning. From OT standpoint, recommend Level III upon D/C. Anticipate pt will progress over hospital stay to be able to return home.  OT will continue to follow to  address the below stated goals.   Goals   Patient Goals to go home for Thanksgiving   LTG Time Frame 10-14   Long Term Goal see below goals   Plan   Treatment Interventions ADL retraining;Functional transfer training;Endurance training;Patient/family training;Equipment evaluation/education;Compensatory technique education;Cardiac education;Energy conservation   Goal Expiration Date 11/30/24   OT Frequency 3-5x/wk   Discharge Recommendation   Rehab Resource Intensity Level, OT III (Minimum Resource Intensity)   AM-PAC Daily Activity Inpatient   Lower Body Dressing 2   Bathing 2   Toileting 2   Upper Body Dressing 2   Grooming 2   Eating 2   Daily Activity Raw Score 12   Daily Activity Standardized Score (Calc for Raw Score >=11) 30.6     LTG (10-14 DAYS)  1) Pt will improve activity tolerance to G for min 30 min txment sessions to increase participation in ADLs  2) Pt will complete ADLs/self care w/ mod I using adaptive device and DME as needed  3) Pt will complete toileting w/ mod I w/ G hygiene/thoroughness using DME as needed  4) Pt will improve functional transfers on/off all surfaces using DME as needed w/ G balance/safety including w/ mod I  5) Pt will improve functional mobility during ADL/IADL/leisure tasks using DME as needed w/ G balance/safety w/ mod I  6) Pt will engage in ongoing cognitive assessment w/ G participation w/ mod I to assist w/ safe d/c planning/recommendations  7) Pt will demonstrate G carryover of pt/caregiver education and training as appropriate w/ mod I w/o cues w/ good tolerance  8) Pt will demonstrate 100% carryover of energy conservation techniques w/ mod I t/o functional I/ADL/leisure tasks w/o cues s/p skilled education  9) Pt will engage in cardiac education using the Recovering After Cardiac Rehabilitation packet w/ G participation and G carryover.  10) Pt will demonstrate 100% carryover of precautions s/p review w/o cues w/ mod I w/ G tolerance/participation t/o functional  ADL/IADL/leisure tasks    Etta Daigle, OTR/L

## 2024-11-16 NOTE — RESPIRATORY THERAPY NOTE
Resp care   11/16/24 4110   Respiratory Assessment   Assessment Type Assess only   General Appearance Awake   Respiratory Pattern Normal   Chest Assessment Chest expansion symmetrical   Bilateral Breath Sounds Diminished   Resp Comments pt found on 5l nc, spo2 is 98%, bs are diminished, spo2 is 98%, IS therapy performed will d/c acp   Oxygen Therapy/Pulse Ox   O2 Device Nasal cannula   O2 Therapy Oxygen humidified   Nasal Cannula O2 Flow Rate (L/min) 5 L/min   Calculated FIO2 (%) - Nasal Cannula 40   SpO2 Activity At Rest   $ Pulse Oximetry Spot Check Charge Completed

## 2024-11-17 LAB
ANION GAP SERPL CALCULATED.3IONS-SCNC: 7 MMOL/L (ref 4–13)
ARTERIAL PATENCY WRIST A: YES
ATRIAL RATE: 74 BPM
ATRIAL RATE: 77 BPM
BASE EX.OXY STD BLDV CALC-SCNC: 56.9 % (ref 60–80)
BASE EXCESS BLDV CALC-SCNC: -0.8 MMOL/L
BODY TEMPERATURE: 99.5 DEGREES FEHRENHEIT
BUN SERPL-MCNC: 28 MG/DL (ref 5–25)
CALCIUM SERPL-MCNC: 7.7 MG/DL (ref 8.4–10.2)
CHLORIDE SERPL-SCNC: 106 MMOL/L (ref 96–108)
CO2 SERPL-SCNC: 25 MMOL/L (ref 21–32)
CREAT SERPL-MCNC: 1.07 MG/DL (ref 0.6–1.3)
ERYTHROCYTE [DISTWIDTH] IN BLOOD BY AUTOMATED COUNT: 14.7 % (ref 11.6–15.1)
GFR SERPL CREATININE-BSD FRML MDRD: 53 ML/MIN/1.73SQ M
GLUCOSE SERPL-MCNC: 124 MG/DL (ref 65–140)
GLUCOSE SERPL-MCNC: 131 MG/DL (ref 65–140)
GLUCOSE SERPL-MCNC: 134 MG/DL (ref 65–140)
GLUCOSE SERPL-MCNC: 137 MG/DL (ref 65–140)
GLUCOSE SERPL-MCNC: 144 MG/DL (ref 65–140)
GLUCOSE SERPL-MCNC: 163 MG/DL (ref 65–140)
HCO3 BLDV-SCNC: 25.5 MMOL/L (ref 24–30)
HCT VFR BLD AUTO: 33 % (ref 34.8–46.1)
HGB BLD-MCNC: 10.8 G/DL (ref 11.5–15.4)
MAGNESIUM SERPL-MCNC: 2.1 MG/DL (ref 1.9–2.7)
MCH RBC QN AUTO: 29.8 PG (ref 26.8–34.3)
MCHC RBC AUTO-ENTMCNC: 32.7 G/DL (ref 31.4–37.4)
MCV RBC AUTO: 91 FL (ref 82–98)
NASAL CANNULA: 4
O2 CT BLDV-SCNC: 12.6 ML/DL
P AXIS: 182 DEGREES
P AXIS: 184 DEGREES
P AXIS: 184 DEGREES
PCO2 BLDV: 48.1 MM HG (ref 42–50)
PH BLDV: 7.34 [PH] (ref 7.3–7.4)
PLATELET # BLD AUTO: 56 THOUSANDS/UL (ref 149–390)
PMV BLD AUTO: 11.4 FL (ref 8.9–12.7)
PO2 BLDV: 30 MM HG (ref 35–45)
POTASSIUM SERPL-SCNC: 4 MMOL/L (ref 3.5–5.3)
PR INTERVAL: 256 MS
PR INTERVAL: 264 MS
PR INTERVAL: 280 MS
QRS AXIS: 13 DEGREES
QRS AXIS: 16 DEGREES
QRS AXIS: 20 DEGREES
QRS AXIS: 31 DEGREES
QRSD INTERVAL: 76 MS
QRSD INTERVAL: 76 MS
QRSD INTERVAL: 80 MS
QRSD INTERVAL: 84 MS
QT INTERVAL: 396 MS
QT INTERVAL: 422 MS
QT INTERVAL: 424 MS
QT INTERVAL: 440 MS
QTC INTERVAL: 439 MS
QTC INTERVAL: 440 MS
QTC INTERVAL: 477 MS
QTC INTERVAL: 479 MS
RBC # BLD AUTO: 3.63 MILLION/UL (ref 3.81–5.12)
SODIUM SERPL-SCNC: 138 MMOL/L (ref 135–147)
T WAVE AXIS: -23 DEGREES
T WAVE AXIS: 26 DEGREES
T WAVE AXIS: 79 DEGREES
T WAVE AXIS: 79 DEGREES
VENTRICULAR RATE: 60 BPM
VENTRICULAR RATE: 74 BPM
VENTRICULAR RATE: 77 BPM
VENTRICULAR RATE: 77 BPM
WBC # BLD AUTO: 15.16 THOUSAND/UL (ref 4.31–10.16)

## 2024-11-17 PROCEDURE — 93010 ELECTROCARDIOGRAM REPORT: CPT | Performed by: INTERNAL MEDICINE

## 2024-11-17 PROCEDURE — 82948 REAGENT STRIP/BLOOD GLUCOSE: CPT

## 2024-11-17 PROCEDURE — 94664 DEMO&/EVAL PT USE INHALER: CPT

## 2024-11-17 PROCEDURE — 85027 COMPLETE CBC AUTOMATED: CPT | Performed by: PHYSICIAN ASSISTANT

## 2024-11-17 PROCEDURE — 99024 POSTOP FOLLOW-UP VISIT: CPT | Performed by: THORACIC SURGERY (CARDIOTHORACIC VASCULAR SURGERY)

## 2024-11-17 PROCEDURE — 83735 ASSAY OF MAGNESIUM: CPT | Performed by: PHYSICIAN ASSISTANT

## 2024-11-17 PROCEDURE — 99233 SBSQ HOSP IP/OBS HIGH 50: CPT | Performed by: STUDENT IN AN ORGANIZED HEALTH CARE EDUCATION/TRAINING PROGRAM

## 2024-11-17 PROCEDURE — 80048 BASIC METABOLIC PNL TOTAL CA: CPT | Performed by: PHYSICIAN ASSISTANT

## 2024-11-17 PROCEDURE — 82805 BLOOD GASES W/O2 SATURATION: CPT | Performed by: PHYSICIAN ASSISTANT

## 2024-11-17 PROCEDURE — 93005 ELECTROCARDIOGRAM TRACING: CPT

## 2024-11-17 PROCEDURE — 94760 N-INVAS EAR/PLS OXIMETRY 1: CPT

## 2024-11-17 RX ORDER — TRAZODONE HYDROCHLORIDE 100 MG/1
200 TABLET ORAL
Status: DISCONTINUED | OUTPATIENT
Start: 2024-11-17 | End: 2024-11-21 | Stop reason: HOSPADM

## 2024-11-17 RX ORDER — POTASSIUM CHLORIDE 1500 MG/1
20 TABLET, EXTENDED RELEASE ORAL 2 TIMES DAILY
Status: DISCONTINUED | OUTPATIENT
Start: 2024-11-17 | End: 2024-11-20

## 2024-11-17 RX ORDER — FUROSEMIDE 10 MG/ML
40 INJECTION INTRAMUSCULAR; INTRAVENOUS ONCE
Status: COMPLETED | OUTPATIENT
Start: 2024-11-17 | End: 2024-11-17

## 2024-11-17 RX ORDER — FUROSEMIDE 10 MG/ML
40 INJECTION INTRAMUSCULAR; INTRAVENOUS
Status: DISCONTINUED | OUTPATIENT
Start: 2024-11-17 | End: 2024-11-20

## 2024-11-17 RX ORDER — ASPIRIN 81 MG/1
81 TABLET ORAL DAILY
Status: DISCONTINUED | OUTPATIENT
Start: 2024-11-17 | End: 2024-11-20

## 2024-11-17 RX ORDER — GUAIFENESIN 600 MG/1
600 TABLET, EXTENDED RELEASE ORAL 2 TIMES DAILY PRN
Status: DISCONTINUED | OUTPATIENT
Start: 2024-11-17 | End: 2024-11-21 | Stop reason: HOSPADM

## 2024-11-17 RX ADMIN — MUPIROCIN 1 APPLICATION: 20 OINTMENT TOPICAL at 21:06

## 2024-11-17 RX ADMIN — OXYCODONE HYDROCHLORIDE 5 MG: 5 TABLET ORAL at 02:55

## 2024-11-17 RX ADMIN — FONDAPARINUX SODIUM 2.5 MG: 2.5 INJECTION SUBCUTANEOUS at 09:10

## 2024-11-17 RX ADMIN — DOCUSATE SODIUM 100 MG: 100 CAPSULE, LIQUID FILLED ORAL at 09:09

## 2024-11-17 RX ADMIN — POTASSIUM CHLORIDE 20 MEQ: 1500 TABLET, EXTENDED RELEASE ORAL at 09:10

## 2024-11-17 RX ADMIN — HYDROMORPHONE HYDROCHLORIDE 0.5 MG: 1 INJECTION, SOLUTION INTRAMUSCULAR; INTRAVENOUS; SUBCUTANEOUS at 05:08

## 2024-11-17 RX ADMIN — CHLORHEXIDINE GLUCONATE 0.12% ORAL RINSE 15 ML: 1.2 LIQUID ORAL at 18:07

## 2024-11-17 RX ADMIN — FUROSEMIDE 40 MG: 10 INJECTION, SOLUTION INTRAMUSCULAR; INTRAVENOUS at 22:11

## 2024-11-17 RX ADMIN — OXYCODONE HYDROCHLORIDE 5 MG: 5 TABLET ORAL at 21:06

## 2024-11-17 RX ADMIN — AMIODARONE HYDROCHLORIDE 200 MG: 200 TABLET ORAL at 21:06

## 2024-11-17 RX ADMIN — POLYETHYLENE GLYCOL 3350 17 G: 17 POWDER, FOR SOLUTION ORAL at 09:10

## 2024-11-17 RX ADMIN — HYDROXYZINE HYDROCHLORIDE 10 MG: 10 TABLET, FILM COATED ORAL at 09:10

## 2024-11-17 RX ADMIN — TRAZODONE HYDROCHLORIDE 200 MG: 100 TABLET ORAL at 21:06

## 2024-11-17 RX ADMIN — POTASSIUM CHLORIDE 20 MEQ: 1500 TABLET, EXTENDED RELEASE ORAL at 18:07

## 2024-11-17 RX ADMIN — ASPIRIN 81 MG: 81 TABLET, COATED ORAL at 09:10

## 2024-11-17 RX ADMIN — INSULIN LISPRO 1 UNITS: 100 INJECTION, SOLUTION INTRAVENOUS; SUBCUTANEOUS at 11:21

## 2024-11-17 RX ADMIN — FUROSEMIDE 40 MG: 10 INJECTION, SOLUTION INTRAMUSCULAR; INTRAVENOUS at 09:09

## 2024-11-17 RX ADMIN — FUROSEMIDE 40 MG: 10 INJECTION, SOLUTION INTRAMUSCULAR; INTRAVENOUS at 15:43

## 2024-11-17 RX ADMIN — MUPIROCIN 1 APPLICATION: 20 OINTMENT TOPICAL at 09:10

## 2024-11-17 RX ADMIN — GUAIFENESIN 600 MG: 600 TABLET, EXTENDED RELEASE ORAL at 21:06

## 2024-11-17 RX ADMIN — ACETAMINOPHEN 975 MG: 325 TABLET, FILM COATED ORAL at 21:06

## 2024-11-17 RX ADMIN — PANTOPRAZOLE SODIUM 40 MG: 40 TABLET, DELAYED RELEASE ORAL at 06:21

## 2024-11-17 RX ADMIN — MILRINONE LACTATE IN DEXTROSE 0.25 MCG/KG/MIN: 200 INJECTION, SOLUTION INTRAVENOUS at 09:10

## 2024-11-17 RX ADMIN — ALPRAZOLAM 0.5 MG: 0.5 TABLET ORAL at 21:06

## 2024-11-17 RX ADMIN — ATORVASTATIN CALCIUM 40 MG: 40 TABLET, FILM COATED ORAL at 15:43

## 2024-11-17 RX ADMIN — DOCUSATE SODIUM 100 MG: 100 CAPSULE, LIQUID FILLED ORAL at 18:07

## 2024-11-17 RX ADMIN — CHLORHEXIDINE GLUCONATE 0.12% ORAL RINSE 15 ML: 1.2 LIQUID ORAL at 09:09

## 2024-11-17 RX ADMIN — AMIODARONE HYDROCHLORIDE 200 MG: 200 TABLET ORAL at 06:21

## 2024-11-17 RX ADMIN — ACETAMINOPHEN 975 MG: 325 TABLET, FILM COATED ORAL at 09:12

## 2024-11-17 RX ADMIN — ACETAMINOPHEN 975 MG: 325 TABLET, FILM COATED ORAL at 15:43

## 2024-11-17 NOTE — PROGRESS NOTES
Progress Note - Critical Care/ICU   Name: Carley Bonilla 69 y.o. female I MRN: [id number]  Unit/Bed#: PPHP-311-01 I Date of Admission: 11/15/2024   Date of Service: 11/17/2024 I Hospital Day: 2      Assessment & Plan   Impressions:  MVP, severe MR s/p mitral valve replacement (11/15/2024)  HTN  HLD  CKD2  Panic disorder, anxiety  Junctional bradycardia  Pre-DM  thrombocytopenia    Neuro:   Analgesia:    ATC tylenol  PRN oxycodone 2.5-5 mg  Delirium precautions  Regulate sleep/wake cycle  Restoril 15mg qHS PRN  CAM-ICU daily  Routine neuro checks     CV:   Hemodynamic monitoring goals:  MAP > 65   SBP < 130  CI > 2.2  Continue to monitor and trend cardiac output and filling pressures   Continue pulmonary artery catheter for hemodynamic monitoring   Continue central line due to vasoactive medications  Continue arterial line for blood pressure monitoring and/or frequent ABG's  Current cardiac infusions:    Milrinone 0.38 mcg/kg/min  Norepinephrine 2 mcg/min  Wean to off as able to meet goals  Beta Blocker:    Hold beta blocker secondary to bradycardia  Epicardial Pacing Wires:    Epicardial pacing wire plan : Epicardial pacing wires in place. Will pace as needed for bradycardia or cardiac output   Maintain for pacing needs  Post op cardiac surgery medications:     mg daily  Atorvastatin 40 mg qHS  Amiodarone 200 mg PO q8 hours   Continue telemetry monitoring     Lung:   Expected post operative oxygen requirement of 4 liters/min via nasal cannula  Chest tube output remains persistently high; Continue chest tubes to suction today  Continue incentive spirometry  Encourage coughing and deep breathing     GI:   Continue PPI for stress ulcer prophylaxis  Bowel regimen:  Colace BID  Miralax QD  Dulcolax QD PRN  Monitor abdominal exam and bowel function    FEN:   Diuresis Plan: Lasix 40mg IV BID  Nutrition plan: Cardiac PO diet as tolerated.   Replenish electrolytes with goals: K >4.0, Mag >2.0, and Phos >3.0    :    Strict I/O monitoring   Continue to trend renal function tests   Cruz: Remove after 48 hrs of I/O monitoring     ID:   Monitor WBC and temps   Maintain normothermia    Heme:   Trend Hgb and plts  Transfuse as needed     Endo:   Sliding Scale Insulin coverage.   Adjust insulin regimen as needed to maintain -180    MSK/Skin:  Early mobility and skin protection:   PT/OT consult as medically appropriate   Frequent turning and pressure off-loading  Local wound care as needed    Disposition:  Critical care    ICU Core Measures     A: Assess, Prevent, and Manage Pain Has pain been assessed? Yes  Need for changes to pain regimen? No   B: Both SAT/SAT  N/A   C: Choice of Sedation RASS Goal: N/A patient not on sedation  Need for changes to sedation or analgesia regimen? NA   D: Delirium CAM-ICU: Negative   E: Early Mobility  Plan for early mobility? Yes   F: Family Engagement Plan for family engagement today? Yes       Review of Invasive Devices:    Cruz Plan: Continue for accurate I/O monitoring for 48 hours  Central access plan: Patient has multiple central venous catheters.   Jess Plan: Keep arterial line for hemodynamic monitoring    Prophylaxis:  VTE VTE covered by:  fondaparinux, Subcutaneous, 2.5 mg at 11/16/24 0902       Stress Ulcer  covered bypantoprazole (PROTONIX) EC tablet 40 mg [[id number]]            24 Hour Events    24 Hour Events/HPI: POD # 2 s/p MV replacement and TYLER ligation.  Patient continues to struggle with borderline cardiac index and mild hypotension.  Restarted Levophed overnight.  Low urine output-given both albumin and Lasix.    Subjective   Review of Systems: See HPI for Review of Systems  Objective :    Medications:  Scheduled Continuous   acetaminophen, 975 mg, Q6H While awake  amiodarone, 200 mg, Q8H AMANUEL  aspirin, 325 mg, Daily  atorvastatin, 40 mg, Daily With Dinner  chlorhexidine, 15 mL, BID  docusate sodium, 100 mg, BID  fondaparinux, 2.5 mg, Q24H  furosemide, 40 mg, BID  (diuretic)  hydrOXYzine HCL, 10 mg, Daily  insulin lispro, 1-5 Units, TID AC  insulin lispro, 1-5 Units, HS  mupirocin, 1 Application, Q12H AMANUEL  pantoprazole, 40 mg, Daily  polyethylene glycol, 17 g, Daily  potassium chloride, 20 mEq, BID  traZODone, 100 mg, Daily      milrinone (Primacor) infusion, 0.38 mcg/kg/min, Last Rate: 0.38 mcg/kg/min (11/16/24 1944)  niCARdipine, 2.5-15 mg/hr, Last Rate: Stopped (11/15/24 1539)  norepinephrine, 1-30 mcg/min, Last Rate: 2 mcg/min (11/17/24 6643)         Vitals: Invasive Monitoring       Most Recent Min/Max in 24hrs   Temp 99.5 °F (37.5 °C) Temp  Min: 97.9 °F (36.6 °C)  Max: 99.5 °F (37.5 °C)   Pulse 80 Pulse  Min: 76  Max: 80   Resp (!) 36 Resp  Min: 13  Max: 36   BP (!) 88/47 BP  Min: 88/47  Max: 88/47   O2 Sat 93 % SpO2  Min: 90 %  Max: 97 %   O2 Device: Nasal cannula  Nasal Cannula O2 Flow Rate (L/min): 4 L/min Arterial Line  Donaldson /53  Arterial Line BP  Min: 92/48  Max: 129/57   MAP 69 mmHg  Arterial Line MAP (mmHg)  Min: 59 mmHg  Max: 75 mmHg     PA Catheter   Most Recent  Min/Max in 24hrs    PAP 38/14 PAP  Min: 23/11  Max: 38/14   CVP 18 mmHg CVP (mean)  Min: 4 mmHg  Max: 25 mmHg   CI 2.3 L/min/m2  CI (L/min/m2)  Min: 1.5 L/min/m2  Max: 2.7 L/min/m2   SVR 1076 (dyne*sec)/cm5 SVR (dyne*sec)/cm5  Min: 942 (dyne*sec)/cm5  Max: 2155 (dyne*sec)/cm5        I/O Chest tube output (if present)     Intake/Output Summary (Last 24 hours) at 11/17/2024 0601  Last data filed at 11/17/2024 0501  Gross per 24 hour   Intake 1811.33 ml   Output 1275 ml   Net 536.33 ml     UOP: 30-70/hour    BM: 0 in the last 24 hours  Weight change: 0.9 kg (1 lb 15.7 oz)     Mediastinal tubes:  100 mL/8hr  320 mL/24hr   Pleural tubes: -- mL/8hr  -- mL/24hr        Physical Exam   Physical Exam  Vitals reviewed.   Skin:     General: Skin is warm and dry.   Neck:      Vascular: Central line present.   Cardiovascular:      Rate and Rhythm: Normal rate and regular rhythm.      Heart sounds: No murmur  heard.     No friction rub.   Constitutional:       General: She is not in acute distress.     Appearance: She is well-developed.      Interventions: Nasal cannula in place.   Pulmonary:      Effort: Pulmonary effort is normal. No respiratory distress.      Breath sounds: Normal breath sounds.   Neurological:      General: No focal deficit present.      Mental Status: She is alert and oriented to person, place and time.   Genitourinary/Anorectal:  Cruz present.        Diagnostic Studies      11/17/24 EKG: Accelerated junctional.   This was personally reviewed by myself.       Labs:  CBC  Recent Labs     11/16/24  0404 11/17/24  0453   WBC 15.16* 15.16*   HGB 12.7 10.8*   HCT 38.4 33.0*   PLT 75* 56*     BMP  Recent Labs     11/16/24  1330 11/17/24  0453   SODIUM 136 138   K 4.4 4.0    106   CO2 24 25   AGAP 4 7   BUN 24 28*   CREATININE 1.07 1.07   CALCIUM 7.6* 7.7*      Baseline Creat: 0.9   Coags  No recent results           Additional Electrolytes  Recent Labs     11/15/24  1230 11/15/24  1321 11/16/24  0404 11/17/24  0453   MG  --   --  2.5 2.1   CAIONIZED 1.07* 1.00*  --   --           Blood Gas  Recent Labs     11/16/24 0404   PHART 7.330*   ISW4SVD 41.7   PO2ART 101.6   VQF5LYK 21.5*   BEART -4.2   SOURCE Line, Arterial     Recent Labs     11/16/24  0404 11/16/24  1039 11/17/24  0459   PHVEN  --    < > 7.343   NYD0TAQ  --    < > 48.1   PO2VEN  --    < > 30.0*   [id number]  --    < > 25.5   BEVEN  --    < > -0.8   [id number]  --    < > 56.9*   SOURCE Line, Arterial  --   --     < > = values in this interval not displayed.    LFTs  No recent results    Infectious  No recent results     No recent results Glucose  Recent Labs     11/15/24  1316 11/16/24  0404 11/16/24  1330 11/17/24  0453   GLUC 151* 154* 139 124        Collaborative bedside rounds performed with cardiac surgery attending, critical care attending and bedside RN.

## 2024-11-17 NOTE — CASE MANAGEMENT
Case Management Assessment & Discharge Planning Note    Patient name Carley Bonilla  Location PPHP-311/PPHP-311-01 MRN [id number]  : 1955 Date 2024       Current Admission Date: 11/15/2024  Current Admission Diagnosis:MVP (mitral valve prolapse)   Patient Active Problem List    Diagnosis Date Noted Date Diagnosed    S/P left atrial appendage ligation 11/15/2024     S/P mitral valve replacement with bioprosthetic valve 11/15/2024     PONV (postoperative nausea and vomiting) 10/09/2024     Constipation 2024     Mitral valve insufficiency 2024     Murmur, cardiac 2024     Age-related osteoporosis with current pathological fracture 2024     Nodular lesion on surface of skin 2024     Chronic insomnia 2023     Panic disorder (episodic paroxysmal anxiety) 2022     Kidney disease, chronic, stage III (GFR 30-59 ml/min) ([facility]) 2020     Hyperlipidemia 2014     Anxiety 2014     Benign essential hypertension 2014     MVP (mitral valve prolapse) 2008       LOS (days): 2  Geometric Mean LOS (GMLOS) (days): 2.2  Days to GMLOS:0.1     OBJECTIVE:    Risk of Unplanned Readmission Score: 16.29      Current admission status: Inpatient    Preferred Pharmacy:   [id number] [place] - [address]  [address]  [place] [address]  Phone: [phone] Fax: [phone]    [facility] - [address] [address]  Phone: [phone] Fax: [phone]    [id number] [place] - [address]  [address]  [place] [address]  Phone: [phone] Fax: [phone]    Primary Care Provider: Kerrie Barrios MD    Primary Insurance: [facility] RE[facility]  Secondary Insurance:     ASSESSMENT:  Active Health Care Proxies    There are no active Health Care Proxies on file.       Readmission Root Cause  30 Day Readmission: No    Patient Information  Admitted  from:: Home  Mental Status: Alert  During Assessment patient was accompanied by: Spouse (Octaviano Bullock)  Assessment information provided by:: Patient, Spouse  Primary Caregiver: Self  Support Systems: Self, Spouse/significant other  County of Residence: [place]  What city do you live in?: [place]  Home entry access options. Select all that apply.: Stairs  Number of steps to enter home.: 1  Do the steps have railings?: Yes  Type of Current Residence: [facility]  Living Arrangements: Lives w/ Spouse/significant other  Is patient a ?: No    Activities of Daily Living Prior to Admission  Functional Status: Independent  Completes ADLs independently?: Yes  Ambulates independently?: Yes  Does patient use assisted devices?: No  Does patient currently own DME?: No  Does patient have a history of Outpatient Therapy (PT/OT)?: Yes  Does the patient have a history of Short-Term Rehab?: No  Does patient have a history of HHC?: No  Does patient currently have HHC?: No    Patient Information Continued  Income Source: Pension/nursing home  Does patient have prescription coverage?: Yes  Does patient receive dialysis treatments?: No  Does patient have a history of substance abuse?: No  Does patient have a history of Mental Health Diagnosis?: No    Means of Transportation  Means of Transport to Appts:: Drives Self    DISCHARGE DETAILS:    Discharge planning discussed with:: pt and spouse Octaviano at bedside  Freedom of Choice: Yes     CM contacted family/caregiver?: Yes  Were Treatment Team discharge recommendations reviewed with patient/caregiver?: Yes  Did patient/caregiver verbalize understanding of patient care needs?: Yes  Were patient/caregiver advised of the risks associated with not following Treatment Team discharge recommendations?: Yes    Contacts  Patient Contacts: Octaviano Bullock - spouse  Relationship to Patient:: Family  Contact Method: In Person  Reason/Outcome: Discharge Planning, Continuity of Care, Emergency  Contact    DME Referral Provided  Referral made for DME?: Yes  DME referral completed for the following items:: Walker  DME Supplier Name:: [employer]    Other Referral/Resources/Interventions Provided:  Interventions: HHC, DME    Would you like to participate in our [facility] service program?  : No - Declined    Treatment Team Recommendation: Home with Home Health Care  Discharge Destination Plan:: Home with Home Health Care  Transport at Discharge : Family    Additional Comments: CM met with pt and spouse to introduce role and complete assessment.  Pt lives w/ spouse in ranch home w/ 1 STE.  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  Pt has no hx of STR or HHC, does have hx of OP PT.  Pt rec'd level 3 by PT/OT, pt [facility]  [facility]  CM ordered wheeled walker in [place], to be delivered closer to d/c.  CM following.    CM reviewed d/c planning process including the following: identifying help at home, patient preference for d/c planning needs, Discharge Lounge, [facility] program, availability of treatment team to discuss questions or concerns patient and/or family may have regarding understanding medications and recognizing signs and symptoms once discharged.  CM also encouraged patient to follow up with all recommended appointments after discharge. Patient advised of importance for patient and family to participate in managing patient’s medical well being.

## 2024-11-17 NOTE — RESPIRATORY THERAPY NOTE
Resp care   11/17/24 0752   Respiratory Assessment   Assessment Type Assess only   General Appearance Alert;Awake   Respiratory Pattern Normal   Chest Assessment Chest expansion symmetrical   Bilateral Breath Sounds Diminished   Resp Comments pt found on 4L nc, spo2 is 93%, bs are diminished, IS therapy performed will d/c acp/rcp.   Oxygen Therapy/Pulse Ox   O2 Device Nasal cannula   O2 Therapy Oxygen humidified   Nasal Cannula O2 Flow Rate (L/min) 4 L/min   Calculated FIO2 (%) - Nasal Cannula 36   SpO2 Activity At Rest   $ Pulse Oximetry Spot Check Charge Completed

## 2024-11-17 NOTE — PLAN OF CARE
Problem: Prexisting or High Potential for Compromised Skin Integrity  Goal: Skin integrity is maintained or improved  Description: INTERVENTIONS:  - Identify patients at risk for skin breakdown  - Assess and monitor skin integrity  - Assess and monitor nutrition and hydration status  - Monitor labs   - Assess for incontinence   - Turn and reposition patient  - Assist with mobility/ambulation  - Relieve pressure over bony prominences  - Avoid friction and shearing  - Provide appropriate hygiene as needed including keeping skin clean and dry  - Evaluate need for skin moisturizer/barrier cream  - Collaborate with interdisciplinary team   - Patient/family teaching  - Consider wound care consult   Outcome: Progressing     Problem: NEUROSENSORY - ADULT  Goal: Achieves stable or improved neurological status  Description: INTERVENTIONS  - Monitor and report changes in neurological status  - Monitor vital signs such as temperature, blood pressure, glucose, and any other labs ordered   - Initiate measures to prevent increased intracranial pressure  - Monitor for seizure activity and implement precautions if appropriate      Outcome: Progressing  Goal: Remains free of injury related to seizures activity  Description: INTERVENTIONS  - Maintain airway, patient safety  and administer oxygen as ordered  - Monitor patient for seizure activity, document and report duration and description of seizure to physician/advanced practitioner  - If seizure occurs,  ensure patient safety during seizure  - Reorient patient post seizure  - Seizure pads on all 4 side rails  - Instruct patient/family to notify RN of any seizure activity including if an aura is experienced  - Instruct patient/family to call for assistance with activity based on nursing assessment  - Administer anti-seizure medications if ordered    Outcome: Progressing     Problem: CARDIOVASCULAR - ADULT  Goal: Maintains optimal cardiac output and hemodynamic  stability  Description: INTERVENTIONS:  - Monitor I/O, vital signs and rhythm  - Monitor for S/S and trends of decreased cardiac output  - Administer and titrate ordered vasoactive medications to optimize hemodynamic stability  - Assess quality of pulses, skin color and temperature  - Assess for signs of decreased coronary artery perfusion  - Instruct patient to report change in severity of symptoms  Outcome: Progressing  Goal: Absence of cardiac dysrhythmias or at baseline rhythm  Description: INTERVENTIONS:  - Continuous cardiac monitoring, vital signs, obtain 12 lead EKG if ordered  - Administer antiarrhythmic and heart rate control medications as ordered  - Monitor electrolytes and administer replacement therapy as ordered  Outcome: Progressing     Problem: RESPIRATORY - ADULT  Goal: Achieves optimal ventilation and oxygenation  Description: INTERVENTIONS:  - Assess for changes in respiratory status  - Assess for changes in mentation and behavior  - Position to facilitate oxygenation and minimize respiratory effort  - Oxygen administered by appropriate delivery if ordered  - Initiate smoking cessation education as indicated  - Encourage broncho-pulmonary hygiene including cough, deep breathe, Incentive Spirometry  - Assess the need for suctioning and aspirate as needed  - Assess and instruct to report SOB or any respiratory difficulty  - Respiratory Therapy support as indicated  Outcome: Progressing     Problem: GENITOURINARY - ADULT  Goal: Maintains or returns to baseline urinary function  Description: INTERVENTIONS:  - Assess urinary function  - Encourage oral fluids to ensure adequate hydration if ordered  - Administer IV fluids as ordered to ensure adequate hydration  - Administer ordered medications as needed  - Offer frequent toileting  - Follow urinary retention protocol if ordered  Outcome: Progressing  Goal: Urinary catheter remains patent  Description: INTERVENTIONS:  - Assess patency of urinary  catheter  - If patient has a chronic guo, consider changing catheter if non-functioning  - Follow guidelines for intermittent irrigation of non-functioning urinary catheter  Outcome: Progressing     Problem: METABOLIC, FLUID AND ELECTROLYTES - ADULT  Goal: Electrolytes maintained within normal limits  Description: INTERVENTIONS:  - Monitor labs and assess patient for signs and symptoms of electrolyte imbalances  - Administer electrolyte replacement as ordered  - Monitor response to electrolyte replacements, including repeat lab results as appropriate  - Instruct patient on fluid and nutrition as appropriate  Outcome: Progressing  Goal: Fluid balance maintained  Description: INTERVENTIONS:  - Monitor labs   - Monitor I/O and WT  - Instruct patient on fluid and nutrition as appropriate  - Assess for signs & symptoms of volume excess or deficit  Outcome: Progressing

## 2024-11-17 NOTE — PROGRESS NOTES
Progress Note - Cardiothoracic Surgery   Carley Bonilla 69 y.o. female MRN: [id number]  Unit/Bed#: PPHP-311-01 Encounter: [id number]      POD # 2 s/p Mitral replacement     Pt seen/examined.  Interval history and data reviewed with critical care team.  Pt doing well.  No specific complaints.    Milrinone 0.38  Levo 2  CI improved  Platelet count decreasing        Medications:   Scheduled Meds:  Current Facility-Administered Medications   Medication Dose Route Frequency Provider Last Rate    acetaminophen  650 mg Rectal Q4H PRN Cedric David PA-C      acetaminophen  975 mg Oral Q6H While awake Cedric David PA-C      ALPRAZolam  0.5 mg Oral HS PRN Cedric David PA-C      amiodarone  200 mg Oral Q8H [facility] Cedric David PA-C      aspirin  81 mg Oral Daily Sheeba Salomon PA-C      atorvastatin  40 mg Oral Daily With Dinner Sheeba Salomon PA-C      bisacodyl  10 mg Rectal Daily PRN Cedric David PA-C      chlorhexidine  15 mL Mouth/Throat BID Cedric David PA-C      docusate sodium  100 mg Oral BID Sheeba Salomon PA-C      fondaparinux  2.5 mg Subcutaneous Q24H Paola Cruz PA-C      furosemide  40 mg Intravenous BID (diuretic) Sheeba Salomon PA-C      HYDROmorphone  0.5 mg Intravenous Q2H PRN Cedric David PA-C      hydrOXYzine HCL  10 mg Oral Daily Cedric David PA-C      insulin lispro  1-5 Units Subcutaneous TID AC Sheeba Salomon PA-C      insulin lispro  1-5 Units Subcutaneous HS Sheeba Salomon PA-C      milrinone (Primacor) infusion  0.25 mcg/kg/min Intravenous Continuous Sheeba Salomon PA-C 0.25 mcg/kg/min (11/17/24 0900)    mupirocin  1 Application Nasal Q12H [facility] Cedric David PA-C      niCARdipine  2.5-15 mg/hr Intravenous Titrated Cedric David PA-C Stopped (11/15/24 1539)    norepinephrine  1-30 mcg/min Intravenous Titrated Paola Cruz PA-C 2 mcg/min (11/17/24 9513)    ondansetron  4 mg Intravenous Q6H PRN Cedric David PA-C      oxyCODONE  2.5 mg Oral Q4H PRN Cedric David PA-C      Or     oxyCODONE  5 mg Oral Q4H PRN Cedric David PA-C      pantoprazole  40 mg Oral Daily Cedric David PA-C      polyethylene glycol  17 g Oral Daily Cedric David PA-C      potassium chloride  20 mEq Oral BID Sheeba Salomon PA-C      traZODone  100 mg Oral Daily Cedric David PA-C       Continuous Infusions:milrinone (Primacor) infusion, 0.25 mcg/kg/min, Last Rate: 0.25 mcg/kg/min (11/17/24 0900)  niCARdipine, 2.5-15 mg/hr, Last Rate: Stopped (11/15/24 1539)  norepinephrine, 1-30 mcg/min, Last Rate: 2 mcg/min (11/17/24 0353)      PRN Meds:.  acetaminophen    ALPRAZolam    bisacodyl    HYDROmorphone    ondansetron    oxyCODONE **OR** oxyCODONE    Vitals: Blood pressure (!) 88/47, pulse 75, temperature 99.3 °F (37.4 °C), temperature source Core, resp. rate (!) 29, height 5' (1.524 m), weight 73.3 kg (161 lb 9.6 oz), SpO2 93%.,Body mass index is 31.56 kg/m².  I/O last 24 hours:  In: 2248.6 [P.O.:710; I.V.:438.6; IV Piggyback:1100]  Out: 1330 [Urine:980; Chest Tube:350]  Invasive Devices       Central Venous Catheter Line  Duration             CVC Central Lines 11/15/24 Triple Right Internal jugular 2 days    Introducer 11/15/24 2 days              Peripheral Intravenous Line  Duration             Peripheral IV 11/15/24 Right Antecubital 2 days              Arterial Line  Duration             Arterial Line 11/15/24 Left Radial 2 days              Line  Duration             Pacer Wires 1 day    Pacer Wires 1 day              Drain  Duration             Urethral Catheter Latex 14 Fr. 2 days    Chest Tube 1 Anterior;Mediastinal 32 Fr. 1 day    Chest Tube 2 Posterior;Mediastinal 32 Fr. 1 day                      Lab, Imaging and other studies:   Results from last 7 days   Lab Units 11/17/24  0453 11/16/24  0404 11/15/24  1921 11/15/24  1321 11/15/24  1316   WBC Thousand/uL 15.16* 15.16*  --   --   --    HEMOGLOBIN g/dL 10.8* 12.7 14.0  --  13.5   I STAT HEMOGLOBIN   --   --   --    < >  --    HEMATOCRIT % 33.0* 38.4  41.1  --  39.7   HEMATOCRIT, ISTAT   --   --   --    < >  --    PLATELETS Thousands/uL 56* 75*  --   --  91*    < > = values in this interval not displayed.     Results from last 7 days   Lab Units 11/17/24  0453 11/16/24  1330 11/16/24  0404 11/15/24  1703 11/15/24  1321   POTASSIUM mmol/L 4.0 4.4 5.0   < >  --    CHLORIDE mmol/L 106 108 110*  --   --    CO2 mmol/L 25 24 24  --   --    CO2, I-STAT mmol/L  --   --   --   --  19*   BUN mg/dL 28* 24 23  --   --    CREATININE mg/dL 1.07 1.07 0.94  --   --    GLUCOSE, ISTAT mg/dl  --   --   --   --  137   CALCIUM mg/dL 7.7* 7.6* 8.0*  --   --     < > = values in this interval not displayed.         Recent Labs     11/16/24  0404   PHART 7.330*   ORB5DTW 21.5*   PO2ART 101.6   [id number] 41.7   BEART -4.2           Plan:    Wean milrinone as tolerated  Wean levo to off  Continue chest tubes, pacing wires.  OOBTC  Incentive spirometry.  Diuresis.  PO ASA/Statin/hold B blocker for now.  ICU  Monitor thrombocytopenia, consider MARTINA panel if worsening or not improving      SIGNATURE: Carlos Manuel Parker, DO  DATE: November 17, 2024  TIME: 9:04 AM

## 2024-11-17 NOTE — PLAN OF CARE
Problem: SAFETY,RESTRAINT: NV/NON-SELF DESTRUCTIVE BEHAVIOR  Goal: Remains free of harm/injury (restraint for non violent/non self-detsructive behavior)  Description: INTERVENTIONS:  - Instruct patient/family regarding restraint use   - Assess and monitor physiologic and psychological status   - Provide interventions and comfort measures to meet assessed patient needs   - Identify and implement measures to help patient regain control  - Assess readiness for release of restraint   Outcome: Completed  Goal: Returns to optimal restraint-free functioning  Description: INTERVENTIONS:  - Assess the patient's behavior and symptoms that indicate continued need for restraint  - Identify and implement measures to help patient regain control  - Assess readiness for release of restraint   Outcome: Completed     Problem: NEUROSENSORY - ADULT  Goal: Achieves stable or improved neurological status  Description: INTERVENTIONS  - Monitor and report changes in neurological status  - Monitor vital signs such as temperature, blood pressure, glucose, and any other labs ordered   - Initiate measures to prevent increased intracranial pressure  - Monitor for seizure activity and implement precautions if appropriate      Outcome: Progressing  Goal: Remains free of injury related to seizures activity  Description: INTERVENTIONS  - Maintain airway, patient safety  and administer oxygen as ordered  - Monitor patient for seizure activity, document and report duration and description of seizure to physician/advanced practitioner  - If seizure occurs,  ensure patient safety during seizure  - Reorient patient post seizure  - Seizure pads on all 4 side rails  - Instruct patient/family to notify RN of any seizure activity including if an aura is experienced  - Instruct patient/family to call for assistance with activity based on nursing assessment  - Administer anti-seizure medications if ordered    Outcome: Adequate for Discharge     Problem:  CARDIOVASCULAR - ADULT  Goal: Maintains optimal cardiac output and hemodynamic stability  Description: INTERVENTIONS:  - Monitor I/O, vital signs and rhythm  - Monitor for S/S and trends of decreased cardiac output  - Administer and titrate ordered vasoactive medications to optimize hemodynamic stability  - Assess quality of pulses, skin color and temperature  - Assess for signs of decreased coronary artery perfusion  - Instruct patient to report change in severity of symptoms  Outcome: Progressing  Goal: Absence of cardiac dysrhythmias or at baseline rhythm  Description: INTERVENTIONS:  - Continuous cardiac monitoring, vital signs, obtain 12 lead EKG if ordered  - Administer antiarrhythmic and heart rate control medications as ordered  - Monitor electrolytes and administer replacement therapy as ordered  Outcome: Not Progressing     Problem: RESPIRATORY - ADULT  Goal: Achieves optimal ventilation and oxygenation  Description: INTERVENTIONS:  - Assess for changes in respiratory status  - Assess for changes in mentation and behavior  - Position to facilitate oxygenation and minimize respiratory effort  - Oxygen administered by appropriate delivery if ordered  - Initiate smoking cessation education as indicated  - Encourage broncho-pulmonary hygiene including cough, deep breathe, Incentive Spirometry  - Assess the need for suctioning and aspirate as needed  - Assess and instruct to report SOB or any respiratory difficulty  - Respiratory Therapy support as indicated  Outcome: Progressing     Problem: GENITOURINARY - ADULT  Goal: Maintains or returns to baseline urinary function  Description: INTERVENTIONS:  - Assess urinary function  - Encourage oral fluids to ensure adequate hydration if ordered  - Administer IV fluids as ordered to ensure adequate hydration  - Administer ordered medications as needed  - Offer frequent toileting  - Follow urinary retention protocol if ordered  Outcome: Progressing  Goal: Urinary  catheter remains patent  Description: INTERVENTIONS:  - Assess patency of urinary catheter  - If patient has a chronic guo, consider changing catheter if non-functioning  - Follow guidelines for intermittent irrigation of non-functioning urinary catheter  Outcome: Progressing     Problem: METABOLIC, FLUID AND ELECTROLYTES - ADULT  Goal: Electrolytes maintained within normal limits  Description: INTERVENTIONS:  - Monitor labs and assess patient for signs and symptoms of electrolyte imbalances  - Administer electrolyte replacement as ordered  - Monitor response to electrolyte replacements, including repeat lab results as appropriate  - Instruct patient on fluid and nutrition as appropriate  Outcome: Progressing  Goal: Fluid balance maintained  Description: INTERVENTIONS:  - Monitor labs   - Monitor I/O and WT  - Instruct patient on fluid and nutrition as appropriate  - Assess for signs & symptoms of volume excess or deficit  Outcome: Progressing

## 2024-11-18 PROBLEM — I49.5 SICK SINUS SYNDROME (HCC): Status: ACTIVE | Noted: 2024-11-18

## 2024-11-18 LAB
ANION GAP SERPL CALCULATED.3IONS-SCNC: 7 MMOL/L (ref 4–13)
ATRIAL RATE: 64 BPM
ATRIAL RATE: 76 BPM
ATRIAL RATE: 80 BPM
BASE EX.OXY STD BLDV CALC-SCNC: 65.7 % (ref 60–80)
BASE EXCESS BLDV CALC-SCNC: 2.5 MMOL/L
BUN SERPL-MCNC: 25 MG/DL (ref 5–25)
CALCIUM SERPL-MCNC: 7.7 MG/DL (ref 8.4–10.2)
CHLORIDE SERPL-SCNC: 103 MMOL/L (ref 96–108)
CO2 SERPL-SCNC: 27 MMOL/L (ref 21–32)
CREAT SERPL-MCNC: 0.98 MG/DL (ref 0.6–1.3)
ERYTHROCYTE [DISTWIDTH] IN BLOOD BY AUTOMATED COUNT: 14.8 % (ref 11.6–15.1)
GFR SERPL CREATININE-BSD FRML MDRD: 59 ML/MIN/1.73SQ M
GLUCOSE SERPL-MCNC: 113 MG/DL (ref 65–140)
GLUCOSE SERPL-MCNC: 115 MG/DL (ref 65–140)
GLUCOSE SERPL-MCNC: 139 MG/DL (ref 65–140)
GLUCOSE SERPL-MCNC: 165 MG/DL (ref 65–140)
HCO3 BLDV-SCNC: 28.2 MMOL/L (ref 24–30)
HCT VFR BLD AUTO: 34.7 % (ref 34.8–46.1)
HGB BLD-MCNC: 11.5 G/DL (ref 11.5–15.4)
MAGNESIUM SERPL-MCNC: 2.1 MG/DL (ref 1.9–2.7)
MCH RBC QN AUTO: 30.2 PG (ref 26.8–34.3)
MCHC RBC AUTO-ENTMCNC: 33.1 G/DL (ref 31.4–37.4)
MCV RBC AUTO: 91 FL (ref 82–98)
NASAL CANNULA: 10
O2 CT BLDV-SCNC: 15.1 ML/DL
P AXIS: 174 DEGREES
P AXIS: 178 DEGREES
P AXIS: 54 DEGREES
PCO2 BLDV: 47 MM HG (ref 42–50)
PH BLDV: 7.4 [PH] (ref 7.3–7.4)
PLATELET # BLD AUTO: 55 THOUSANDS/UL (ref 149–390)
PMV BLD AUTO: 10.7 FL (ref 8.9–12.7)
PO2 BLDV: 34.4 MM HG (ref 35–45)
POTASSIUM SERPL-SCNC: 3.9 MMOL/L (ref 3.5–5.3)
PR INTERVAL: 194 MS
PR INTERVAL: 304 MS
PR INTERVAL: 344 MS
QRS AXIS: -40 DEGREES
QRS AXIS: 34 DEGREES
QRS AXIS: 38 DEGREES
QRSD INTERVAL: 132 MS
QRSD INTERVAL: 74 MS
QRSD INTERVAL: 76 MS
QT INTERVAL: 416 MS
QT INTERVAL: 446 MS
QT INTERVAL: 460 MS
QTC INTERVAL: 460 MS
QTC INTERVAL: 468 MS
QTC INTERVAL: 530 MS
RBC # BLD AUTO: 3.81 MILLION/UL (ref 3.81–5.12)
SODIUM SERPL-SCNC: 137 MMOL/L (ref 135–147)
T WAVE AXIS: -11 DEGREES
T WAVE AXIS: -18 DEGREES
T WAVE AXIS: 123 DEGREES
VENTRICULAR RATE: 64 BPM
VENTRICULAR RATE: 76 BPM
VENTRICULAR RATE: 80 BPM
WBC # BLD AUTO: 15.24 THOUSAND/UL (ref 4.31–10.16)

## 2024-11-18 PROCEDURE — 82805 BLOOD GASES W/O2 SATURATION: CPT | Performed by: PHYSICIAN ASSISTANT

## 2024-11-18 PROCEDURE — 94760 N-INVAS EAR/PLS OXIMETRY 1: CPT

## 2024-11-18 PROCEDURE — 99222 1ST HOSP IP/OBS MODERATE 55: CPT | Performed by: INTERNAL MEDICINE

## 2024-11-18 PROCEDURE — 83735 ASSAY OF MAGNESIUM: CPT | Performed by: PHYSICIAN ASSISTANT

## 2024-11-18 PROCEDURE — 97535 SELF CARE MNGMENT TRAINING: CPT

## 2024-11-18 PROCEDURE — 99024 POSTOP FOLLOW-UP VISIT: CPT | Performed by: THORACIC SURGERY (CARDIOTHORACIC VASCULAR SURGERY)

## 2024-11-18 PROCEDURE — 85027 COMPLETE CBC AUTOMATED: CPT | Performed by: PHYSICIAN ASSISTANT

## 2024-11-18 PROCEDURE — 93010 ELECTROCARDIOGRAM REPORT: CPT | Performed by: INTERNAL MEDICINE

## 2024-11-18 PROCEDURE — 94664 DEMO&/EVAL PT USE INHALER: CPT

## 2024-11-18 PROCEDURE — 82948 REAGENT STRIP/BLOOD GLUCOSE: CPT

## 2024-11-18 PROCEDURE — 80048 BASIC METABOLIC PNL TOTAL CA: CPT | Performed by: PHYSICIAN ASSISTANT

## 2024-11-18 PROCEDURE — 97530 THERAPEUTIC ACTIVITIES: CPT

## 2024-11-18 PROCEDURE — 99291 CRITICAL CARE FIRST HOUR: CPT | Performed by: ANESTHESIOLOGY

## 2024-11-18 PROCEDURE — 93005 ELECTROCARDIOGRAM TRACING: CPT

## 2024-11-18 RX ORDER — MAGNESIUM SULFATE HEPTAHYDRATE 40 MG/ML
2 INJECTION, SOLUTION INTRAVENOUS ONCE
Status: COMPLETED | OUTPATIENT
Start: 2024-11-18 | End: 2024-11-18

## 2024-11-18 RX ADMIN — FONDAPARINUX SODIUM 2.5 MG: 2.5 INJECTION SUBCUTANEOUS at 08:30

## 2024-11-18 RX ADMIN — ACETAMINOPHEN 975 MG: 325 TABLET, FILM COATED ORAL at 14:15

## 2024-11-18 RX ADMIN — CHLORHEXIDINE GLUCONATE 0.12% ORAL RINSE 15 ML: 1.2 LIQUID ORAL at 17:57

## 2024-11-18 RX ADMIN — POLYETHYLENE GLYCOL 3350 17 G: 17 POWDER, FOR SOLUTION ORAL at 08:30

## 2024-11-18 RX ADMIN — ACETAMINOPHEN 975 MG: 325 TABLET, FILM COATED ORAL at 08:31

## 2024-11-18 RX ADMIN — MILRINONE LACTATE IN DEXTROSE 0.25 MCG/KG/MIN: 200 INJECTION, SOLUTION INTRAVENOUS at 05:30

## 2024-11-18 RX ADMIN — ATORVASTATIN CALCIUM 40 MG: 40 TABLET, FILM COATED ORAL at 16:16

## 2024-11-18 RX ADMIN — PANTOPRAZOLE SODIUM 40 MG: 40 TABLET, DELAYED RELEASE ORAL at 05:43

## 2024-11-18 RX ADMIN — POTASSIUM CHLORIDE 20 MEQ: 1500 TABLET, EXTENDED RELEASE ORAL at 08:31

## 2024-11-18 RX ADMIN — CHLORHEXIDINE GLUCONATE 0.12% ORAL RINSE 15 ML: 1.2 LIQUID ORAL at 08:30

## 2024-11-18 RX ADMIN — FUROSEMIDE 40 MG: 10 INJECTION, SOLUTION INTRAMUSCULAR; INTRAVENOUS at 16:16

## 2024-11-18 RX ADMIN — POTASSIUM CHLORIDE 20 MEQ: 1500 TABLET, EXTENDED RELEASE ORAL at 17:54

## 2024-11-18 RX ADMIN — DOCUSATE SODIUM 100 MG: 100 CAPSULE, LIQUID FILLED ORAL at 17:54

## 2024-11-18 RX ADMIN — DOCUSATE SODIUM 100 MG: 100 CAPSULE, LIQUID FILLED ORAL at 08:31

## 2024-11-18 RX ADMIN — MAGNESIUM SULFATE HEPTAHYDRATE 2 G: 40 INJECTION, SOLUTION INTRAVENOUS at 08:30

## 2024-11-18 RX ADMIN — ASPIRIN 81 MG: 81 TABLET, COATED ORAL at 08:31

## 2024-11-18 RX ADMIN — HYDROXYZINE HYDROCHLORIDE 10 MG: 10 TABLET, FILM COATED ORAL at 08:31

## 2024-11-18 RX ADMIN — FUROSEMIDE 40 MG: 10 INJECTION, SOLUTION INTRAMUSCULAR; INTRAVENOUS at 08:31

## 2024-11-18 RX ADMIN — AMIODARONE HYDROCHLORIDE 200 MG: 200 TABLET ORAL at 14:15

## 2024-11-18 RX ADMIN — ALPRAZOLAM 0.5 MG: 0.5 TABLET ORAL at 22:36

## 2024-11-18 RX ADMIN — AMIODARONE HYDROCHLORIDE 200 MG: 200 TABLET ORAL at 05:43

## 2024-11-18 RX ADMIN — TRAZODONE HYDROCHLORIDE 200 MG: 100 TABLET ORAL at 22:36

## 2024-11-18 NOTE — ASSESSMENT & PLAN NOTE
She had severe much regurgitation identified as an outpatient. She was referred for surgery. Unfortunately, repair was not possible and a replacement was ultimately done. Bioprosthetic valve. Had left atrial appendage ligation during her surgery, also.    Management per the surgical service.

## 2024-11-18 NOTE — ASSESSMENT & PLAN NOTE
Sick sinus syndrome with junctional bradycardia at times + brief periods of Mobitz 1  No evidence of complete heart block/high grade AV block  Epicardial wires in place but not being utilized, back up pacing not needed recently  On atentolol 25 mg daily as an outpatient, held on admission  Received < 2 g of amiodarone in the post operative setting, now held

## 2024-11-18 NOTE — PLAN OF CARE
Problem: OCCUPATIONAL THERAPY ADULT  Goal: Performs self-care activities at highest level of function for planned discharge setting.  See evaluation for individualized goals.  Description: Treatment Interventions: ADL retraining, Functional transfer training, Endurance training, Patient/family training, Equipment evaluation/education, Compensatory technique education, Cardiac education, Energy conservation          See flowsheet documentation for full assessment, interventions and recommendations.   Outcome: Completed  Note: Limitation: Decreased ADL status, Decreased endurance, Decreased self-care trans, Decreased high-level ADLs  Prognosis: Good  Assessment: Pt seen for OT treatment session day 1 on this date focused on ADL retraining, functional transfers and mobility, energy conservation, functional endurance, recall of safety precautions, and patient education. Pt was greeted in chair and was cooperative throughout session. Following session, pt was left in chair with all needs within reach. Pt continues to be limited by functional status related to ADLs and transfers requiring MIN A for LB dressing and transfers/FM although pt is expected to progress well and s.o. will assist as needed at home. The patient's raw score on the -PAC Daily Activity Inpatient Short Form is 19. A raw score of greater than or equal to 19 suggests the patient may benefit from discharge to home. Please refer to the recommendation of the Occupational Therapist for safe discharge planning. Pt is expected to continue to progress and has adequate assist at home, indicating no further acute OT needs at this time, d/c acute OT.  Recommend d/c to home with increased social support, no further OT needs.     Rehab Resource Intensity Level, OT: No post-acute rehabilitation needs

## 2024-11-18 NOTE — ASSESSMENT & PLAN NOTE
Lab Results   Component Value Date    EGFR 59 11/18/2024    EGFR 53 11/17/2024    EGFR 53 11/16/2024    CREATININE 0.98 11/18/2024    CREATININE 1.07 11/17/2024    CREATININE 1.07 11/16/2024   Creatinine currently stable.

## 2024-11-18 NOTE — CONSULTS
Consultation - Cardiology   Carley Bonilla 69 y.o. female MRN: [id number]  [facility]: [id number]      Assessment & Plan  MVP (mitral valve prolapse)  She had severe much regurgitation identified as an outpatient. She was referred for surgery. Unfortunately, repair was not possible and a replacement was ultimately done. Bioprosthetic valve. Had left atrial appendage ligation during her surgery, also.    Management per the surgical service.  Benign essential hypertension  Was on atenolol prior to her surgery. Currently off of any beta-blocker. Not requiring any antihypertensive.  Hyperlipidemia  On pravastatin prior to surgery. No CAD identified on her preoperative cardiac catheterization.  Kidney disease, chronic, stage III (GFR 30-59 ml/min) ([facility], located within [facility])  Lab Results   Component Value Date    EGFR 59 11/18/2024    EGFR 53 11/17/2024    EGFR 53 11/16/2024    CREATININE 0.98 11/18/2024    CREATININE 1.07 11/17/2024    CREATININE 1.07 11/16/2024   Creatinine currently stable.  S/P left atrial appendage ligation  Done at the time of her surgery. No atrial fibrillation currently.  S/P mitral valve replacement with bioprosthetic valve  Required replacement as opposed to repair. Bioprosthetic valve performed.  Sick sinus syndrome (HCC)  Postoperative. Sinus pericardia. Beta-blocker is held. Amiodarone was given initially but this was discontinued today. Electrophysiology following.    History of Present Illness   Physician Requesting Consult: Parker Reddy MD  Reason for Consult / Principal Problem: s/p MVR  HPI: Carley Bonilla is a 69 y.o. year old female who presents electively for a mitral valve surgery.    The patient was seen by me in the office recently. She was following with a cardiologist elsewhere and then moved to the area. She has known mitral valve prolapse with mitral regurgitation which has been monitored clinically and with serial echoes. She has known this history for many years. She had  previously had a transesophageal echo.    When I saw her, she was reporting some symptoms of shortness of breath when walking up an incline. We did proceed with doing a transesophageal echo which confirmed severe mitral regurgitation and she was referred for mitral valve surgery.    Plan was for repair, but unfortunately there was residual mitral regurgitation when repair was initially done so despite repeated attempts, the valve ultimately needed to be replaced with a bioprosthetic valve.    Patient is doing well postoperatively. She is having some bradycardia and electrophysiology has been consulted. This currently appears to be sinus bradycardia. She was getting amiodarone but this has now been discontinued. She has not received any beta-blocker postoperatively.    She is currently on a low-dose of milrinone. She may need a pacemaker but no indication for 1 absolutely currently.    Her preoperative cardiac catheterization showed no significant CAD.    During the surgery, she also had a left atrial appendage clipping.      Inpatient consult to Cardiology  Consult performed by: Hal Tracey MD  Consult ordered by: Paola Cruz PA-C          Review of Systems:  Bradycardia.  Chest pain improving.  Otherwise, 12 point ROS negative.    Historical Information   Past Medical History:   Diagnosis Date    Anxiety     Cardiac disease     mitral valve regurgitation    Mitral valve disorder      Past Surgical History:   Procedure Laterality Date    ANKLE FRACTURE SURGERY      CARDIAC CATHETERIZATION Left 10/30/2024    Procedure: Cardiac Left Heart Cath;  Surgeon: René England DO;  Location: [facility];  Service: Cardiology    ORIF WRIST FRACTURE Right 11/21/2016    Procedure: DISTAL RADIUS O/R I/F;  Surgeon: Chuckie Nova MD;  Location: [facility] MAIN OR;  Service:     NV REPLACEMENT MITRAL VALVE W/CARDIOPULMONARY BYP N/A 11/15/2024    Procedure: MITRAL VALVE REPAIR WITH BETH 4D ANNULOPLASTYN RING, CONVERTED  TO MITRAL VALVE REPLACEMENT WITH MITRIS RESILIA 29 MM.  LEFT ATRIAL APPENDAGE CLIPPING,KWAME;  Surgeon: Parker Reddy MD;  Location: [facility];  Service: Cardiac Surgery    WRIST SURGERY       Social History     Substance and Sexual Activity   Alcohol Use Yes    Comment: occ     Social History     Substance and Sexual Activity   Drug Use No     Social History     Tobacco Use   Smoking Status Never   Smokeless Tobacco Never     Family History: Family history non-contributory    Meds/Allergies     Current Facility-Administered Medications:     acetaminophen (TYLENOL) rectal suppository 650 mg, 650 mg, Rectal, Q4H PRN, Cedric David PA-C    acetaminophen (TYLENOL) tablet 975 mg, 975 mg, Oral, Q6H While awake, Cedric David PA-C, 975 mg at 11/18/24 1415    ALPRAZolam (XANAX) tablet 0.5 mg, 0.5 mg, Oral, HS PRN, Cedric David PA-C, 0.5 mg at 11/17/24 2106    aspirin (ECOTRIN LOW STRENGTH) EC tablet 81 mg, 81 mg, Oral, Daily, Sheeba Salomon PA-C, 81 mg at 11/18/24 0831    atorvastatin (LIPITOR) tablet 40 mg, 40 mg, Oral, Daily With Dinner, Sheeba Salomon PA-C, 40 mg at 11/17/24 1543    bisacodyl (DULCOLAX) rectal suppository 10 mg, 10 mg, Rectal, Daily PRN, Cedric David PA-C    chlorhexidine (PERIDEX) 0.12 % oral rinse 15 mL, 15 mL, Mouth/Throat, BID, Cedric David PA-C, 15 mL at 11/18/24 0830    docusate sodium (COLACE) capsule 100 mg, 100 mg, Oral, BID, Sheeba Salomon PA-C, 100 mg at 11/18/24 0831    fondaparinux (ARIXTRA) subcutaneous injection 2.5 mg, 2.5 mg, Subcutaneous, Q24H, Paola Cruz PA-C, 2.5 mg at 11/18/24 0830    furosemide (LASIX) injection 40 mg, 40 mg, Intravenous, BID (diuretic), Sheeba Salomon PA-C, 40 mg at 11/18/24 0831    guaiFENesin (MUCINEX) 12 hr tablet 600 mg, 600 mg, Oral, BID PRN, Kristin Doe PA-C, 600 mg at 11/17/24 2106    hydrOXYzine HCL (ATARAX) tablet 10 mg, 10 mg, Oral, Daily, Cedric David PA-C, 10 mg at 11/18/24 0831    insulin lispro (HumALOG/ADMELOG) 100  units/mL subcutaneous injection 1-5 Units, 1-5 Units, Subcutaneous, TID AC, 1 Units at 11/17/24 1121 **AND** Fingerstick Glucose (POCT), , , TID AC, Sheeba Salomon PA-C    insulin lispro (HumALOG/ADMELOG) 100 units/mL subcutaneous injection 1-5 Units, 1-5 Units, Subcutaneous, HS, Sheeba Salomon PA-C, 1 Units at 11/16/24 2311    milrinone (PRIMACOR) 20 mg in 100 mL infusion (premix), 0.13 mcg/kg/min, Intravenous, Continuous, Paola Cruz PA-C, Last Rate: 2.6 mL/hr at 11/18/24 0800, 0.13 mcg/kg/min at 11/18/24 0800    ondansetron (ZOFRAN) injection 4 mg, 4 mg, Intravenous, Q6H PRN, Cedric David PA-C, 4 mg at 11/16/24 1638    oxyCODONE (ROXICODONE) split tablet 2.5 mg, 2.5 mg, Oral, Q4H PRN **OR** oxyCODONE (ROXICODONE) IR tablet 5 mg, 5 mg, Oral, Q4H PRN, Cedric David PA-C, 5 mg at 11/17/24 2106    pantoprazole (PROTONIX) EC tablet 40 mg, 40 mg, Oral, Daily, Cedric David PA-C, 40 mg at 11/18/24 0543    polyethylene glycol (MIRALAX) packet 17 g, 17 g, Oral, Daily, Cedric David PA-C, 17 g at 11/18/24 0830    potassium chloride (Klor-Con M20) CR tablet 20 mEq, 20 mEq, Oral, BID, Sheeba Salomon PA-C, 20 mEq at 11/18/24 0831    traZODone (DESYREL) tablet 200 mg, 200 mg, Oral, HS, Sheeba Salomon PA-C, 200 mg at 11/17/24 2106  Allergies   Allergen Reactions    Morphine And Codeine Other (See Comments)     hallucinations      Bee Venom Hives    Codeine Other (See Comments)     Hallucinations    Penicillins Hives    Sulfa Antibiotics Other (See Comments)     Yeast infections       Objective   Vitals: Blood pressure 105/54, pulse (!) 54, temperature 97.6 °F (36.4 °C), temperature source Oral, resp. rate (!) 26, height 5' (1.524 m), weight 73.1 kg (161 lb 2.5 oz), SpO2 93%., Body mass index is 31.47 kg/m².,   Orthostatic Blood Pressures      Flowsheet Row Most Recent Value   Blood Pressure 105/54 filed at 11/18/2024 1000   Patient Position - Orthostatic VS Lying filed at 11/18/2024 0400              Intake/Output  Summary (Last 24 hours) at 11/18/2024 1530  Last data filed at 11/18/2024 1530  Gross per 24 hour   Intake 572.67 ml   Output 2660 ml   Net -2087.33 ml       Invasive Devices       Central Venous Catheter Line  Duration             CVC Central Lines 11/15/24 Triple Right Internal jugular 3 days              Peripheral Intravenous Line  Duration             Peripheral IV 11/15/24 Right Antecubital 3 days              Line  Duration             Pacer Wires 3 days    Pacer Wires 3 days              Drain  Duration             Chest Tube 1 Anterior;Mediastinal 32 Fr. 3 days    Chest Tube 2 Posterior;Mediastinal 32 Fr. 3 days                  Physical Exam:  GEN: Carley Bonilla MONICA sitting in recliner.  HEENT: pupils equal, round, and reactive to light; extraocular muscles intact  NECK: supple, no carotid bruits   HEART: bradycardic, regular.  LUNGS: clear to auscultation bilaterally; no wheezes, rales, or rhonchi   ABDOMEN: normal bowel sounds, soft, no tenderness, no distention  EXTREMITIES: peripheral pulses normal; no clubbing, cyanosis, or edema  NEURO: no focal findings   SKIN: normal without suspicious lesions on exposed skin    Lab Results:         Results from last 7 days   Lab Units 11/18/24  0432 11/17/24  0453 11/16/24  0404   WBC Thousand/uL 15.24* 15.16* 15.16*   HEMOGLOBIN g/dL 11.5 10.8* 12.7   HEMATOCRIT % 34.7* 33.0* 38.4   PLATELETS Thousands/uL 55* 56* 75*         Results from last 7 days   Lab Units 11/18/24  0432 11/17/24  0453 11/16/24  1330 11/15/24  1703 11/15/24  1321 11/15/24  1316 11/15/24  1230 11/15/24  1156   POTASSIUM mmol/L 3.9 4.0 4.4   < >  --    < >  --   --    CHLORIDE mmol/L 103 106 108   < >  --    < >  --   --    CO2 mmol/L 27 25 24   < >  --    < >  --   --    CO2, I-STAT mmol/L  --   --   --   --  19*  --  24 26   BUN mg/dL 25 28* 24   < >  --    < >  --   --    CREATININE mg/dL 0.98 1.07 1.07   < >  --    < >  --   --    CALCIUM mg/dL 7.7* 7.7* 7.6*   < >  --    < >  --   --     GLUCOSE, ISTAT mg/dl  --   --   --   --  137  --  148* 153*    < > = values in this interval not displayed.         Results from last 7 days   Lab Units 11/18/24  0432 11/17/24  0453 11/16/24  0404   MAGNESIUM mg/dL 2.1 2.1 2.5       Imaging: Results Review Statement: I personally reviewed the following image studies in PACS and associated radiology reports: Echocardiogram. My interpretation of the radiology images/reports is: KWAME reviewed. Had persistent MR after ring, now s/p replacement..  KWAME intraop interventional w/realtime guidance of cardiac procedures  Result Date: 11/18/2024  Narrative: This order contains the linked images for the KWAME that was performed by the Anesthesiologist.  Please see the  CARDIAC KWAME ANESTHESIA procedure for results.    XR chest portable  Result Date: 11/16/2024  Narrative: XR CHEST PORTABLE INDICATION: MVR 11/15/2024. COMPARISON: CXR 11/15/2024, chest CT 10/23/2024. FINDINGS: Right jugular catheter in right atrium. Right jugular pulmonary artery catheter in main pulmonary artery. Mild pulmonary venous congestion. Left greater than right bibasilar atelectasis. No pneumothorax or pleural effusion. Mild cardiomegaly. MVR, sternal wires well aligned. Left atrial appendage ligation. Two mediastinal drains. Temporary epicardial pacemaker wires. Bones are unremarkable for age. Normal upper abdomen.     Impression: Status post MVR with mild pulmonary venous congestion and bibasilar atelectasis. Workstation performed: [id number]     US bedside procedure  Result Date: 11/15/2024  Narrative: [id number]    XR chest portable ICU  Result Date: 11/15/2024  Narrative: XR CHEST PORTABLE ICU INDICATION: S/P open heart. COMPARISON: Compared to 10/5/2022 FINDINGS: Endotracheal tube just above the paula. Feeding tube looped in the stomach. Norman-Ciara catheter in the left main pulmonary artery. Pericardial tube in place. Prosthetic aortic valve. Poor inspiration. Mild  prominent markings. No pneumothorax or pleural effusion. Normal cardiomediastinal silhouette. Bones are unremarkable for age. Normal upper abdomen.     Impression: No acute cardiopulmonary disease. Workstation performed: [id number]     KWAME Anesthesia  Result Date: 11/15/2024  Narrative: Karli Ibarra MD     11/15/2024 12:31 PM Procedure Performed: KWAME Anesthesia Start Time:  11/15/2024 7:59 AM Preanesthesia Checklist Patient identified, IV assessed, risks and benefits discussed, monitors and equipment assessed, procedure being performed at surgeon's request and anesthesia consent obtained. Procedure Diagnostic Indications for KWAME:  assessment of ascending aorta, assessment of surgical repair and hemodynamic monitoring. Type of KWAME: complete KWAME with interpretation. Images Saved: ultrasound permanent image saved. Physician Requesting Echo: Parker Reddy MD.  Location performed: OR. Intubated.  Heart visualized. Insertion of KWAME Probe:  Easy. Probe Type:  Epiaortic and multiplane. Modalities:  Color flow mapping, 3D, pulse wave Doppler and continuous wave Doppler. Echocardiographic and Doppler Measurements PREPROCEDURE LEFT VENTRICLE: Systolic Function: normal. Ejection Fraction: 50-55%. Cavity size: normal.   Regional Wall Motion Abnormalities: none. RIGHT VENTRICLE: Systolic Function: normal.  Cavity size normal. No hypertrophy  AORTIC VALVE: Leaflets: normal and trileaflet. Leaflet motions normal and normal. Stenosis: none.     Regurgitation: none.  MITRAL VALVE: Leaflets: myxomatous and thickened. Leaflet Motions: prolapse and Bileaflet prolapse. Worse in A3, P2 and P3. Regurgitation: severe and Centrally directed.   Stenosis: none.   TRICUSPID VALVE: Leaflets: normal. Leaflet Motions: normal. Stenosis: none. Regurgitation: mild. PULMONIC VALVE: Leaflets: normal. Regurgitation: none. Stenosis: none. ASCENDING AORTA: Size:  normal.  Dissection not present.  AORTIC ARCH: Size:  normal.  dissection not present.  Grade 2: severe intimal thickening without protruding atheroma. DESCENDING AORTA: Size: normal.  Dissection not present. Grade 3: atheroma protruding < 0.5 cm into lumen. RIGHT ATRIUM: Size:  dilated. No spontaneous echo contrast. LEFT ATRIUM: Size: dilated. No spontaneous echo contrast. LEFT ATRIAL APPENDAGE: Size: dilated. No spontaneous echo contrast ATRIAL SEPTUM: Intra-atrial septal morphology: normal.  VENTRICULAR SEPTUM: Intra-ventricular septum morphology: normal. EPIAORTIC: Plaque Thickness: 0-5 mm. OTHER FINDINGS: Pericardium:  pericardial effusion and Trace. Pleural Effusion:  none. POSTPROCEDURE LEFT VENTRICLE: Unchanged .    RIGHT VENTRICLE: Unchanged .  AORTIC VALVE: Unchanged .      MITRAL VALVE: Leaflets: bioprosthetic. Regurgitation: none. Stenosis: none. Mean Gradient: 2.Valve/Ring Size: 29 mm. TRICUSPID VALVE: Unchanged .   PULMONIC VALVE: Unchanged   ATRIA: Left Atrial Appendage Ligate: Yes. Residual Flow in Left Atrial Appendage by Color Flow Doppler: No.  AORTA: Unchanged . REMOVAL: Probe Removal: atraumatic.      Cardiac catheterization  Result Date: 10/30/2024  Narrative:   Normal apearing epicardial vessels angiographically.     CT chest wo contrast  Result Date: 10/29/2024  Narrative: CT CHEST WITHOUT IV CONTRAST INDICATION: I34.0: Nonrheumatic mitral (valve) insufficiency I34.1: Nonrheumatic mitral (valve) prolapse. COMPARISON: Chest radiograph 10/5/2022 TECHNIQUE: CT examination of the chest was performed without intravenous contrast. Multiplanar 2D reformatted images were created from the source data. This examination, like all CT scans performed in the [facility], was performed utilizing techniques to minimize radiation dose exposure, including the use of iterative reconstruction and automated exposure control. Radiation dose length product (DLP) for this visit: 175 mGy-cm FINDINGS: LUNGS: Study somewhat limited by respiratory motion artifact. Scattered tiny benign  calcified granulomata. A 0.6 cm right lower lobe nodule (series 3 image 153). Additional 0.4 pleural-based nodule (series 3 image 180). No tracheal or endobronchial lesion. PLEURA: Unremarkable. HEART/GREAT VESSELS: Cardiomegaly. Mild coronary artery calcifications. No thoracic aortic aneurysm. MEDIASTINUM AND ZACHARY: Small hiatal hernia noted. No mediastinal or hilar lymphadenopathy. CHEST WALL AND LOWER NECK: Unremarkable. VISUALIZED STRUCTURES IN THE UPPER ABDOMEN: Unremarkable. OSSEOUS STRUCTURES: No acute fracture or destructive osseous lesion.     Impression: 1. No acute abnormality in the chest. 2. Right lower lobe 6 mm and 4 mm nodules. Based on current Fleischner Society 2017 Guidelines on incidental pulmonary nodule, follow-up non-contrast CT is recommended at 6-12 months from the initial examination and, if stable at that time, an additional  follow-up is recommended for 18-24 months from the initial examination. The study was marked in EPIC for significant notification and follow-up. Resident: DON Santoyo I, the attending radiologist, have reviewed the images and agree with the final report above. Workstation performed: [id number]     VAS carotid complete study  Result Date: 10/22/2024  Narrative:  [facility] REPORT CLINICAL: Indications: Patient presents for surgical clearance and general health evaluation secondary to future open heart surgery.  Patient is asymptomatic at this time. Operative History: No prior cardiovascular surgeries Risk Factors The patient has history of HTN, Hyperlipidemia, and CKD. Clinical Right Pressure:  138/78 mm Hg, Left Pressure:  Refused.  FINDINGS:  Right        Impression  PSV  EDV (cm/s)  Direction of Flow  Ratio  Dist. ICA                 63          23                      0.86  Mid. ICA                  95          35                      1.31  Prox. ICA    Normal       79          29                      1.10  Dist CCA                  75          18                             Mid CCA                   72          18                      0.99  Prox CCA                  73          14                            Ext Carotid               81          11                      1.11  Prox Vert                 63          16  Antegrade                 Subclavian               137           0                             Left         Impression  PSV  EDV (cm/s)  Direction of Flow  Ratio  Dist. ICA                 69          25                      0.81  Mid. ICA                 114          30                      1.34  Prox. ICA    1 - 49%      78          24                      0.91  Dist CCA                  79          22                            Mid CCA                   86          18                      0.93  Prox CCA                  92          22                            Ext Carotid               77          13                      0.90  Prox Vert                 67          19  Antegrade                 Subclavian               178           0                               CONCLUSION:  Impression  RIGHT: There is no evidence of arterial disease throughout the extracranial carotid system. Vertebral artery flow is antegrade. There is no significant subclavian artery disease.  LEFT: There is <50% stenosis noted in the internal carotid artery. Plaque is heterogenous and irregular. Vertebral artery flow is antegrade. There is no significant subclavian artery disease.  There is no prior study for comparison.  SIGNATURE: Electronically Signed by: KISHOR HUGHES MD [facility] on 2024-10-22 03:29:05 PM      EKG: Sinus rhythm, first degree AV block. Wenckebach.    Telemetry: SB. Previously was junctional.

## 2024-11-18 NOTE — CONSULTS
Consultation - Electrophysiology-Cardiology (EP)   Carley Bonilla 69 y.o. female MRN: [id number]  Unit/Bed#: [facility]-311-01 Encounter: [id number]      Inpatient consult to Electrophysiology  Consult performed by: Tamara Betts PA-C  Consult ordered by: Joselo Kunz PA-C        Assessment & Plan     Assessment:  Assessment & Plan  Sick sinus syndrome (HCC)  Sick sinus syndrome with junctional bradycardia at times + brief periods of Mobitz 1  No evidence of complete heart block/high grade AV block  Epicardial wires in place but not being utilized, back up pacing not needed recently  On atentolol 25 mg daily as an outpatient, held on admission  Received < 2 g of amiodarone in the post operative setting, now held  MVP (mitral valve prolapse)  Mitral valve prolapse with severe mitral regurgitation, status post tissue MV replacement + left atrial appendage ligation 11/15/2024  Low CI in the post operative setting requiring pressor support - currently being weaned off of milrinone  Preserved LV systolic function with LVEF 65% per echo 10/2024  Benign essential hypertension  Hypotensive in the post operative setting, currently on low dose milrinone  Hyperlipidemia  Maintained on statin therapy  Kidney disease, chronic, stage III (GFR 30-59 ml/min) ([facility])  Lab Results   Component Value Date    EGFR 59 11/18/2024    EGFR 53 11/17/2024    EGFR 53 11/16/2024    CREATININE 0.98 11/18/2024    CREATININE 1.07 11/17/2024    CREATININE 1.07 11/16/2024   Creatinine appears to be at baseline  S/P left atrial appendage ligation  Performed at time of MVR  S/P mitral valve replacement with bioprosthetic valve  See 'MVP (mitral valve prolapse)' for details        Plan:  Review of EKGs shows improvement in overall rhythm since her MV surgery on 11/15.  She initially required pacing with underlying junctional bradycardia, however she no longer is requiring pacing and her epicardial pacing leads are currently unplugged from the temp  pacer.  One EKG shows a brief period of Mobitz type I with subsequent junctional rhythm, however no evidence of complete heart block or high-grade AV block is noted. Telemetry shows similar findings, atrial ectopy is noted without AV block.     As we are typically expecting to see complete heart block in the post MVR setting, we are hopeful that as she continues to recover her sinus node function will continue to improve.    There is no urgent indication for a pacemaker at this time.  Okay to pull epicardial wires if primary team prefers.  Will not make n.p.o. for tomorrow, instead we will see and reevaluate.  If she continues to have significant bradycardia can then consider pacemaker implantation later this admission.    Continue to hold amiodarone and beta-blocker at this time.  Continue to monitor telemetry.      History of Present Illness   Physician Requesting Consult: Parker Reddy MD  Reason for Consult / Principal Problem: Bradycardia status post MVR    HPI: Carley Bonilla is a 69 y.o. year old female with mitral valve prolapse with severe mitral regurgitation status post tissue MV replacement 11/15/2024, preserved LV systolic function with LVEF 65% per echo 10/2024, hypertension, hyperlipidemia, CKD, and panic disorder.  She typically follows with Dr. Tracey as an outpatient.  She is a longtime history of mitral valve prolapse, which was under serial surveillance.  A prior echo 2/2022 showed moderate mitral regurgitation, however updated imaging after she reestablish care with cardiology 10/2024 showed severe regurgitation with bileaflet prolapse.  He was seen in consultation by CT surgery given these changes, and mitral valve repair was recommended.  She presented this admission for elective procedure, and ultimately underwent tissue mitral valve replacement and left atrial appendage ligation on 11/15/2024.  She was found to have a low cardiac index in the postoperative setting, requiring pressor support.   She was also noted to require backup pacing at times.  She has been largely weaned off pressors, currently only on low-dose milrinone.  She has not required pacing lately, however was noted to have occasional bradycardia and concern for junctional bradycardia, thus EP was asked to see her in consultation regarding these arrhythmias.  She received 1.6 g of amiodarone in the postoperative setting, however this is now been discontinued.  She was maintained on atenolol 25 mg daily as an outpatient, which has also been held.  She is currently sitting comfortably, denies significant complaints.  While she still has epicardial wires in place, they are currently not connected to the temporary pacemaker.      Review of Systems  ROS as noted above, otherwise 12 point review of systems was performed and is negative.       Historical Information   Past Medical History:   Diagnosis Date    Anxiety     Cardiac disease     mitral valve regurgitation    Mitral valve disorder      Past Surgical History:   Procedure Laterality Date    ANKLE FRACTURE SURGERY      CARDIAC CATHETERIZATION Left 10/30/2024    Procedure: Cardiac Left Heart Cath;  Surgeon: René England DO;  Location: [facility];  Service: Cardiology    ORIF WRIST FRACTURE Right 11/21/2016    Procedure: DISTAL RADIUS O/R I/F;  Surgeon: Chuckie Nova MD;  Location: [facility] MAIN OR;  Service:     WRIST SURGERY       Social History     Substance and Sexual Activity   Alcohol Use Yes    Comment: occ     Social History     Substance and Sexual Activity   Drug Use No     Social History     Tobacco Use   Smoking Status Never   Smokeless Tobacco Never     Family History:   Family History   Problem Relation Age of Onset    No Known Problems Mother     No Known Problems Daughter     No Known Problems Maternal Grandmother     No Known Problems Paternal Grandmother     No Known Problems Maternal Aunt     No Known Problems Paternal Aunt     Breast cancer Neg Hx         Meds/Allergies   Hospital Medications:   Current Facility-Administered Medications:     acetaminophen (TYLENOL) rectal suppository 650 mg, Q4H PRN    acetaminophen (TYLENOL) tablet 975 mg, Q6H While awake    ALPRAZolam (XANAX) tablet 0.5 mg, HS PRN    amiodarone tablet 200 mg, Q8H AMANUEL    aspirin (ECOTRIN LOW STRENGTH) EC tablet 81 mg, Daily    atorvastatin (LIPITOR) tablet 40 mg, Daily With Dinner    bisacodyl (DULCOLAX) rectal suppository 10 mg, Daily PRN    chlorhexidine (PERIDEX) 0.12 % oral rinse 15 mL, BID    docusate sodium (COLACE) capsule 100 mg, BID    fondaparinux (ARIXTRA) subcutaneous injection 2.5 mg, Q24H    furosemide (LASIX) injection 40 mg, BID (diuretic)    guaiFENesin (MUCINEX) 12 hr tablet 600 mg, BID PRN    hydrOXYzine HCL (ATARAX) tablet 10 mg, Daily    insulin lispro (HumALOG/ADMELOG) 100 units/mL subcutaneous injection 1-5 Units, TID AC **AND** Fingerstick Glucose (POCT), TID AC    insulin lispro (HumALOG/ADMELOG) 100 units/mL subcutaneous injection 1-5 Units, HS    milrinone (PRIMACOR) 20 mg in 100 mL infusion (premix), Continuous, Last Rate: 0.13 mcg/kg/min (11/18/24 0800)    ondansetron (ZOFRAN) injection 4 mg, Q6H PRN    oxyCODONE (ROXICODONE) split tablet 2.5 mg, Q4H PRN **OR** oxyCODONE (ROXICODONE) IR tablet 5 mg, Q4H PRN    pantoprazole (PROTONIX) EC tablet 40 mg, Daily    polyethylene glycol (MIRALAX) packet 17 g, Daily    potassium chloride (Klor-Con M20) CR tablet 20 mEq, BID    traZODone (DESYREL) tablet 200 mg, HS  Home Medications:   Medications Prior to Admission:     ALPRAZolam ER (XANAX XR) 1 MG 24 hr tablet    atenolol (TENORMIN) 25 mg tablet    Calcium-Vitamin D-Vitamin K (VIACTIV PO)    cyanocobalamin (VITAMIN B-12) 1,000 mcg tablet    hydrOXYzine HCL (ATARAX) 25 mg tablet    pravastatin (PRAVACHOL) 10 mg tablet    ramelteon (ROZEREM) 8 mg tablet    traZODone (DESYREL) 100 mg tablet    EPINEPHrine (EpiPen 2-Miguel) 0.3 mg/0.3 mL SOAJ    ibandronate (BONIVA) 150 MG  tablet    mupirocin (BACTROBAN) 2 % ointment    Propylene Glycol (Systane Balance) 0.6 % SOLN    Allergies   Allergen Reactions    Morphine And Codeine Other (See Comments)     hallucinations      Bee Venom Hives    Codeine Other (See Comments)     Hallucinations    Penicillins Hives    Sulfa Antibiotics Other (See Comments)     Yeast infections       Objective   Vitals: Blood pressure 105/54, pulse (!) 54, temperature 97.6 °F (36.4 °C), temperature source Oral, resp. rate (!) 26, height 5' (1.524 m), weight 73.1 kg (161 lb 2.5 oz), SpO2 93%.  Orthostatic Blood Pressures      Flowsheet Row Most Recent Value   Blood Pressure 105/54 filed at 11/18/2024 1000   Patient Position - Orthostatic VS Lying filed at 11/18/2024 0400              Intake/Output Summary (Last 24 hours) at 11/18/2024 1123  Last data filed at 11/18/2024 1000  Gross per 24 hour   Intake 706.58 ml   Output 2815 ml   Net -2108.42 ml       Invasive Devices       Central Venous Catheter Line  Duration             CVC Central Lines 11/15/24 Triple Right Internal jugular 3 days              Peripheral Intravenous Line  Duration             Peripheral IV 11/15/24 Right Antecubital 3 days              Line  Duration             Pacer Wires 3 days    Pacer Wires 3 days              Drain  Duration             Chest Tube 1 Anterior;Mediastinal 32 Fr. 2 days    Chest Tube 2 Posterior;Mediastinal 32 Fr. 2 days                    Physical Exam  GEN: NAD, alert and oriented x 3, well appearing  SKIN: dry without significant lesions or rashes  HEENT: NCAT, PERRL, EOMs intact  NECK: No JVD appreciated  CARDIOVASCULAR: RRR, normal S1, S2 without murmurs, rubs, or gallops appreciated  LUNGS: Clear to auscultation bilaterally without wheezes, rhonchi, or rales  ABDOMEN: Soft, nontender, nondistended  EXTREMITIES/VASCULAR: perfused without clubbing, cyanosis, or LE edema b/l  PSYCH: Normal mood and affect  NEURO: CN ll-Xll grossly intact      Lab Results: I have  personally reviewed pertinent lab results.    Results from last 7 days   Lab Units 24  0432 24  0453 24  0404   WBC Thousand/uL 15.24* 15.16* 15.16*   HEMOGLOBIN g/dL 11.5 10.8* 12.7   HEMATOCRIT % 34.7* 33.0* 38.4   PLATELETS Thousands/uL 55* 56* 75*     Results from last 7 days   Lab Units 24  0432 24  0453 24  1330 11/15/24  1703 11/15/24  1321   POTASSIUM mmol/L 3.9 4.0 4.4   < >  --    CHLORIDE mmol/L 103 106 108   < >  --    CO2 mmol/L 27 25 24   < >  --    CO2, I-STAT mmol/L  --   --   --   --  19*   BUN mg/dL 25 28* 24   < >  --    CREATININE mg/dL 0.98 1.07 1.07   < >  --    GLUCOSE, ISTAT mg/dl  --   --   --   --  137   CALCIUM mg/dL 7.7* 7.7* 7.6*   < >  --     < > = values in this interval not displayed.         Results from last 7 days   Lab Units 24  0432 24  0453 24  0404   MAGNESIUM mg/dL 2.1 2.1 2.5       Imaging: Results Review Statement: No pertinent imaging studies reviewed.    ECHO: Results for orders placed during the hospital encounter of 17    Echo complete with contrast if indicated    [facility]  [address] [place] [address] [phone]    Transthoracic Echocardiogram  2D, M-mode, Doppler, and Color Doppler    Study date:  2017    Patient: BRENNA PUGH  MR number: [id number]  Account number: [id number]  : 1955  Age: 61 years  Gender: Female  Status: Outpatient  Location: [facility]  Height: 60 in  Weight: 101 lb  BP: 127/ 71 mmHg    Indications: MV disorder.    Diagnoses: I34.9 - Nonrheumatic mitral valve disorder, unspecified    Sonographer:  Josesito Paniagua, MED  Primary Physician:  Parker Zuluaga MD  Referring Physician:  Patricio Choi MD  Group:  [facility]  Interpreting Physician:  Patricio Choi MD    SUMMARY    LEFT VENTRICLE:  There were no regional wall motion abnormalities.  Doppler parameters were consistent with high  ventricular filling pressure.    LEFT ATRIUM:  The atrium was moderately dilated.    MITRAL VALVE:  redundant valve with marked prolapse of the posterior leaflet and severe  regurgitation    TRICUSPID VALVE:  There was trace regurgitation.    SUMMARY MEASUREMENTS  Unspecified Scan Mode measurements:  Aortic Valve:   HR was 68 /min.  Left Ventricle:   HR was 73 /min.    COMPARISONS:  left ventricular sized has increased slightly since 2013    HISTORY: PRIOR HISTORY: Risk factors: hypertension and a family history of  coronary artery disease.    PROCEDURE: The study was performed in the [facility]. This  was a routine study. The transthoracic approach was used. The study included  complete 2D imaging, M-mode, complete spectral Doppler, and color Doppler.  Image quality was adequate.    LEFT VENTRICLE: Size was normal. Systolic function was by visual assessment.  Ejection fraction was estimated to be 65 %. There were no regional wall motion  abnormalities. Wall thickness was normal. DOPPLER: Doppler parameters were  consistent with high ventricular filling pressure.    RIGHT VENTRICLE: The size was normal. Systolic function was normal. Wall  thickness was normal.    LEFT ATRIUM: The atrium was moderately dilated.    RIGHT ATRIUM: Size was normal.    MITRAL VALVE: redundant valve with marked prolapse of the posterior leaflet and  severe regurgitation    AORTIC VALVE: The valve was trileaflet. Leaflets exhibited normal thickness and  normal cuspal separation. DOPPLER: Transaortic velocity was within the normal  range. There was no evidence for stenosis. There was no regurgitation.    TRICUSPID VALVE: DOPPLER: There was trace regurgitation.    PERICARDIUM: There was no pericardial effusion. The pericardium was normal in  appearance.    AORTA: The root exhibited normal size.    PULMONARY ARTERY: DOPPLER: Systolic pressure was within the normal range.    SYSTEM MEASUREMENT TABLES    Apical four  chamber  4 chamber Left Atrium Volume Index; Planimetry; End Systole; Apical four  chamber;: 24 cm2 Left Ventricular End Diastolic Volume; Method of Disks, Single  Plane; Apical four chamber;: 77.1 ml Left Ventricular End Systolic Volume;  Method of Disks, Single Plane; Apical four chamber;: 21.3 ml Right Atrium  Systolic Area; Planimetry; End Systole; Apical four chamber;: 11.52 cm2 Right  Ventricular Internal Diastolic Dimension; End Diastole; Apical four chamber;:  33.6 mm  TAPSE: 27.4 mm    Unspecified Scan Mode  Aortic Root Diameter; End Systole;: 24.2 mm  Gradient Pressure, Peak; Simplified Bernoulli; Antegrade Flow; Systole;: 10.5  mm[Hg] Gradient pressure, average; Simplified Bernoulli; Antegrade Flow;  Systole;: 5.5 mm[Hg]  HR: 68 /min  Left atrial diameter; End Diastole;: 41.7 mm  Cardiac Output; Teichholz; Systole;: 6.76 L/min  HR: 73 /min  Interventricular Septum Diastolic Thickness; Teichholz; End Diastole;: 8.7 mm  Left Ventricle Internal End Diastolic Dimension; Teichholz;: 48.2 mm  Left Ventricle Internal Systolic Dimension; Teichholz; End Systole;: 24.3 mm  Left Ventricle Mass; Mass AVCube with Teichholz; End Diastole;: 137 g  Left Ventricle Posterior Wall Diastolic Thickness; Teichholz; End Diastole;:  8.2 mm  Left Ventricular Ejection Fraction; Teichholz;: 80.8 %  Left Ventricular End Diastolic Volume; Teichholz;: 108.6 ml  Left Ventricular End Systolic Volume; Teichholz;: 20.8 ml  Stroke volume; Teichholz; Systole;: 87.8 ml  Mitral Valve Area; Area by Pressure Half-Time; Systole;: 2.89 cm2  Mitral Valve E to A Ratio; Systole;: 1.72  Maximum Tricuspid valve regurgitation pressure gradient; Regurgitant Flow;  Systole;: 20.6 mm[Hg]    In[employer]oci[employer] Commission Accredited Echocardiography Laboratory    Prepared and electronically signed by    Patricio Choi MD  Signed 04-Jan-2017 13:30:45      Results for orders placed during the hospital encounter of 10/02/24    Echo complete w/ contrast if  indicated    Interpretation Summary    Left Ventricle: Left ventricular cavity size is normal. Wall thickness is normal. The left ventricular ejection fraction is 65%. Systolic function is normal. Wall motion is normal. Diastolic function is moderately abnormal, consistent with grade II (pseudonormal) relaxation.    Left Atrium: The atrium is severely dilated.    Atrial Septum: The septum bows into the right atrium, suggesting increased left atrial pressure.    Mitral Valve: The valve is myxomatous. There is moderate diffuse thickening. There is bileaflet prolapse, posterior greater than anterior. There is moderate to severe regurgitation with an eccentrically directed jet.    Tricuspid Valve: There is mild regurgitation. The right ventricular systolic pressure is mildly elevated. The estimated right ventricular systolic pressure is 40.00 mmHg.    Consider KWAME to further evaluate the severity of mitral regurgitation, if clinically indicated.        CARDIAC CATH 10/2024:        EKG: immediately following MV surgery            Most recent ECG - sick sinus syndrome, brief period of Mobitz 1 followed by junctional rhythm, no evidence of high grade AV block        TELEMETRY: sinus rhythm, PACs noted, no significant bradycardia/pauses/high grade AV block noted      VTE Prophylaxis: VTE covered by:  fondaparinux, Subcutaneous, 2.5 mg at 11/18/24 [id number]

## 2024-11-18 NOTE — PLAN OF CARE
Problem: PHYSICAL THERAPY ADULT  Goal: Performs mobility at highest level of function for planned discharge setting.  See evaluation for individualized goals.  Description: Treatment/Interventions: ADL retraining, Functional transfer training, LE strengthening/ROM, Elevations, Therapeutic exercise, Endurance training, Patient/family training, Equipment eval/education, Bed mobility, Gait training, Continued evaluation, Spoke to nursing, OT, Family  Equipment Recommended: Walker       See flowsheet documentation for full assessment, interventions and recommendations.  Outcome: Progressing  Note: Prognosis: Good  Problem List: Decreased strength, Decreased endurance, Impaired balance, Decreased mobility, Pain  Assessment: Pt demonstrated overall significant improvement in balance, endurance and all aspects of observed mobility progressing w/ amb distances and requiring less (A) overall; rest periods provided b/in bouts of amb and pt remained in NAD w/ transient nausea reported (pulse ox stable); D/C recommendations are listed below; will follow        Rehab Resource Intensity Level, PT: III (Minimum Resource Intensity)    See flowsheet documentation for full assessment.

## 2024-11-18 NOTE — ASSESSMENT & PLAN NOTE
Was on atenolol prior to her surgery. Currently off of any beta-blocker. Not requiring any antihypertensive.

## 2024-11-18 NOTE — ASSESSMENT & PLAN NOTE
Postoperative. Sinus pericardia. Beta-blocker is held. Amiodarone was given initially but this was discontinued today. Electrophysiology following.

## 2024-11-18 NOTE — OCCUPATIONAL THERAPY NOTE
Occupational Therapy Progress Note     Patient Name: Carley Bonilla  Today's Date: 11/18/2024  Problem List  Principal Problem:    MVP (mitral valve prolapse)  Active Problems:    Anxiety    Benign essential hypertension    Hyperlipidemia    Kidney disease, chronic, stage III (GFR 30-59 ml/min) (Formerly [facility])    Panic disorder (episodic paroxysmal anxiety)    S/P left atrial appendage ligation    S/P mitral valve replacement with bioprosthetic valve    Sick sinus syndrome ([facility])            11/18/24 1039   OT Last Visit   OT Visit Date 11/18/24   Note Type   Note Type Treatment   Pain Assessment   Pain Assessment Tool 0-10   Pain Score 2   Pain Location/Orientation Orientation: Mid;Location: Chest;Location: Incision   Patient's Stated Pain Goal No pain   Hospital Pain Intervention(s) Repositioned;Ambulation/increased activity   Restrictions/Precautions   Other Precautions Cardiac/sternal;Multiple lines;Telemetry;Pain;O2  (CT)   Lifestyle   Autonomy Pt I w/ ADLs, ADLs, and mobility. No DME use. +   Reciprocal Relationships Supportive s/o who is retired   Service to Others retired   Intrinsic Gratification enjoys bowling and watching the Steelers.   ADL   Where Assessed Chair   LB Dressing Assistance 4  Minimal Assistance   LB Dressing Deficit Don/doff R sock;Don/doff L sock   LB Dressing Comments Pt is expected to progress and reports her s.o. will assist as needed with ADL.   Bed Mobility   Additional Comments Pt greeted OOB in chair, left in chair with all needs within reach.   Transfers   Sit to Stand 4  Minimal assistance   Additional items Assist x 1;Verbal cues   Stand to Sit 4  Minimal assistance   Additional items Assist x 1;Verbal cues   Additional Comments with RW   Functional Mobility   Functional Mobility 4  Minimal assistance   Additional Comments Pt performs household distance mobility with MIN A x 1 with RW and chair follow, extra assist for lines, requiring 2 seated rest breaks.   Additional items  Rolling walker   Cognition   Overall Cognitive Status WFL   Arousal/Participation Cooperative;Alert   Attention Within functional limits   Orientation Level Oriented X4   Memory Within functional limits   Following Commands Follows one step commands without difficulty   Comments Pt cooperative to therapy, with good support at home, verbalizes understanding to all education.   Additional Activities   Additional Activities Other (Comment)  (Recovering from Cardiac Surgery education packet)   Additional Activities Comments Discussed s/p cardiac sx education packet, reviewed w/ OT, discussed cardiac/sternal precautions, lifestyle modifications, post hospitalization IADL management, environmental temperature control, emotional regulation and management, lifting/driving restrictions, energy conservation techniques, mobility schedule, incisional management, VNA, and cardiac rehabilitation initiation upon clearance per physician at follow up. Pt reviewed education packet and acknowledged reception of such.   Activity Tolerance   Activity Tolerance Patient limited by fatigue;Patient limited by pain   Medical Staff Made Aware RN cleared, co-tx with DPT due to medical complexity, assist from restorative Awilda.   Assessment   Assessment Pt seen for OT treatment session day 1 on this date focused on ADL retraining, functional transfers and mobility, energy conservation, functional endurance, recall of safety precautions, and patient education. Pt was greeted in chair and was cooperative throughout session. Following session, pt was left in chair with all needs within reach. Pt continues to be limited by functional status related to ADLs and transfers requiring MIN A for LB dressing and transfers/FM although pt is expected to progress well and s.o. will assist as needed at home. The patient's raw score on the AM-PAC Daily Activity Inpatient Short Form is 19. A raw score of greater than or equal to 19 suggests the patient may  benefit from discharge to home. Please refer to the recommendation of the Occupational Therapist for safe discharge planning. Pt is expected to continue to progress and has adequate assist at home, indicating no further acute OT needs at this time, d/c acute OT.  Recommend d/c to home with increased social support, no further OT needs.   Plan   Treatment Interventions ADL retraining;Functional transfer training;Endurance training;Patient/family training;Continued evaluation;Cardiac education;Energy conservation   Goal Expiration Date 11/30/24   OT Treatment Day 1   OT Frequency Other (comment)  (d/c acute OT)   Discharge Recommendation   Rehab Resource Intensity Level, OT No post-acute rehabilitation needs   AM-PAC Daily Activity Inpatient   Lower Body Dressing 3   Bathing 3   Toileting 3   Upper Body Dressing 3   Grooming 3   Eating 4   Daily Activity Raw Score 19   Daily Activity Standardized Score (Calc for Raw Score >=11) 40.22   AM-PAC Applied Cognition Inpatient   Following a Speech/Presentation 4   Understanding Ordinary Conversation 4   Taking Medications 4   Remembering Where Things Are Placed or Put Away 4   Remembering List of 4-5 Errands 4   Taking Care of Complicated Tasks 3   Applied Cognition Raw Score 23   Applied Cognition Standardized Score 53.08   Modified Ward Scale   Modified Ward Scale 4   End of Consult   Education Provided Yes;Family or social support of family present for education by provider   Patient Position at End of Consult Bedside chair;All needs within reach   Nurse Communication Nurse aware of consult       NORA Arroyo, OTR/L

## 2024-11-18 NOTE — RESPIRATORY THERAPY NOTE
Resp care   11/18/24 9492   Respiratory Assessment   Assessment Type Assess only   General Appearance Awake;Alert   Respiratory Pattern Normal   Chest Assessment Chest expansion symmetrical   Bilateral Breath Sounds Diminished;Clear   Resp Comments pt found on 10L MFNC, spo2 is 95%, pt titrated to 6L MFNC, spo2 is 96%, will cont to monitor   Oxygen Therapy/Pulse Ox   O2 Device Mid flow nasal cannula   O2 Therapy Oxygen humidified   Nasal Cannula O2 Flow Rate (L/min) 6 L/min   Calculated FIO2 (%) - Nasal Cannula 44   SpO2 Activity At Rest   $ Pulse Oximetry Spot Check Charge Completed

## 2024-11-18 NOTE — PROGRESS NOTES
Progress Note - Critical Care/ICU   Name: Carley Bonilla 69 y.o. female I MRN: [id number]  Unit/Bed#: PPHP-311-01 I Date of Admission: 11/15/2024   Date of Service: 11/18/2024 I Hospital Day: 3      Assessment & Plan   Impressions:  MVP, severe MR s/p mitral valve replacement (11/15/2024)  HTN  HLD  CKD2  Panic disorder, anxiety  Junctional bradycardia  Pre-DM  Thrombocytopenia  Acute pulmonary insuffiencey     Neuro:     Cardiac Surgery Pain Control: ATC tylenol and PRN oxycodone 2.5/5mg  Delirium precautions  Regulate sleep/wake cycle  CAM-ICU daily  Trend neuro exam    CV:   Cardiac Surgery Hemodynamic Monitoring: MAP goal >65 CI >2.2 SBP < 130 Continue central line due to vasoactive medications Continue arterial line for blood pressure monitoring and/or frequent blood gas draws  Follow rhythm on telemetry  Critical Care Infusions : Primacor 0.25 mcg/kg/min  Beta Blocker Plan: Hold beta blocker secondary to bradycardia  Epicardial pacing wire plan : Epicardial pacing wires in place. Will pace as needed for bradycardia or cardiac output   Post op cardiac surgery medications:   ASA 81 mg QD  Statin: Lipitor 40 mg qHS  Amiodarone 200 mg PO q8 hours     Lung:   Expected post operative oxygen requirement of 10 liters/min via nasal cannula  Encourage IS, deep breathing  Chest tube drainage diminished; D/C today    GI:   Continue PPI for stress ulcer prophylaxis  Continue bowel regimen  Trend abdominal exam and bowel function    FEN:   Diuresis Plan: Lasix 40mg IV bid  Nutrition plan: as tolerated  Replenish electrolytes with goals: K >4.0, Mag >2.0, and Phos >3.0    :   [place] Plan: Continue for accurate I/O monitoring for 48 hours  Trend UOP and BUN/creat  Strict I and O     ID:   Trend temps and WBC count  Maintain normothermia    Heme:   Trend hgb and plts  DVT ppx: arixtra    Endo:   SSI    MSK/Skin:  Mobility goal:   PT/OT consult as medically appropriate   Frequent turning and pressure off-loading  Local wound  care as needed    Disposition:  Critical care    ICU Core Measures     A: Assess, Prevent, and Manage Pain Has pain been assessed? Yes  Need for changes to pain regimen? No   B: Both SAT/SAT  N/A   C: Choice of Sedation RASS Goal: N/A patient not on sedation  Need for changes to sedation or analgesia regimen? NA   D: Delirium CAM-ICU: Negative   E: Early Mobility  Plan for early mobility? Yes   F: Family Engagement Plan for family engagement today? Yes       Review of Invasive Devices:    [place] Plan: Continue for accurate I/O monitoring for 48 hours  Central access plan: Patient has multiple central venous catheters.  Medications requiring central line  [place] Plan: Keep arterial line for hemodynamic monitoring and frequent labs    Prophylaxis:  VTE VTE covered by:  fondaparinux, Subcutaneous, 2.5 mg at 11/17/24 0910       Stress Ulcer  covered bypantoprazole (PROTONIX) EC tablet 40 mg [[id number]]            24 Hour Events    24 Hour Events/HPI: POD # 3 s/p MV replacement and LAAL. Cardiac index has improved on milrinone, currently off levophed. Given extra dose of lasix overnight for decreased UOP.     Critical Care Infusions : Primacor 0.25 mcg/kg/min  Subjective   Review of Systems: See HPI for Review of Systems  Objective :    Medications:  Scheduled Continuous   acetaminophen, 975 mg, Q6H While awake  amiodarone, 200 mg, Q8H AMANUEL  aspirin, 81 mg, Daily  atorvastatin, 40 mg, Daily With Dinner  chlorhexidine, 15 mL, BID  docusate sodium, 100 mg, BID  fondaparinux, 2.5 mg, Q24H  furosemide, 40 mg, BID (diuretic)  hydrOXYzine HCL, 10 mg, Daily  insulin lispro, 1-5 Units, TID AC  insulin lispro, 1-5 Units, HS  pantoprazole, 40 mg, Daily  polyethylene glycol, 17 g, Daily  potassium chloride, 20 mEq, BID  traZODone, 200 mg, HS      milrinone (Primacor) infusion, 0.25 mcg/kg/min, Last Rate: 0.25 mcg/kg/min (11/18/24 0530)  niCARdipine, 2.5-15 mg/hr, Last Rate: Stopped (11/15/24 1539)  norepinephrine, 1-30 mcg/min, Last  Rate: Stopped (11/17/24 1445)         Vitals: Invasive Monitoring       Most Recent Min/Max in 24hrs   Temp 99.7 °F (37.6 °C) Temp  Min: 98.8 °F (37.1 °C)  Max: 100.2 °F (37.9 °C)   Pulse 80 Pulse  Min: 75  Max: 80   Resp (!) 27 Resp  Min: 15  Max: 31   /54 BP  Min: 105/54  Max: 123/59   O2 Sat 95 % SpO2  Min: 91 %  Max: 95 %   O2 Device: Mid flow nasal cannula  Nasal Cannula O2 Flow Rate (L/min): 10 L/min Arterial Line  Jess /63  Arterial Line BP  Min: 90/51  Max: 131/63   MAP 84 mmHg  Arterial Line MAP (mmHg)  Min: 65 mmHg  Max: 84 mmHg     PA Catheter   Most Recent  Min/Max in 24hrs    PAP 42/16 PAP  Min: 23/11  Max: 47/18   CVP 18 mmHg CVP (mean)  Min: 4 mmHg  Max: 26 mmHg   CI 2.1 L/min/m2  CI (L/min/m2)  Min: 1.5 L/min/m2  Max: 2.7 L/min/m2   SVR 1555 (dyne*sec)/cm5 SVR (dyne*sec)/cm5  Min: 871 (dyne*sec)/cm5  Max: 2155 (dyne*sec)/cm5        I/O Chest tube output (if present)     Intake/Output Summary (Last 24 hours) at 11/18/2024 0639  Last data filed at 11/18/2024 0600  Gross per 24 hour   Intake 308.92 ml   Output 2720 ml   Net -2411.08 ml     UOP: 50-60/hour    BM: 0 in the last 24 hours  Weight change: -0.2 kg (-7.1 oz)     Mediastinal tubes:  0 mL/8hr  60 mL/24hr             Physical Exam   Physical Exam  Vitals and nursing note reviewed.   Eyes:      Extraocular Movements: Extraocular movements intact.      Conjunctiva/sclera: Conjunctivae normal.   Skin:     General: Skin is warm.      Capillary Refill: Capillary refill takes less than 2 seconds.   HENT:      Head: Normocephalic and atraumatic.   Neck:      Vascular: Central line present.   Cardiovascular:      Rate and Rhythm: Normal rate and regular rhythm.      Pulses: Normal pulses.   Musculoskeletal:      Right lower leg: No edema.      Left lower leg: No edema.   Constitutional:       General: She is not in acute distress.     Appearance: She is well-developed and well-nourished. She is not ill-appearing or toxic-appearing.    Pulmonary:      Effort: No accessory muscle usage, respiratory distress or accessory muscle usage.      Breath sounds: No wheezing.   Neurological:      General: No focal deficit present.      Mental Status: She is alert and oriented to person, place and time.      Cranial Nerves: No facial asymmetry.      Motor: gross motor function is at baseline for patient. Strength full and intact in all extremities.          Diagnostic Studies             Labs:  CBC  Recent Labs     11/17/24 0453 11/18/24 0432   WBC 15.16* 15.24*   HGB 10.8* 11.5   HCT 33.0* 34.7*   PLT 56* 55*     BMP  Recent Labs     11/17/24 0453 11/18/24 0432   SODIUM 138 137   K 4.0 3.9    103   CO2 25 27   AGAP 7 7   BUN 28* 25   CREATININE 1.07 0.98   CALCIUM 7.7* 7.7*        Baseline Creat: 0.9   Coags  No recent results           Additional Electrolytes  Recent Labs     11/17/24 0453 11/18/24 0432   MG 2.1 2.1          Blood Gas  No recent results  Recent Labs     11/18/24 0432   PHVEN 7.396   WIS0NWJ 47.0   PO2VEN 34.4*   ICP1OGD 28.2   BEVEN 2.5   U2AEFKE 65.7    LFTs  No recent results    Infectious  No recent results     No recent results Glucose  Recent Labs     11/16/24  1330 11/17/24 0453 11/18/24  0432   GLUC 139 124 115        Collaborative bedside rounds performed with cardiac surgery attending, critical care attending and bedside RN.

## 2024-11-18 NOTE — PHYSICAL THERAPY NOTE
PHYSICAL THERAPY NOTE          Patient Name: Carley Bonilla  Today's Date: 11/18/2024 11/18/24 1038   PT Last Visit   PT Visit Date 11/18/24   Note Type   Note Type Treatment   Pain Assessment   Pain Assessment Tool 0-10   Pain Score 4   Pain Location/Orientation Orientation: Mid;Location: Chest;Location: Incision   Pain Onset/Description Onset: Ongoing;Frequency: Intermittent;Descriptor: Aching;Descriptor: Discomfort   Effect of Pain on Daily Activities guarding   Patient's Stated Pain Goal No pain   Hospital Pain Intervention(s) Repositioned;Ambulation/increased activity;Emotional support   Restrictions/Precautions   Other Precautions Cardiac/sternal;Multiple lines;Telemetry;O2  (CT)   General   Chart Reviewed Yes   Additional Pertinent History cleared for Tx session by nsg   Response to Previous Treatment Patient with no complaints from previous session.   Family/Caregiver Present Yes   Cognition   Overall Cognitive Status WFL   Arousal/Participation Alert;Cooperative   Attention Within functional limits   Orientation Level Oriented to person;Oriented to place;Oriented to situation   Memory Within functional limits   Following Commands Follows one step commands without difficulty   Subjective   Subjective Alert; in the chair; agreeable to mobilize   Transfers   Sit to Stand 4  Minimal assistance   Additional items Assist x 1;Verbal cues   Stand to Sit 4  Minimal assistance   Additional items Assist x 1;Verbal cues   Ambulation/Elevation   Gait pattern Short stride;Inconsistent chris;Excessively slow   Gait Assistance 4  Minimal assist   Additional items Assist x 1;Verbal cues;Tactile cues   Assistive Device Rolling walker   Distance 40 ft + 50 ft + 50 ft w/ seated rest periods in between   Balance   Static Sitting Fair +   Dynamic Sitting Fair   Static Standing Fair -   Dynamic Standing Poor +   Ambulatory Poor +    Activity Tolerance   Activity Tolerance Patient limited by fatigue;Patient limited by pain   Medical Staff Made Aware Co-Tx performed w/ OTR due to complexity of medical status   Assessment   Prognosis Good   Problem List Decreased strength;Decreased endurance;Impaired balance;Decreased mobility;Pain   Assessment Pt demonstrated overall significant improvement in balance, endurance and all aspects of observed mobility progressing w/ amb distances and requiring less (A) overall; rest periods provided b/in bouts of amb and pt remained in NAD w/ transient nausea reported (pulse ox stable); D/C recommendations are listed below; will follow   Goals   Patient Goals to return home   STG Expiration Date 11/30/24   PT Treatment Day 1   Plan   Treatment/Interventions Elevations;Functional transfer training;LE strengthening/ROM;Therapeutic exercise;Endurance training;Bed mobility;Gait training;Spoke to nursing;Spoke to case management;OT   Progress Progressing toward goals   PT Frequency (S)  4-6x/wk  (change from prior POC)   Discharge Recommendation   Rehab Resource Intensity Level, PT III (Minimum Resource Intensity)   Equipment Recommended Walker   Walker Package Recommended Wheeled walker   Change/add to Walker Package? No   AM-PAC Basic Mobility Inpatient   Turning in Flat Bed Without Bedrails 3   Lying on Back to Sitting on Edge of Flat Bed Without Bedrails 2   Moving Bed to Chair 3   Standing Up From Chair Using Arms 3   Walk in Room 3   Climb 3-5 Stairs With Railing 2   Basic Mobility Inpatient Raw Score 16   Basic Mobility Standardized Score 38.32   [facility] Highest Level Of Mobility   -HLM Goal 5: Stand one or more mins   -HLM Achieved 7: Walk 25 feet or more   Education   Education Provided Mobility training;Assistive device   Patient Demonstrates verbal understanding   End of Consult   Patient Position at End of Consult Bedside chair;All needs within reach       Carlos Milton

## 2024-11-18 NOTE — PROGRESS NOTES
Progress Note - Cardiothoracic Surgery   Carley Bonilla 69 y.o. female MRN: [id number]  Unit/Bed#: PPHP-311-01 Encounter: [id number]      POD # 3 s/p Mitral replacement     Pt seen/examined.  Interval history and data reviewed with critical care team.  Pt doing well.  No specific complaints.    Milrinone 0.25  Levo off  CI > 2.2          Medications:   Scheduled Meds:  Current Facility-Administered Medications   Medication Dose Route Frequency Provider Last Rate    acetaminophen  650 mg Rectal Q4H PRN Cedric David PA-C      acetaminophen  975 mg Oral Q6H While awake Cedric David PA-C      ALPRAZolam  0.5 mg Oral HS PRN Cedric David PA-C      amiodarone  200 mg Oral Q8H AMANUEL Cedric David PA-C      aspirin  81 mg Oral Daily Sheeba Salomon PA-C      atorvastatin  40 mg Oral Daily With Dinner Sheeba Salomon PA-C      bisacodyl  10 mg Rectal Daily PRN Cedric David PA-C      chlorhexidine  15 mL Mouth/Throat BID Cedric David PA-C      docusate sodium  100 mg Oral BID Sheeba Salomon PA-C      fondaparinux  2.5 mg Subcutaneous Q24H Paola Cruz PA-C      furosemide  40 mg Intravenous BID (diuretic) Sheeba Salomon PA-C      guaiFENesin  600 mg Oral BID PRN Kristin Doe PA-C      hydrOXYzine HCL  10 mg Oral Daily Cedric David PA-C      insulin lispro  1-5 Units Subcutaneous TID AC Sheeba Salomon PA-C      insulin lispro  1-5 Units Subcutaneous HS Sheeba Salomon PA-C      milrinone (Primacor) infusion  0.13 mcg/kg/min Intravenous Continuous Paola Cruz PA-C 0.13 mcg/kg/min (11/18/24 0800)    ondansetron  4 mg Intravenous Q6H PRN Cedric David PA-C      oxyCODONE  2.5 mg Oral Q4H PRN Cedric David PA-C      Or    oxyCODONE  5 mg Oral Q4H PRN Cedric David PA-C      pantoprazole  40 mg Oral Daily Cedric David PA-C      polyethylene glycol  17 g Oral Daily Cedric Frankie, PA-C      potassium chloride  20 mEq Oral BID Sheeba Salomon PA-C      traZODone  200 mg Oral HS Sheeba Salomon PA-C        Continuous Infusions:milrinone (Primacor) infusion, 0.13 mcg/kg/min, Last Rate: 0.13 mcg/kg/min (11/18/24 0800)      PRN Meds:.  acetaminophen    ALPRAZolam    bisacodyl    guaiFENesin    ondansetron    oxyCODONE **OR** oxyCODONE    Vitals: Blood pressure 105/54, pulse (!) 54, temperature 97.6 °F (36.4 °C), temperature source Oral, resp. rate (!) 26, height 5' (1.524 m), weight 73.1 kg (161 lb 2.5 oz), SpO2 93%.,Body mass index is 31.47 kg/m².  I/O last 24 hours:  In: 749.3 [P.O.:508; I.V.:191.3; IV Piggyback:50]  Out: 3190 [Urine:3030; Chest Tube:160]  Invasive Devices       Central Venous Catheter Line  Duration             CVC Central Lines 11/15/24 Triple Right Internal jugular 3 days              Peripheral Intravenous Line  Duration             Peripheral IV 11/15/24 Right Antecubital 3 days              Line  Duration             Pacer Wires 3 days    Pacer Wires 3 days              Drain  Duration             Chest Tube 1 Anterior;Mediastinal 32 Fr. 3 days    Chest Tube 2 Posterior;Mediastinal 32 Fr. 3 days                      Lab, Imaging and other studies:   Results from last 7 days   Lab Units 11/18/24  0432 11/17/24  0453 11/16/24  0404   WBC Thousand/uL 15.24* 15.16* 15.16*   HEMOGLOBIN g/dL 11.5 10.8* 12.7   HEMATOCRIT % 34.7* 33.0* 38.4   PLATELETS Thousands/uL 55* 56* 75*     Results from last 7 days   Lab Units 11/18/24  0432 11/17/24  0453 11/16/24  1330 11/15/24  1703 11/15/24  1321   POTASSIUM mmol/L 3.9 4.0 4.4   < >  --    CHLORIDE mmol/L 103 106 108   < >  --    CO2 mmol/L 27 25 24   < >  --    CO2, I-STAT mmol/L  --   --   --   --  19*   BUN mg/dL 25 28* 24   < >  --    CREATININE mg/dL 0.98 1.07 1.07   < >  --    GLUCOSE, ISTAT mg/dl  --   --   --   --  137   CALCIUM mg/dL 7.7* 7.7* 7.6*   < >  --     < > = values in this interval not displayed.         Recent Labs     11/16/24  0404   PHART 7.330*   YXX6CNT 21.5*   PO2ART 101.6   KAM1AFP 41.7   BEART -4.2           Plan:    Milrinone  to 0.125  DC [place] tolerated  Continue chest tubes, pacing wires.  OOBTC  Incentive spirometry.  Diuresis.  PO ASA/Statin/hold B blocker for now.  ICU  Monitor thrombocytopenia, consider MARTINA panel if worsening or not improving      SIGNATURE: Parker Reddy MD  DATE: November 18, 2024  TIME: 2:08 PM

## 2024-11-18 NOTE — ASSESSMENT & PLAN NOTE
Mitral valve prolapse with severe mitral regurgitation, status post tissue MV replacement + left atrial appendage ligation 11/15/2024  Low CI in the post operative setting requiring pressor support - currently being weaned off of milrinone  Preserved LV systolic function with LVEF 65% per echo 10/2024

## 2024-11-18 NOTE — ASSESSMENT & PLAN NOTE
Lab Results   Component Value Date    EGFR 59 11/18/2024    EGFR 53 11/17/2024    EGFR 53 11/16/2024    CREATININE 0.98 11/18/2024    CREATININE 1.07 11/17/2024    CREATININE 1.07 11/16/2024   Creatinine appears to be at baseline

## 2024-11-19 ENCOUNTER — HOME HEALTH ADMISSION (OUTPATIENT)
Dept: HOME HEALTH SERVICES | Facility: HOME HEALTHCARE | Age: 69
End: 2024-11-19
Payer: COMMERCIAL

## 2024-11-19 PROBLEM — R73.03 PREDIABETES: Status: ACTIVE | Noted: 2024-11-19

## 2024-11-19 PROBLEM — D72.829 LEUKOCYTOSIS: Status: ACTIVE | Noted: 2024-11-19

## 2024-11-19 PROBLEM — D69.6 THROMBOCYTOPENIA (HCC): Status: ACTIVE | Noted: 2024-11-19

## 2024-11-19 PROBLEM — D64.9 ANEMIA: Status: ACTIVE | Noted: 2024-11-19

## 2024-11-19 PROBLEM — E83.51 HYPOCALCEMIA: Status: ACTIVE | Noted: 2024-11-19

## 2024-11-19 LAB
ANION GAP SERPL CALCULATED.3IONS-SCNC: 7 MMOL/L (ref 4–13)
ARTERIAL PATENCY WRIST A: YES
ATRIAL RATE: 75 BPM
BASE EX.OXY STD BLDV CALC-SCNC: 61.6 % (ref 60–80)
BASE EXCESS BLDV CALC-SCNC: 3.1 MMOL/L
BUN SERPL-MCNC: 25 MG/DL (ref 5–25)
CALCIUM SERPL-MCNC: 7.9 MG/DL (ref 8.4–10.2)
CHLORIDE SERPL-SCNC: 102 MMOL/L (ref 96–108)
CO2 SERPL-SCNC: 29 MMOL/L (ref 21–32)
CREAT SERPL-MCNC: 0.81 MG/DL (ref 0.6–1.3)
ERYTHROCYTE [DISTWIDTH] IN BLOOD BY AUTOMATED COUNT: 14.8 % (ref 11.6–15.1)
GFR SERPL CREATININE-BSD FRML MDRD: 74 ML/MIN/1.73SQ M
GLUCOSE SERPL-MCNC: 112 MG/DL (ref 65–140)
GLUCOSE SERPL-MCNC: 116 MG/DL (ref 65–140)
GLUCOSE SERPL-MCNC: 117 MG/DL (ref 65–140)
GLUCOSE SERPL-MCNC: 121 MG/DL (ref 65–140)
GLUCOSE SERPL-MCNC: 125 MG/DL (ref 65–140)
HCO3 BLDV-SCNC: 28.1 MMOL/L (ref 24–30)
HCT VFR BLD AUTO: 33.7 % (ref 34.8–46.1)
HGB BLD-MCNC: 11.1 G/DL (ref 11.5–15.4)
MAGNESIUM SERPL-MCNC: 2.6 MG/DL (ref 1.9–2.7)
MCH RBC QN AUTO: 29.6 PG (ref 26.8–34.3)
MCHC RBC AUTO-ENTMCNC: 32.9 G/DL (ref 31.4–37.4)
MCV RBC AUTO: 90 FL (ref 82–98)
O2 CT BLDV-SCNC: 10.3 ML/DL
P AXIS: 35 DEGREES
PCO2 BLDV: 44.8 MM HG (ref 42–50)
PH BLDV: 7.42 [PH] (ref 7.3–7.4)
PLATELET # BLD AUTO: 81 THOUSANDS/UL (ref 149–390)
PMV BLD AUTO: 11 FL (ref 8.9–12.7)
PO2 BLDV: 32.4 MM HG (ref 35–45)
POTASSIUM SERPL-SCNC: 4.2 MMOL/L (ref 3.5–5.3)
PR INTERVAL: 296 MS
QRS AXIS: 31 DEGREES
QRSD INTERVAL: 76 MS
QT INTERVAL: 486 MS
QTC INTERVAL: 542 MS
RBC # BLD AUTO: 3.75 MILLION/UL (ref 3.81–5.12)
SODIUM SERPL-SCNC: 138 MMOL/L (ref 135–147)
T WAVE AXIS: 18 DEGREES
VENTRICULAR RATE: 75 BPM
WBC # BLD AUTO: 13.07 THOUSAND/UL (ref 4.31–10.16)

## 2024-11-19 PROCEDURE — 97116 GAIT TRAINING THERAPY: CPT

## 2024-11-19 PROCEDURE — 80048 BASIC METABOLIC PNL TOTAL CA: CPT

## 2024-11-19 PROCEDURE — 99232 SBSQ HOSP IP/OBS MODERATE 35: CPT | Performed by: INTERNAL MEDICINE

## 2024-11-19 PROCEDURE — 97530 THERAPEUTIC ACTIVITIES: CPT

## 2024-11-19 PROCEDURE — 93010 ELECTROCARDIOGRAM REPORT: CPT | Performed by: INTERNAL MEDICINE

## 2024-11-19 PROCEDURE — 85027 COMPLETE CBC AUTOMATED: CPT

## 2024-11-19 PROCEDURE — 82948 REAGENT STRIP/BLOOD GLUCOSE: CPT

## 2024-11-19 PROCEDURE — 99024 POSTOP FOLLOW-UP VISIT: CPT | Performed by: PHYSICIAN ASSISTANT

## 2024-11-19 PROCEDURE — 83735 ASSAY OF MAGNESIUM: CPT

## 2024-11-19 PROCEDURE — 93005 ELECTROCARDIOGRAM TRACING: CPT

## 2024-11-19 PROCEDURE — 82805 BLOOD GASES W/O2 SATURATION: CPT

## 2024-11-19 RX ORDER — SENNOSIDES 8.6 MG
1 TABLET ORAL 2 TIMES DAILY
Status: DISCONTINUED | OUTPATIENT
Start: 2024-11-19 | End: 2024-11-21 | Stop reason: HOSPADM

## 2024-11-19 RX ORDER — BISACODYL 5 MG/1
10 TABLET, DELAYED RELEASE ORAL ONCE
Status: COMPLETED | OUTPATIENT
Start: 2024-11-19 | End: 2024-11-19

## 2024-11-19 RX ADMIN — FONDAPARINUX SODIUM 2.5 MG: 2.5 INJECTION SUBCUTANEOUS at 08:56

## 2024-11-19 RX ADMIN — ACETAMINOPHEN 975 MG: 325 TABLET, FILM COATED ORAL at 13:44

## 2024-11-19 RX ADMIN — TRAZODONE HYDROCHLORIDE 200 MG: 100 TABLET ORAL at 22:01

## 2024-11-19 RX ADMIN — CHLORHEXIDINE GLUCONATE 0.12% ORAL RINSE 15 ML: 1.2 LIQUID ORAL at 17:49

## 2024-11-19 RX ADMIN — ACETAMINOPHEN 975 MG: 325 TABLET, FILM COATED ORAL at 08:56

## 2024-11-19 RX ADMIN — POTASSIUM CHLORIDE 20 MEQ: 1500 TABLET, EXTENDED RELEASE ORAL at 17:48

## 2024-11-19 RX ADMIN — DOCUSATE SODIUM 100 MG: 100 CAPSULE, LIQUID FILLED ORAL at 17:48

## 2024-11-19 RX ADMIN — HYDROXYZINE HYDROCHLORIDE 10 MG: 10 TABLET, FILM COATED ORAL at 22:01

## 2024-11-19 RX ADMIN — FUROSEMIDE 40 MG: 10 INJECTION, SOLUTION INTRAMUSCULAR; INTRAVENOUS at 08:56

## 2024-11-19 RX ADMIN — SENNOSIDES 8.6 MG: 8.6 TABLET, FILM COATED ORAL at 08:56

## 2024-11-19 RX ADMIN — POTASSIUM CHLORIDE 20 MEQ: 1500 TABLET, EXTENDED RELEASE ORAL at 08:56

## 2024-11-19 RX ADMIN — CHLORHEXIDINE GLUCONATE 0.12% ORAL RINSE 15 ML: 1.2 LIQUID ORAL at 08:56

## 2024-11-19 RX ADMIN — BISACODYL 10 MG: 5 TABLET, COATED ORAL at 08:55

## 2024-11-19 RX ADMIN — Medication 2.5 MG: at 04:48

## 2024-11-19 RX ADMIN — ALPRAZOLAM 0.5 MG: 0.5 TABLET ORAL at 22:01

## 2024-11-19 RX ADMIN — SENNOSIDES 8.6 MG: 8.6 TABLET, FILM COATED ORAL at 17:48

## 2024-11-19 RX ADMIN — ASPIRIN 81 MG: 81 TABLET, COATED ORAL at 08:55

## 2024-11-19 RX ADMIN — PANTOPRAZOLE SODIUM 40 MG: 40 TABLET, DELAYED RELEASE ORAL at 06:32

## 2024-11-19 RX ADMIN — ATORVASTATIN CALCIUM 40 MG: 40 TABLET, FILM COATED ORAL at 16:36

## 2024-11-19 RX ADMIN — BISACODYL 10 MG: 5 TABLET, COATED ORAL at 13:44

## 2024-11-19 RX ADMIN — DOCUSATE SODIUM 100 MG: 100 CAPSULE, LIQUID FILLED ORAL at 08:55

## 2024-11-19 RX ADMIN — HYDROXYZINE HYDROCHLORIDE 10 MG: 10 TABLET, FILM COATED ORAL at 08:55

## 2024-11-19 RX ADMIN — FUROSEMIDE 40 MG: 10 INJECTION, SOLUTION INTRAMUSCULAR; INTRAVENOUS at 16:36

## 2024-11-19 NOTE — ASSESSMENT & PLAN NOTE
Lab Results   Component Value Date    EGFR 74 11/19/2024    EGFR 59 11/18/2024    EGFR 53 11/17/2024    CREATININE 0.81 11/19/2024    CREATININE 0.98 11/18/2024    CREATININE 1.07 11/17/2024   Creatinine appears to be at baseline

## 2024-11-19 NOTE — ASSESSMENT & PLAN NOTE
Lab Results   Component Value Date    EGFR 74 11/19/2024    EGFR 59 11/18/2024    EGFR 53 11/17/2024    CREATININE 0.81 11/19/2024    CREATININE 0.98 11/18/2024    CREATININE 1.07 11/17/2024   Creatinine currently stable.  Diuretics per CTS service.

## 2024-11-19 NOTE — PROGRESS NOTES
Progress Note - Cardiac Surgery   Carley Bonilla 69 y.o. female MRN: [id number]  Unit/Bed#: [facility]-326-01 Encounter: [id number]    Mitral regurgitation. S/P left atrial appendage ligation and mitral valve repair; POD # 4      24 Hour Events: Transferred out of ICU. Seen by EP, plain to continue monitoring. Seen by general cardiology, no changes to regimen made. No other events. No complaints. Voiding well s/p guo removal. (-) flatus, (-) BM, last BM 1-2 days PTA. Ambulating well, home at discharge. Pain controlled.     Medications:   Scheduled Meds:  Current Facility-Administered Medications   Medication Dose Route Frequency Provider Last Rate    acetaminophen  650 mg Rectal Q4H PRN Cedric David PA-C      acetaminophen  975 mg Oral Q6H While awake Cedric David PA-C      ALPRAZolam  0.5 mg Oral HS PRN Cedric David PA-C      aspirin  81 mg Oral Daily Sheeba Salomon PA-C      atorvastatin  40 mg Oral Daily With Dinner Sheeba Salomon PA-C      bisacodyl  10 mg Rectal Daily PRN Cedric David PA-C      chlorhexidine  15 mL Mouth/Throat BID Cedric David PA-C      docusate sodium  100 mg Oral BID Sheeba Salomon PA-C      fondaparinux  2.5 mg Subcutaneous Q24H Paola Guo PA-C      furosemide  40 mg Intravenous BID (diuretic) Sheeba Salomon PA-C      guaiFENesin  600 mg Oral BID PRN Kristin Doe PA-C      hydrOXYzine HCL  10 mg Oral Daily Cedric David PA-C      insulin lispro  1-5 Units Subcutaneous TID AC Sheeba Salomon PA-C      insulin lispro  1-5 Units Subcutaneous HS Sheeba Salomon PA-C      milrinone (Primacor) infusion  0.13 mcg/kg/min Intravenous Continuous Paola Guo PA-C 0.13 mcg/kg/min (11/18/24 0800)    ondansetron  4 mg Intravenous Q6H PRN Cedric David PA-C      oxyCODONE  2.5 mg Oral Q4H PRN Cedric David PA-C      Or    oxyCODONE  5 mg Oral Q4H PRN Cedric Frankie, PA-C      pantoprazole  40 mg Oral Daily Cedric David PA-C      polyethylene glycol  17 g Oral Daily Cedric  LORRIE David      potassium chloride  20 mEq Oral BID Sheeba Salomon PA-C      traZODone  200 mg Oral HS Sheeba Salomon PA-C       Continuous Infusions:milrinone (Primacor) infusion, 0.13 mcg/kg/min, Last Rate: 0.13 mcg/kg/min (11/18/24 0800)      PRN Meds:.  acetaminophen    ALPRAZolam    bisacodyl    guaiFENesin    ondansetron    oxyCODONE **OR** oxyCODONE    Vitals:   Vitals:    11/18/24 2234 11/19/24 0212 11/19/24 0442 11/19/24 0600   BP: 130/74 92/55     BP Location:  Right arm     Pulse: 69 65     Resp:  16     Temp: 98.5 °F (36.9 °C) 98.5 °F (36.9 °C)     TempSrc:       SpO2: 92% 90%     Weight:   72.3 kg (159 lb 6.3 oz) 72.3 kg (159 lb 6.3 oz)   Height:         Bp x24hrs: /30-70    Tmax 100.4 at 0800 11/18, afebrile since that time    Telemetry: Accelerated junctional w/ stable T wave inversions in II/III/V2/aVR; Heart Rate: 66; 4 beat run NSVT overnight, bradycardia in the 40-50s yesterday    Respiratory:   SpO2: SpO2: 90 %, SpO2 Activity: SpO2 Activity: At Rest, SpO2 Device: O2 Device: Nasal cannula; 4 LPM    Intake/Output:     Intake/Output Summary (Last 24 hours) at 11/19/2024 0705  Last data filed at 11/19/2024 0600  Gross per 24 hour   Intake 472.75 ml   Output 1500 ml   Net -1027.25 ml      UOP - none recorded/8hrs w/ 1 unmeasured occurrence; 1400cc/24hrs w/ 1 unmeasured shift & 2 unmeasured occurrences    Weights:   Weight (last 2 days)       Date/Time Weight    11/19/24 0600 72.3 (159.39)    11/19/24 0442 72.3 (159.39)    11/18/24 0543 73.1 (161.16)    11/17/24 0515 73.3 (161.6)          Admission weight: 66.7kg - up 6.7kg from admission weight    Chest tube Output:    Mediastinal tubes: 0 mL/8 hours  100 mL/24 hours   Pleural tubes: 0 mL/8 hours  0 mL/24 hours       Results:   Results from last 7 days   Lab Units 11/19/24  0559 11/18/24  0432 11/17/24  0453   WBC Thousand/uL 13.07* 15.24* 15.16*   HEMOGLOBIN g/dL 11.1* 11.5 10.8*   HEMATOCRIT % 33.7* 34.7* 33.0*   PLATELETS Thousands/uL 81*  55* 56*     Results from last 7 days   Lab Units 11/19/24  0559 11/18/24  0432 11/17/24  0453 11/15/24  1703 11/15/24  1321   SODIUM mmol/L 138 137 138   < >  --    POTASSIUM mmol/L 4.2 3.9 4.0   < >  --    CHLORIDE mmol/L 102 103 106   < >  --    CO2 mmol/L 29 27 25   < >  --    CO2, I-STAT mmol/L  --   --   --   --  19*   BUN mg/dL 25 25 28*   < >  --    CREATININE mg/dL 0.81 0.98 1.07   < >  --    GLUCOSE, ISTAT mg/dl  --   --   --   --  137   CALCIUM mg/dL 7.9* 7.7* 7.7*   < >  --     < > = values in this interval not displayed.     Recent Labs     11/19/24  0559   MG 2.6         SVO2: 61.6    Point of care glucose: 116 - 113 - 165 - 139 - 131 - 144 - 163    Studies:  No new studies    Results Review Statement: No pertinent imaging studies reviewed.    Invasive Lines/Tubes:  Invasive Devices       Central Venous Catheter Line  Duration             CVC Central Lines 11/15/24 Triple Right Internal jugular 3 days              Peripheral Intravenous Line  Duration             Peripheral IV 11/15/24 Right Antecubital 4 days              Line  Duration             Pacer Wires 3 days    Pacer Wires 3 days              Drain  Duration             Chest Tube 1 Anterior;Mediastinal 32 Fr. 3 days    Chest Tube 2 Posterior;Mediastinal 32 Fr. 3 days                    Physical Exam:    General: No acute distress  HEENT/NECK:  Normocephalic. Atraumatic.  No jugular venous distention.    Cardiac: Regular rate and rhythm and No murmurs/rubs/gallops  Pulmonary:  Crackles bilaterally and Poor inspiratory effort  Abdomen:  Non-tender, Non-distended, and Hypo-active bowel sounds  Incisions: Sternum is stable.  Incision is clean, dry, and intact.   Extremities: Extremities warm/dry and Trace edema B/L  Neuro: Alert and oriented X 3  Skin: Warm/Dry, without rashes or lesions.    Assessment:  Principal Problem:    MVP (mitral valve prolapse)  Active Problems:    Anxiety    Benign essential hypertension    Hyperlipidemia    Kidney  disease, chronic, stage III (GFR 30-59 ml/min) (HCC)    Panic disorder (episodic paroxysmal anxiety)    S/P left atrial appendage ligation    S/P mitral valve replacement with bioprosthetic valve    Sick sinus syndrome (HCC)       Mitral regurgitation. S/P left atrial appendage ligation and mitral valve repair; POD # 4    Plan:    Cardiac:     Elective surgical admission  Normal ventricular systolic function, EF 65%    Accelerated junctional; HR well-controlled    Beta blocker on hold    EP following    EKG this AM    ACE inhibitor/ARB not indicated; Will not order    Amio on hold w/ junctional    Milrinone 0.13, discontinue today    No need for AC w/ ligation TYLER    Continue ASA and Statin therapy    Epicardial pacing wires no longer required.  Remove today if still ok w/ EP    Maintain central IV access today for medications requiring central IV access    Continue Arixtra for DVT prophylaxis    Pulmonary:     Acute post-op pulmonary insufficiency; Requiring 4 liters via nasal cannla, secondary to atelectasis, pulmonary vascular congestion, poor inspiratory effort secondary to pain, and body habitus/obesity. Continue incentive spirometry/coughing/deep breathing exercises.  Wean supplemental oxygen as tolerated for saturation > 90%    Chest tube drainage diminished; D/C today    Renal:     Normal preoperative renal function    Intake/Output net: (-)1027.3 mL/24 hours    Diuretic Regimen:  Continue IV Lasix, 40 mg BID  Continue Potassium Chloride 20 mEq PO BID    Neuro:    Neurologically intact; No active issues     Incisional pain well controlled   Continue tylenol, 975 mg PO q 8, standing dose   Continue oxycodone, 2.5 to 5 mg PO q 4 hours prn pain    Continue Atarax    Continue Trazadone    GI:    Cardiac diet, with 1800 mL fluid restriction    Tolerating diet without complaint    Continue stool softeners and prn suppository -- add Senna     Continue GI prophylaxis    Endo:     Pre-Op Hgb A1C: 5.9    Patient has  been transitioned from continuous insulin infusion to intermittent subcutaneous dosing  Serum glucose well controlled on insulin sliding scale coverage    7    Hematology:     Post-operative acute blood loss anemia; Hemoglobin 11.1; trend prn  Thrombocytopenia  Leukocytosis; Afebrile with no signs of infection; Trend CBC prn   Hypocalcemia    8.   Disposition:        Not yet medically cleared for discharge, pending clearance from EP & stability off milrinone, likely discharge by end of the week      VTE Pharmacologic Prophylaxis: Sequential compression device (Venodyne)  and Fondaparinux (Arixtra)  VTE Mechanical Prophylaxis: sequential compression device    Collaborative rounds completed with supervising physician  Plan of care discussed with bedside nurse    SIGNATURE: Bernie Lind PA-C  DATE: November 19, 2024  TIME: 7:05 AM

## 2024-11-19 NOTE — ASSESSMENT & PLAN NOTE
Was on atenolol prior to her surgery. Currently off of any beta-blocker due to bradycardia. Not requiring any antihypertensive.

## 2024-11-19 NOTE — CASE MANAGEMENT
Case Management Discharge Planning Note    Patient name Carley Bonilla  Location [facility]-326-01 MRN [id number]  : 1955 Date 2024       Current Admission Date: 11/15/2024  Current Admission Diagnosis:MVP (mitral valve prolapse)   Patient Active Problem List    Diagnosis Date Noted Date Diagnosed    Leukocytosis 2024     Anemia 2024     Thrombocytopenia ([facility]) 2024     Hypocalcemia 2024     Prediabetes 2024     Sick sinus syndrome ([facility]) 2024     S/P left atrial appendage ligation 11/15/2024     S/P mitral valve replacement with bioprosthetic valve 11/15/2024     PONV (postoperative nausea and vomiting) 10/09/2024     Constipation 2024     Mitral valve insufficiency 2024     Murmur, cardiac 2024     Age-related osteoporosis with current pathological fracture 2024     Nodular lesion on surface of skin 2024     Chronic insomnia 2023     Panic disorder (episodic paroxysmal anxiety) 2022     Kidney disease, chronic, stage III (GFR 30-59 ml/min) ([facility]) 2020     Hyperlipidemia 2014     Anxiety 2014     Benign essential hypertension 2014     MVP (mitral valve prolapse) 2008       LOS (days): 4  Geometric Mean LOS (GMLOS) (days): 8.5  Days to GMLOS:4.2     OBJECTIVE:  Risk of Unplanned Readmission Score: 14.52         Current admission status: Inpatient   Preferred Pharmacy:   [id number] [place] - [address]  [address]  [place] [address]  Phone: [phone] Fax: [phone]    [facility] - [place], [place] - [address]  [address] [address]  Phone: [phone] Fax: [phone]    [id number] [place] [address]  [address] [place] [address]  Phone: [phone] Fax: [phone]    Primary Care Provider: Kerrie Barrios MD    Primary Insurance: [employer] [facility] RE[facility]  Secondary Insurance:     DISCHARGE  DETAILS:    Discharge planning discussed with:: pt at bedside with list of acceting HHCs. However pt states she no longer lives in [place], lives in [place] and is requesting [facility]. Referral sent  [place] of Choice: Yes     CM contacted family/caregiver?: No- see comments (pt alert and oriented)  Were Treatment Team discharge recommendations reviewed with patient/caregiver?: Yes  Did patient/caregiver verbalize understanding of patient care needs?: Yes  Were patient/caregiver advised of the risks associated with not following Treatment Team discharge recommendations?: Yes         Requested Home Health Care         Is the patient interested in HHC at discharge?: Yes  Home Health Discipline requested:: Nursing, Physical Therapy  Home Health Follow-Up Provider:: PCP  Home Health Services Needed:: Post-Op Care and Assessment, Evaluate Functional Status and Safety, Gait/ADL Training, Strengthening/Theraputic Exercises to Improve Function  Homebound Criteria Met:: Requires the Assistance of Another Person for Safe Ambulation or to Leave the Home, Uses an Assist Device (i.e. cane, walker, etc)  Supporting Clincal Findings:: Limited Endurance, Fatigues Easliy in Short Distances    DME Referral Provided  Referral made for DME?: Yes  DME referral completed for the following items:: Walker  DME Supplier Name:: [employer]    Other Referral/Resources/Interventions Provided:  Interventions: HHC, DME  Referral Comments: Pt wants [facility], referral sent    Would you like to participate in our [facility] service program?  : No - Declined    Treatment Team Recommendation: Home with Home Health Care  Discharge Destination Plan:: Home with Home Health Care  Transport at Discharge : Family                                      Additional Comments: POD#4 MVR, chest tubes, wires, Milrinone removed today. Plan home on 11/21/24. Lives in [place] with signif. other who will transport home.

## 2024-11-19 NOTE — PLAN OF CARE
Problem: PHYSICAL THERAPY ADULT  Goal: Performs mobility at highest level of function for planned discharge setting.  See evaluation for individualized goals.  Description: Treatment/Interventions: ADL retraining, Functional transfer training, LE strengthening/ROM, Elevations, Therapeutic exercise, Endurance training, Patient/family training, Equipment eval/education, Bed mobility, Gait training, Continued evaluation, Spoke to nursing, OT, Family  Equipment Recommended: Walker       See flowsheet documentation for full assessment, interventions and recommendations.  Outcome: Progressing  Note: Prognosis: Good  Problem List: Decreased strength, Decreased endurance, Impaired balance, Decreased mobility  Assessment: Pt cont to improve in functional endurance and mobility skills ambulating further distance overall w/ min (A) and rw; HEP initiated and pt remained in NAD; D/C recommendations are listed below; will follow        Rehab Resource Intensity Level, PT: III (Minimum Resource Intensity)    See flowsheet documentation for full assessment.

## 2024-11-19 NOTE — RESTORATIVE TECHNICIAN NOTE
Restorative Technician Note      Patient Name: Carley Bonilla     Restorative Tech Visit Date: 11/19/24  Note Type: Mobility  Patient Position Upon Consult: Bedside chair  Activity Performed: Transferred  Assistive Device: Other (Comment) (HHA x 1)  Patient Position at End of Consult: All needs within reach; Supine; Other (comment) (to get tubes/wires out)

## 2024-11-19 NOTE — PROGRESS NOTES
Progress Note - Cardiology   Carley Bonilla 69 y.o. female MRN: [id number]  Unit/Bed#: [facility]-326-01 Encounter: [id number]          Assessment & Plan  MVP (mitral valve prolapse)  She had severe mitral regurgitation identified as an outpatient. She was referred for surgery. Unfortunately, repair was not possible and a replacement was ultimately done. Bioprosthetic valve. Had left atrial appendage ligation during her surgery, also.    Management per the surgical service.  Benign essential hypertension  Was on atenolol prior to her surgery. Currently off of any beta-blocker due to bradycardia. Not requiring any antihypertensive.  Hyperlipidemia  On pravastatin prior to surgery. No CAD identified on her preoperative cardiac catheterization.  Kidney disease, chronic, stage III (GFR 30-59 ml/min) ([facility])  Lab Results   Component Value Date    EGFR 74 11/19/2024    EGFR 59 11/18/2024    EGFR 53 11/17/2024    CREATININE 0.81 11/19/2024    CREATININE 0.98 11/18/2024    CREATININE 1.07 11/17/2024   Creatinine currently stable.  Diuretics per CTS service.  S/P left atrial appendage ligation  Done at the time of her surgery. She did not have afib prior to surgery.  S/P mitral valve replacement with bioprosthetic valve  Required replacement as opposed to repair. Bioprosthetic valve performed.  Sick sinus syndrome (HCC)  Postoperative. Previously with junctional rhythm, Wenckebach. Rate change this AM. Low P wave amplitude. Regular. Hold amiodarone and metoprolol. EP following.      Subjective/Objective     Subjective:   Denies any complaints. Out of ICU. Ambulated with PT this AM.    Objective:    Vitals: /81 (BP Location: Right arm)   Pulse 69   Temp 97.8 °F (36.6 °C) (Oral)   Resp 16   Ht 5' (1.524 m)   Wt 72.3 kg (159 lb 6.3 oz)   LMP  (LMP Unknown)   SpO2 (!) 89%   BMI 31.13 kg/m²   Vitals:    11/19/24 0442 11/19/24 0600   Weight: 72.3 kg (159 lb 6.3 oz) 72.3 kg (159 lb 6.3 oz)     Orthostatic Blood Pressures       Flowsheet Row Most Recent Value   Blood Pressure 154/81 filed at 11/19/2024 0720   Patient Position - Orthostatic VS Lying filed at 11/19/2024 0720              Intake/Output Summary (Last 24 hours) at 11/19/2024 1021  Last data filed at 11/19/2024 1006  Gross per 24 hour   Intake 277.55 ml   Output 1260 ml   Net -982.45 ml     Physical Exam:   General appearance: alert and in no acute distress  Head: Normocephalic, without obvious abnormality, atraumatic  Neck: no carotid bruit, no JVD and supple, symmetrical, trachea midline  Lungs: clear to auscultation bilaterally. Normal air entry. Normal effort. Chest tube  Heart: S1, S2 regular bio MVR  Abdomen: soft, non-tender; bowel sounds normal; no masses,  no organomegaly  Extremities: extremities normal, atraumatic, no cyanosis or edema  Pulses: 2+ and symmetric bilaterally  Skin: Skin color, texture, turgor normal. No rashes or lesions  Neurologic: Grossly normal. Alert and oriented.    Medications:    Current Facility-Administered Medications:     acetaminophen (TYLENOL) rectal suppository 650 mg, 650 mg, Rectal, Q4H PRN, Cedric David PA-C    acetaminophen (TYLENOL) tablet 975 mg, 975 mg, Oral, Q6H While awake, Cedric David PA-C, 975 mg at 11/19/24 0856    ALPRAZolam (XANAX) tablet 0.5 mg, 0.5 mg, Oral, HS PRN, Cedric David PA-C, 0.5 mg at 11/18/24 2236    aspirin (ECOTRIN LOW STRENGTH) EC tablet 81 mg, 81 mg, Oral, Daily, Sheeba Salomon PA-C, 81 mg at 11/19/24 0855    atorvastatin (LIPITOR) tablet 40 mg, 40 mg, Oral, Daily With Dinner, Sheeba Salomon PA-C, 40 mg at 11/18/24 1616    bisacodyl (DULCOLAX) rectal suppository 10 mg, 10 mg, Rectal, Daily PRN, Cedric David PA-C    chlorhexidine (PERIDEX) 0.12 % oral rinse 15 mL, 15 mL, Mouth/Throat, BID, Cedric David PA-C, 15 mL at 11/19/24 0856    docusate sodium (COLACE) capsule 100 mg, 100 mg, Oral, BID, Sheeba Salomon PA-C, 100 mg at 11/19/24 0855    fondaparinux (ARIXTRA) subcutaneous injection 2.5 mg,  2.5 mg, Subcutaneous, Q24H, Paola Cruz PA-C, 2.5 mg at 11/19/24 0856    furosemide (LASIX) injection 40 mg, 40 mg, Intravenous, BID (diuretic), Sheeba Salomon PA-C, 40 mg at 11/19/24 0856    guaiFENesin (MUCINEX) 12 hr tablet 600 mg, 600 mg, Oral, BID PRN, Kristin Doe PA-C, 600 mg at 11/17/24 2106    hydrOXYzine HCL (ATARAX) tablet 10 mg, 10 mg, Oral, Daily, Cedric David PA-C, 10 mg at 11/19/24 0855    insulin lispro (HumALOG/ADMELOG) 100 units/mL subcutaneous injection 1-5 Units, 1-5 Units, Subcutaneous, TID AC, 1 Units at 11/17/24 1121 **AND** Fingerstick Glucose (POCT), , , TID AC, Shebea Salomon PA-C    insulin lispro (HumALOG/ADMELOG) 100 units/mL subcutaneous injection 1-5 Units, 1-5 Units, Subcutaneous, HS, Sheeba Salomon PA-C, 1 Units at 11/16/24 2311    ondansetron (ZOFRAN) injection 4 mg, 4 mg, Intravenous, Q6H PRN, Cedric David PA-C, 4 mg at 11/16/24 1638    oxyCODONE (ROXICODONE) split tablet 2.5 mg, 2.5 mg, Oral, Q4H PRN, 2.5 mg at 11/19/24 0448 **OR** oxyCODONE (ROXICODONE) IR tablet 5 mg, 5 mg, Oral, Q4H PRN, Cedric David PA-C, 5 mg at 11/17/24 2106    pantoprazole (PROTONIX) EC tablet 40 mg, 40 mg, Oral, Daily, Cedric David PA-C, 40 mg at 11/19/24 0632    polyethylene glycol (MIRALAX) packet 17 g, 17 g, Oral, Daily, Cedric David PA-C, 17 g at 11/18/24 0830    potassium chloride (Klor-Con M20) CR tablet 20 mEq, 20 mEq, Oral, BID, Sheeba Salomon PA-C, 20 mEq at 11/19/24 0856    senna (SENOKOT) tablet 8.6 mg, 1 tablet, Oral, BID, Bernie Lind PA-C, 8.6 mg at 11/19/24 0856    traZODone (DESYREL) tablet 200 mg, 200 mg, Oral, HS, Sheeba Salomon PA-C, 200 mg at 11/18/24 8906    Lab Results:      Results from last 7 days   Lab Units 11/19/24  0559 11/18/24  0432 11/17/24  0453   WBC Thousand/uL 13.07* 15.24* 15.16*   HEMOGLOBIN g/dL 11.1* 11.5 10.8*   HEMATOCRIT % 33.7* 34.7* 33.0*   PLATELETS Thousands/uL 81* 55* 56*         Results from last 7 days   Lab Units 11/19/24  0559  11/18/24  0432 11/17/24  0453 11/15/24  1703 11/15/24  1321 11/15/24  1316 11/15/24  1230 11/15/24  1156   SODIUM mmol/L 138 137 138   < >  --    < >  --   --    POTASSIUM mmol/L 4.2 3.9 4.0   < >  --    < >  --   --    CHLORIDE mmol/L 102 103 106   < >  --    < >  --   --    CO2 mmol/L 29 27 25   < >  --    < >  --   --    CO2, I-STAT mmol/L  --   --   --   --  19*  --  24 26   BUN mg/dL 25 25 28*   < >  --    < >  --   --    CREATININE mg/dL 0.81 0.98 1.07   < >  --    < >  --   --    CALCIUM mg/dL 7.9* 7.7* 7.7*   < >  --    < >  --   --    GLUCOSE, ISTAT mg/dl  --   --   --   --  137  --  148* 153*    < > = values in this interval not displayed.         Results from last 7 days   Lab Units 11/19/24  0559 11/18/24  0432 11/17/24  0453   MAGNESIUM mg/dL 2.6 2.1 2.1       Telemetry: Personally reviewed. Rate change this AM.    Echo:  LEFT VENTRICLE:  Systolic Function: normal. Ejection Fraction: 50-55%. Cavity size: normal.   Regional Wall Motion Abnormalities: none.   RIGHT VENTRICLE:  Systolic Function: normal.  Cavity size normal. No hypertrophy     AORTIC VALVE:  Leaflets: normal and trileaflet. Leaflet motions normal and normal. Stenosis: none.     Regurgitation: none.     MITRAL VALVE:  Leaflets: myxomatous and thickened. Leaflet Motions: prolapse and Bileaflet prolapse. Worse in A3, P2 and P3. Regurgitation: severe and Centrally directed.   Stenosis: none.      TRICUSPID VALVE:  Leaflets: normal. Leaflet Motions: normal. Stenosis: none. Regurgitation: mild.   PULMONIC VALVE:  Leaflets: normal. Regurgitation: none. Stenosis: none.   ASCENDING AORTA:  Size:  normal.  Dissection not present.     AORTIC ARCH:  Size:  normal.  dissection not present. Grade 2: severe intimal thickening without protruding atheroma.   DESCENDING AORTA:  Size: normal.  Dissection not present. Grade 3: atheroma protruding < 0.5 cm into lumen.   RIGHT ATRIUM:  Size:  dilated. No spontaneous echo contrast.   LEFT ATRIUM:  Size: dilated.  No spontaneous echo contrast.   LEFT ATRIAL APPENDAGE:  Size: dilated. No spontaneous echo contrast    ATRIAL SEPTUM:  Intra-atrial septal morphology: normal.     VENTRICULAR SEPTUM:  Intra-ventricular septum morphology: normal.    EPIAORTIC:  Plaque Thickness: 0-5 mm.   OTHER FINDINGS:  Pericardium:  pericardial effusion and Trace. Pleural Effusion:  none.   POSTPROCEDURE   LEFT VENTRICLE: Unchanged .   RIGHT VENTRICLE: Unchanged .   AORTIC VALVE: Unchanged .   MITRAL VALVE:   Leaflets: bioprosthetic. Regurgitation: none. Stenosis: none. Mean Gradient: 2.Valve/Ring Size: 29 mm.   TRICUSPID VALVE: Unchanged .   PULMONIC VALVE: Unchanged    ATRIA:   Left Atrial Appendage Ligate: Yes. Residual Flow in Left Atrial Appendage by Color Flow Doppler: No.    AORTA: Unchanged .       Cath:  No CAD

## 2024-11-19 NOTE — ASSESSMENT & PLAN NOTE
She had severe mitral regurgitation identified as an outpatient. She was referred for surgery. Unfortunately, repair was not possible and a replacement was ultimately done. Bioprosthetic valve. Had left atrial appendage ligation during her surgery, also.    Management per the surgical service.

## 2024-11-19 NOTE — CASE MANAGEMENT
Case Management Discharge Planning Note    Patient name Carley Bonilla  Location [facility]-[id number] MRN [id number]  : 1955 Date 2024       Current Admission Date: 11/15/2024  Current Admission Diagnosis:MVP (mitral valve prolapse)   Patient Active Problem List    Diagnosis Date Noted Date Diagnosed    Leukocytosis 2024     Anemia 2024     Thrombocytopenia ([facility]) 2024     Hypocalcemia 2024     Prediabetes 2024     Sick sinus syndrome ([facility]) 2024     S/P left atrial appendage ligation 11/15/2024     S/P mitral valve replacement with bioprosthetic valve 11/15/2024     PONV (postoperative nausea and vomiting) 10/09/2024     Constipation 2024     Mitral valve insufficiency 2024     Murmur, cardiac 2024     Age-related osteoporosis with current pathological fracture 2024     Nodular lesion on surface of skin 2024     Chronic insomnia 2023     Panic disorder (episodic paroxysmal anxiety) 2022     Kidney disease, chronic, stage III (GFR 30-59 ml/min) ([facility]) 2020     Hyperlipidemia 2014     Anxiety 2014     Benign essential hypertension 2014     MVP (mitral valve prolapse) 2008       LOS (days): 4  Geometric Mean LOS (GMLOS) (days): 8.5  Days to GMLOS:4.2     OBJECTIVE:  Risk of Unplanned Readmission Score: 14.52         Current admission status: Inpatient   Preferred Pharmacy:   [id number] [place] - [address]  [address]  [place] [address]  Phone: [phone] Fax: [phone]    [facility] - [place], [place] - [address]  [address] [address]  Phone: [phone] Fax: [phone]    [id number] [place] [address]  [address] [place] [address]  Phone: [phone] Fax: [phone]    Primary Care Provider: Kerrie Barrios MD    Primary Insurance: [facility] REP  Secondary Insurance:     DISCHARGE  DETAILS:                                Requested Home Health Care         [facility] Health Agency Name:: [facility]         Other Referral/Resources/Interventions Provided:  Referral Comments: Accepted by [facility]                                                      Additional Comments: POD#4 MVR, chest tubes, wires, Milrinone removed today. Plan home on 11/21/24. Lives in [place] with signif. other who will transport home.  Accepted by [facility].

## 2024-11-19 NOTE — ASSESSMENT & PLAN NOTE
Sick sinus syndrome with junctional bradycardia at times + brief periods of Mobitz 1  No evidence of complete heart block/high grade AV block  Epicardial wires in place but not being utilized, back up pacing not needed recently  On atentolol 25 mg daily as an outpatient, held on admission  Received < 2 g of amiodarone in the post operative setting, now held  Rates in the high 40s-50s noted 11/18, improved as of 11/19  ECGs/telemetry at times show likely atrial bigeminy/PACs, p waves are difficult to see

## 2024-11-19 NOTE — RESTORATIVE TECHNICIAN NOTE
Restorative Technician Note      Patient Name: Carley Bonilla     Restorative Tech Visit Date: 11/19/24  Note Type: Mobility  Patient Position Upon Consult: Bedside chair  Activity Performed: Ambulated; Other (Comment) (to/from the BR)  Assistive Device: Roller walker  Patient Position at End of Consult: All needs within reach; Bedside chair

## 2024-11-19 NOTE — UTILIZATION REVIEW
Continued Stay Review    Date: 11/19                          Current Patient Class: Inpatient    Level of care   11/15 - 11/18  Critical   11/18 - [facility]       HPI:69 y.o. female initially admitted on 11/15  for scheduled  procedures:   mitral valve replacement and ligation of left atrial appendage   Low CI in the post operative setting requiring pressor support  and pacing for Junctional bradycardia.  Preserved LV systolic function with LVEF 65% per echo 10/2024    11/18   Transferred out of ICU.    no longer is requiring pacing /  OK TO PULL EPICARDIAL WIRES .   One EKG shows a brief period of Mobitz type I with subsequent junctional rhythm, however no evidence of complete heart block or high-grade AV block is noted.   hopeful that as she continues to recover her sinus node function will continue to improve.   amiodarone dc'ed      11/19  Chest tubes removed in routine fashion without incident. Cont bid IV lasix .  WEANED OFF MILNIRONE Gtt this am     Medications:   Scheduled Medications:  acetaminophen, 975 mg, Oral, Q6H While awake  aspirin, 81 mg, Oral, Daily  atorvastatin, 40 mg, Oral, Daily With Dinner  bisacodyl, 10 mg, Oral, Once  chlorhexidine, 15 mL, Mouth/Throat, BID  docusate sodium, 100 mg, Oral, BID  fondaparinux, 2.5 mg, Subcutaneous, Q24H  furosemide, 40 mg, Intravenous, BID (diuretic)  hydrOXYzine HCL, 10 mg, Oral, Daily  insulin lispro, 1-5 Units, Subcutaneous, TID AC  insulin lispro, 1-5 Units, Subcutaneous, HS  pantoprazole, 40 mg, Oral, Daily  polyethylene glycol, 17 g, Oral, Daily  potassium chloride, 20 mEq, Oral, BID  senna, 1 tablet, Oral, BID  traZODone, 200 mg, Oral, HS      Continuous IV Infusions:     PRN Meds:  acetaminophen, 650 mg, Rectal, Q4H PRN  ALPRAZolam, 0.5 mg, Oral, HS PRN  bisacodyl, 10 mg, Rectal, Daily PRN  guaiFENesin, 600 mg, Oral, BID PRN  ondansetron, 4 mg, Intravenous, Q6H PRN  oxyCODONE, 2.5 mg, Oral, Q4H PRN  ...  11/19  @  0450   Or  oxyCODONE, 5 mg, Oral, Q4H  PRN      Discharge Plan: tbd    Vital Signs (last 3 days)       Date/Time Temp Pulse Resp BP MAP (mmHg) SpO2 Nasal Cannula O2 Flow Rate (L/min) O2 Device Plato Coma Scale Score Pain    11/19/24 1245 -- 61 -- 137/59 85 94 % -- -- -- --    11/19/24 1230 -- 63 -- 119/62 81 94 % -- -- -- --    11/19/24 1200 -- 63 -- 143/69 94 93 % -- -- -- --    11/19/24 1145 -- 64 -- 150/126 134 94 % -- -- -- --    11/19/24 1130 -- 65 -- 136/69 91 94 % -- -- -- --    11/19/24 11:15:02 98.4 °F (36.9 °C) 63 -- 127/108 114 94 % -- -- -- --    11/19/24 1100 -- 63 -- 137/81 100 93 % -- Nasal cannula -- --    11/19/24 0800 -- -- -- -- -- -- -- -- 15 No Pain    11/19/24 07:20:06 97.8 °F (36.6 °C) 69 -- 154/81 105 89 % -- Nasal cannula -- --    11/19/24 0448 -- -- -- -- -- -- -- -- -- 7    11/19/24 02:12:07 98.5 °F (36.9 °C) 65 16 92/55 67 90 % 4 L/min Nasal cannula -- --    11/18/24 22:34:50 98.5 °F (36.9 °C) 69 -- 130/74 93 92 % -- -- -- --    11/18/24 1853 98.8 °F (37.1 °C) 52 18 134/50 78 90 % -- Nasal cannula -- --          Weight (last 2 days)       Date/Time Weight    11/19/24 0600 72.3 (159.39)    11/19/24 0442 72.3 (159.39)    11/18/24 0543 73.1 (161.16)    11/17/24 0515 73.3 (161.6)            Pertinent Labs/Diagnostic Results:   Radiology:  XR chest portable   Final Interpretation by Ange Nugent MD (11/16 2019)      Status post MVR with mild pulmonary venous congestion and bibasilar atelectasis.            Workstation performed: [id number]         XR chest portable ICU   Final Interpretation by Jose Linda MD (11/15 9249)      No acute cardiopulmonary disease.            Workstation performed: [id number]           Cardiology:  KWAME intraop interventional w/realtime guidance of cardiac procedures   Final Result by SYSTEMGENERATED, DOCUMENTATION (11/18 4450)   This order contains the linked images for the KWAME that was performed by    the Anesthesiologist.  Please see the  CARDIAC KWAME ANESTHESIA procedure    for  results.      ECG 12 lead   Final Result by Hal Tracey MD (11/18 1754)   Sinus rhythm with 1st degree A-V block   ST & T wave abnormality, consider inferolateral ischemia   Abnormal ECG   When compared with ECG of 18-Nov-2024 03:02, (unconfirmed)   No significant change was found   Confirmed by Hal Tracey ([id number]) on 11/18/2024 5:54:42 PM      ECG 12 lead   Final Result by Hal Tracey MD (11/18 1754)   Sinus rhythm with 1st degree A-V block   Nonspecific ST and T wave abnormality   Abnormal ECG   When compared with ECG of 18-Nov-2024 03:01, (unconfirmed)   Electronic ventricular pacemaker No longer present   Confirmed by Hal Tracey ([id number]) on 11/18/2024 5:54:23 PM        GI:  No orders to display           Results from last 7 days   Lab Units 11/19/24 0559 11/18/24 0432 11/17/24  0453 11/16/24  0404 11/15/24  1921 11/15/24  1316   WBC Thousand/uL 13.07* 15.24* 15.16* 15.16*  --    < >   HEMOGLOBIN g/dL 11.1* 11.5 10.8* 12.7 14.0  --    HEMATOCRIT % 33.7* 34.7* 33.0* 38.4 41.1  --    PLATELETS Thousands/uL 81* 55* 56* 75*  --   --     < > = values in this interval not displayed.         Results from last 7 days   Lab Units 11/19/24  0559 11/18/24  0432 11/17/24  0453 11/16/24  1330 11/16/24  0404 11/15/24  1703 11/15/24  1321 11/15/24  1316 11/15/24  1230 11/15/24  1156 11/15/24  1128 11/15/24  1059   SODIUM mmol/L 138 137 138 136 140  --   --    < >  --   --   --   --    POTASSIUM mmol/L 4.2 3.9 4.0 4.4 5.0   < >  --    < >  --   --   --   --    CHLORIDE mmol/L 102 103 106 108 110*  --   --    < >  --   --   --   --    CO2 mmol/L 29 27 25 24 24  --   --    < >  --   --   --   --    CO2, I-STAT mmol/L  --   --   --   --   --   --  19*  --  24 26 27 25   ANION GAP mmol/L 7 7 7 4 6  --   --    < >  --   --   --   --    BUN mg/dL 25 25 28* 24 23  --   --    < >  --   --   --   --    CREATININE mg/dL 0.81 0.98 1.07 1.07 0.94  --   --    < >  --   --   --   --    EGFR ml/min/1.73sq m 74 59 53 53 62  --   --    < >   --   --   --   --    CALCIUM mg/dL 7.9* 7.7* 7.7* 7.6* 8.0*  --   --    < >  --   --   --   --    CALCIUM, IONIZED, ISTAT mmol/L  --   --   --   --   --   --  1.00*  --  1.07* 1.09* 1.17 1.35*   MAGNESIUM mg/dL 2.6 2.1 2.1  --  2.5  --   --   --   --   --   --   --     < > = values in this interval not displayed.         Results from last 7 days   Lab Units 11/19/24  1114 11/19/24  0558 11/18/24  2058 11/18/24  1647 11/18/24  1108 11/17/24  2105 11/17/24  1542 11/17/24  1050 11/17/24  0621 11/17/24  0541 11/16/24  2249 11/16/24  1627   POC GLUCOSE mg/dl 121 116 113 165* 139 131 144* 163* 134 137 159* 131     Results from last 7 days   Lab Units 11/19/24  0559 11/18/24  0432 11/17/24  0453 11/16/24  1330 11/16/24  0404 11/15/24  1316   GLUCOSE RANDOM mg/dL 125 115 124 139 154* 151*       Results from last 7 days   Lab Units 11/19/24  0559 11/18/24  0432 11/17/24  0459   PH GURDEEP  7.416* 7.396 7.343   PCO2 GURDEEP mm Hg 44.8 47.0 48.1   PO2 GURDEEP mm Hg 32.4* 34.4* 30.0*   HCO3 GURDEEP mmol/L 28.1 28.2 25.5   BASE EXC GURDEEP mmol/L 3.1 2.5 -0.8   O2 CONTENT GURDEEP ml/dL 10.3 15.1 12.6   O2 HGB, VENOUS % 61.6 65.7 56.9*     Network Utilization Review Department  ATTENTION: Please call with any questions or concerns to [phone] and carefully listen to the prompts so that you are directed to the right person. All voicemails are confidential.   For Discharge needs, contact Care Management DC Support Team at [phone] opt. [phone]  Send all requests for admission clinical reviews, approved or denied determinations and any other requests to dedicated fax number below belonging to the campus where the patient is receiving treatment. List of dedicated fax numbers for the Facilities:  FACILITY NAME UR FAX NUMBER   ADMISSION DENIALS (Administrative/Medical Necessity) [phone]   DISCHARGE SUPPORT TEAM (NETWORK) [phone]   PARENT CHILD HEALTH (Maternity/NICU/Pediatrics) [phone]   [facility] [phone]    [facility] [phone]   [facility] [phone]   [facility] [phone]   Critical access hospital [phone]   [facility] [phone]   [facility] [phone]   [facility] [phone]   [facility] [phone]   [facility] [phone]   [facility] [phone]   [facility] [phone]

## 2024-11-19 NOTE — ASSESSMENT & PLAN NOTE
Mitral valve prolapse with severe mitral regurgitation, status post tissue MV replacement + left atrial appendage ligation 11/15/2024  Low CI in the post operative setting requiring pressor support - currently being weaned off of milrinone (held as of this morning)  Preserved LV systolic function with LVEF 65% per echo 10/2024

## 2024-11-19 NOTE — PROCEDURES
11/19/24    Procedure: Chest tube removal    Chest tubes removed in routine fashion without incident.  Insertion site dressed with Acticoat.  Carley Bonilla tolerated the procedure well.  Nurse notified.    SIGNATURE: Bernie Lind PA-C  DATE: November 19, 2024  TIME: 10:35 AM 11/19/24    Procedure: Epicardial Pacing Wire removal    Carley Bonilla was returned to bed and informed of mandatory one hour post-procedure bed rest.  The assigned nurse was notified.  Epicardial pacing wires removed in routine fashion, without incident.  The patient tolerated the procedure well.  Vital signs ordered  q 15 minutes for one hour, as per protocol.    SIGNATURE: Bernie Lind PA-C  DATE: November 19, 2024  TIME: 10:35 AM

## 2024-11-19 NOTE — ASSESSMENT & PLAN NOTE
Postoperative. Previously with junctional rhythm, Wenckebach. Rate change this AM. Low P wave amplitude. Regular. Hold amiodarone and metoprolol. EP following.

## 2024-11-19 NOTE — PHYSICAL THERAPY NOTE
PHYSICAL THERAPY NOTE          Patient Name: Carley Bonilla  Today's Date: 11/19/2024 11/19/24 0928   PT Last Visit   PT Visit Date 11/19/24   Note Type   Note Type Treatment   Pain Assessment   Pain Assessment Tool 0-10   Pain Score 3   Pain Location/Orientation Orientation: Mid;Location: Chest;Location: Incision   Pain Onset/Description Onset: Ongoing;Frequency: Intermittent;Descriptor: Aching;Descriptor: Discomfort   Effect of Pain on Daily Activities min guarding   Patient's Stated Pain Goal No pain   Hospital Pain Intervention(s) Repositioned;Ambulation/increased activity;Emotional support   Restrictions/Precautions   Other Precautions Cardiac/sternal;Multiple lines;Telemetry;O2  (CT)   General   Chart Reviewed Yes   Additional Pertinent History cleared for Tx session by nsg   Response to Previous Treatment Patient with no complaints from previous session.   Family/Caregiver Present Yes   Cognition   Overall Cognitive Status WFL   Arousal/Participation Cooperative;Alert   Attention Attends with cues to redirect   Orientation Level Oriented to person;Oriented to place;Oriented to situation   Memory Decreased recall of precautions   Following Commands Follows one step commands without difficulty   Subjective   Subjective Alert; in the chair; agreeable to mobilize   Transfers   Sit to Stand 5  Supervision   Stand to Sit 5  Supervision   Ambulation/Elevation   Gait pattern Excessively slow;Short stride;Inconsistent chris   Gait Assistance 4  Minimal assist   Additional items Assist x 1;Verbal cues;Tactile cues   Assistive Device Rolling walker   Distance 30 ft + 60 ft + 40 ft + 60 ft w/ extended seated rest periods in between   Stair Management Assistance Not tested   Balance   Static Sitting Fair +   Dynamic Sitting Fair   Static Standing Fair -   Dynamic Standing Poor +   Ambulatory Poor +   Activity Tolerance   Activity  Tolerance Patient limited by fatigue   Nurse Made Aware spoke to RAFAEL Leon   Exercises   Knee AROM Long Arc Quad Sitting;15 reps;AROM;Bilateral   Ankle Pumps Sitting;15 reps;AROM;Bilateral   Assessment   Prognosis Good   Problem List Decreased strength;Decreased endurance;Impaired balance;Decreased mobility   Assessment Pt cont to improve in functional endurance and mobility skills ambulating further distance overall w/ min (A) and rw; HEP initiated and pt remained in NAD; D/C recommendations are listed below; will follow   Goals   Patient Goals to cont getting better   STG Expiration Date 11/30/24   PT Treatment Day 2   Plan   Treatment/Interventions Functional transfer training;LE strengthening/ROM;Elevations;Therapeutic exercise;Endurance training;Cognitive reorientation;Bed mobility;Gait training;Spoke to nursing;Spoke to case management;Family;Equipment eval/education   Progress Progressing toward goals   PT Frequency 4-6x/wk   Discharge Recommendation   Rehab Resource Intensity Level, PT III (Minimum Resource Intensity)   Equipment Recommended Walker   Walker Package Recommended Wheeled walker   Change/add to Walker Package? No   AM-PAC Basic Mobility Inpatient   Turning in Flat Bed Without Bedrails 3   Lying on Back to Sitting on Edge of Flat Bed Without Bedrails 2   Moving Bed to Chair 3   Standing Up From Chair Using Arms 3   Walk in Room 3   Climb 3-5 Stairs With Railing 3   Basic Mobility Inpatient Raw Score 17   Basic Mobility Standardized Score 39.67   [facility] Highest Level Of Mobility   -HLM Goal 5: Stand one or more mins   -HLM Achieved 7: Walk 25 feet or more   Education   Education Provided Mobility training;Home exercise program;Assistive device   Patient Demonstrates verbal understanding;Reinforcement needed   End of Consult   Patient Position at End of Consult Bedside chair;All needs within reach     Carlos Milton

## 2024-11-19 NOTE — RESTORATIVE TECHNICIAN NOTE
Restorative Technician Note      Patient Name: Carley Bonilla     Restorative Tech Visit Date: 11/19/24  Note Type: Mobility  Patient Position Upon Consult: Supine  Activity Performed: Ambulated  Assistive Device: Roller walker  Patient Position at End of Consult: All needs within reach; Bedside chair

## 2024-11-19 NOTE — PROGRESS NOTES
Progress Note - Electrophysiology-Cardiology (EP)   Carley Bonilla 69 y.o. female MRN: [id number]  [facility] Encounter: [id number]      Assessment:  Assessment & Plan  Sick sinus syndrome (HCC)  Sick sinus syndrome with junctional bradycardia at times + brief periods of Mobitz 1  No evidence of complete heart block/high grade AV block  Epicardial wires in place but not being utilized, back up pacing not needed recently  On atentolol 25 mg daily as an outpatient, held on admission  Received < 2 g of amiodarone in the post operative setting, now held  Rates in the high 40s-50s noted 11/18, improved as of 11/19  ECGs/telemetry at times show likely atrial bigeminy/PACs, p waves are difficult to see  MVP (mitral valve prolapse)  Mitral valve prolapse with severe mitral regurgitation, status post tissue MV replacement + left atrial appendage ligation 11/15/2024  Low CI in the post operative setting requiring pressor support - currently being weaned off of milrinone (held as of this morning)  Preserved LV systolic function with LVEF 65% per echo 10/2024  Benign essential hypertension  Hypotensive in the post operative setting, currently on low dose milrinone  Hyperlipidemia  Maintained on statin therapy  Kidney disease, chronic, stage III (GFR 30-59 ml/min) ([facility])  Lab Results   Component Value Date    EGFR 74 11/19/2024    EGFR 59 11/18/2024    EGFR 53 11/17/2024    CREATININE 0.81 11/19/2024    CREATININE 0.98 11/18/2024    CREATININE 1.07 11/17/2024   Creatinine appears to be at baseline  S/P left atrial appendage ligation  Performed at time of MVR  S/P mitral valve replacement with bioprosthetic valve  See 'MVP (mitral valve prolapse)' for details  Anemia  Acute blood loss anemia in the post operative setting, stable        Plan:  We thoroughly reviewed EKGs and telemetry.  Vital signs show bradycardia into the high 40s and 50s yesterday, however improved today.  No significant bradycardia noted on  telemetry, no evidence of high-grade AV block.  EKG shows sinus rhythm with likely atrial bigeminy.    There is still no urgent indication for a pacemaker.  Okay to pull epicardial wires from our standpoint.  Continue to closely monitor telemetry.    We will make n.p.o. at midnight in case there are changes in her rhythm or she does not continue to improve, in which case a pacemaker may be indicated.  This was explained to the patient and her family, they understand and agree with this plan.    Discussed with primary team - she was on atenolol 25 mg daily as an outpatient, which is currently being held.  They will discuss with attending whether a beta-blocker is indicated in the post operative setting.  If they would like to discharge her on a beta blocker, would recommend starting this now to see if she is able to tolerate.  This may help determine whether pacemaker is truly needed.      Subjective/Objective   Chief Complaint: No acute complaints    Subjective: She is feeling much better today, transferred out of the ICU.  She has been ambulating this morning, feels well and denies all significant cardiac complaints.  Specifically she denies dizziness, lightheadedness, or palpitations.      Objective:     Vitals: /81 (BP Location: Right arm)   Pulse 69   Temp 97.8 °F (36.6 °C) (Oral)   Resp 16   Ht 5' (1.524 m)   Wt 72.3 kg (159 lb 6.3 oz)   LMP  (LMP Unknown)   SpO2 (!) 89%   BMI 31.13 kg/m²   Vitals:    11/19/24 0442 11/19/24 0600   Weight: 72.3 kg (159 lb 6.3 oz) 72.3 kg (159 lb 6.3 oz)     Orthostatic Blood Pressures      Flowsheet Row Most Recent Value   Blood Pressure 154/81 filed at 11/19/2024 0720   Patient Position - Orthostatic VS Lying filed at 11/19/2024 0720              Intake/Output Summary (Last 24 hours) at 11/19/2024 0951  Last data filed at 11/19/2024 0900  Gross per 24 hour   Intake 632.75 ml   Output 1445 ml   Net -812.25 ml       Invasive Devices       Central Venous Catheter  Line  Duration             CVC Central Lines 11/15/24 Triple Right Internal jugular 4 days              Peripheral Intravenous Line  Duration             Peripheral IV 11/15/24 Right Antecubital 4 days              Line  Duration             Pacer Wires 3 days    Pacer Wires 3 days              Drain  Duration             Chest Tube 1 Anterior;Mediastinal 32 Fr. 3 days    Chest Tube 2 Posterior;Mediastinal 32 Fr. 3 days                                Scheduled Meds:  Current Facility-Administered Medications   Medication Dose Route Frequency Provider Last Rate    acetaminophen  650 mg Rectal Q4H PRN Cedric David PA-C      acetaminophen  975 mg Oral Q6H While awake Cedric David PA-C      ALPRAZolam  0.5 mg Oral HS PRN Cedric David PA-C      aspirin  81 mg Oral Daily Sheeba Salomon PA-C      atorvastatin  40 mg Oral Daily With Dinner Sheeba Salomon PA-C      bisacodyl  10 mg Rectal Daily PRN Cedric David PA-C      chlorhexidine  15 mL Mouth/Throat BID Cedric David PA-C      docusate sodium  100 mg Oral BID Sheeba Salomon PA-C      fondaparinux  2.5 mg Subcutaneous Q24H Paola Cruz PA-C      furosemide  40 mg Intravenous BID (diuretic) Sheeba Salomon PA-C      guaiFENesin  600 mg Oral BID PRN Kristin Doe PA-C      hydrOXYzine HCL  10 mg Oral Daily Cedric David PA-C      insulin lispro  1-5 Units Subcutaneous TID AC Sheeba Salomon PA-C      insulin lispro  1-5 Units Subcutaneous HS Sheeba Salomon PA-C      ondansetron  4 mg Intravenous Q6H PRN Cedric David PA-C      oxyCODONE  2.5 mg Oral Q4H PRN Cedric David PA-C      Or    oxyCODONE  5 mg Oral Q4H PRN Cedric David PA-C      pantoprazole  40 mg Oral Daily Cedric David PA-C      polyethylene glycol  17 g Oral Daily Cedric David PA-C      potassium chloride  20 mEq Oral BID Sheeba Salomon PA-C      senna  1 tablet Oral BID Bernie Lind PA-C      traZODone  200 mg Oral HS Sheeba Salomon PA-C       Continuous Infusions:   PRN  Meds:.  acetaminophen    ALPRAZolam    bisacodyl    guaiFENesin    ondansetron    oxyCODONE **OR** oxyCODONE    Review of Systems   Constitutional: Negative for fever and malaise/fatigue.   Cardiovascular:  Negative for chest pain, dyspnea on exertion, irregular heartbeat, leg swelling, near-syncope, orthopnea, palpitations, paroxysmal nocturnal dyspnea and syncope.   All other systems reviewed and are negative.        Physical Exam:   GEN: NAD, alert and oriented x 3, well appearing  SKIN: dry without significant lesions or rashes  HEENT: NCAT, PERRL, EOMs intact  NECK: No JVD appreciated  CARDIOVASCULAR: RRR, normal S1, S2 without murmurs, rubs, or gallops appreciated  LUNGS: Clear to auscultation bilaterally without wheezes, rhonchi, or rales; decreased breath sounds at bases b/l  ABDOMEN: Soft, nontender, nondistended  EXTREMITIES/VASCULAR: perfused without clubbing, cyanosis, or LE edema b/l  PSYCH: Normal mood and affect  NEURO: CN ll-Xll grossly intact                Lab Results: I have personally reviewed pertinent lab results.    Results from last 7 days   Lab Units 11/19/24  0559 11/18/24  0432 11/17/24  0453   WBC Thousand/uL 13.07* 15.24* 15.16*   HEMOGLOBIN g/dL 11.1* 11.5 10.8*   HEMATOCRIT % 33.7* 34.7* 33.0*   PLATELETS Thousands/uL 81* 55* 56*     Results from last 7 days   Lab Units 11/19/24  0559 11/18/24  0432 11/17/24  0453 11/15/24  1703 11/15/24  1321   POTASSIUM mmol/L 4.2 3.9 4.0   < >  --    CHLORIDE mmol/L 102 103 106   < >  --    CO2 mmol/L 29 27 25   < >  --    CO2, I-STAT mmol/L  --   --   --   --  19*   BUN mg/dL 25 25 28*   < >  --    CREATININE mg/dL 0.81 0.98 1.07   < >  --    GLUCOSE, ISTAT mg/dl  --   --   --   --  137   CALCIUM mg/dL 7.9* 7.7* 7.7*   < >  --     < > = values in this interval not displayed.         Results from last 7 days   Lab Units 11/19/24  0559 11/18/24  0432 11/17/24  0453   MAGNESIUM mg/dL 2.6 2.1 2.1         Imaging: Results Review Statement: No pertinent  imaging studies reviewed.    ECHO: Results for orders placed during the hospital encounter of 17    Echo complete with contrast if indicated    [facility]  [address], [place] [id number] [phone]    Transthoracic Echocardiogram  2D, M-mode, Doppler, and Color Doppler    Study date:  2017    Patient: BRENNA PUGH  MR number: [id number]  Account number: [id number]  : 1955  Age: 61 years  Gender: Female  Status: Outpatient  Location: [facility]  Height: 60 in  Weight: 101 lb  BP: 127/ 71 mmHg    Indications: MV disorder.    Diagnoses: I34.9 - Nonrheumatic mitral valve disorder, unspecified    Sonographer:  [place] RCS  Primary Physician:  Parker Zuluaga MD  Referring Physician:  Patricio Choi MD  Group:  [facility]  Interpreting Physician:  Patricio Choi MD    SUMMARY    LEFT VENTRICLE:  There were no regional wall motion abnormalities.  Doppler parameters were consistent with high ventricular filling pressure.    LEFT ATRIUM:  The atrium was moderately dilated.    MITRAL VALVE:  redundant valve with marked prolapse of the posterior leaflet and severe  regurgitation    TRICUSPID VALVE:  There was trace regurgitation.    SUMMARY MEASUREMENTS  Unspecified Scan Mode measurements:  Aortic Valve:   HR was 68 /min.  Left Ventricle:   HR was 73 /min.    COMPARISONS:  left ventricular sized has increased slightly since     HISTORY: PRIOR HISTORY: Risk factors: hypertension and a family history of  coronary artery disease.    PROCEDURE: The study was performed in the [facility]. This  was a routine study. The transthoracic approach was used. The study included  complete 2D imaging, M-mode, complete spectral Doppler, and color Doppler.  Image quality was adequate.    LEFT VENTRICLE: Size was normal. Systolic function was by visual assessment.  Ejection fraction was estimated to be 65 %.  There were no regional wall motion  abnormalities. Wall thickness was normal. DOPPLER: Doppler parameters were  consistent with high ventricular filling pressure.    RIGHT VENTRICLE: The size was normal. Systolic function was normal. Wall  thickness was normal.    LEFT ATRIUM: The atrium was moderately dilated.    RIGHT ATRIUM: Size was normal.    MITRAL VALVE: redundant valve with marked prolapse of the posterior leaflet and  severe regurgitation    AORTIC VALVE: The valve was trileaflet. Leaflets exhibited normal thickness and  normal cuspal separation. DOPPLER: Transaortic velocity was within the normal  range. There was no evidence for stenosis. There was no regurgitation.    TRICUSPID VALVE: DOPPLER: There was trace regurgitation.    PERICARDIUM: There was no pericardial effusion. The pericardium was normal in  appearance.    AORTA: The root exhibited normal size.    PULMONARY ARTERY: DOPPLER: Systolic pressure was within the normal range.    SYSTEM MEASUREMENT TABLES    Apical four chamber  4 chamber Left Atrium Volume Index; Planimetry; End Systole; Apical four  chamber;: 24 cm2 Left Ventricular End Diastolic Volume; Method of Disks, Single  Plane; Apical four chamber;: 77.1 ml Left Ventricular End Systolic Volume;  Method of Disks, Single Plane; Apical four chamber;: 21.3 ml Right Atrium  Systolic Area; Planimetry; End Systole; Apical four chamber;: 11.52 cm2 Right  Ventricular Internal Diastolic Dimension; End Diastole; Apical four chamber;:  33.6 mm  TAPSE: 27.4 mm    Unspecified Scan Mode  Aortic Root Diameter; End Systole;: 24.2 mm  Gradient Pressure, Peak; Simplified Bernoulli; Antegrade Flow; Systole;: 10.5  mm[Hg] Gradient pressure, average; Simplified Bernoulli; Antegrade Flow;  Systole;: 5.5 mm[Hg]  HR: 68 /min  Left atrial diameter; End Diastole;: 41.7 mm  Cardiac Output; Teichholz; Systole;: 6.76 L/min  HR: 73 /min  Interventricular Septum Diastolic Thickness; Teichholz; End Diastole;: 8.7  mm  Left Ventricle Internal End Diastolic Dimension; Teichholz;: 48.2 mm  Left Ventricle Internal Systolic Dimension; Teichholz; End Systole;: 24.3 mm  Left Ventricle Mass; Mass AVCube with Teichholz; End Diastole;: 137 g  Left Ventricle Posterior Wall Diastolic Thickness; Teichholz; End Diastole;:  8.2 mm  Left Ventricular Ejection Fraction; Teichholz;: 80.8 %  Left Ventricular End Diastolic Volume; Teichholz;: 108.6 ml  Left Ventricular End Systolic Volume; Teichholz;: 20.8 ml  Stroke volume; Teichholz; Systole;: 87.8 ml  Mitral Valve Area; Area by Pressure Half-Time; Systole;: 2.89 cm2  Mitral Valve E to A Ratio; Systole;: 1.72  Maximum Tricuspid valve regurgitation pressure gradient; Regurgitant Flow;  Systole;: 20.6 mm[Hg]    [facility] Accredited Echocardiography Laboratory    Prepared and electronically signed by    Patricio Choi MD  Signed 04-Jan-2017 13:30:45      Results for orders placed during the hospital encounter of 10/02/24    Echo complete w/ contrast if indicated    Interpretation Summary    Left Ventricle: Left ventricular cavity size is normal. Wall thickness is normal. The left ventricular ejection fraction is 65%. Systolic function is normal. Wall motion is normal. Diastolic function is moderately abnormal, consistent with grade II (pseudonormal) relaxation.    Left Atrium: The atrium is severely dilated.    Atrial Septum: The septum bows into the right atrium, suggesting increased left atrial pressure.    Mitral Valve: The valve is myxomatous. There is moderate diffuse thickening. There is bileaflet prolapse, posterior greater than anterior. There is moderate to severe regurgitation with an eccentrically directed jet.    Tricuspid Valve: There is mild regurgitation. The right ventricular systolic pressure is mildly elevated. The estimated right ventricular systolic pressure is 40.00 mmHg.    Consider KWAME to further evaluate the severity of mitral regurgitation, if clinically  indicated.        EKG:        TELEMETRY: No significant bradycardia or high-grade AV block noted, graphic trends show acceptable rates, difficult to always see P waves but suspect sinus rhythm with PACs      VTE Pharmacologic Prophylaxis: VTE covered by:  fondaparinux, Subcutaneous, 2.5 mg at 11/19/24 0834

## 2024-11-20 PROBLEM — Z51.81 ANTICOAGULATION GOAL OF INR 2 TO 3: Status: ACTIVE | Noted: 2024-11-20

## 2024-11-20 PROBLEM — Z79.01 ANTICOAGULATION GOAL OF INR 2 TO 3: Status: ACTIVE | Noted: 2024-11-20

## 2024-11-20 PROBLEM — I48.0 PAF (PAROXYSMAL ATRIAL FIBRILLATION) (HCC): Status: ACTIVE | Noted: 2024-11-20

## 2024-11-20 PROBLEM — D72.829 LEUKOCYTOSIS: Status: RESOLVED | Noted: 2024-11-19 | Resolved: 2024-11-20

## 2024-11-20 PROBLEM — I48.92 ATRIAL FLUTTER (HCC): Status: ACTIVE | Noted: 2024-11-20

## 2024-11-20 LAB
ANION GAP SERPL CALCULATED.3IONS-SCNC: 6 MMOL/L (ref 4–13)
ATRIAL RATE: 234 BPM
BUN SERPL-MCNC: 26 MG/DL (ref 5–25)
CALCIUM SERPL-MCNC: 7.6 MG/DL (ref 8.4–10.2)
CHLORIDE SERPL-SCNC: 104 MMOL/L (ref 96–108)
CO2 SERPL-SCNC: 29 MMOL/L (ref 21–32)
CREAT SERPL-MCNC: 0.71 MG/DL (ref 0.6–1.3)
DME PARACHUTE DELIVERY DATE ACTUAL: NORMAL
DME PARACHUTE DELIVERY DATE REQUESTED: NORMAL
DME PARACHUTE ITEM DESCRIPTION: NORMAL
DME PARACHUTE ORDER STATUS: NORMAL
DME PARACHUTE SUPPLIER NAME: NORMAL
DME PARACHUTE SUPPLIER PHONE: NORMAL
ERYTHROCYTE [DISTWIDTH] IN BLOOD BY AUTOMATED COUNT: 14.7 % (ref 11.6–15.1)
GFR SERPL CREATININE-BSD FRML MDRD: 87 ML/MIN/1.73SQ M
GLUCOSE SERPL-MCNC: 101 MG/DL (ref 65–140)
GLUCOSE SERPL-MCNC: 109 MG/DL (ref 65–140)
GLUCOSE SERPL-MCNC: 159 MG/DL (ref 65–140)
GLUCOSE SERPL-MCNC: 85 MG/DL (ref 65–140)
GLUCOSE SERPL-MCNC: 98 MG/DL (ref 65–140)
HCT VFR BLD AUTO: 33.7 % (ref 34.8–46.1)
HGB BLD-MCNC: 11.1 G/DL (ref 11.5–15.4)
INR PPP: 1.1 (ref 0.85–1.19)
MCH RBC QN AUTO: 29.6 PG (ref 26.8–34.3)
MCHC RBC AUTO-ENTMCNC: 32.9 G/DL (ref 31.4–37.4)
MCV RBC AUTO: 90 FL (ref 82–98)
PLATELET # BLD AUTO: 107 THOUSANDS/UL (ref 149–390)
PMV BLD AUTO: 10.5 FL (ref 8.9–12.7)
POTASSIUM SERPL-SCNC: 3.8 MMOL/L (ref 3.5–5.3)
PROTHROMBIN TIME: 14.5 SECONDS (ref 12.3–15)
QRS AXIS: 40 DEGREES
QRSD INTERVAL: 84 MS
QT INTERVAL: 528 MS
QTC INTERVAL: 553 MS
RBC # BLD AUTO: 3.75 MILLION/UL (ref 3.81–5.12)
SODIUM SERPL-SCNC: 139 MMOL/L (ref 135–147)
T WAVE AXIS: 45 DEGREES
VENTRICULAR RATE: 66 BPM
WBC # BLD AUTO: 9.85 THOUSAND/UL (ref 4.31–10.16)

## 2024-11-20 PROCEDURE — 85610 PROTHROMBIN TIME: CPT | Performed by: PHYSICIAN ASSISTANT

## 2024-11-20 PROCEDURE — 93005 ELECTROCARDIOGRAM TRACING: CPT

## 2024-11-20 PROCEDURE — 80048 BASIC METABOLIC PNL TOTAL CA: CPT | Performed by: PHYSICIAN ASSISTANT

## 2024-11-20 PROCEDURE — 99232 SBSQ HOSP IP/OBS MODERATE 35: CPT | Performed by: INTERNAL MEDICINE

## 2024-11-20 PROCEDURE — 93010 ELECTROCARDIOGRAM REPORT: CPT | Performed by: INTERNAL MEDICINE

## 2024-11-20 PROCEDURE — 99024 POSTOP FOLLOW-UP VISIT: CPT | Performed by: PHYSICIAN ASSISTANT

## 2024-11-20 PROCEDURE — 97530 THERAPEUTIC ACTIVITIES: CPT

## 2024-11-20 PROCEDURE — 82948 REAGENT STRIP/BLOOD GLUCOSE: CPT

## 2024-11-20 PROCEDURE — 85027 COMPLETE CBC AUTOMATED: CPT | Performed by: PHYSICIAN ASSISTANT

## 2024-11-20 RX ORDER — TORSEMIDE 20 MG/1
40 TABLET ORAL DAILY
Status: DISCONTINUED | OUTPATIENT
Start: 2024-11-20 | End: 2024-11-21 | Stop reason: HOSPADM

## 2024-11-20 RX ORDER — ASPIRIN 325 MG
325 TABLET ORAL DAILY
Status: DISCONTINUED | OUTPATIENT
Start: 2024-11-20 | End: 2024-11-21 | Stop reason: HOSPADM

## 2024-11-20 RX ORDER — WARFARIN SODIUM 5 MG/1
5 TABLET ORAL
Status: COMPLETED | OUTPATIENT
Start: 2024-11-20 | End: 2024-11-20

## 2024-11-20 RX ORDER — POTASSIUM CHLORIDE 1500 MG/1
40 TABLET, EXTENDED RELEASE ORAL DAILY
Status: DISCONTINUED | OUTPATIENT
Start: 2024-11-20 | End: 2024-11-21 | Stop reason: HOSPADM

## 2024-11-20 RX ADMIN — WARFARIN SODIUM 5 MG: 5 TABLET ORAL at 17:01

## 2024-11-20 RX ADMIN — FONDAPARINUX SODIUM 2.5 MG: 2.5 INJECTION SUBCUTANEOUS at 11:47

## 2024-11-20 RX ADMIN — TRAZODONE HYDROCHLORIDE 200 MG: 100 TABLET ORAL at 22:55

## 2024-11-20 RX ADMIN — PANTOPRAZOLE SODIUM 40 MG: 40 TABLET, DELAYED RELEASE ORAL at 05:54

## 2024-11-20 RX ADMIN — TORSEMIDE 40 MG: 20 TABLET ORAL at 08:29

## 2024-11-20 RX ADMIN — HYDROXYZINE HYDROCHLORIDE 10 MG: 10 TABLET, FILM COATED ORAL at 20:04

## 2024-11-20 RX ADMIN — CHLORHEXIDINE GLUCONATE 0.12% ORAL RINSE 15 ML: 1.2 LIQUID ORAL at 21:27

## 2024-11-20 RX ADMIN — ASPIRIN 81 MG: 81 TABLET, COATED ORAL at 08:29

## 2024-11-20 RX ADMIN — ATORVASTATIN CALCIUM 40 MG: 40 TABLET, FILM COATED ORAL at 17:01

## 2024-11-20 RX ADMIN — POTASSIUM CHLORIDE 40 MEQ: 1500 TABLET, EXTENDED RELEASE ORAL at 08:28

## 2024-11-20 RX ADMIN — CHLORHEXIDINE GLUCONATE 0.12% ORAL RINSE 15 ML: 1.2 LIQUID ORAL at 08:29

## 2024-11-20 NOTE — ASSESSMENT & PLAN NOTE
Postoperative. Previously with junctional rhythm, Wenckebach. Because of this, beta-blocker and amiodarone were held. This morning, she went into atrial fibrillation. Her rate is overall controlled. See below

## 2024-11-20 NOTE — PROGRESS NOTES
Progress Note - Cardiology   Carley Bonilla 69 y.o. female MRN: [id number]  Unit/Bed#: [facility]-326-01 Encounter: [id number]          Assessment & Plan  MVP (mitral valve prolapse)  She had severe mitral regurgitation identified as an outpatient. She was referred for surgery. Unfortunately, repair was not possible and a replacement was ultimately done. Bioprosthetic valve. Had left atrial appendage ligation during her surgery, also.    Management per the surgical service.  Benign essential hypertension  Was on atenolol prior to her surgery. Currently off of any beta-blocker due to bradycardia. Not requiring any antihypertensive.  Hyperlipidemia  On pravastatin prior to surgery. No CAD identified on her preoperative cardiac catheterization.  Kidney disease, chronic, stage III (GFR 30-59 ml/min) ([facility])  Lab Results   Component Value Date    EGFR 87 11/20/2024    EGFR 74 11/19/2024    EGFR 59 11/18/2024    CREATININE 0.71 11/20/2024    CREATININE 0.81 11/19/2024    CREATININE 0.98 11/18/2024   Creatinine currently stable.  Diuretics per CTS service.  S/P left atrial appendage ligation  Done at the time of her surgery. She did not have afib prior to surgery.   11/20, had afib identified. To start anticoagulation.  S/P mitral valve replacement with bioprosthetic valve  Required replacement as opposed to repair. Bioprosthetic valve performed.  Sick sinus syndrome (HCC)  Postoperative. Previously with junctional rhythm, Wenckebach. Because of this, beta-blocker and amiodarone were held. This morning, she went into atrial fibrillation. Her rate is overall controlled. See below  PAF (paroxysmal atrial fibrillation) ([facility])  New diagnosis today 11/20. Rate controlled despite not being on any aviva blocking agents. Her beta-blocker and amiodarone were discontinued previously because of bradycardia. She is asymptomatic. Electrophysiology following and I discussed with them. Continue rate control and anticoagulation strategy for now.  Monitor telemetry. Hold off on pacemaker or trying to restore sinus rhythm currently.    She did have a left atrial appendage ligation at the time of her surgery. However, recommend initiation of anticoagulation which is being undertaken by the surgical service.  Anticoagulation goal of INR 2 to 3  For new diagnosis of atrial fibrillation today. Since starting on anticoagulation, recommend discontinuation of aspirin once INR therapeutic.      Subjective/Objective     Subjective:   Denies any complaints. Out of ICU. Ambulated with PT this AM.    Objective:    Vitals: BP (!) 110/48   Pulse 59   Temp 99 °F (37.2 °C)   Resp 16   Ht 5' (1.524 m)   Wt 70.2 kg (154 lb 12.2 oz)   LMP  (LMP Unknown)   SpO2 91%   BMI 30.23 kg/m²   Vitals:    11/19/24 0600 11/20/24 0600   Weight: 72.3 kg (159 lb 6.3 oz) 70.2 kg (154 lb 12.2 oz)     Orthostatic Blood Pressures      Flowsheet Row Most Recent Value   Blood Pressure 110/48 filed at 11/20/2024 1041   Patient Position - Orthostatic VS Lying filed at 11/19/2024 0720              Intake/Output Summary (Last 24 hours) at 11/20/2024 1347  Last data filed at 11/20/2024 1218  Gross per 24 hour   Intake 600 ml   Output 1500 ml   Net -900 ml     Physical Exam:  GEN: Carley Bonilla appears well, alert and oriented x 3, pleasant and cooperative   HEENT: pupils equal, round, and reactive to light; extraocular muscles intact  NECK: supple, no carotid bruits   HEART: irregularly irregular.  LUNGS: clear to auscultation bilaterally; no wheezes, rales, or rhonchi   ABDOMEN: normal bowel sounds, soft, no tenderness, no distention  EXTREMITIES: peripheral pulses normal; no clubbing, cyanosis, or edema  NEURO: no focal findings   SKIN: normal without suspicious lesions on exposed skin    Medications:    Current Facility-Administered Medications:     acetaminophen (TYLENOL) rectal suppository 650 mg, 650 mg, Rectal, Q4H PRN, Cedric David PA-C    acetaminophen (TYLENOL) tablet 975 mg, 975  mg, Oral, Q6H While awake, Cedric David PA-C, 975 mg at 11/19/24 1344    ALPRAZolam (XANAX) tablet 0.5 mg, 0.5 mg, Oral, HS PRN, Cedric David PA-C, 0.5 mg at 11/19/24 2201    aspirin tablet 325 mg, 325 mg, Oral, Daily, Bernie Lind PA-C    atorvastatin (LIPITOR) tablet 40 mg, 40 mg, Oral, Daily With Dinner, Sheeba Salomon PA-C, 40 mg at 11/19/24 1636    bisacodyl (DULCOLAX) rectal suppository 10 mg, 10 mg, Rectal, Daily PRN, Cedric David PA-C    chlorhexidine (PERIDEX) 0.12 % oral rinse 15 mL, 15 mL, Mouth/Throat, BID, Cedric David PA-C, 15 mL at 11/20/24 0829    docusate sodium (COLACE) capsule 100 mg, 100 mg, Oral, BID, Sheeba Salomon PA-C, 100 mg at 11/19/24 1748    fondaparinux (ARIXTRA) subcutaneous injection 2.5 mg, 2.5 mg, Subcutaneous, Q24H, Paola Cruz PA-C, 2.5 mg at 11/20/24 1147    guaiFENesin (MUCINEX) 12 hr tablet 600 mg, 600 mg, Oral, BID PRN, Kristin Doe PA-C, 600 mg at 11/17/24 2106    hydrOXYzine HCL (ATARAX) tablet 10 mg, 10 mg, Oral, Daily, Cedric David PA-C, 10 mg at 11/19/24 2201    insulin lispro (HumALOG/ADMELOG) 100 units/mL subcutaneous injection 1-5 Units, 1-5 Units, Subcutaneous, TID AC, 1 Units at 11/17/24 1121 **AND** Fingerstick Glucose (POCT), , , TID AC, Sheeba Salomon PA-C    insulin lispro (HumALOG/ADMELOG) 100 units/mL subcutaneous injection 1-5 Units, 1-5 Units, Subcutaneous, HS, Sheeba Salomon PA-C, 1 Units at 11/16/24 2311    ondansetron (ZOFRAN) injection 4 mg, 4 mg, Intravenous, Q6H PRN, Cedric David PA-C, 4 mg at 11/16/24 1638    oxyCODONE (ROXICODONE) split tablet 2.5 mg, 2.5 mg, Oral, Q4H PRN, 2.5 mg at 11/19/24 0448 **OR** oxyCODONE (ROXICODONE) IR tablet 5 mg, 5 mg, Oral, Q4H PRN, Cedric David PA-C, 5 mg at 11/17/24 2106    pantoprazole (PROTONIX) EC tablet 40 mg, 40 mg, Oral, Daily, Cedric David PA-C, 40 mg at 11/20/24 9061    polyethylene glycol (MIRALAX) packet 17 g, 17 g, Oral, Daily, Cedric David PA-C, 17 g at 11/18/24  0830    potassium chloride (Klor-Con M20) CR tablet 40 mEq, 40 mEq, Oral, Daily, Bernie Lind PA-C, 40 mEq at 11/20/24 0828    senna (SENOKOT) tablet 8.6 mg, 1 tablet, Oral, BID, Bernie Lind PA-C, 8.6 mg at 11/19/24 1748    torsemide (DEMADEX) tablet 40 mg, 40 mg, Oral, Daily, Bernie Lind PA-C, 40 mg at 11/20/24 0829    traZODone (DESYREL) tablet 200 mg, 200 mg, Oral, HS, Sheeba GLO Salomon-C, 200 mg at 11/19/24 2201    warfarin (COUMADIN) tablet 5 mg, 5 mg, Oral, Once (warfarin), Bernie Lind PA-C    Lab Results:      Results from last 7 days   Lab Units 11/20/24  0532 11/19/24  0559 11/18/24  0432   WBC Thousand/uL 9.85 13.07* 15.24*   HEMOGLOBIN g/dL 11.1* 11.1* 11.5   HEMATOCRIT % 33.7* 33.7* 34.7*   PLATELETS Thousands/uL 107* 81* 55*         Results from last 7 days   Lab Units 11/20/24  0532 11/19/24  0559 11/18/24  0432 11/15/24  1703 11/15/24  1321 11/15/24  1316 11/15/24  1230 11/15/24  1156   SODIUM mmol/L 139 138 137   < >  --    < >  --   --    POTASSIUM mmol/L 3.8 4.2 3.9   < >  --    < >  --   --    CHLORIDE mmol/L 104 102 103   < >  --    < >  --   --    CO2 mmol/L 29 29 27   < >  --    < >  --   --    CO2, I-STAT mmol/L  --   --   --   --  19* --  24 26   BUN mg/dL 26* 25 25   < >  --    < >  --   --    CREATININE mg/dL 0.71 0.81 0.98   < >  --    < >  --   --    CALCIUM mg/dL 7.6* 7.9* 7.7*   < >  --    < >  --   --    GLUCOSE, ISTAT mg/dl  --   --   --   --  137  --  148* 153*    < > = values in this interval not displayed.     Results from last 7 days   Lab Units 11/20/24  1147   INR  1.10     Results from last 7 days   Lab Units 11/19/24  0559 11/18/24  0432 11/17/24  0453   MAGNESIUM mg/dL 2.6 2.1 2.1       Telemetry: Personally reviewed. Into afib just after 9 am this morning.    Echo:  LEFT VENTRICLE:  Systolic Function: normal. Ejection Fraction: 50-55%. Cavity size: normal.   Regional Wall Motion Abnormalities: none.   RIGHT VENTRICLE:  Systolic Function: normal.  Cavity size  normal. No hypertrophy     AORTIC VALVE:  Leaflets: normal and trileaflet. Leaflet motions normal and normal. Stenosis: none.     Regurgitation: none.     MITRAL VALVE:  Leaflets: myxomatous and thickened. Leaflet Motions: prolapse and Bileaflet prolapse. Worse in A3, P2 and P3. Regurgitation: severe and Centrally directed.   Stenosis: none.      TRICUSPID VALVE:  Leaflets: normal. Leaflet Motions: normal. Stenosis: none. Regurgitation: mild.   PULMONIC VALVE:  Leaflets: normal. Regurgitation: none. Stenosis: none.   ASCENDING AORTA:  Size:  normal.  Dissection not present.     AORTIC ARCH:  Size:  normal.  dissection not present. Grade 2: severe intimal thickening without protruding atheroma.   DESCENDING AORTA:  Size: normal.  Dissection not present. Grade 3: atheroma protruding < 0.5 cm into lumen.   RIGHT ATRIUM:  Size:  dilated. No spontaneous echo contrast.   LEFT ATRIUM:  Size: dilated. No spontaneous echo contrast.   LEFT ATRIAL APPENDAGE:  Size: dilated. No spontaneous echo contrast    ATRIAL SEPTUM:  Intra-atrial septal morphology: normal.     VENTRICULAR SEPTUM:  Intra-ventricular septum morphology: normal.    EPIAORTIC:  Plaque Thickness: 0-5 mm.   OTHER FINDINGS:  Pericardium:  pericardial effusion and Trace. Pleural Effusion:  none.   POSTPROCEDURE   LEFT VENTRICLE: Unchanged .   RIGHT VENTRICLE: Unchanged .   AORTIC VALVE: Unchanged .   MITRAL VALVE:   Leaflets: bioprosthetic. Regurgitation: none. Stenosis: none. Mean Gradient: 2.Valve/Ring Size: 29 mm.   TRICUSPID VALVE: Unchanged .   PULMONIC VALVE: Unchanged    ATRIA:   Left Atrial Appendage Ligate: Yes. Residual Flow in Left Atrial Appendage by Color Flow Doppler: No.    AORTA: Unchanged .       Cath:  No CAD

## 2024-11-20 NOTE — PROGRESS NOTES
Progress Note - Electrophysiology-Cardiology (EP)   Carley Bonilla 69 y.o. female MRN: [id number]  Unit/Bed#: [facility]-326-01 Encounter: [id number]      Assessment:  Assessment & Plan  Sick sinus syndrome (HCC)  Sick sinus syndrome with junctional bradycardia at times + brief periods of Mobitz 1  No evidence of complete heart block/high grade AV block  Epicardial wires in place but not being utilized, back up pacing not needed recently  On atentolol 25 mg daily as an outpatient, held on admission  Received < 2 g of amiodarone in the post operative setting, now held  Rates in the high 40s-50s noted 11/18, improved as of 11/19 without significant recurrence  ECGs/telemetry at times show likely atrial bigeminy/PACs, p waves are difficult to see  MVP (mitral valve prolapse)  Mitral valve prolapse with severe mitral regurgitation, status post tissue MV replacement + left atrial appendage ligation 11/15/2024  Low CI in the post operative setting requiring pressor support - now weaned off of milrinone   Preserved LV systolic function with LVEF 65% per echo 10/2024  Benign essential hypertension  Hypotensive in the post operative setting, improved  Hyperlipidemia  Maintained on statin therapy  Kidney disease, chronic, stage III (GFR 30-59 ml/min) ([facility])  Lab Results   Component Value Date    EGFR 87 11/20/2024    EGFR 74 11/19/2024    EGFR 59 11/18/2024    CREATININE 0.71 11/20/2024    CREATININE 0.81 11/19/2024    CREATININE 0.98 11/18/2024   Creatinine appears to be at baseline  S/P left atrial appendage ligation  Performed at time of MVR  S/P mitral valve replacement with bioprosthetic valve  See 'MVP (mitral valve prolapse)' for details  Anemia  Acute blood loss anemia in the post operative setting, stable  PAF (paroxysmal atrial fibrillation) ([facility])  Noted to go into rate controlled atrial fibrillation/flutter earlier this morning  Patient asymptomatic   Rates controlled off of antiarrhythmics/AV aviva agents  CHADS2 Vasc =  3        Plan:  Review of telemetry shows ongoing atrial fibrillation/flutter, which started earlier this morning.  Her rates are well-controlled off of AV aviva agents and antiarrhythmics, and she is entirely asymptomatic.    Suspect that this is due to inflammation in the postoperative setting.  At this time, would still not recommend pacemaker implantation as her rates are controlled and there is no evidence of tachy-gila syndrome.  Would allow her to remain in atrial fibrillation, and continue to monitor telemetry.     Okay to eat today, will make n.p.o. again at midnight in case she does develop significant tachycardia or subsequent bradycardia, in which case a pacemaker would be indicated.  Otherwise, would recommend a 2-week event monitor that can be arranged upon discharge.    Would recommend anticoagulation, to be started per primary team. No other medication changes needed at this time.     Will reassess tomorrow, continue to monitor telemetry closely.      Subjective/Objective   Chief Complaint: No acute complaints    Subjective: She is currently feeling well, is entirely asymptomatic in A-fib.  She denies chest pain or pressure, shortness of breath, palpitations, dizziness, or lightheadedness.      Objective:     Vitals: /58   Pulse 70   Temp 97.7 °F (36.5 °C)   Resp 16   Ht 5' (1.524 m)   Wt 70.2 kg (154 lb 12.2 oz)   LMP  (LMP Unknown)   SpO2 96%   BMI 30.23 kg/m²   Vitals:    11/19/24 0600 11/20/24 0600   Weight: 72.3 kg (159 lb 6.3 oz) 70.2 kg (154 lb 12.2 oz)     Orthostatic Blood Pressures      Flowsheet Row Most Recent Value   Blood Pressure 123/58 filed at 11/20/2024 0710   Patient Position - Orthostatic VS Lying filed at 11/19/2024 0720              Intake/Output Summary (Last 24 hours) at 11/20/2024 0822  Last data filed at 11/20/2024 0636  Gross per 24 hour   Intake 420 ml   Output 1580 ml   Net -1160 ml       Invasive Devices       Central Venous Catheter Line  Duration              CVC Central Lines 11/15/24 Triple Right Internal jugular 5 days                                Scheduled Meds:  Current Facility-Administered Medications   Medication Dose Route Frequency Provider Last Rate    acetaminophen  650 mg Rectal Q4H PRN Cedric David PA-C      acetaminophen  975 mg Oral Q6H While awake Cedric David PA-C      ALPRAZolam  0.5 mg Oral HS PRN Cedric David PA-C      aspirin  81 mg Oral Daily Sheeba Salomon PA-C      atorvastatin  40 mg Oral Daily With Dinner Sheeba Salomon PA-C      bisacodyl  10 mg Rectal Daily PRN Cedric David PA-C      chlorhexidine  15 mL Mouth/Throat BID Cedric David PA-C      docusate sodium  100 mg Oral BID Sheeba Salomon PA-C      fondaparinux  2.5 mg Subcutaneous Q24H Paola Cruz PA-C      guaiFENesin  600 mg Oral BID PRN Kristin Doe PA-C      hydrOXYzine HCL  10 mg Oral Daily Cedric David PA-C      insulin lispro  1-5 Units Subcutaneous TID AC Sheeba Salomon PA-C      insulin lispro  1-5 Units Subcutaneous HS Sheeba Salomon PA-C      ondansetron  4 mg Intravenous Q6H PRN Cedric David PA-C      oxyCODONE  2.5 mg Oral Q4H PRN Cedric David PA-C      Or    oxyCODONE  5 mg Oral Q4H PRN Cedric David PA-C      pantoprazole  40 mg Oral Daily Cedric David PA-C      polyethylene glycol  17 g Oral Daily Cedric David PA-C      potassium chloride  40 mEq Oral Daily Bernie Lind PA-C      senna  1 tablet Oral BID Bernie Lind PA-C      torsemide  40 mg Oral Daily Bernie Lind PA-C      traZODone  200 mg Oral HS Sheeba Salomon PA-C       Continuous Infusions:   PRN Meds:.  acetaminophen    ALPRAZolam    bisacodyl    guaiFENesin    ondansetron    oxyCODONE **OR** oxyCODONE    Review of Systems   Constitutional: Negative for fever and malaise/fatigue.   Cardiovascular:  Negative for chest pain, dyspnea on exertion, irregular heartbeat, leg swelling, near-syncope, orthopnea, palpitations, paroxysmal nocturnal dyspnea and syncope.    All other systems reviewed and are negative.        Physical Exam:   GEN: NAD, alert and oriented x 3, well appearing  SKIN: dry without significant lesions or rashes  HEENT: NCAT, PERRL, EOMs intact  NECK: No JVD appreciated  CARDIOVASCULAR: RRR, normal S1, S2 without murmurs, rubs, or gallops appreciated  LUNGS: Clear to auscultation bilaterally without wheezes, rhonchi, or rales  ABDOMEN: Soft, nontender, nondistended  EXTREMITIES/VASCULAR: perfused without clubbing, cyanosis, or LE edema b/l  PSYCH: Normal mood and affect  NEURO: CN ll-Xll grossly intact                Lab Results: I have personally reviewed pertinent lab results.    Results from last 7 days   Lab Units 11/20/24  0532 11/19/24 0559 11/18/24  0432   WBC Thousand/uL 9.85 13.07* 15.24*   HEMOGLOBIN g/dL 11.1* 11.1* 11.5   HEMATOCRIT % 33.7* 33.7* 34.7*   PLATELETS Thousands/uL 107* 81* 55*     Results from last 7 days   Lab Units 11/20/24  0532 11/19/24  0559 11/18/24  0432 11/15/24  1703 11/15/24  1321   POTASSIUM mmol/L 3.8 4.2 3.9   < >  --    CHLORIDE mmol/L 104 102 103   < >  --    CO2 mmol/L 29 29 27   < >  --    CO2, I-STAT mmol/L  --   --   --   --  19*   BUN mg/dL 26* 25 25   < >  --    CREATININE mg/dL 0.71 0.81 0.98   < >  --    GLUCOSE, ISTAT mg/dl  --   --   --   --  137   CALCIUM mg/dL 7.6* 7.9* 7.7*   < >  --     < > = values in this interval not displayed.         Results from last 7 days   Lab Units 11/19/24  0559 11/18/24  0432 11/17/24  0453   MAGNESIUM mg/dL 2.6 2.1 2.1         Imaging: Results Review Statement: No pertinent imaging studies reviewed.    ECHO: Results for orders placed during the hospital encounter of 01/03/17    Echo complete with contrast if indicated    [facility]  [address]  [place] [address] [phone]    Transthoracic Echocardiogram  2D, M-mode, Doppler, and Color Doppler    Study date:  03-Jan-2017    Patient: BRENNA PUGH  MR number: [id number]  Account number:  [id number]  : 1955  Age: 61 years  Gender: Female  Status: Outpatient  Location: [facility]  Height: 60 in  Weight: 101 lb  BP: 127/ 71 mmHg    Indications: MV disorder.    Diagnoses: I34.9 - Nonrheumatic mitral valve disorder, unspecified    Sonographer:  [place] RCS  Primary Physician:  Parker Zuluaga MD  Referring Physician:  Patricio Choi MD  Group:  [facility]  Interpreting Physician:  Patricio Choi MD    SUMMARY    LEFT VENTRICLE:  There were no regional wall motion abnormalities.  Doppler parameters were consistent with high ventricular filling pressure.    LEFT ATRIUM:  The atrium was moderately dilated.    MITRAL VALVE:  redundant valve with marked prolapse of the posterior leaflet and severe  regurgitation    TRICUSPID VALVE:  There was trace regurgitation.    SUMMARY MEASUREMENTS  Unspecified Scan Mode measurements:  Aortic Valve:   HR was 68 /min.  Left Ventricle:   HR was 73 /min.    COMPARISONS:  left ventricular sized has increased slightly since     HISTORY: PRIOR HISTORY: Risk factors: hypertension and a family history of  coronary artery disease.    PROCEDURE: The study was performed in the [facility]. This  was a routine study. The transthoracic approach was used. The study included  complete 2D imaging, M-mode, complete spectral Doppler, and color Doppler.  Image quality was adequate.    LEFT VENTRICLE: Size was normal. Systolic function was by visual assessment.  Ejection fraction was estimated to be 65 %. There were no regional wall motion  abnormalities. Wall thickness was normal. DOPPLER: Doppler parameters were  consistent with high ventricular filling pressure.    RIGHT VENTRICLE: The size was normal. Systolic function was normal. Wall  thickness was normal.    LEFT ATRIUM: The atrium was moderately dilated.    RIGHT ATRIUM: Size was normal.    MITRAL VALVE: redundant valve with marked prolapse of the  posterior leaflet and  severe regurgitation    AORTIC VALVE: The valve was trileaflet. Leaflets exhibited normal thickness and  normal cuspal separation. DOPPLER: Transaortic velocity was within the normal  range. There was no evidence for stenosis. There was no regurgitation.    TRICUSPID VALVE: DOPPLER: There was trace regurgitation.    PERICARDIUM: There was no pericardial effusion. The pericardium was normal in  appearance.    AORTA: The root exhibited normal size.    PULMONARY ARTERY: DOPPLER: Systolic pressure was within the normal range.    SYSTEM MEASUREMENT TABLES    Apical four chamber  4 chamber Left Atrium Volume Index; Planimetry; End Systole; Apical four  chamber;: 24 cm2 Left Ventricular End Diastolic Volume; Method of Disks, Single  Plane; Apical four chamber;: 77.1 ml Left Ventricular End Systolic Volume;  Method of Disks, Single Plane; Apical four chamber;: 21.3 ml Right Atrium  Systolic Area; Planimetry; End Systole; Apical four chamber;: 11.52 cm2 Right  Ventricular Internal Diastolic Dimension; End Diastole; Apical four chamber;:  33.6 mm  TAPSE: 27.4 mm    Unspecified Scan Mode  Aortic Root Diameter; End Systole;: 24.2 mm  Gradient Pressure, Peak; Simplified Bernoulli; Antegrade Flow; Systole;: 10.5  mm[Hg] Gradient pressure, average; Simplified Bernoulli; Antegrade Flow;  Systole;: 5.5 mm[Hg]  HR: 68 /min  Left atrial diameter; End Diastole;: 41.7 mm  Cardiac Output; Teichholz; Systole;: 6.76 L/min  HR: 73 /min  Interventricular Septum Diastolic Thickness; Teichholz; End Diastole;: 8.7 mm  Left Ventricle Internal End Diastolic Dimension; Teichholz;: 48.2 mm  Left Ventricle Internal Systolic Dimension; Teichholz; End Systole;: 24.3 mm  Left Ventricle Mass; Mass AVCube with Teichholz; End Diastole;: 137 g  Left Ventricle Posterior Wall Diastolic Thickness; Teichholz; End Diastole;:  8.2 mm  Left Ventricular Ejection Fraction; Teichholz;: 80.8 %  Left Ventricular End Diastolic Volume; Teichholz;:  108.6 ml  Left Ventricular End Systolic Volume; Teichholz;: 20.8 ml  Stroke volume; Teichholz; Systole;: 87.8 ml  Mitral Valve Area; Area by Pressure Half-Time; Systole;: 2.89 cm2  Mitral Valve E to A Ratio; Systole;: 1.72  Maximum Tricuspid valve regurgitation pressure gradient; Regurgitant Flow;  Systole;: 20.6 mm[Hg]    [facility]    Prepared and electronically signed by    Patricio Choi MD  Signed 04-Jan-2017 13:30:45      Results for orders placed during the hospital encounter of 10/02/24    Echo complete w/ contrast if indicated    Interpretation Summary    Left Ventricle: Left ventricular cavity size is normal. Wall thickness is normal. The left ventricular ejection fraction is 65%. Systolic function is normal. Wall motion is normal. Diastolic function is moderately abnormal, consistent with grade II (pseudonormal) relaxation.    Left Atrium: The atrium is severely dilated.    Atrial Septum: The septum bows into the right atrium, suggesting increased left atrial pressure.    Mitral Valve: The valve is myxomatous. There is moderate diffuse thickening. There is bileaflet prolapse, posterior greater than anterior. There is moderate to severe regurgitation with an eccentrically directed jet.    Tricuspid Valve: There is mild regurgitation. The right ventricular systolic pressure is mildly elevated. The estimated right ventricular systolic pressure is 40.00 mmHg.    Consider KWAME to further evaluate the severity of mitral regurgitation, if clinically indicated.        EKG/TELEMETRY: went in to atrial fibrillation/flutter this morning, rates controlled, still in atrial flutter currently.         VTE Pharmacologic Prophylaxis: VTE covered by:  fondaparinux, Subcutaneous, 2.5 mg at 11/19/24 0868

## 2024-11-20 NOTE — ASSESSMENT & PLAN NOTE
Mitral valve prolapse with severe mitral regurgitation, status post tissue MV replacement + left atrial appendage ligation 11/15/2024  Low CI in the post operative setting requiring pressor support - now weaned off of milrinone   Preserved LV systolic function with LVEF 65% per echo 10/2024

## 2024-11-20 NOTE — ASSESSMENT & PLAN NOTE
Sick sinus syndrome with junctional bradycardia at times + brief periods of Mobitz 1  No evidence of complete heart block/high grade AV block  Epicardial wires in place but not being utilized, back up pacing not needed recently  On atentolol 25 mg daily as an outpatient, held on admission  Received < 2 g of amiodarone in the post operative setting, now held  Rates in the high 40s-50s noted 11/18, improved as of 11/19 without significant recurrence  ECGs/telemetry at times show likely atrial bigeminy/PACs, p waves are difficult to see

## 2024-11-20 NOTE — ASSESSMENT & PLAN NOTE
Noted to go into rate controlled atrial fibrillation/flutter earlier this morning  Patient asymptomatic   Rates controlled off of antiarrhythmics/AV aviva agents  CHADS2 Vasc = 3

## 2024-11-20 NOTE — PROGRESS NOTES
Progress Note - Cardiac Surgery   Carley Bonilla 69 y.o. female MRN: [id number]  Unit/Bed#: [facility]-326-01 Encounter: [id number]    Mitral regurgitation. S/P left atrial appendage ligation and mitral valve repair; POD # 5      24 Hour Events: Seen by EP, NPO in case need for PPM but not likely per them yesterday. No other events. (+) BM, still distended abdomen, given additional Dulcolax. No complaints, feeling much better overall today.    Medications:   Scheduled Meds:  Current Facility-Administered Medications   Medication Dose Route Frequency Provider Last Rate    acetaminophen  650 mg Rectal Q4H PRN Cedric David PA-C      acetaminophen  975 mg Oral Q6H While awake Cedric David PA-C      ALPRAZolam  0.5 mg Oral HS PRN Cedric David PA-C      aspirin  81 mg Oral Daily Sheeba Salomon PA-C      atorvastatin  40 mg Oral Daily With Dinner Sheeba Salomon PA-C      bisacodyl  10 mg Rectal Daily PRN Cedric David PA-C      chlorhexidine  15 mL Mouth/Throat BID Cedric David PA-C      docusate sodium  100 mg Oral BID Sheeba Salomon PA-C      fondaparinux  2.5 mg Subcutaneous Q24H Paola Cruz PA-C      furosemide  40 mg Intravenous BID (diuretic) Sheeba Salomon PA-C      guaiFENesin  600 mg Oral BID PRN Kristin Doe PA-C      hydrOXYzine HCL  10 mg Oral Daily Cedric David PA-C      insulin lispro  1-5 Units Subcutaneous TID AC Sheeba Salomon PA-C      insulin lispro  1-5 Units Subcutaneous HS Sheeba Salomon PA-C      ondansetron  4 mg Intravenous Q6H PRN Cedric David PA-C      oxyCODONE  2.5 mg Oral Q4H PRN Cedric David PA-C      Or    oxyCODONE  5 mg Oral Q4H PRN Cedric David PA-C      pantoprazole  40 mg Oral Daily Cedric David PA-C      polyethylene glycol  17 g Oral Daily Cedric David PA-C      potassium chloride  20 mEq Oral BID Sheeba Salomon PA-C      senna  1 tablet Oral BID Bernie Lind PA-C      traZODone  200 mg Oral HS Sheeba Salomon PA-C       Continuous Infusions:   PRN  Meds:.  acetaminophen    ALPRAZolam    bisacodyl    guaiFENesin    ondansetron    oxyCODONE **OR** oxyCODONE    Vitals:   Vitals:    11/19/24 2333 11/20/24 0237 11/20/24 0600 11/20/24 0710   BP: 133/68 138/65  123/58   BP Location:       Pulse: 60 65  70   Resp:       Temp: 99 °F (37.2 °C) 99.4 °F (37.4 °C)  97.7 °F (36.5 °C)   TempSrc:       SpO2: 91% 93%  96%   Weight:   70.2 kg (154 lb 12.2 oz)    Height:         Bp x24hrs: 100-150/    Telemetry: NSR w/ frequent PACs; Heart Rate: 64; 4 beat run of NSVT    Respiratory:   SpO2: SpO2: 96 %, SpO2 Activity: SpO2 Activity: At Rest, SpO2 Device: O2 Device: Nasal cannula; 2 LPM    Intake/Output:     Intake/Output Summary (Last 24 hours) at 11/20/2024 0730  Last data filed at 11/20/2024 0636  Gross per 24 hour   Intake 459.65 ml   Output 1580 ml   Net -1120.35 ml      UOP - 600cc/8hrs; 1550cc/24hrs w/ 3 unmeasured occurences    Stool - 3/24hrs    Weights:   Weight (last 2 days)       Date/Time Weight    11/20/24 0600 70.2 (154.76)    11/19/24 0600 72.3 (159.39)    11/19/24 0442 72.3 (159.39)    11/18/24 0543 73.1 (161.16)          Admission weight: 66.7kg - up 3.5kg from admission weight    Chest tube Output:  CTs & EPWs have been discontinued    Results:   Results from last 7 days   Lab Units 11/20/24  0532 11/19/24  0559 11/18/24  0432   WBC Thousand/uL 9.85 13.07* 15.24*   HEMOGLOBIN g/dL 11.1* 11.1* 11.5   HEMATOCRIT % 33.7* 33.7* 34.7*   PLATELETS Thousands/uL 107* 81* 55*     Results from last 7 days   Lab Units 11/20/24  0532 11/19/24  0559 11/18/24  0432 11/15/24  1703 11/15/24  1321   SODIUM mmol/L 139 138 137   < >  --    POTASSIUM mmol/L 3.8 4.2 3.9   < >  --    CHLORIDE mmol/L 104 102 103   < >  --    CO2 mmol/L 29 29 27   < >  --    CO2, I-STAT mmol/L  --   --   --   --  19*   BUN mg/dL 26* 25 25   < >  --    CREATININE mg/dL 0.71 0.81 0.98   < >  --    GLUCOSE, ISTAT mg/dl  --   --   --   --  137   CALCIUM mg/dL 7.6* 7.9* 7.7*   < >  --     < > =  values in this interval not displayed.     Recent Labs     11/19/24  0559   MG 2.6         Point of care glucose: 101 - 117 - 112 - 121 - 116 - 113 - 165    Studies:  No new studies    Results Review Statement: No pertinent imaging studies reviewed.    Invasive Lines/Tubes:  Invasive Devices       Central Venous Catheter Line  Duration             CVC Central Lines 11/15/24 Triple Right Internal jugular 4 days                    Physical Exam:    General: No acute distress  HEENT/NECK:  Normocephalic. Atraumatic.  No jugular venous distention.    Cardiac: Regular rate and rhythm and No murmurs/rubs/gallops  Pulmonary:   good inspiratory effort, equal expansion b/l, improved but still persistent lower lobe inspiratory crackles  Abdomen:  Non-tender, Non-distended, and hyperactive BS  Incisions: Sternum is stable.  Incision is clean, dry, and intact.   Extremities: Extremities warm/dry and Trace edema B/L  Neuro: Alert and oriented X 3  Skin: Warm/Dry, without rashes or lesions.    Assessment:  Principal Problem:    MVP (mitral valve prolapse)  Active Problems:    Anxiety    Benign essential hypertension    Hyperlipidemia    Kidney disease, chronic, stage III (GFR 30-59 ml/min) (HCC)    Panic disorder (episodic paroxysmal anxiety)    S/P left atrial appendage ligation    S/P mitral valve replacement with bioprosthetic valve    Sick sinus syndrome (HCC)    Leukocytosis    Anemia    Thrombocytopenia (HCC)    Hypocalcemia    Prediabetes       Mitral regurgitation. S/P left atrial appendage ligation and mitral valve repair; POD # 5    Plan:    Cardiac:     Elective surgical admission  Normal ventricular systolic function, EF 65%    NSR w/ PACs; HR well-controlled    No beta blocker      EP following    ACE inhibitor/ARB not indicated; Will not order    Amio on hold w/ prior junctional    No need for AC w/ ligation TYLER    Continue ASA and Statin therapy -- increase to 325mg    Epicardial pacing wires have been  removed    Central IV access no longer required; Remove central venous catheter today    Continue Arixtra for DVT prophylaxis    Pulmonary:     Acute post-op pulmonary insufficiency; Requiring 2 Liters via nasal cannula, secondary to atelectasis, pulmonary vascular congestion, poor inspiratory effort secondary to pain, and body habitus/obesity. Continue incentive spirometry/coughing/deep breathing exercises.  Wean supplemental oxygen as tolerated for saturation > 90%    Chest tubes have been discontinued    Renal:     Normal preoperative renal function    Intake/Output net: (-)860.4 mL/24 hours -- more (-) w/ innacurate I/Os    Diuretic Regimen:  Transition to  PO Torsemide, 40 mg QD  Transition to  Potassium Chloride 40 mEq PO QD    Neuro:    Neurologically intact; No active issues     Incisional pain well controlled   Continue tylenol, 975 mg PO q 8, standing dose   Continue oxycodone, 2.5 to 5 mg PO q 4 hours prn pain    Continue Atarax     Continue Trazadone    GI:    Cardiac diet, with 1800 mL fluid restriction    Tolerating diet without complaint  + Flatus  + BM postoperatively    Continue stool softeners and prn suppository    Continue GI prophylaxis    Endo:     Pre-Op Hgb A1C: 5.9    Patient has been transitioned from continuous insulin infusion to intermittent subcutaneous dosing  Serum glucose well controlled on insulin sliding scale coverage    7    Hematology:     Post-operative acute blood loss anemia; Hemoglobin 11.1; trend prn  Thrombocytopenia   Hypocalcemia    8.   Disposition:        Anticipated discharge date: tomorrow       VTE Pharmacologic Prophylaxis: Sequential compression device (Venodyne)  and Fondaparinux (Arixtra)  VTE Mechanical Prophylaxis: sequential compression device    Collaborative rounds completed with supervising physician  Plan of care discussed with bedside nurse    SIGNATURE: Bernie Lind PA-C  DATE: November 20, 2024  TIME: 7:30 AM

## 2024-11-20 NOTE — PHYSICAL THERAPY NOTE
Physical Therapy Treatment Note       11/20/24 0755   PT Last Visit   PT Visit Date 11/20/24   Note Type   Note Type Treatment   Pain Assessment   Pain Assessment Tool 0-10   Pain Score 1   Pain Location/Orientation Location: Chest   Patient's Stated Pain Goal No pain   Hospital Pain Intervention(s) Ambulation/increased activity   Restrictions/Precautions   Weight Bearing Precautions Per Order No   Other Precautions Cardiac/sternal;Multiple lines;Telemetry;Pain   General   Chart Reviewed Yes   Family/Caregiver Present No   Cognition   Overall Cognitive Status WFL   Arousal/Participation Responsive   Attention Attends with cues to redirect   Orientation Level Oriented X4   Memory Unable to assess   Following Commands Follows one step commands without difficulty   Subjective   Subjective states she feels better overall.  Improving strength, less pain.   Transfers   Sit to Stand 5  Supervision   Additional items Increased time required   Stand to Sit 5  Supervision   Additional items Assist x 1;Increased time required   Ambulation/Elevation   Gait pattern   (slow, wide JERRELL)   Gait Assistance   (CGA/S, 2nd for chair follow)   Additional items Assist x 1   Assistive Device Rolling walker   Distance 100'+140'+80' w/ seated rests   Balance   Static Sitting Normal   Dynamic Sitting Good   Static Standing Good   Dynamic Standing Fair   Ambulatory Fair   Endurance Deficit   Endurance Deficit Yes   Endurance Deficit Description fatigue, weakness, pain   Activity Tolerance   Activity Tolerance Patient tolerated treatment well;Patient limited by fatigue;Patient limited by pain   Nurse Made Aware yes   Assessment   Prognosis Good   Problem List Decreased strength;Decreased endurance;Impaired balance;Decreased mobility;Pain   Assessment Pt seen for session for setup, transfers, gait w/ rest time, reposiitoning.  Pt cooperative w/ session, states she feels stronger and has less pain.  Note improving mobility tolerance w/ less  frequent breaks and increased independence.  making good progress, continue to anticipate d/c home w/ Level III (minimal) post acute care therapy needs at d/c   Goals   Patient Goals to go home tomorrow   STG Expiration Date 11/30/24   PT Treatment Day 3   Plan   Treatment/Interventions Functional transfer training;LE strengthening/ROM;Therapeutic exercise;Endurance training;Patient/family training;Equipment eval/education;Bed mobility;Gait training;Elevations   Progress Progressing toward goals   PT Frequency 4-6x/wk   Discharge Recommendation   Rehab Resource Intensity Level, PT III (Minimum Resource Intensity)   Equipment Recommended Walker   Walker Package Recommended Wheeled walker   Change/add to Walker Package? No   AM-PAC Basic Mobility Inpatient   Turning in Flat Bed Without Bedrails 4   Lying on Back to Sitting on Edge of Flat Bed Without Bedrails 3   Moving Bed to Chair 3   Standing Up From Chair Using Arms 3   Walk in Room 3   Climb 3-5 Stairs With Railing 3   Basic Mobility Inpatient Raw Score 19   Basic Mobility Standardized Score 42.48   [facility] Highest Level Of Mobility   JH-HLM Goal 6: Walk 10 steps or more   JH-HLM Achieved 8: Walk 250 feet ot more     Aj Eisenberg PT, DPT, GCS, CSRS

## 2024-11-20 NOTE — ASSESSMENT & PLAN NOTE
Lab Results   Component Value Date    EGFR 87 11/20/2024    EGFR 74 11/19/2024    EGFR 59 11/18/2024    CREATININE 0.71 11/20/2024    CREATININE 0.81 11/19/2024    CREATININE 0.98 11/18/2024   Creatinine appears to be at baseline

## 2024-11-20 NOTE — ASSESSMENT & PLAN NOTE
For new diagnosis of atrial fibrillation today. Since starting on anticoagulation, recommend discontinuation of aspirin once INR therapeutic.

## 2024-11-20 NOTE — ASSESSMENT & PLAN NOTE
Lab Results   Component Value Date    EGFR 87 11/20/2024    EGFR 74 11/19/2024    EGFR 59 11/18/2024    CREATININE 0.71 11/20/2024    CREATININE 0.81 11/19/2024    CREATININE 0.98 11/18/2024   Creatinine currently stable.  Diuretics per CTS service.

## 2024-11-20 NOTE — ASSESSMENT & PLAN NOTE
New diagnosis today 11/20. Rate controlled despite not being on any aviva blocking agents. Her beta-blocker and amiodarone were discontinued previously because of bradycardia. She is asymptomatic. Electrophysiology following and I discussed with them. Continue rate control and anticoagulation strategy for now. Monitor telemetry. Hold off on pacemaker or trying to restore sinus rhythm currently.    She did have a left atrial appendage ligation at the time of her surgery. However, recommend initiation of anticoagulation which is being undertaken by the surgical service.

## 2024-11-20 NOTE — PLAN OF CARE
Problem: PHYSICAL THERAPY ADULT  Goal: Performs mobility at highest level of function for planned discharge setting.  See evaluation for individualized goals.  Description: Treatment/Interventions: ADL retraining, Functional transfer training, LE strengthening/ROM, Elevations, Therapeutic exercise, Endurance training, Patient/family training, Equipment eval/education, Bed mobility, Gait training, Continued evaluation, Spoke to nursing, OT, Family  Equipment Recommended: Walker       See flowsheet documentation for full assessment, interventions and recommendations.  Outcome: Progressing  Note: Prognosis: Good  Problem List: Decreased strength, Decreased endurance, Impaired balance, Decreased mobility, Pain  Assessment: Pt seen for session for setup, transfers, gait w/ rest time, reposiitoning.  Pt cooperative w/ session, states she feels stronger and has less pain.  Note improving mobility tolerance w/ less frequent breaks and increased independence.  making good progress, continue to anticipate d/c home w/ Level III (minimal) post acute care therapy needs at d/c        Rehab Resource Intensity Level, PT: III (Minimum Resource Intensity)    See flowsheet documentation for full assessment.

## 2024-11-20 NOTE — ASSESSMENT & PLAN NOTE
Done at the time of her surgery. She did not have afib prior to surgery.   11/20, had afib identified. To start anticoagulation.

## 2024-11-21 ENCOUNTER — TELEPHONE (OUTPATIENT)
Dept: CARDIOLOGY CLINIC | Facility: CLINIC | Age: 69
End: 2024-11-21

## 2024-11-21 VITALS
WEIGHT: 153.22 LBS | HEIGHT: 60 IN | HEART RATE: 75 BPM | DIASTOLIC BLOOD PRESSURE: 60 MMHG | TEMPERATURE: 98 F | BODY MASS INDEX: 30.08 KG/M2 | RESPIRATION RATE: 20 BRPM | OXYGEN SATURATION: 94 % | SYSTOLIC BLOOD PRESSURE: 128 MMHG

## 2024-11-21 LAB
GLUCOSE SERPL-MCNC: 92 MG/DL (ref 65–140)
INR PPP: 1.17 (ref 0.85–1.19)
PROTHROMBIN TIME: 15.2 SECONDS (ref 12.3–15)

## 2024-11-21 PROCEDURE — 85610 PROTHROMBIN TIME: CPT | Performed by: PHYSICIAN ASSISTANT

## 2024-11-21 PROCEDURE — 99232 SBSQ HOSP IP/OBS MODERATE 35: CPT | Performed by: INTERNAL MEDICINE

## 2024-11-21 PROCEDURE — 99024 POSTOP FOLLOW-UP VISIT: CPT | Performed by: PHYSICIAN ASSISTANT

## 2024-11-21 PROCEDURE — 82948 REAGENT STRIP/BLOOD GLUCOSE: CPT

## 2024-11-21 RX ORDER — ACETAMINOPHEN 325 MG/1
TABLET ORAL
COMMUNITY
Start: 2024-11-21

## 2024-11-21 RX ORDER — WARFARIN SODIUM 2.5 MG/1
TABLET ORAL
Qty: 60 TABLET | Refills: 2 | Status: SHIPPED | OUTPATIENT
Start: 2024-11-21

## 2024-11-21 RX ORDER — POTASSIUM CHLORIDE 1500 MG/1
40 TABLET, EXTENDED RELEASE ORAL DAILY
Qty: 14 TABLET | Refills: 1 | Status: SHIPPED | OUTPATIENT
Start: 2024-11-22 | End: 2024-11-29

## 2024-11-21 RX ORDER — ASPIRIN 81 MG/1
81 TABLET ORAL DAILY
Qty: 30 TABLET | Refills: 2 | Status: SHIPPED | OUTPATIENT
Start: 2024-11-21

## 2024-11-21 RX ORDER — WARFARIN SODIUM 2.5 MG/1
TABLET ORAL
Qty: 60 TABLET | Refills: 0 | Status: SHIPPED | OUTPATIENT
Start: 2024-11-21 | End: 2024-11-21

## 2024-11-21 RX ORDER — TORSEMIDE 20 MG/1
40 TABLET ORAL DAILY
Qty: 14 TABLET | Refills: 1 | Status: SHIPPED | OUTPATIENT
Start: 2024-11-22 | End: 2024-11-29

## 2024-11-21 RX ORDER — PANTOPRAZOLE SODIUM 40 MG/1
40 TABLET, DELAYED RELEASE ORAL DAILY
Qty: 30 TABLET | Refills: 0 | Status: SHIPPED | OUTPATIENT
Start: 2024-11-22 | End: 2024-12-22

## 2024-11-21 RX ORDER — POLYETHYLENE GLYCOL 3350 17 G/17G
17 POWDER, FOR SOLUTION ORAL DAILY
Qty: 30 EACH | Refills: 0 | Status: SHIPPED | OUTPATIENT
Start: 2024-11-22 | End: 2024-12-22

## 2024-11-21 RX ORDER — DOCUSATE SODIUM 100 MG/1
100 CAPSULE, LIQUID FILLED ORAL 2 TIMES DAILY
Qty: 60 CAPSULE | Refills: 0 | Status: SHIPPED | OUTPATIENT
Start: 2024-11-21 | End: 2024-12-21

## 2024-11-21 RX ORDER — OXYCODONE HYDROCHLORIDE 5 MG/1
TABLET ORAL
Qty: 30 TABLET | Refills: 0 | Status: SHIPPED | OUTPATIENT
Start: 2024-11-21 | End: 2024-12-01

## 2024-11-21 RX ADMIN — ASPIRIN 325 MG ORAL TABLET 325 MG: 325 PILL ORAL at 09:39

## 2024-11-21 RX ADMIN — TORSEMIDE 40 MG: 20 TABLET ORAL at 09:39

## 2024-11-21 RX ADMIN — PANTOPRAZOLE SODIUM 40 MG: 40 TABLET, DELAYED RELEASE ORAL at 05:29

## 2024-11-21 RX ADMIN — FONDAPARINUX SODIUM 2.5 MG: 2.5 INJECTION SUBCUTANEOUS at 09:39

## 2024-11-21 RX ADMIN — POTASSIUM CHLORIDE 40 MEQ: 1500 TABLET, EXTENDED RELEASE ORAL at 09:39

## 2024-11-21 RX ADMIN — CHLORHEXIDINE GLUCONATE 0.12% ORAL RINSE 15 ML: 1.2 LIQUID ORAL at 09:45

## 2024-11-21 NOTE — DISCHARGE SUMMARY
Discharge Summary - Cardiac Surgery   Carley Bonilla 69 y.o. female MRN: [id number]  Unit/Bed#: PPHP-326-01 Encounter: [id number]    Admission Date: 11/15/2024     Discharge Date: 11/21/24    Admitting Diagnosis: Nonrheumatic mitral valve regurgitation [I34.0]  MVP (mitral valve prolapse) [I34.1]    Primary Discharge Diagnosis:   Mitral regurgitation. S/P left atrial appendage ligation and mitral valve replacement;    Secondary Discharge Diagnosis:   MVP, HTN, HLD, CKD2, panic disorder and chronic insomnia    Attending: Parker Reddy M.D.    Consulting Physician(s):   Cardiology  Electrophysiology  Medical critical care  PT/OT    Procedures Performed:   Procedure(s):  11/15/2024: MITRAL VALVE REPAIR WITH BETH 4D ANNULOPLASTYN RING, CONVERTED TO MITRAL VALVE REPLACEMENT WITH MITRIS RESILIA 29 MM.  LEFT ATRIAL APPENDAGE CLIPPING,KWAME     Hospital Course:   The patient was seen in consultation prior to this admission for evaluation of Mitral regurgitation.  Risks and benefits of left atrial appendage ligation and mitral valve replacement were discussed in detail, and patient was agreeable.  Routine preoperative evaluation was completed and informed consent was obtained prior to admission.    11/15: Elective admission for MV repair/TYLER ligation.   Intraoperative blood products include: PRBC X 2.  No significant postoperative bleeding.  Transferred to ICU supported with epi 4.  Wean towards extubation.      11/16: High SVR, low CI - transitioned to milrinone 0.38, just increased this morning with CI 1.8. A Paced 80, junctional bradycardia in the 30s, 3LNC. Net neg 637ml/24h. Hgb 12, plt 75k, Cr 0.9. Lasix later this PM once CI stabilized after Milrinone increased. Keep in the ICU.      11/17: Milrinone 0.38, Levo at 2. Borderline CI 1.9-2.0.  Lasix BID. Junctional in the 70s with back up rate. 4LNC. CI 2.3 this morning. CVP 18. Hgb 10.8, plt 56k (preop 200k). Decrease ASA to 81 mg daily. Drop Milrinone to 0.25.      11/18: Given extra dose of Lasix overnight for decreased UOP. Levo off. Currently on Milrinone 0.25mcg. -2.4L/24 hrs. A paced, possible accelerated junctional rhythm underneath. Hold BB and consult EP. Hg 11.5, WBC 15.2, Plt 55 Cr 0.9, SVO2 65%. On 10L Midflow. Decrease Milrinone to 0.13, d/c Edwards. Continue BID Lasix. Transfer to Stepdown.     11/19: Transfer to floor. SVO2 61, discontinue milrinone, transfer to telem. On 4LNC, wean as tolerated. Plt 88 from 54, maintain ASA at 81mg. F/u EKG & EP, discontinue EPWs & CTs. Add Senna/Dulcolax.     11/20: No events. Remains NSR w/ PACs, f/u EP today. Transition to Torsemide 40mg PO QDay. On 2LNC, weaned to RA during encounter. Home likely tomorrow. PM: PAF/flutter but rate controlled, check INR & dose Coumadin, no amio/beta blocker as is rate controlled per EP, likely Zio at discharge. PM: HR remains 50s afib/flutter     11/21: Converted to NSR in the 60s, per EP no Amio needed at discharge. Diuresing well. Stable for discharge to home.     Condition at Discharge:   good     Discharge Physical Exam:    Please see the documented physical exam from this morning's progress note for details.    Discharge Data:  Results from last 7 days   Lab Units 11/20/24  0532 11/19/24  0559 11/18/24  0432   WBC Thousand/uL 9.85 13.07* 15.24*   HEMOGLOBIN g/dL 11.1* 11.1* 11.5   HEMATOCRIT % 33.7* 33.7* 34.7*   PLATELETS Thousands/uL 107* 81* 55*     Results from last 7 days   Lab Units 11/20/24  0532 11/19/24  0559 11/18/24  0432 11/15/24  1703 11/15/24  1321   POTASSIUM mmol/L 3.8 4.2 3.9   < >  --    CHLORIDE mmol/L 104 102 103   < >  --    CO2 mmol/L 29 29 27   < >  --    CO2, I-STAT mmol/L  --   --   --   --  19*   BUN mg/dL 26* 25 25   < >  --    CREATININE mg/dL 0.71 0.81 0.98   < >  --    GLUCOSE, ISTAT mg/dl  --   --   --   --  137   CALCIUM mg/dL 7.6* 7.9* 7.7*   < >  --     < > = values in this interval not displayed.     Results from last 7 days   Lab Units 11/21/24  0544  11/20/24  1147   INR  1.17 1.10       Discharge instructions/Information to patient and family:   See after visit summary for information provided to patient and family.      Carley Bonilla was educated on restrictions regarding driving and lifting, and techniques of proper incisional care.  They were specifically counselled on signs and symptoms of an incisional infection, and advised to contact our service immediately should they develop fevers, sweats, chill, redness or drainage at the site of any incisions.    Provisions for Follow-Up Care:  See after visit summary for information related to follow-up care and any pertinent home health orders.      Disposition:  Home w/ HHC, home PT, RW & mailed Zio per EP    Planned Readmission:   No    Discharge Medications:  See after visit summary for reconciled discharge medications provided to patient and family.      Carley Bonilla was provided contact information and scheduled a follow up appointment with Parker Reddy M.D.  Additionally, follow up appointments have been scheduled for their primary care physician and primary cardiologist.  Contact information was provided.    No refills on PTA medications needed per patient.    Carley Bonilla was counseled on the importance of avoiding tobacco products.  As with all patients whom have undergone open heart surgery, tobacco cessation medication was contraindicated at the time of discharge.     ACE/ARB was Contraindicated secondary to hypotension concerns.    Beta Blocker was Contraindicated secondary to bradycardia/heart block concerns.    Aspirin was Prescribed at discharge    Statin was Prescribed at discharge      The patient was discharged on ongoing diuretic therapy with Torsemide, 40 mg, PO QD and Potassium Chloride 40 mEq, PO QD.  They were advised to continue these medications for 7 days, unless otherwise directed.     Narcotic pain medication was prescribed in the form of Oxycodone.  Prior to prescribing, their  prescription profile was reviewed on the [place] department of health prescription drug monitoring program. Tylenol PRN also recommended.    Carley Bonilla  is being discharged on anticoagulation therapy for treatment of PAF/flutter.  As they are new to Coumadin therapy, arrangements have been made the [facility] to monitor INR (goal 2 to 3) and provide dosing instructions.  Their office was notified by Epic messaging which itemized the patient’s daily INR’s and correlating Coumadin doses during their hospitalization.     Carley Bonilla has been prescribed Coumadin, 2.5 mg tabs, with 60 tablets being dispensed.  They have been advised to take 5 mg daily, unless otherwise directed.  Follow up PT/INR will be ordered at the discretion of the Coumadin clinic.      The patient was informed that following their postoperative surgical evaluation, they will be referred to outpatient cardiac rehabilitation.  They were counseled that this program is run by specialists who will help them safely strengthen their heart and prevent more heart disease.  Cardiac rehabilitation will include exercise, relaxation, stress management, and heart-healthy nutrition.  Caregivers will also check to make sure their medication regimen is working.    During this admission, the patient was questioned on their use of tobacco, alcohol, and illicit/non-prescription drug use in the  previous 24 months. Carley Bonilla states that they have not used any of these substances in this time frame.    I spent 30 minutes discharging the patient. This time was spent on the day of discharge. I had direct contact with the patient on the day of discharge. Additional documentation is required if more than 30 minutes were spent on discharge.     SIGNATURE: Bernie Lind PA-C  DATE: November 21, 2024  TIME: 10:24 AM

## 2024-11-21 NOTE — RESTORATIVE TECHNICIAN NOTE
Restorative Technician Note      Patient Name: Carley Bonilla     Restorative Tech Visit Date: 11/21/24  Note Type: Mobility  Patient Position Upon Consult: Bedside chair  Activity Performed: Ambulated  Assistive Device: Roller walker  Patient Position at End of Consult: All needs within reach; Bedside chair

## 2024-11-21 NOTE — QUICK NOTE
EP Service    Telemetry reviewed documenting conversion to sinus rhythm this morning with heart rates in 70s. No significant bradycardia, pauses or heat block. No indication for PPM at this time.    Plan:  -- no indication for PPM at this time  -- do not recommend AV aviva agents or amiodarone given that rates were controlled in Afib, and h/o junctional gila/Wenckebach   -- anticoagulation with warfarin per CTS  -- outpatient live Zio on discharge --> office will mail to patient's house per patient's preference  -- stable for D/C today from EP standpoint  -- discussed with CT surgery and patient/son

## 2024-11-21 NOTE — ASSESSMENT & PLAN NOTE
Postoperative. Previously with junctional rhythm, Wenckebach. Because of this, beta-blocker and amiodarone were held.   She is paroxysmal in afib. Ratesc ontrolled. Back in sr today.

## 2024-11-21 NOTE — DISCHARGE INSTRUCTIONS
Instructions Following Open Heart Surgery      Protect your sternum (breast bone). Wires are placed during surgery to hold the sternum together. Do not lift anything heavier than 10 pounds for the first four weeks after surgery. (For example, a gallon of milk weighs 8 pounds) When you are seen for a routine postop check, you will be cleared to lift up to 25 lbs. Following three months from the time of surgery, you may lift without any restrictions.     Hug a pillow to your chest or cross your arms over your chest when you laugh, sneeze, or cough. You may resume sexual activity when you feel ready. Do not put any excessive pressure on your chest.    Be careful when you get into or out of a chair or bed.  Hug a pillow or cross your arms when you stand or sit. Do not twist as you move. Use only your legs to sit and stand. You may need a raised toilet seat if you have trouble standing up without using your arms.     No sleeping on side for the first 4 weeks. Limit daytime naps, to prevent difficulty sleeping at night.    Women: Wear a clean surgical bra every day.     Do not play sports that use your shoulder.  Examples include tennis and golf.    Do not drive until cleared by surgeon. Typically cleared to drive after one month, determination will be made at routine postop appointment. When a passenger in the car, wear a seat belt.    Continue you breathing exercises throughout the day with incentive spirometry    Activity: Your goal is to go on frequent walks, slowly increasing your activity level. Walking will help prevent leg swelling and prevent blood clots. When you start outpatient cardiac rehab in one month, you will be given additional instructions and a formal exercise plan. It is OK to go up steps, with help.     You may resume sexual activity when you are comfortable. Do not put excessive pressure on your chest.     Weigh yourself daily in the morning and keep of log of your weight. If your weight  increases more than 2 lbs per day or more than 5 lbs in a week, call your surgeon's office.    Keep your legs elevated when sitting. Pressure stockings may be worn to prevent significant leg swelling.     You may get routine vaccines any time after open heart surgery    Do not smoke:  Nicotine can damage blood vessels and make it more difficult to heal. Do not use e-cigarettes or smokeless tobacco in place of cigarettes or to help you quit. They still contain nicotine. Ask your primary care provider for information if you currently smoke and need help quitting.     Incision/Wound Care:    Shower daily. Gently wash your incision with warm water and mild soap. Do not scrub the area. Gently pat the area dry with a clean towel. If you have a bandage, dry the area and put on a new, clean bandage. Change your bandage at least daily, or if it gets wet or dirty. Always wash your hands before you care for your wound. Do not apply ointments, creams, lotions, powders, etc. If you develop signs of an infection (fever > 101, redness, warm skin, or drainage) please call your surgeon's office.     Do not pick at or touch puncture sites or incisions. Allow and scabs to come off on their own.     It is normal to have some drainage from the chest tube sites. Apply clean/dry dressings, until this resolves.    You may have discomfort or numbness along the incision for 3-4 weeks. It is normal to occasionally experience brief sharp shooing pains, tightness, or pulling sensations.    Do not take a bath or swim until cleared by surgeon.    As your incision heals, sometimes small tender lumps or pimple-like bumps develop which is due to your body attempting to “dissolve” the suture (stiches).     Call your local emergency number (285 in the [place]) or have someone call if:   You have squeezing, pressure, or pain in your chest or back, lightheadedness or sudden cold sweat  Short of breath that does not go away with rest  You cough up blood.  You  have a fast heartbeat that flutters. Or palpitations  You faint.  Signs of a stroke:  Numbness or drooping on one side of your face. Weakness in an arm or leg. Confusion or difficulty speaking. Dizziness, a severe headache, or vision loss    Call your surgeons office if:   You have bleeding that does not stop even after you apply pressure for 5 minutes.  You have redness, tenderness, or signs of infection at incision (pain, swelling, odor, or green/yellow discharge from incision site)  You have a severe headache.  You have a fever higher than 101°F (38.4°C).  You urinate less, or not at all.  You have persistent nausea, vomiting, or diarrhea  You hear persistent or worsening crunching or grinding in your sternum.  You have questions or concerns about your condition or care.      Diet:    Eat heart-healthy foods, low in unhealthy fats and sodium (salt). A heart healthy diet helps improve your cholesterol levels and lowers your risk for heart disease and stroke. You will receive formal education on healthy eating and label reading during your cardiac rehab in one month.   Choose foods that contain healthy fats, such as soybean, canola, olive, corn, and sunflower oils.  Limit unhealthy fats. Examples include:  Cholesterol  is found in animal foods, such as eggs and lobster, and in dairy products made from whole milk.    Saturated fat  is found in meats, such as campbell and hamburger. It is also found in chicken or turkey skin, whole milk, and butter.     Trans fat  is found in packaged foods, such as potato chips and cookies. It is also in some fried foods, and shortening. Do not eat foods that contain trans fats.  Get 20 to 30 grams of fiber each day.  Fruits, vegetables, whole-grain foods, and legumes (cooked beans) are good sources of fiber.  Low Sodium: Limit to 2,000 mg or less each day, unless otherwise directed. Choose low-sodium or no-salt-added foods. Add little or no salt to food you prepare. Use herbs and  spices in place of salt.  Foods high in sodium include frozen dinners, macaroni and cheese, instant potatoes, canned vegetables, cured or smoked meats, hot dogs, campbell and sausage, ketchup, barbecue sauce, salad dressing, pickles, olives, soy sauce, and miso.     Fluid Restriction: Fluid restriction after hospital discharge is advised. Limit your fluid intake to no more than 8 cups of liquid (8oz = 1cup) or 2000 mL per day.    Limit/Do not drink alcohol. Alcohol can damage heart and raise your blood pressure. The general recommended limit is 1 drink a day for women 21 or older and for men 65 or older. Do not have more than 3 drinks within 24 hours or 7 within a week. A drink of alcohol is 12 oz of beer, 5 oz of wine, or 1½ oz of liquor.        Narcotic Safety     What do I need to know about narcotic safety?    A narcotic is a type of medicine used to treat pain. When you are discharged from the hospital, you will be prescribed a narcotic called oxycodone. Pain control and management may help you rest, heal, and return to your daily activities. Do not take more than the recommended amount. Too much can cause a life-threatening overdose. Do not continue to take it after your pain stops.     How do I use narcotics safely?   Take prescribed narcotics exactly as directed.  You may develop tolerance. This means you keep needing higher doses to get the same effect. You may also develop narcotic use disorder. This means you are not able to control your narcotic use.  Do not give narcotics to others or take narcotics that belong to someone else.  Do not mix narcotics with other medicines or alcohol.  The combination can cause an overdose, or cause you to stop breathing.   Do not drive or operate heavy machinery after you use a narcotic.    Talk to your healthcare provider if you have any side effects.  Side effects include nausea, sleepiness, itching, and trouble thinking clearly.     What can I do to manage constipation?   Constipation is the most common side effect of narcotic medicine. Constipation is when you have hard, dry bowel movements, or you go longer than usual between bowel movements. The following are ways you can prevent or relieve constipation:  Drink liquids as allowed.  Drinking extra liquids will help soften and move your bowels. You should drink up to your maximum fluid allotment of 2 liters.    Eat high-fiber foods.  This may help decrease constipation by adding bulk to your bowel movements. High-fiber foods include fruits, vegetables, whole-grain breads and cereals, and beans  Supplements. You have been prescribed a fiber supplement called polyethylene glycol (Dania lax) and a stool softener called docusate sodium (Colace). You should take these for as long as you continue narcotic medications.  Exercise regularly.  Regular physical activity can help stimulate your intestines. Walking is a good exercise to prevent or relieve constipation. Ask which exercises are best for you.    How do I store narcotics safely?   Store narcotics where others cannot easily get them.  Keep them in a locked cabinet or secure area. Do not  keep them in a purse or other bag you carry with you. A person may be looking for something else and find the narcotics.    Make sure narcotics are stored out of the reach of children.  A child can easily overdose on narcotics. Narcotics may look like candy to a small child.              Blood Thinners    What you need to know:     You have been prescribed a blood thinner(s) to help prevent blood clots. Blood clots can cause strokes, heart attacks, and death. Examples of blood thinners include Aspirin, Plavix (clopidorel), Brilinta (ticagrelor), Xarelto (rivaroxaban), Pradaxa., (dabigatran), Eliquis (apixaban), and Coumadin (warfarin)    While taking any blood thinner, watch for bleeding and bruising. Watch for blood in your urine and bowel movements. Use a soft washcloth on your skin, and a soft  toothbrush to brush your teeth. If you shave, use an electric shaver. Do not play contact sports.    Do not start or stop any other medicines unless your healthcare provider tells you to do so. (Many medicines cannot be used with blood thinners)    Take your blood thinner exactly as prescribed by your healthcare provider. Do not skip a dose or take less than prescribed. Tell your provider right away if you forget to take your blood thinner, or if you took too much.    Warfarin (Coumadin):     You will need a regular blood test called a PT/INR. This test checks how thin your blood is. Your doctor may change your Coumadin dose, based on blood test (INR) results.    Following discharge from the hospital, you will be contacted by the [facility] to order these blood tests. If you do not hear from them within two days, contact the office at [phone]  It is not safe to take this medicine during pregnancy. It could harm an unborn baby. Tell your doctor right away if you become pregnant. Use an effective form of birth control to keep from getting pregnant during treatment and for at least 1 month after your last dose.    If you miss a dose do not take extra medicine to make up for a missed dose. Inform your healthcare provider.     Foods and medicines can affect your body's response to Coumadin. Warfarin works best when you eat about the same amount of vitamin K every day. Vitamin K is found in green leafy vegetables and certain other foods. Do not make major changes to your diet while you take Coumadin. Do not eat grapefruit or drink grapefruit juice while you are using this medicine.

## 2024-11-21 NOTE — PROGRESS NOTES
Progress Note - Cardiology   Carley Bonilla 69 y.o. female MRN: [id number]  [facility] En[facility]: [id number]          Assessment & Plan  MVP (mitral valve prolapse)  She had severe mitral regurgitation identified as an outpatient. She was referred for surgery. Unfortunately, repair was not possible and a replacement was ultimately done. Bioprosthetic valve. Had left atrial appendage ligation during her surgery, also.    Management per the surgical service.  Benign essential hypertension  Was on atenolol prior to her surgery. Currently off of any beta-blocker due to bradycardia. Not requiring any antihypertensive.  Hyperlipidemia  On pravastatin prior to surgery. No CAD identified on her preoperative cardiac catheterization.  Kidney disease, chronic, stage III (GFR 30-59 ml/min) ([facility])  Lab Results   Component Value Date    EGFR 87 11/20/2024    EGFR 74 11/19/2024    EGFR 59 11/18/2024    CREATININE 0.71 11/20/2024    CREATININE 0.81 11/19/2024    CREATININE 0.98 11/18/2024   Creatinine currently stable.  Diuretics per CTS service.  S/P left atrial appendage ligation  Done at the time of her surgery. She did not have afib prior to surgery.   11/20, had afib identified. To start anticoagulation.  Back into SR now  S/P mitral valve replacement with bioprosthetic valve  Required replacement as opposed to repair. Bioprosthetic valve performed.  Sick sinus syndrome (HCC)  Postoperative. Previously with junctional rhythm, Wenckebach. Because of this, beta-blocker and amiodarone were held.   She is paroxysmal in afib. Ratesc ontrolled. Back in sr today.  PAF (paroxysmal atrial fibrillation) ([facility])  PAF.  Rates controlled when in AF.  Back in SR currently.  No aviva blocking agents.   OP zio patch  Warfarin.  Anticoagulation goal of INR 2 to 3  For PAF.  TYLER ligation done        Subjective/Objective     Subjective:   For discharge today.  Back to SR.      Objective:    Vitals: /54   Pulse 73   Temp 98.2 °F  (36.8 °C)   Resp 20   Ht 5' (1.524 m)   Wt 69.5 kg (153 lb 3.5 oz)   LMP  (LMP Unknown)   SpO2 91%   BMI 29.92 kg/m²   Vitals:    11/20/24 0600 11/21/24 0535   Weight: 70.2 kg (154 lb 12.2 oz) 69.5 kg (153 lb 3.5 oz)     Orthostatic Blood Pressures      Flowsheet Row Most Recent Value   Blood Pressure 127/54 filed at 11/21/2024 0659   Patient Position - Orthostatic VS Lying filed at 11/19/2024 0720              Intake/Output Summary (Last 24 hours) at 11/21/2024 1041  Last data filed at 11/21/2024 0758  Gross per 24 hour   Intake 360 ml   Output 750 ml   Net -390 ml     Physical Exam:  GEN: Carley Bonilla appears well, alert and oriented x 3, pleasant and cooperative   HEENT: pupils equal, round, and reactive to light; extraocular muscles intact  NECK: supple, no carotid bruits   HEART: regular  LUNGS: clear to auscultation bilaterally; no wheezes, rales, or rhonchi   ABDOMEN: normal bowel sounds, soft, no tenderness, no distention  EXTREMITIES: peripheral pulses normal; no clubbing, cyanosis, or edema  NEURO: no focal findings   SKIN: normal without suspicious lesions on exposed skin    Medications:    Current Facility-Administered Medications:     acetaminophen (TYLENOL) rectal suppository 650 mg, 650 mg, Rectal, Q4H PRN, Cedric David PA-C    acetaminophen (TYLENOL) tablet 975 mg, 975 mg, Oral, Q6H While awake, Cedric David PA-C, 975 mg at 11/19/24 1344    ALPRAZolam (XANAX) tablet 0.5 mg, 0.5 mg, Oral, HS PRN, Cedric David PA-C, 0.5 mg at 11/19/24 2201    aspirin tablet 325 mg, 325 mg, Oral, Daily, Bernie Lind PA-C, 325 mg at 11/21/24 0939    atorvastatin (LIPITOR) tablet 40 mg, 40 mg, Oral, Daily With Dinner, Sheeba Salomon PA-C, 40 mg at 11/20/24 1701    bisacodyl (DULCOLAX) rectal suppository 10 mg, 10 mg, Rectal, Daily PRN, Cedric David PA-C    chlorhexidine (PERIDEX) 0.12 % oral rinse 15 mL, 15 mL, Mouth/Throat, BID, Cedric David PA-C, 15 mL at 11/21/24 0945    docusate sodium  (COLACE) capsule 100 mg, 100 mg, Oral, BID, Sheeba Salomon PA-C, 100 mg at 11/19/24 1748    fondaparinux (ARIXTRA) subcutaneous injection 2.5 mg, 2.5 mg, Subcutaneous, Q24H, Paola Cruz PA-C, 2.5 mg at 11/21/24 0939    guaiFENesin (MUCINEX) 12 hr tablet 600 mg, 600 mg, Oral, BID PRN, Kristin Doe PA-C, 600 mg at 11/17/24 2106    hydrOXYzine HCL (ATARAX) tablet 10 mg, 10 mg, Oral, Daily, Cedric David PA-C, 10 mg at 11/20/24 2004    insulin lispro (HumALOG/ADMELOG) 100 units/mL subcutaneous injection 1-5 Units, 1-5 Units, Subcutaneous, TID AC, 1 Units at 11/17/24 1121 **AND** Fingerstick Glucose (POCT), , , TID AC, Sheeba Salomon PA-C    insulin lispro (HumALOG/ADMELOG) 100 units/mL subcutaneous injection 1-5 Units, 1-5 Units, Subcutaneous, HS, Sheeba Salomon PA-C, 1 Units at 11/16/24 2311    ondansetron (ZOFRAN) injection 4 mg, 4 mg, Intravenous, Q6H PRN, Cedric David PA-C, 4 mg at 11/16/24 1638    oxyCODONE (ROXICODONE) split tablet 2.5 mg, 2.5 mg, Oral, Q4H PRN, 2.5 mg at 11/19/24 0448 **OR** oxyCODONE (ROXICODONE) IR tablet 5 mg, 5 mg, Oral, Q4H PRN, Cedric David PA-C, 5 mg at 11/17/24 2106    pantoprazole (PROTONIX) EC tablet 40 mg, 40 mg, Oral, Daily, Cedric David PA-C, 40 mg at 11/21/24 0529    polyethylene glycol (MIRALAX) packet 17 g, 17 g, Oral, Daily, Cedric David PA-C, 17 g at 11/18/24 0830    potassium chloride (Klor-Con M20) CR tablet 40 mEq, 40 mEq, Oral, Daily, Bernie Lind PA-C, 40 mEq at 11/21/24 0939    senna (SENOKOT) tablet 8.6 mg, 1 tablet, Oral, BID, Bernie Lind PA-C, 8.6 mg at 11/19/24 1748    torsemide (DEMADEX) tablet 40 mg, 40 mg, Oral, Daily, GLO Pak-C, 40 mg at 11/21/24 0939    traZODone (DESYREL) tablet 200 mg, 200 mg, Oral, HS, Sheeba Salomon, PA-C, 200 mg at 11/20/24 9278    Lab Results:      Results from last 7 days   Lab Units 11/20/24  0532 11/19/24  0559 11/18/24  0432   WBC Thousand/uL 9.85 13.07* 15.24*   HEMOGLOBIN g/dL 11.1* 11.1* 11.5    HEMATOCRIT % 33.7* 33.7* 34.7*   PLATELETS Thousands/uL 107* 81* 55*         Results from last 7 days   Lab Units 11/20/24  0532 11/19/24  0559 11/18/24  0432 11/15/24  1703 11/15/24  1321 11/15/24  1316 11/15/24  1230 11/15/24  1156   SODIUM mmol/L 139 138 137   < >  --    < >  --   --    POTASSIUM mmol/L 3.8 4.2 3.9   < >  --    < >  --   --    CHLORIDE mmol/L 104 102 103   < >  --    < >  --   --    CO2 mmol/L 29 29 27   < >  --    < >  --   --    CO2, I-STAT mmol/L  --   --   --   --  19*  --  24 26   BUN mg/dL 26* 25 25   < >  --    < >  --   --    CREATININE mg/dL 0.71 0.81 0.98   < >  --    < >  --   --    CALCIUM mg/dL 7.6* 7.9* 7.7*   < >  --    < >  --   --    GLUCOSE, ISTAT mg/dl  --   --   --   --  137  --  148* 153*    < > = values in this interval not displayed.     Results from last 7 days   Lab Units 11/21/24  0515 11/20/24  1147   INR  1.17 1.10     Results from last 7 days   Lab Units 11/19/24  0559 11/18/24  0432 11/17/24  0453   MAGNESIUM mg/dL 2.6 2.1 2.1       Telemetry: Personally reviewed. Back to     Echo:  LEFT VENTRICLE:  Systolic Function: normal. Ejection Fraction: 50-55%. Cavity size: normal.   Regional Wall Motion Abnormalities: none.   RIGHT VENTRICLE:  Systolic Function: normal.  Cavity size normal. No hypertrophy     AORTIC VALVE:  Leaflets: normal and trileaflet. Leaflet motions normal and normal. Stenosis: none.     Regurgitation: none.     MITRAL VALVE:  Leaflets: myxomatous and thickened. Leaflet Motions: prolapse and Bileaflet prolapse. Worse in A3, P2 and P3. Regurgitation: severe and Centrally directed.   Stenosis: none.      TRICUSPID VALVE:  Leaflets: normal. Leaflet Motions: normal. Stenosis: none. Regurgitation: mild.   PULMONIC VALVE:  Leaflets: normal. Regurgitation: none. Stenosis: none.   ASCENDING AORTA:  Size:  normal.  Dissection not present.     AORTIC ARCH:  Size:  normal.  dissection not present. Grade 2: severe intimal thickening without protruding  atheroma.   DESCENDING AORTA:  Size: normal.  Dissection not present. Grade 3: atheroma protruding < 0.5 cm into lumen.   RIGHT ATRIUM:  Size:  dilated. No spontaneous echo contrast.   LEFT ATRIUM:  Size: dilated. No spontaneous echo contrast.   LEFT ATRIAL APPENDAGE:  Size: dilated. No spontaneous echo contrast    ATRIAL SEPTUM:  Intra-atrial septal morphology: normal.     VENTRICULAR SEPTUM:  Intra-ventricular septum morphology: normal.    EPIAORTIC:  Plaque Thickness: 0-5 mm.   OTHER FINDINGS:  Pericardium:  pericardial effusion and Trace. Pleural Effusion:  none.   POSTPROCEDURE   LEFT VENTRICLE: Unchanged .   RIGHT VENTRICLE: Unchanged .   AORTIC VALVE: Unchanged .   MITRAL VALVE:   Leaflets: bioprosthetic. Regurgitation: none. Stenosis: none. Mean Gradient: 2.Valve/Ring Size: 29 mm.   TRICUSPID VALVE: Unchanged .   PULMONIC VALVE: Unchanged    ATRIA:   Left Atrial Appendage Ligate: Yes. Residual Flow in Left Atrial Appendage by Color Flow Doppler: No.    AORTA: Unchanged .       Cath:  No CAD

## 2024-11-21 NOTE — ASSESSMENT & PLAN NOTE
PAF.  Rates controlled when in AF.  Back in SR currently.  No aviva blocking agents.   OP zio patch  Warfarin.

## 2024-11-21 NOTE — ASSESSMENT & PLAN NOTE
Done at the time of her surgery. She did not have afib prior to surgery.   11/20, had afib identified. To start anticoagulation.  Back into SR now

## 2024-11-21 NOTE — NURSING NOTE
"Pt was found with a pill in her bed, it was found to be her home remelton, a sedative. This medication is not ordered for the patient. Relayed information to cardiac surgery GLO Gibbs, she educated the patient and her boyfriend on taking medications that are prescribed to her while she is in the hospital. The boyfriend stated that he \"wanted the patient to sleep better, so he wanted her to have the medication. They would be leaving today so it did not matter anyway.\"   "

## 2024-11-21 NOTE — PROGRESS NOTES
Progress Note - Cardiac Surgery   Carley Bonilla 69 y.o. female MRN: [id number]  Unit/Bed#: PPHP-326-01 Encounter: [id number]    Mitral regurgitation. S/P left atrial appendage ligation and mitral valve replacement; POD # 6      24 Hour Events: A fib/flutter during the day, rate controlled, started on AC due to this, converted to NSR this AM. No other events. No complaints.    Medications:   Scheduled Meds:  Current Facility-Administered Medications   Medication Dose Route Frequency Provider Last Rate    acetaminophen  650 mg Rectal Q4H PRN Cedric David PA-C      acetaminophen  975 mg Oral Q6H While awake Cedric David PA-C      ALPRAZolam  0.5 mg Oral HS PRN Cedric David PA-C      aspirin  325 mg Oral Daily Bernie Lind PA-C      atorvastatin  40 mg Oral Daily With Dinner Sheeba Salomon PA-C      bisacodyl  10 mg Rectal Daily PRN Cedric David PA-C      chlorhexidine  15 mL Mouth/Throat BID Cedric David PA-C      docusate sodium  100 mg Oral BID Sheeba Salomon PA-C      fondaparinux  2.5 mg Subcutaneous Q24H Paola Cruz PA-C      guaiFENesin  600 mg Oral BID PRN Kristin Doe PA-C      hydrOXYzine HCL  10 mg Oral Daily Cedric David PA-C      insulin lispro  1-5 Units Subcutaneous TID AC Sheeba Salomon PA-C      insulin lispro  1-5 Units Subcutaneous HS Sheeba Salomon PA-C      ondansetron  4 mg Intravenous Q6H PRN Cedric David PA-C      oxyCODONE  2.5 mg Oral Q4H PRN Cedric David PA-C      Or    oxyCODONE  5 mg Oral Q4H PRN Cedric David PA-C      pantoprazole  40 mg Oral Daily Cedric David PA-C      polyethylene glycol  17 g Oral Daily Cedric David PA-C      potassium chloride  40 mEq Oral Daily Bernie Lind PA-C      senna  1 tablet Oral BID Bernie Lind PA-C      torsemide  40 mg Oral Daily Bernie Lind PA-C      traZODone  200 mg Oral HS Sheeba Salomon PA-C       Continuous Infusions:   PRN Meds:.  acetaminophen    ALPRAZolam    bisacodyl    guaiFENesin     ondansetron    oxyCODONE **OR** oxyCODONE    Vitals:   Vitals:    11/20/24 2209 11/20/24 2209 11/21/24 0535 11/21/24 0659   BP: 121/55 121/55  127/54   Pulse: 60 61  73   Resp:    20   Temp: 98.1 °F (36.7 °C) 98.1 °F (36.7 °C)  98.2 °F (36.8 °C)   TempSrc:       SpO2: (!) 89% (!) 89%  91%   Weight:   69.5 kg (153 lb 3.5 oz)    Height:         Bp x24hrs: 100-120/40-60    Telemetry: NSR; Heart Rate: 71; 3 beat then 7 beat run of NSVT, intermittent slow a fib/flutter throughout the day    Respiratory:   SpO2: SpO2: 91 %, SpO2 Activity: SpO2 Activity: At Rest, SpO2 Device: O2 Device: None (Room air); Room Air    Intake/Output:     Intake/Output Summary (Last 24 hours) at 11/21/2024 0747  Last data filed at 11/20/2024 2257  Gross per 24 hour   Intake 360 ml   Output 850 ml   Net -490 ml      UOP - none recorded/8hrs; 850cc/24hrs w/ 1 unmeasured shift    Stool - 2/24hrs    Weights:   Weight (last 2 days)       Date/Time Weight    11/21/24 0535 69.5 (153.22)    11/20/24 0600 70.2 (154.76)    11/19/24 0600 72.3 (159.39)    11/19/24 0442 72.3 (159.39)          Admission weight: 66.7kg - up 2.8kg from admission weight    Chest tube Output:  CTs & EPWs have been discontinued    Results:   NO NEW CBC OR BMP TODAY  Results from last 7 days   Lab Units 11/20/24  0532 11/19/24  0559 11/18/24  0432   WBC Thousand/uL 9.85 13.07* 15.24*   HEMOGLOBIN g/dL 11.1* 11.1* 11.5   HEMATOCRIT % 33.7* 33.7* 34.7*   PLATELETS Thousands/uL 107* 81* 55*     Results from last 7 days   Lab Units 11/20/24  0532 11/19/24  0559 11/18/24  0432 11/15/24  1703 11/15/24  1321   SODIUM mmol/L 139 138 137   < >  --    POTASSIUM mmol/L 3.8 4.2 3.9   < >  --    CHLORIDE mmol/L 104 102 103   < >  --    CO2 mmol/L 29 29 27   < >  --    CO2, I-STAT mmol/L  --   --   --   --  19*   BUN mg/dL 26* 25 25   < >  --    CREATININE mg/dL 0.71 0.81 0.98   < >  --    GLUCOSE, ISTAT mg/dl  --   --   --   --  137   CALCIUM mg/dL 7.6* 7.9* 7.7*   < >  --     < > = values in  this interval not displayed.     Recent Labs     11/19/24  0559   MG 2.6     Results from last 7 days   Lab Units 11/21/24  0515 11/20/24  1147   INR  1.17 1.10       Date:   INR:  Coumadin Dose:  11/21  1.17  11/20  1.10  5      Point of care glucose: 92 - 109 - 159 - 85 - 101 - 117 - 112    Studies:  No new studies    Results Review Statement: No pertinent imaging studies reviewed.    Invasive Lines/Tubes:  Invasive Devices       Peripheral Intravenous Line  Duration             Peripheral IV 11/20/24 Right Antecubital <1 day                    Physical Exam:    General: No acute distress  HEENT/NECK:  Normocephalic. Atraumatic.  No jugular venous distention.    Cardiac: Regular rate and rhythm  Pulmonary:   on RA, no respiratory distress  Abdomen:  Non-tender and Non-distended  Incisions: Sternum is stable.  Incision is clean, dry, and intact.   Extremities: Extremities warm/dry and Trace edema B/L  Neuro: Alert and oriented X 3  Skin: Warm/Dry, without rashes or lesions.    Assessment:  Principal Problem:    MVP (mitral valve prolapse)  Active Problems:    Anxiety    Benign essential hypertension    Hyperlipidemia    Kidney disease, chronic, stage III (GFR 30-59 ml/min) ([facility])    Panic disorder (episodic paroxysmal anxiety)    S/P left atrial appendage ligation    S/P mitral valve replacement with bioprosthetic valve    Sick sinus syndrome ([facility])    Anemia    Thrombocytopenia ([facility])    Hypocalcemia    Prediabetes    Anticoagulation goal of INR 2 to 3    PAF (paroxysmal atrial fibrillation) ([facility])    Atrial flutter ([facility])       Mitral regurgitation. S/P left atrial appendage ligation and mitral valve replacement; POD # 6    Plan:    Cardiac:     Elective surgical admission  Normal ventricular systolic function, EF 65%    NSR w/ paroxysmal atrial fibrillation/flutter; HR well-controlled    No beta blocker, rate controlled, EP following, for Zio at discharge    ACE inhibitor/ARB not indicated; Will not order    Amio on  hold w/ prior junctional, review w/ EP Amio taper at discharge or not    Anticoagulated for PAF/flutter  INR 1.17, Dose Coumadin, 5 mg today    Continue ASA and Statin therapy    Epicardial pacing wires have been removed    Patient no longer has central IV access     Continue Arixtra for DVT prophylaxis    Pulmonary:     Good Room air oxygen saturation; Continue incentive spirometry/Coughing/Deep breathing exercises    Chest tubes have been discontinued    Renal:     Intake/Output net: (-)490 mL/24 hours -- more (-) given innacurate I/Os    Diuretic Regimen:  Continue PO Torsemide, 40 mg QD  Continue Potassium Chloride 40 mEq PO QD    Neuro:    Neurologically intact; No active issues     Incisional pain well controlled   Continue tylenol, 975 mg PO q 8, standing dose   Continue oxycodone, 2.5 to 5 mg PO q 4 hours prn pain    Continue Atarax     Continue Trazadone    Continue Xanax    GI:    Cardiac diet, with 1800 mL fluid restriction    Tolerating diet without complaint  + Flatus  + BM postoperatively    Continue stool softeners and prn suppository    Continue GI prophylaxis    Endo:     Pre-Op Hgb A1C: 5.9    Patient has been transitioned from continuous insulin infusion to intermittent subcutaneous dosing  Serum glucose well controlled on insulin sliding scale coverage    7   Disposition:        Anticipated discharge date: today pending EP/surgeon review       VTE Pharmacologic Prophylaxis: VTE covered by:  fondaparinux, Subcutaneous, 2.5 mg at 11/20/24 1147    and Sequential compression device (Venodyne)   VTE Mechanical Prophylaxis: sequential compression device    Collaborative rounds completed with supervising physician  Plan of care discussed with bedside nurse    SIGNATURE: Bernie Lind PA-C  DATE: November 21, 2024  TIME: 7:47 AM

## 2024-11-22 ENCOUNTER — TELEPHONE (OUTPATIENT)
Dept: CARDIAC SURGERY | Facility: CLINIC | Age: 69
End: 2024-11-22

## 2024-11-22 ENCOUNTER — ANTICOAG VISIT (OUTPATIENT)
Dept: CARDIOLOGY CLINIC | Facility: CLINIC | Age: 69
End: 2024-11-22

## 2024-11-22 ENCOUNTER — HOME CARE VISIT (OUTPATIENT)
Dept: HOME HEALTH SERVICES | Facility: HOME HEALTHCARE | Age: 69
End: 2024-11-22

## 2024-11-22 DIAGNOSIS — I48.0 PAF (PAROXYSMAL ATRIAL FIBRILLATION) (HCC): Primary | ICD-10-CM

## 2024-11-22 DIAGNOSIS — Z71.89 COMPLEX CARE COORDINATION: Primary | ICD-10-CM

## 2024-11-22 DIAGNOSIS — I48.3 TYPICAL ATRIAL FLUTTER (HCC): ICD-10-CM

## 2024-11-22 NOTE — TELEPHONE ENCOUNTER
Patient was discharged yesterday after MV replacement on 11/15/24.   She called today stating that last night she started to have numbness from her left hip to her left knee. No symptoms of swelling, pain, discrepancy in size of either leg, no shortness of breath. She is able to bear weight and is ambulating with a walker.   Reassurance given, this is most likely due to nerve compression related to being in hospital bed/OR table. Discussed that if it progresses or she develops any of the above symptoms, she should call our office. She has [facility] coming to her home this weekend.

## 2024-11-22 NOTE — UTILIZATION REVIEW
NOTIFICATION OF ADMISSION DISCHARGE   This is a Notification of Discharge from [facility]. Please be advised that this patient has been discharge from our facility. Below you will find the admission and discharge date and time including the patient’s disposition.   UTILIZATION REVIEW CONTACT:  Lorrie Brasher  Utilization   Network Utilization Review Department  Phone: [phone] x carefully listen to the prompts. All voicemails are confidential.  Email: [email or web address]     ADMISSION INFORMATION  PRESENTATION DATE: 11/15/2024  5:25 AM  OBERVATION ADMISSION DATE: N/A  INPATIENT ADMISSION DATE: 11/15/24  6:52 AM   DISCHARGE DATE: 11/21/2024  2:00 PM   DISPOSITION:Home with Home Health Care    Network Utilization Review Department  ATTENTION: Please call with any questions or concerns to [phone] and carefully listen to the prompts so that you are directed to the right person. All voicemails are confidential.   For Discharge needs, contact Care Management DC Support Team at [phone] opt. 2  Send all requests for admission clinical reviews, approved or denied determinations and any other requests to dedicated fax number below belonging to the campus where the patient is receiving treatment. List of dedicated fax numbers for the Facilities:  FACILITY NAME UR FAX NUMBER   ADMISSION DENIALS (Administrative/Medical Necessity) [phone]   DISCHARGE SUPPORT TEAM ([facility]) [phone]   PARENT CHILD HEALTH (Maternity/NICU/Pediatrics) [phone]   [facility] [phone]   [facility] [phone]   [facility] [phone]   [facility] [phone]   [facility] [phone]   [facility] [phone]   [facility] [phone]   [facility]  [place] [phone]   [facility] [phone]   [facility] [phone]   [facility] [phone]   [facility] [phone]

## 2024-11-23 ENCOUNTER — TELEPHONE (OUTPATIENT)
Dept: OTHER | Facility: OTHER | Age: 69
End: 2024-11-23

## 2024-11-23 ENCOUNTER — HOME CARE VISIT (OUTPATIENT)
Dept: HOME HEALTH SERVICES | Facility: HOME HEALTHCARE | Age: 69
End: 2024-11-23
Payer: COMMERCIAL

## 2024-11-23 VITALS
HEART RATE: 84 BPM | OXYGEN SATURATION: 96 % | BODY MASS INDEX: 28.66 KG/M2 | SYSTOLIC BLOOD PRESSURE: 120 MMHG | WEIGHT: 146 LBS | RESPIRATION RATE: 16 BRPM | TEMPERATURE: 97.7 F | DIASTOLIC BLOOD PRESSURE: 80 MMHG | HEIGHT: 60 IN

## 2024-11-23 PROCEDURE — 400013 VN SOC

## 2024-11-23 PROCEDURE — G0299 HHS/HOSPICE OF RN EA 15 MIN: HCPCS

## 2024-11-23 NOTE — CASE COMMUNICATION
11.23.24   pt called asking if her heart monitor was working correctly.   states everything is fine but she wanted to make sure we were recieving transmissions.    Pt has terry monitor and will call the company number on her folder.   Instructed to call back for any further questions or concerns

## 2024-11-23 NOTE — TELEPHONE ENCOUNTER
Paged on call regarding PT's visiting nurse calling in with concerns for PT's left leg numbness, after her procedure.

## 2024-11-24 ENCOUNTER — TELEPHONE (OUTPATIENT)
Dept: OTHER | Facility: OTHER | Age: 69
End: 2024-11-24

## 2024-11-24 NOTE — TELEPHONE ENCOUNTER
Nicky kee irhyth called reporting abnormal EKG rhythm for pt. Ref number is [id number]    On call notified via epic chat

## 2024-11-25 ENCOUNTER — HOME CARE VISIT (OUTPATIENT)
Dept: HOME HEALTH SERVICES | Facility: HOME HEALTHCARE | Age: 69
End: 2024-11-25
Payer: COMMERCIAL

## 2024-11-25 ENCOUNTER — PATIENT OUTREACH (OUTPATIENT)
Dept: CASE MANAGEMENT | Facility: OTHER | Age: 69
End: 2024-11-25

## 2024-11-25 ENCOUNTER — ANTICOAG VISIT (OUTPATIENT)
Dept: CARDIOLOGY CLINIC | Facility: CLINIC | Age: 69
End: 2024-11-25

## 2024-11-25 VITALS — SYSTOLIC BLOOD PRESSURE: 121 MMHG | HEART RATE: 92 BPM | DIASTOLIC BLOOD PRESSURE: 70 MMHG

## 2024-11-25 VITALS
SYSTOLIC BLOOD PRESSURE: 122 MMHG | OXYGEN SATURATION: 100 % | HEART RATE: 96 BPM | RESPIRATION RATE: 18 BRPM | DIASTOLIC BLOOD PRESSURE: 62 MMHG | TEMPERATURE: 97.8 F

## 2024-11-25 DIAGNOSIS — I48.0 PAF (PAROXYSMAL ATRIAL FIBRILLATION) (HCC): Primary | ICD-10-CM

## 2024-11-25 DIAGNOSIS — I48.3 TYPICAL ATRIAL FLUTTER (HCC): ICD-10-CM

## 2024-11-25 LAB — INR PPP: 1.9 (ref 0.85–1.19)

## 2024-11-25 PROCEDURE — G0299 HHS/HOSPICE OF RN EA 15 MIN: HCPCS

## 2024-11-25 PROCEDURE — G0151 HHCP-SERV OF PT,EA 15 MIN: HCPCS

## 2024-11-25 NOTE — PROGRESS NOTES
Contacted patient for f/u post hospitalization s/p mitral valve repair.  Patient lives at home with her boyfriend who assists her as needed.  She is receiving home health services of SN and PT.  Patient reports L hip to knee numbness that has been present since discharge.  She is able to bear weight and ambulate without issue.  She contacted surgeon office who is aware.  Reinforced if symptoms worsen to notify surgeon.  She was agreeable.   She denies chest pain or sob.  Dressing is intact with no s/s of infection.  She is aware of lifting restrictions.  She reports a decreased appetite but is staying well hydrated.  She takes all medications as prescribed.  Follow up appointments scheduled.  She denies transportation issues. Ambulates with use of a walker.  Her PT/INR drawn by home health nurse.  She is managing well at home and denies any needs at this time.

## 2024-11-27 ENCOUNTER — HOME CARE VISIT (OUTPATIENT)
Dept: HOME HEALTH SERVICES | Facility: HOME HEALTHCARE | Age: 69
End: 2024-11-27
Payer: COMMERCIAL

## 2024-11-27 VITALS — SYSTOLIC BLOOD PRESSURE: 112 MMHG | HEART RATE: 92 BPM | DIASTOLIC BLOOD PRESSURE: 66 MMHG

## 2024-11-27 PROCEDURE — G0151 HHCP-SERV OF PT,EA 15 MIN: HCPCS

## 2024-11-29 ENCOUNTER — ANTICOAG VISIT (OUTPATIENT)
Dept: CARDIOLOGY CLINIC | Facility: CLINIC | Age: 69
End: 2024-11-29

## 2024-11-29 ENCOUNTER — HOME CARE VISIT (OUTPATIENT)
Dept: HOME HEALTH SERVICES | Facility: HOME HEALTHCARE | Age: 69
End: 2024-11-29
Payer: COMMERCIAL

## 2024-11-29 DIAGNOSIS — I48.3 TYPICAL ATRIAL FLUTTER (HCC): ICD-10-CM

## 2024-11-29 DIAGNOSIS — I48.0 PAF (PAROXYSMAL ATRIAL FIBRILLATION) (HCC): Primary | ICD-10-CM

## 2024-11-29 LAB — INR PPP: 3.4 (ref 0.85–1.19)

## 2024-11-29 PROCEDURE — G0299 HHS/HOSPICE OF RN EA 15 MIN: HCPCS

## 2024-12-02 ENCOUNTER — OFFICE VISIT (OUTPATIENT)
Dept: CARDIOLOGY CLINIC | Facility: CLINIC | Age: 69
End: 2024-12-02
Payer: COMMERCIAL

## 2024-12-02 VITALS
RESPIRATION RATE: 16 BRPM | OXYGEN SATURATION: 95 % | HEART RATE: 77 BPM | SYSTOLIC BLOOD PRESSURE: 112 MMHG | TEMPERATURE: 97.7 F | DIASTOLIC BLOOD PRESSURE: 66 MMHG

## 2024-12-02 VITALS
WEIGHT: 147 LBS | BODY MASS INDEX: 28.86 KG/M2 | SYSTOLIC BLOOD PRESSURE: 128 MMHG | DIASTOLIC BLOOD PRESSURE: 80 MMHG | HEART RATE: 93 BPM | TEMPERATURE: 97 F | OXYGEN SATURATION: 99 % | HEIGHT: 60 IN

## 2024-12-02 DIAGNOSIS — Z95.3 S/P MITRAL VALVE REPLACEMENT WITH BIOPROSTHETIC VALVE: ICD-10-CM

## 2024-12-02 DIAGNOSIS — I10 BENIGN ESSENTIAL HYPERTENSION: ICD-10-CM

## 2024-12-02 DIAGNOSIS — N18.30 STAGE 3 CHRONIC KIDNEY DISEASE, UNSPECIFIED WHETHER STAGE 3A OR 3B CKD (HCC): Chronic | ICD-10-CM

## 2024-12-02 DIAGNOSIS — I49.5 SICK SINUS SYNDROME (HCC): ICD-10-CM

## 2024-12-02 DIAGNOSIS — I34.1 MVP (MITRAL VALVE PROLAPSE): ICD-10-CM

## 2024-12-02 DIAGNOSIS — E78.5 DYSLIPIDEMIA: ICD-10-CM

## 2024-12-02 DIAGNOSIS — Z98.890 S/P LEFT ATRIAL APPENDAGE LIGATION: ICD-10-CM

## 2024-12-02 DIAGNOSIS — I48.0 PAF (PAROXYSMAL ATRIAL FIBRILLATION) (HCC): ICD-10-CM

## 2024-12-02 PROCEDURE — 99214 OFFICE O/P EST MOD 30 MIN: CPT

## 2024-12-02 PROCEDURE — 93000 ELECTROCARDIOGRAM COMPLETE: CPT

## 2024-12-02 NOTE — PROGRESS NOTES
Carley Bonilla  1955  [id number]  [facility] [address] [phone]    1. S/P mitral valve replacement with bioprosthetic valve        2. MVP (mitral valve prolapse)        3. S/P left atrial appendage ligation        4. Sick sinus syndrome (HCC)        5. PAF (paroxysmal atrial fibrillation) ([facility])  POCT ECG      6. Benign essential hypertension        7. Dyslipidemia        8. Stage 3 chronic kidney disease, unspecified whether stage 3a or 3b CKD ([facility])            Summary/Discussion:  MVP (mitral valve prolapse)  - s/p mitral valve replacement and left atrial appendage ligation on 11/15/2024  - mid chest wall incision healing well without any s/s of infection   - no clinical s/s of volume overload   - post op appointment with CTS scheduled for 12/5/2024  - encouraged cardiac rehab once cleared by CTS    Benign essential hypertension  - 128/80 while not being on any antihypertensive    Hyperlipidemia  - continue pravastatin 10 mg daily  - no CAD identified on her preoperative cardiac catheterization    Kidney disease, chronic, stage III   Creatinine currently stable.  Diuretics per CTS service.    Sick sinus syndrome  - noted postoperative with evidence of junctional rhythm, Wenckebach  - beta-blocker and amiodarone were held and are continued to be held at this time   - currently wearing live zio   - has an appointment with EP on 12/23/2024    PAF  - she remains in sinus rhythm today based on office EKG, heart rate elevated at 102 bpm. Upon recheck heart rate 93 bpm  - noted to have controlled rates when in AF on live zio   - wearing live zio  - continue coumadin   follows with out clinic  INR (11/29/2024): 3.40  goal INR 2-3  - has an appointment with EP on 12/23/2024  - has an appointment with sleep medicine    Interval History: Carley Bonilla is a 69 y.o. year old female with history mentioned in problem list who presents to the office  today for a hospital follow up s/p MVR.     Since her surgery she has been overall feeling well from a cardiac standpoint.  She has not had any issues with her incision site and her pain has been well-controlled.  She does monitor her heart rates regularly on her smart watch with reports of occasional notifications stating atrial fibrillation.  She is currently wearing a live zio. She denies any chest pain/pressure/discomfort or shortness of breath. She denies lower extremity edema, orthopnea, and PND. She denies lightheadedness, dizziness, and syncope.       She will RTO on 3/26/2025 with Dr. Tracey or sooner if necessary. She will call with any concerns.       Medical Problems       Problem List       Anxiety    Benign essential hypertension    Hyperlipidemia    Kidney disease, chronic, stage III (GFR 30-59 ml/min) ([facility]) (Chronic)    Lab Results   Component Value Date    EGFR 87 11/20/2024    EGFR 74 11/19/2024    EGFR 59 11/18/2024    CREATININE 0.71 11/20/2024    CREATININE 0.81 11/19/2024    CREATININE 0.98 11/18/2024         MVP (mitral valve prolapse)    Overview Signed 5/9/2024  1:59 PM by Kerrie Barrios MD   Formatting of this note might be different from the original.   Required antibiotic prophylaxis         Panic disorder (episodic paroxysmal anxiety)    Chronic insomnia    Murmur, cardiac    Age-related osteoporosis with current pathological fracture    Nodular lesion on surface of skin    Mitral valve insufficiency    Constipation    PONV (postoperative nausea and vomiting)    S/P left atrial appendage ligation    S/P mitral valve replacement with bioprosthetic valve    Sick sinus syndrome ([facility])    Anemia    Thrombocytopenia ([facility])    Hypocalcemia    Prediabetes    Anticoagulation goal of INR 2 to 3    PAF (paroxysmal atrial fibrillation) ([facility])    Atrial flutter ([facility])        Past Medical History:   Diagnosis Date    Anxiety     Cardiac disease     mitral valve regurgitation    Mitral valve disorder       Social History     Socioeconomic History    Marital status:      Spouse name: Not on file    Number of children: Not on file    Years of education: Not on file    Highest education level: Not on file   Occupational History    Not on file   Tobacco Use    Smoking status: Never    Smokeless tobacco: Never   Vaping Use    Vaping status: Never Used   Substance and Sexual Activity    Alcohol use: Yes     Comment: occ    Drug use: No    Sexual activity: Yes     Partners: Male     Comment: boy friend   Other Topics Concern    Not on file   Social History Narrative    Not on file     Social Drivers of Health     Financial Resource Strain: Not on file   Food Insecurity: Unknown (6/13/2024)    Nursing - Inadequate Food Risk Classification     Worried About Running Out of Food in the Last Year: Never true     Ran Out of Food in the Last Year: Patient declined     Ran Out of Food in the Last Year: Not on file   Transportation Needs: No Transportation Needs (11/23/2024)    OASIS : Transportation     Lack of Transportation (Medical): No     Lack of Transportation (Non-Medical): No     Patient Unable or Declines to Respond: No   Physical Activity: Not on file   Stress: Not on file   Social Connections: Not on file   Intimate Partner Violence: Not on file   Housing Stability: Unknown (6/13/2024)    Housing Stability Vital Sign     Unable to Pay for Housing in the Last Year: No     Number of Times Moved in the Last Year: Not on file     Homeless in the Last Year: No      Family History   Problem Relation Age of Onset    No Known Problems Mother     No Known Problems Daughter     No Known Problems Maternal Grandmother     No Known Problems Paternal Grandmother     No Known Problems Maternal Aunt     No Known Problems Paternal Aunt     Breast cancer Neg Hx      Past Surgical History:   Procedure Laterality Date    ANKLE FRACTURE SURGERY      CARDIAC CATHETERIZATION Left 10/30/2024    Procedure: Cardiac Left Heart  Cath;  Surgeon: René England DO;  Location: [facility];  Service: Cardiology    ORIF WRIST FRACTURE Right 11/21/2016    Procedure: DISTAL RADIUS O/R I/F;  Surgeon: Chuckie Nova MD;  Location: [facility] MAIN OR;  Service:     MD REPLACEMENT MITRAL VALVE W/CARDIOPULMONARY BYP N/A 11/15/2024    Procedure: MITRAL VALVE REPAIR WITH BETH 4D ANNULOPLASTYN RING, CONVERTED TO MITRAL VALVE REPLACEMENT WITH MITRIS RESILIA 29 MM.  LEFT ATRIAL APPENDAGE CLIPPING,KWAME;  Surgeon: Parker Reddy MD;  Location: [facility] MAIN OR;  Service: Cardiac Surgery    WRIST SURGERY         Current Outpatient Medications:     acetaminophen (TYLENOL) 325 mg tablet, Take 1-2 tablets Q4-6 hours PRN pain. Do not take more than 4 grams in 24 hours., Disp: , Rfl:     ALPRAZolam ER (XANAX XR) 1 MG 24 hr tablet, Take 1 mg by mouth daily at bedtime, Disp: , Rfl:     docusate sodium (COLACE) 100 mg capsule, Take 1 capsule (100 mg total) by mouth 2 (two) times a day, Disp: 60 capsule, Rfl: 0    hydrOXYzine HCL (ATARAX) 25 mg tablet, Take 25 mg by mouth daily Take one tablet by mouth 3 times per day as needed for anxiety or sleep. , Disp: , Rfl:     pantoprazole (PROTONIX) 40 mg tablet, Take 1 tablet (40 mg total) by mouth daily, Disp: 30 tablet, Rfl: 0    polyethylene glycol (MIRALAX) 17 g packet, Take 17 g by mouth daily Hold for soft stools., Disp: 30 each, Rfl: 0    pravastatin (PRAVACHOL) 10 mg tablet, TAKE 1 TABLET BY MOUTH EVERY DAY, Disp: 90 tablet, Rfl: 1    Propylene Glycol (Systane Balance) 0.6 % SOLN, drops: 0.6% 1 drop six times a day into both eyes, Disp: , Rfl:     ramelteon (ROZEREM) 8 mg tablet, Take 8 mg by mouth daily, Disp: , Rfl:     traZODone (DESYREL) 100 mg tablet, Take 200 mg by mouth daily Pt self reported that Dr. Reid Arevalo increased dosage from 100mg to 200mg for insomnia. , Disp: , Rfl:     warfarin (Jantoven) 2.5 mg tablet, Take 2 tablets (5mg total) QHS unless otherwise directed., Disp: 60 tablet, Rfl: 2     aspirin (ECOTRIN LOW STRENGTH) 81 mg EC tablet, Take 1 tablet (81 mg total) by mouth daily (Patient not taking: Reported on 12/2/2024), Disp: 30 tablet, Rfl: 2    ibandronate (BONIVA) 150 MG tablet, Take 1 tablet (150 mg total) by mouth every 30 (thirty) days (Patient not taking: Reported on 12/2/2024), Disp: 3 tablet, Rfl: 3    potassium chloride (Klor-Con M20) 20 mEq tablet, Take 2 tablets (40 mEq total) by mouth daily for 7 days Unless otherwise directed., Disp: 14 tablet, Rfl: 1    torsemide (DEMADEX) 20 mg tablet, Take 2 tablets (40 mg total) by mouth daily for 7 days Unless otherwise directed., Disp: 14 tablet, Rfl: 1  Allergies   Allergen Reactions    Morphine And Codeine Other (See Comments)     hallucinations      Bee Venom Hives    Codeine Other (See Comments)     Hallucinations    Penicillins Hives    Sulfa Antibiotics Other (See Comments)     Yeast infections       Labs:     Chemistry        Component Value Date/Time     03/19/2015 0839    K 3.8 11/20/2024 0532    K 5.0 10/19/2023 0940     11/20/2024 0532     10/19/2023 0940    CO2 29 11/20/2024 0532    CO2 19 (L) 11/15/2024 1321    CO2 27 10/19/2023 0940    BUN 26 (H) 11/20/2024 0532    BUN 22 (H) 10/19/2023 0940    CREATININE 0.71 11/20/2024 0532    CREATININE 1.0 10/19/2023 0940        Component Value Date/Time    CALCIUM 7.6 (L) 11/20/2024 0532    CALCIUM 9.6 10/19/2023 0940    ALKPHOS 56 10/24/2024 0855    ALKPHOS 59 10/19/2023 0940    AST 21 10/24/2024 0855    AST 24 10/19/2023 0940    ALT 18 10/24/2024 0855    ALT 21 10/19/2023 0940    BILITOT 0.52 03/19/2015 0839            Lab Results   Component Value Date    CHOL 217 03/19/2015    CHOL 203 05/29/2014    CHOL 228 01/08/2014     Lab Results   Component Value Date    HDL 47 (L) 10/24/2024    HDL 52 05/10/2024    HDL 49 06/20/2017     Lab Results   Component Value Date    LDLCALC 115 (H) 10/24/2024    LDLCALC 108 (H) 05/10/2024    LDLCALC 136 (H) 06/20/2017     Lab Results  "  Component Value Date    TRIG 130 10/24/2024    TRIG 138 05/10/2024    TRIG 137 06/20/2017     No results found for: \"CHOLHDL\"    Imaging: KWAME intraop interventional w/realtime guidance of cardiac procedures  Result Date: 11/18/2024  Narrative: This order contains the linked images for the KWAME that was performed by the Anesthesiologist.  Please see the  CARDIAC KWAME ANESTHESIA procedure for results.    XR chest portable  Result Date: 11/16/2024  Narrative: XR CHEST PORTABLE INDICATION: MVR 11/15/2024. COMPARISON: CXR 11/15/2024, chest CT 10/23/2024. FINDINGS: Right jugular catheter in right atrium. Right jugular pulmonary artery catheter in main pulmonary artery. Mild pulmonary venous congestion. Left greater than right bibasilar atelectasis. No pneumothorax or pleural effusion. Mild cardiomegaly. MVR, sternal wires well aligned. Left atrial appendage ligation. Two mediastinal drains. Temporary epicardial pacemaker wires. Bones are unremarkable for age. Normal upper abdomen.     Impression: Status post MVR with mild pulmonary venous congestion and bibasilar atelectasis. Workstation performed: [id number]     US bedside procedure  Result Date: 11/15/2024  Narrative: [id number]    XR chest portable ICU  Result Date: 11/15/2024  Narrative: XR CHEST PORTABLE ICU INDICATION: S/P open heart. COMPARISON: Compared to 10/5/2022 FINDINGS: Endotracheal tube just above the paula. Feeding tube looped in the stomach. [place]-Ciara catheter in the left main pulmonary artery. Pericardial tube in place. Prosthetic aortic valve. Poor inspiration. Mild prominent markings. No pneumothorax or pleural effusion. Normal cardiomediastinal silhouette. Bones are unremarkable for age. Normal upper abdomen.     Impression: No acute cardiopulmonary disease. Workstation performed: [id number]     KWAME Anesthesia  Result Date: 11/15/2024  Narrative: Karli Ibarra MD     11/15/2024 12:31 PM Procedure Performed: KWAME " Anesthesia Start Time:  11/15/2024 7:59 AM Preanesthesia Checklist Patient identified, IV assessed, risks and benefits discussed, monitors and equipment assessed, procedure being performed at surgeon's request and anesthesia consent obtained. Procedure Diagnostic Indications for KWAME:  assessment of ascending aorta, assessment of surgical repair and hemodynamic monitoring. Type of KWAME: complete KWAME with interpretation. Images Saved: ultrasound permanent image saved. Physician Requesting Echo: Parker Reddy MD.  Location performed: OR. Intubated.  Heart visualized. Insertion of KWAME Probe:  Easy. Probe Type:  Epiaortic and multiplane. Modalities:  Color flow mapping, 3D, pulse wave Doppler and continuous wave Doppler. Echocardiographic and Doppler Measurements PREPROCEDURE LEFT VENTRICLE: Systolic Function: normal. Ejection Fraction: 50-55%. Cavity size: normal.   Regional Wall Motion Abnormalities: none. RIGHT VENTRICLE: Systolic Function: normal.  Cavity size normal. No hypertrophy  AORTIC VALVE: Leaflets: normal and trileaflet. Leaflet motions normal and normal. Stenosis: none.     Regurgitation: none.  MITRAL VALVE: Leaflets: myxomatous and thickened. Leaflet Motions: prolapse and Bileaflet prolapse. Worse in A3, P2 and P3. Regurgitation: severe and Centrally directed.   Stenosis: none.   TRICUSPID VALVE: Leaflets: normal. Leaflet Motions: normal. Stenosis: none. Regurgitation: mild. PULMONIC VALVE: Leaflets: normal. Regurgitation: none. Stenosis: none. ASCENDING AORTA: Size:  normal.  Dissection not present.  AORTIC ARCH: Size:  normal.  dissection not present. Grade 2: severe intimal thickening without protruding atheroma. DESCENDING AORTA: Size: normal.  Dissection not present. Grade 3: atheroma protruding < 0.5 cm into lumen. RIGHT ATRIUM: Size:  dilated. No spontaneous echo contrast. LEFT ATRIUM: Size: dilated. No spontaneous echo contrast. LEFT ATRIAL APPENDAGE: Size: dilated. No spontaneous echo contrast  ATRIAL SEPTUM: Intra-atrial septal morphology: normal.  VENTRICULAR SEPTUM: Intra-ventricular septum morphology: normal. EPIAORTIC: Plaque Thickness: 0-5 mm. OTHER FINDINGS: Pericardium:  pericardial effusion and Trace. Pleural Effusion:  none. POSTPROCEDURE LEFT VENTRICLE: Unchanged .    RIGHT VENTRICLE: Unchanged .  AORTIC VALVE: Unchanged .      MITRAL VALVE: Leaflets: bioprosthetic. Regurgitation: none. Stenosis: none. Mean Gradient: 2.Valve/Ring Size: 29 mm. TRICUSPID VALVE: Unchanged .   PULMONIC VALVE: Unchanged   ATRIA: Left Atrial Appendage Ligate: Yes. Residual Flow in Left Atrial Appendage by Color Flow Doppler: No.  AORTA: Unchanged . REMOVAL: Probe Removal: atraumatic.        ECG: Sinus tachycardia, possible left atrial enlargement, nonspecific T wave abnormality    Review of Systems   Constitutional: Positive for malaise/fatigue.   All other systems reviewed and are negative.      Vitals:    12/02/24 1013   BP: 128/80   Pulse: 93   Temp: (!) 97 °F (36.1 °C)   SpO2: 99%     Vitals:    12/02/24 1013   Weight: 66.7 kg (147 lb)     Height: 5' (152.4 cm)   Body mass index is 28.71 kg/m².    Physical Exam  Vitals and nursing note reviewed.   Constitutional:       General: She is not in acute distress.     Appearance: She is not ill-appearing.   HENT:      Head: Normocephalic.      Nose: Nose normal.      Mouth/Throat:      Mouth: Mucous membranes are moist.      Pharynx: Oropharynx is clear.   Cardiovascular:      Rate and Rhythm: Normal rate and regular rhythm.      Heart sounds: No murmur heard.  Pulmonary:      Breath sounds: Decreased breath sounds present.   Musculoskeletal:      Cervical back: Normal range of motion.      Right lower leg: No edema.      Left lower leg: No edema.   Skin:     General: Skin is warm and dry.      Comments: Mid chest wall incision C/D/I   Neurological:      Mental Status: She is alert and oriented to person, place, and time.   Psychiatric:         Mood and Affect: Mood  normal.         Behavior: Behavior normal.

## 2024-12-03 ENCOUNTER — HOME CARE VISIT (OUTPATIENT)
Dept: HOME HEALTH SERVICES | Facility: HOME HEALTHCARE | Age: 69
End: 2024-12-03
Payer: COMMERCIAL

## 2024-12-03 ENCOUNTER — ANTICOAG VISIT (OUTPATIENT)
Dept: CARDIOLOGY CLINIC | Facility: CLINIC | Age: 69
End: 2024-12-03

## 2024-12-03 VITALS — DIASTOLIC BLOOD PRESSURE: 66 MMHG | OXYGEN SATURATION: 96 % | SYSTOLIC BLOOD PRESSURE: 128 MMHG | HEART RATE: 92 BPM

## 2024-12-03 DIAGNOSIS — I48.0 PAF (PAROXYSMAL ATRIAL FIBRILLATION) (HCC): Primary | ICD-10-CM

## 2024-12-03 DIAGNOSIS — I48.3 TYPICAL ATRIAL FLUTTER (HCC): ICD-10-CM

## 2024-12-03 LAB — INR PPP: 3.4 (ref 0.85–1.19)

## 2024-12-03 PROCEDURE — G0151 HHCP-SERV OF PT,EA 15 MIN: HCPCS

## 2024-12-03 PROCEDURE — G0299 HHS/HOSPICE OF RN EA 15 MIN: HCPCS

## 2024-12-04 VITALS
HEART RATE: 86 BPM | SYSTOLIC BLOOD PRESSURE: 128 MMHG | RESPIRATION RATE: 16 BRPM | DIASTOLIC BLOOD PRESSURE: 72 MMHG | OXYGEN SATURATION: 98 % | TEMPERATURE: 97.9 F

## 2024-12-05 ENCOUNTER — OFFICE VISIT (OUTPATIENT)
Dept: CARDIAC SURGERY | Facility: CLINIC | Age: 69
End: 2024-12-05

## 2024-12-05 ENCOUNTER — HOME CARE VISIT (OUTPATIENT)
Dept: HOME HEALTH SERVICES | Facility: HOME HEALTHCARE | Age: 69
End: 2024-12-05
Payer: COMMERCIAL

## 2024-12-05 VITALS
OXYGEN SATURATION: 96 % | HEART RATE: 107 BPM | HEIGHT: 60 IN | BODY MASS INDEX: 29.27 KG/M2 | TEMPERATURE: 98.8 F | WEIGHT: 149.1 LBS | SYSTOLIC BLOOD PRESSURE: 134 MMHG | DIASTOLIC BLOOD PRESSURE: 67 MMHG

## 2024-12-05 DIAGNOSIS — Z98.890 S/P LEFT ATRIAL APPENDAGE LIGATION: ICD-10-CM

## 2024-12-05 DIAGNOSIS — Z95.3 S/P MITRAL VALVE REPLACEMENT WITH BIOPROSTHETIC VALVE: Primary | ICD-10-CM

## 2024-12-05 DIAGNOSIS — Z48.89 ENCOUNTER FOR POSTOPERATIVE CARE: ICD-10-CM

## 2024-12-05 PROCEDURE — 99024 POSTOP FOLLOW-UP VISIT: CPT | Performed by: THORACIC SURGERY (CARDIOTHORACIC VASCULAR SURGERY)

## 2024-12-05 PROCEDURE — G0151 HHCP-SERV OF PT,EA 15 MIN: HCPCS

## 2024-12-05 NOTE — PATIENT INSTRUCTIONS
Things to know as you continue to recover after heart surgery:    INCISION CARE:  It is normal to experience soreness as your chest wall continues to recover. Brief sharp shooting pains, achiness, tightness or pulling sensations are all normal.  As your skin incisions heal, sometimes small tender lumps or pimple-like bumps develop which is due to your body attempting to “dissolve” the suture (stiches) underneath the skin. This is normal.  However, if you develop new redness, pain, swelling or drainage at a skin incision, call our office.  You may use scar creams, cocoa butter, Vitamin E, etc on your incisions 12 weeks after your surgery.   Your lifting restricting increases to 25 lb on  12/13/24 until 2/7/25 (12 weeks from your surgical date) when your restrictions end. This is to allow your sternum (breastbone) to fully heal.  Feeling frequent popping or clicking in your sternum is not normal and you should call our office if this occurs. Feeling an occasional pop or click is expected.  It is normal to have minor swelling in the leg that vein was removed for your bypass surgery. Over time the swelling improves or resolves.     ACTIVITY:  Participating in the cardiac rehab program is an excellent way for your heart to recover and strengthen after surgery as well as improve your overall stamina. The rehab therapists will also have you start light upper body exercises to help you return to normal activity. You may begin cardiac rehab 12/20/24. Maintaining regular exercise, even after completing cardiac rehab, is highly recommended as it also helps with blood sugar and weight regulation.  You may now try sleep on your side. Using extra pillows at first to provide support may help with comfort.    MEDICATIONS:  All your medications will be managed by your PCP and/or Cardiologist. Please contact their office for refills.  Most over the counter supplements may be resumed after your post -op appointment with the  surgeon.    LIFESTYLE:  We strongly encourage you do not smoke as it impacts healing and, if you underwent coronary artery bypass graft surgery, it impacts how long your bypass grafts stay open.  Smoking can also increase abnormal blood clot formation.  Following a “heart healthy” diet is always a good idea.  You can get nutrition information and diet guidance at the cardiac rehab program or ask your PCP for a referral to a [facility] dietician.

## 2024-12-05 NOTE — PROGRESS NOTES
POST OP FOLLOW UP VISIT    Procedure:     11/15/24    1. Mitral valve replacement with  a 27 mm Reyes Mitris Resilia pericardial tissue valve  2. Ligation of the left atrial appendage with a 40 mm Penditure device        History: Carley Bonilla is a 69 y.o. year old female who presents to our office today for routine follow up care from mitral valve replacement and left atrial appendage ligation.  She tolerated her procedure well. Post op she was atrial paced with junctional rhythm and then transient Afib/Flutter. Amiodarone and Metoprolol was held and EP suggested ZIO monitoring as out patient. She was stable for discharge home on POD # 6.  Patient has been seen by Cardiology She has a ZIO monitor in place presently. She has experienced a few palpitations and an episode of tachycardia that resolved spontaneously. She is tolerating activity and ambulation. She denies chest pain, SOB, lightheadedness, edema. Her incision is healing well and she remains afebrile. She has numbness along the out aspect of her left thigh since surgery. No difficulty with walking, no redness, swelling or bruising. She denies back difficulties.    Vital Signs:   Vitals:    12/05/24 1513 12/05/24 1517   BP: 132/61 134/67   BP Location: Left arm Right arm   Patient Position: Sitting Sitting   Cuff Size: Standard Standard   Pulse: (!) 107    Temp: 98.8 °F (37.1 °C)    TempSrc: Tympanic    SpO2: 96%    Weight: 67.6 kg (149 lb 1.6 oz)    Height: 5' (1.524 m)        Home Medications:   Prior to Admission medications    Medication Sig Start Date End Date Taking? Authorizing Provider   acetaminophen (TYLENOL) 325 mg tablet Take 1-2 tablets Q4-6 hours PRN pain. Do not take more than 4 grams in 24 hours. 11/21/24  Yes Bernie Lind PA-C   ALPRAZolam ER (XANAX XR) 1 MG 24 hr tablet Take 1 mg by mouth daily at bedtime 4/25/24  Yes Historical Provider, MD   docusate sodium (COLACE) 100 mg capsule Take 1 capsule (100 mg total) by mouth 2 (two)  times a day 11/21/24 12/21/24 Yes Bernie Lind PA-C   hydrOXYzine HCL (ATARAX) 25 mg tablet Take 25 mg by mouth daily Take one tablet by mouth 3 times per day as needed for anxiety or sleep.  2/19/24  Yes Historical Provider, MD   pantoprazole (PROTONIX) 40 mg tablet Take 1 tablet (40 mg total) by mouth daily 11/22/24 12/22/24 Yes Bernie Lind PA-C   polyethylene glycol (MIRALAX) 17 g packet Take 17 g by mouth daily Hold for soft stools. 11/22/24 12/22/24 Yes Bernie Lind PA-C   pravastatin (PRAVACHOL) 10 mg tablet TAKE 1 TABLET BY MOUTH EVERY DAY 8/27/24  Yes Kerrie Barrios MD   Propylene Glycol (Systane Balance) 0.6 % SOLN drops: 0.6% 1 drop six times a day into both eyes   Yes Historical Provider, MD   ramelteon (ROZEREM) 8 mg tablet Take 8 mg by mouth daily 3/28/24  Yes Historical Provider, MD   traZODone (DESYREL) 100 mg tablet Take 200 mg by mouth daily Pt self reported that Dr. Reid Arevalo increased dosage from 100mg to 200mg for insomnia.  3/28/24  Yes Historical Provider, MD   warfarin (Jantoven) 2.5 mg tablet Take 2 tablets (5mg total) QHS unless otherwise directed. 11/21/24  Yes Bernie Lind PA-C   aspirin (ECOTRIN LOW STRENGTH) 81 mg EC tablet Take 1 tablet (81 mg total) by mouth daily  Patient not taking: Reported on 12/2/2024 11/21/24   Bernie Lind PA-C   ibandronate (BONIVA) 150 MG tablet Take 1 tablet (150 mg total) by mouth every 30 (thirty) days  Patient not taking: Reported on 12/2/2024 11/11/24   Kerrie Barrios MD   potassium chloride (Klor-Con M20) 20 mEq tablet Take 2 tablets (40 mEq total) by mouth daily for 7 days Unless otherwise directed.  Patient not taking: Reported on 12/5/2024 11/22/24 11/29/24  Bernie Lind PA-C   torsemide (DEMADEX) 20 mg tablet Take 2 tablets (40 mg total) by mouth daily for 7 days Unless otherwise directed. 11/22/24 11/29/24  Bernie Lind PA-C       Physical Exam:  General: Alert, oriented, well developed, no acute distress  HEENT/NECK:   PERRLA.  No jugular venous distention.    Cardiac:Regular rate and rhythm, no murmurs rubs or gallops.  Pulmonary:Breath sounds clear bilaterally  Abdomen:  Non-tender, Non-distended.  Positive bowel sounds.  Upper extremities: 2+ radial pulses; brisk capillary refill  Lower extremities: Extremities warm/dry. PT/DP pules 2+ bilaterally.  No edema B/L  Incisions: Sternum is stable.  Incision is clean, dry, and intact.  CT sites scabbed  Musculoskeletal: MAEE, stable gait  Neuro: Alert and oriented X 3.  Sensation is grossly intact.  No focal deficits  Skin: Warm/Dry, without rashes or lesions.    Lab Results:       Lab Results   Component Value Date    INR 3.40 (A) 12/03/2024    INR 3.40 (A) 11/29/2024    INR 1.90 (A) 11/25/2024    PROTIME 15.2 (H) 11/21/2024    PROTIME 14.5 11/20/2024    PROTIME 13.1 10/24/2024     Lab Results   Component Value Date    HGBA1C 5.9 (H) 10/24/2024         ECG/12/2/24    Sinus tachycardia, possible left atrial enlargement, nonspecific T wave abnormality         Assessment:   Mitral regurgitation.   S/P mitral valve replacement; 27 mm Reyes Mitris Resilia pericardial tissue valve  And Ligation of the left atrial appendage with a 40 mm Penditure device     Carley Bonilla is 3 weeks post-op, making good progress in their recovery.  Incisions are well-healed and the sternum is stable.   Weight and VS are stable.     Plan:   Medications reviewed with patient, associated questions answered and no changes made.     Benefits of participating in cardiac rehab have been discussed and patient is cleared to begin the outpatient  program 12/20/24.     May resume driving next week.    Increase activity as tolerated and maintain a lifting restriction of 10 lb for one more week then no more than 25 lbs until 2/7/25, which is 12 weeks from the surgical date.     No further follow up in our office is needed; call with questions or concerns.     Patient should maintain routine follow-up with Cardiology  and Primary Care for ongoing medical care.     Patient verbalizes understanding of recommendations and all questions were answered to their satisfaction.    LAURA 5/16/22 AZUL Murguia  12/5/24  3:30PM

## 2024-12-05 NOTE — LETTER
December 5, 2024     Hal Tracey MD  [address] [facility] [address]    Patient: Carley Bonilla   YOB: 1955   Date of Visit: 12/5/2024       Dear Dr. Tracey:    Thank you for referring Carley Bonilla to me for evaluation. Below are my notes for this consultation.    If you have questions, please do not hesitate to call me. I look forward to following your patient along with you.         Sincerely,        Parker Reddy MD        CC: MD Hilary Chinchilla CRNP  12/5/2024  4:13 PM  Attested   POST OP FOLLOW UP VISIT    Procedure:     11/15/24    1. Mitral valve replacement with  a 27 mm Reyes Mitris Resilia pericardial tissue valve  2. Ligation of the left atrial appendage with a 40 mm Penditure device        History: Carley Bonilla is a 69 y.o. year old female who presents to our office today for routine follow up care from mitral valve replacement and left atrial appendage ligation.  She tolerated her procedure well. Post op she was atrial paced with junctional rhythm and then transient Afib/Flutter. Amiodarone and Metoprolol was held and EP suggested ZIO monitoring as out patient. She was stable for discharge home on POD # 6.  Patient has been seen by Cardiology She has a ZIO monitor in place presently. She has experienced a few palpitations and an episode of tachycardia that resolved spontaneously. She is tolerating activity and ambulation. She denies chest pain, SOB, lightheadedness, edema. Her incision is healing well and she remains afebrile. She has numbness along the out aspect of her left thigh since surgery. No difficulty with walking, no redness, swelling or bruising. She denies back difficulties.    Vital Signs:   Vitals:    12/05/24 1513 12/05/24 1517   BP: 132/61 134/67   BP Location: Left arm Right arm   Patient Position: Sitting Sitting   Cuff Size: Standard Standard   Pulse: (!) 107    Temp: 98.8 °F (37.1 °C)    TempSrc: Tympanic    SpO2: 96%    Weight: 67.6 kg (149 lb 1.6  oz)    Height: 5' (1.524 m)        Home Medications:   Prior to Admission medications    Medication Sig Start Date End Date Taking? Authorizing Provider   acetaminophen (TYLENOL) 325 mg tablet Take 1-2 tablets Q4-6 hours PRN pain. Do not take more than 4 grams in 24 hours. 11/21/24  Yes Bernie Lind PA-C   ALPRAZolam ER (XANAX XR) 1 MG 24 hr tablet Take 1 mg by mouth daily at bedtime 4/25/24  Yes Historical Provider, MD   docusate sodium (COLACE) 100 mg capsule Take 1 capsule (100 mg total) by mouth 2 (two) times a day 11/21/24 12/21/24 Yes Bernie Lind PA-C   hydrOXYzine HCL (ATARAX) 25 mg tablet Take 25 mg by mouth daily Take one tablet by mouth 3 times per day as needed for anxiety or sleep.  2/19/24  Yes Historical Provider, MD   pantoprazole (PROTONIX) 40 mg tablet Take 1 tablet (40 mg total) by mouth daily 11/22/24 12/22/24 Yes Bernie Lind PA-C   polyethylene glycol (MIRALAX) 17 g packet Take 17 g by mouth daily Hold for soft stools. 11/22/24 12/22/24 Yes Bernie Lind PA-C   pravastatin (PRAVACHOL) 10 mg tablet TAKE 1 TABLET BY MOUTH EVERY DAY 8/27/24  Yes Kerrie Barrios MD   Propylene Glycol (Systane Balance) 0.6 % SOLN drops: 0.6% 1 drop six times a day into both eyes   Yes Historical Provider, MD   ramelteon (ROZEREM) 8 mg tablet Take 8 mg by mouth daily 3/28/24  Yes Historical Provider, MD   traZODone (DESYREL) 100 mg tablet Take 200 mg by mouth daily Pt self reported that Dr. Reid Arevalo increased dosage from 100mg to 200mg for insomnia.  3/28/24  Yes Historical Provider, MD   warfarin (Jantoven) 2.5 mg tablet Take 2 tablets (5mg total) QHS unless otherwise directed. 11/21/24  Yes Bernie Lind PA-C   aspirin (ECOTRIN LOW STRENGTH) 81 mg EC tablet Take 1 tablet (81 mg total) by mouth daily  Patient not taking: Reported on 12/2/2024 11/21/24   Bernie Lind PA-C   ibandronate (BONIVA) 150 MG tablet Take 1 tablet (150 mg total) by mouth every 30 (thirty) days  Patient not taking:  Reported on 12/2/2024 11/11/24   Kerrie Barrios MD   potassium chloride (Klor-Con M20) 20 mEq tablet Take 2 tablets (40 mEq total) by mouth daily for 7 days Unless otherwise directed.  Patient not taking: Reported on 12/5/2024 11/22/24 11/29/24  Bernie Lind PA-C   torsemide (DEMADEX) 20 mg tablet Take 2 tablets (40 mg total) by mouth daily for 7 days Unless otherwise directed. 11/22/24 11/29/24  Bernie Lind PA-C       Physical Exam:  General: Alert, oriented, well developed, no acute distress  HEENT/NECK:  PERRLA.  No jugular venous distention.    Cardiac:Regular rate and rhythm, no murmurs rubs or gallops.  Pulmonary:Breath sounds clear bilaterally  Abdomen:  Non-tender, Non-distended.  Positive bowel sounds.  Upper extremities: 2+ radial pulses; brisk capillary refill  Lower extremities: Extremities warm/dry. PT/DP pules 2+ bilaterally.  No edema B/L  Incisions: Sternum is stable.  Incision is clean, dry, and intact.  CT sites scabbed  Musculoskeletal: MAEE, stable gait  Neuro: Alert and oriented X 3.  Sensation is grossly intact.  No focal deficits  Skin: Warm/Dry, without rashes or lesions.    Lab Results:       Lab Results   Component Value Date    INR 3.40 (A) 12/03/2024    INR 3.40 (A) 11/29/2024    INR 1.90 (A) 11/25/2024    PROTIME 15.2 (H) 11/21/2024    PROTIME 14.5 11/20/2024    PROTIME 13.1 10/24/2024     Lab Results   Component Value Date    HGBA1C 5.9 (H) 10/24/2024         ECG/12/2/24    Sinus tachycardia, possible left atrial enlargement, nonspecific T wave abnormality         Assessment:   Mitral regurgitation.   S/P mitral valve replacement; 27 mm Reyes Mitris Resilia pericardial tissue valve  And Ligation of the left atrial appendage with a 40 mm Penditure device     Carley Bonilla is 3 weeks post-op, making good progress in their recovery.  Incisions are well-healed and the sternum is stable.   Weight and VS are stable.     Plan:   Medications reviewed with patient, associated questions  answered and no changes made.     Benefits of participating in cardiac rehab have been discussed and patient is cleared to begin the outpatient  program 12/20/24.     May resume driving next week.    Increase activity as tolerated and maintain a lifting restriction of 10 lb for one more week then no more than 25 lbs until 2/7/25, which is 12 weeks from the surgical date.     No further follow up in our office is needed; call with questions or concerns.     Patient should maintain routine follow-up with Cardiology and Primary Care for ongoing medical care.     Patient verbalizes understanding of recommendations and all questions were answered to their satisfaction.    LAURA 5/16/22 NORMAL    AZUL Mathias  12/5/24  3:30PM  Attestation signed by Parker Reddy MD at 12/5/2024  4:16 PM:  Attending Attestation:    I supervised the Advanced Practitioner on 12/5/2024.  I discussed the case with the Advanced Practitioner, reviewed the note and agree.    The patient is a very pleasant 69-year-old female that returns today on a 3 weeks postop follow-up after a mitral valve replacement.  She had postoperative atrial fibrillation and atrial flutter, she was discharged on a Zio patch.  She continues to do well, and does not have any complications.  Plan:  Follow-up with PCP  Follow-up with cardiology and electrophysiology  Cardiac rehab  Continue anticoagulation per cardiology, there is no need for long-term anticoagulation from the valvular surgery standpoint.

## 2024-12-06 VITALS — HEART RATE: 109 BPM | SYSTOLIC BLOOD PRESSURE: 144 MMHG | OXYGEN SATURATION: 93 % | DIASTOLIC BLOOD PRESSURE: 64 MMHG

## 2024-12-10 ENCOUNTER — HOME CARE VISIT (OUTPATIENT)
Dept: HOME HEALTH SERVICES | Facility: HOME HEALTHCARE | Age: 69
End: 2024-12-10
Payer: COMMERCIAL

## 2024-12-10 ENCOUNTER — ANTICOAG VISIT (OUTPATIENT)
Dept: CARDIOLOGY CLINIC | Facility: CLINIC | Age: 69
End: 2024-12-10

## 2024-12-10 VITALS — HEART RATE: 108 BPM

## 2024-12-10 VITALS
DIASTOLIC BLOOD PRESSURE: 80 MMHG | SYSTOLIC BLOOD PRESSURE: 126 MMHG | HEART RATE: 100 BPM | OXYGEN SATURATION: 96 % | RESPIRATION RATE: 8 BRPM | TEMPERATURE: 98.8 F

## 2024-12-10 DIAGNOSIS — I48.3 TYPICAL ATRIAL FLUTTER (HCC): ICD-10-CM

## 2024-12-10 DIAGNOSIS — I48.0 PAF (PAROXYSMAL ATRIAL FIBRILLATION) (HCC): Primary | ICD-10-CM

## 2024-12-10 LAB — INR PPP: 2.2 (ref 0.85–1.19)

## 2024-12-10 PROCEDURE — G0300 HHS/HOSPICE OF LPN EA 15 MIN: HCPCS

## 2024-12-10 PROCEDURE — G0151 HHCP-SERV OF PT,EA 15 MIN: HCPCS

## 2024-12-13 NOTE — PROGRESS NOTES
Assessment/Plan:  Assessment & Plan  IFG (impaired fasting glucose)    Pending labs.  To consider Metformin XR?  Recommend lifestyle modifications.      Orders:  •  Comprehensive metabolic panel; Future  •  Hemoglobin A1C; Future    Benign essential hypertension    Stable.  Management per Cardio.  Check blood pressure outside of office.  Recommend lifestyle modifications.    Orders:  •  CBC and differential; Future  •  Comprehensive metabolic panel; Future    PAF (paroxysmal atrial fibrillation) (HCC)    Stable.  Management per Cardio.  On Coumadin per Cardio.     Orders:  •  TSH, 3rd generation with Free T4 reflex; Future    Anxiety    Stable.  Management per Sleep Medicine.      Orders:  •  TSH, 3rd generation with Free T4 reflex; Future    Sick sinus syndrome (HCC)    Management per Cardio.         MVP (mitral valve prolapse)    Management per Cardio.   S/p Mitral Valve Bioprosthestic Replacement and Left Atrial Appendage Ligation 11/15/24.  Does not need A/C for valvular surgery reasons.  Pending Cardiac Rehab 12/24.         Osteopenia, unspecified location    Last Dexa 1/8/24.  On Boniva 150mg q30 days.    Orders:  •  TSH, 3rd generation with Free T4 reflex; Future  •  Vitamin D 25 hydroxy; Future    Dyslipidemia    Pending labs.  On Pravachol 10mg QHS.  Recommend lifestyle modifications.      Orders:  •  CBC and differential; Future  •  Comprehensive metabolic panel; Future  •  LDL cholesterol, direct; Future    Overweight    Stable.  Recommend lifestyle modifications.         Sciatica of left side    Home Exercise Program given - can start if ok with cardiac rehab team.      Orders:  •  Ambulatory Referral to Comprehensive Spine PT; Future    Chronic insomnia    Management per Sleep Medicine.   On Trazodone 100 mg q.h.s., Rozerem 8 mg q.h.s., Xanax ER 1 mg QHS, Atarax 25mg TID PRN.   Sleep Med advises weaning Xanax ER 0.5mg QHS.  s/p CBT.     Orders:  •  TSH, 3rd generation with Free T4 reflex;  Future    Myalgia    Orders:  •  Vitamin D 25 hydroxy; Future    Benign paroxysmal positional vertigo due to bilateral vestibular disorder    Pending Vestibular Therapy consult.      Orders:  •  Ambulatory Referral to Physical Therapy; Future    History of colon polyps    Colonoscopy is up to date.         Breast cancer screening by mammogram    Orders:  •  Mammo screening bilateral w 3d and cad; Future       Return in about 8 weeks (around 2/10/2025) for F/U IFG, HTN, HL, Osteopenia, Labs.      Future Appointments   Date Time Provider Department Center   12/20/2024 10:00 AM AN CARDIAC REHAB EVAL ROOM [facility]   12/23/2024 11:00 AM Rahul Quintero MD [facility]   2/4/2025 10:00 AM Genesis Cervantes DO  [facility]   3/26/2025  2:20 PM Hal Tracey MD [facility]   4/3/2025  4:00 PM AN MAMMO [address] [place] [address]        Subjective:     Carley is a 69 y.o. female who presents today as a new patient for her medical conditions.      New Patient    Previous PCP:  Dr. Kerrie Barrios at [facility] / Dr. Carmen Leon  Reason for Transfer:  Preference  Last seen by previous PCP:  8/22/24  Last Labs:  11/20/24  Last Physical:  AWV 6/13/24  Medical Records Requested:   Yes / No      HPI:  Chief Complaint   Patient presents with   • New Patient Visit     Here to establish care and address concerns.      -- Above per clinical staff and reviewed. --      HPI      Today:      PTO c boyfriend Octaviano    Controlled Substance Review    [place] PDMP or [place]  reviewed: No red flags were identified; safe to proceed with prescription..    11/19/2024 10/19/2024 2 Alprazolam Xr 1 Mg Tablet 30.00 30 Ch Gid [id number] Pen (5675) 0     Rx per   René Arevalo [facility] [place] [address] [facility] [place] [address]       Left lateral thigh numbness - Since surgery 11/15/24, unchanged.  Sometimes feels numbness, sometimes feels like pins and needs.  Constant.  She denies  truama or motor dysfunction.      Overweight - Trying to watch diet.  She would like to eat less sweets.  No exercise - pending Cardiac Rehab 24.  Previously walking for 30-40 minutes, 6-7 per week.      IFG - HgA1C 5.9 on 10/24/24.      Hypertension - Management per Cardio Dr. Tracey - Next appt 3/25.  Stable s meds.  No BP check outside of office.       Hyperlipidemia - On Pravachol 10mg QHS.  No higher dose or other statin previously.       Sick Sinus Syndrome / pAfib - Management per EP Cardio Dr. Quintero - Next appt .  On Coumadin per Cardio.      Mitral Valve Prolapse - Management per Cardio?  S/p Mitral Valve Bioprosthestic Replacement and Left Atrial Appendage Ligation 11/15/24 per CTS Dr. Reddy - Next appt PRN.  Does not need A/C for valvular surgery reasons.  Pending Cardiac Rehab .      Osteopenia - Last Dexa 24.  On Boniva 150mg q30 days.  D/C Fosamax due to HA.       Chronic Insomnia / Anxiety - Management per Sleep Medicine Dr. René Arevalo - Next appt 25, 25.  On Trazodone 100 mg q.h.s., Rozerem 8 mg q.h.s., Xanax ER 1 mg QHS, Atarax 25mg TID PRN.   Sleep Med advises weaning Xanax ER 0.5mg QHS.  s/p CBT.     PHQ-2/9 Depression Screening    Little interest or pleasure in doing things: 0 - not at all  Feeling down, depressed, or hopeless: 1 - several days  Trouble falling or staying asleep, or sleeping too much: 1 - several days  Feeling tired or having little energy: 0 - not at all  Poor appetite or overeatin - not at all  Feeling bad about yourself - or that you are a failure or have let yourself or your family down: 0 - not at all  Trouble concentrating on things, such as reading the newspaper or watching television: 0 - not at all  Moving or speaking so slowly that other people could have noticed. Or the opposite - being so fidgety or restless that you have been moving around a lot more than usual: 0 - not at all  Thoughts that you would be better  off dead, or of hurting yourself in some way: 0 - not at all  PHQ-2 Score: 1  PHQ-2 Interpretation: Negative depression screen  PHQ-9 Score: 2  PHQ-9 Interpretation: No or Minimal depression       AMANUEL-7 Flowsheet Screening    Flowsheet Row Most Recent Value   Over the last two weeks, how often have you been bothered by the following problems?     Feeling nervous, anxious, or on edge 1   Not being able to stop or control worrying 1   Worrying too much about different things 1   Trouble relaxing  0   Being so restless that it's hard to sit still 0   Becoming easily annoyed or irritable  0   Feeling afraid as if something awful might happen 0   How difficult have these problems made it for you to do your work, take care of things at home, or get along with other people?  Not difficult at all   AMANUEL Score  3        MDQ:   1, No Problem    Seborrheic Keratosis - Management per Derm Dr. Campos - Next appt PRN.    H/O Colon Polyps - Last colonoscopy 2022? In [place] (patient unsure of  / Location) - states repeat due in 5 years - 2027..      Reviewed:  Labs 11/20/24, [facility] 8/22/24, CTS 12/5/24. Cardio 12/2/24. Derm, 6/18/24      The following portions of the patient's history were reviewed and updated as appropriate: allergies, current medications, past family history, past medical history, past social history, past surgical history and problem list.      Review of Systems   Constitutional:  Negative for appetite change, chills, diaphoresis, fatigue and fever.   Respiratory:  Positive for shortness of breath (Intermittent HILL). Negative for chest tightness.    Cardiovascular:  Negative for chest pain and palpitations.   Gastrointestinal:  Negative for abdominal pain, blood in stool, diarrhea, nausea and vomiting.   Genitourinary:  Negative for dysuria.   Neurological:  Positive for dizziness (In AM, lasting 10-20 minutes, worse c turning head , self resolves, symptoms x 1 month).        Current  Outpatient Medications   Medication Sig Dispense Refill   • acetaminophen (TYLENOL) 325 mg tablet Take 1-2 tablets Q4-6 hours PRN pain. Do not take more than 4 grams in 24 hours.     • ALPRAZolam ER (XANAX XR) 1 MG 24 hr tablet Take 1 mg by mouth daily at bedtime Rx per Sleep Medicine     • aspirin (ECOTRIN LOW STRENGTH) 81 mg EC tablet Take 1 tablet (81 mg total) by mouth daily (Patient taking differently: Take 81 mg by mouth daily Advised per Cardio) 30 tablet 2   • atenolol (TENORMIN) 25 mg tablet Take 25 mg by mouth daily Rx per Cardio     • hydrOXYzine HCL (ATARAX) 25 mg tablet Take 25 mg by mouth 3 (three) times a day as needed for anxiety (Insomnia) Take one tablet by mouth 3 times per day as needed for anxiety or sleep.  Rx per Sleep Medicine.     • ibandronate (BONIVA) 150 MG tablet Take 1 tablet (150 mg total) by mouth every 30 (thirty) days 3 tablet 3   • Omega-3 Fatty Acids (fish oil) 1,000 mg Take 1,000 mg by mouth daily     • Pediatric Vitamins (Multivitamin Gummies Childrens) CHEW Chew 1 tablet in the morning     • pravastatin (PRAVACHOL) 10 mg tablet TAKE 1 TABLET BY MOUTH EVERY DAY (Patient taking differently: Take 10 mg by mouth daily at bedtime) 90 tablet 1   • Propylene Glycol (Systane Balance) 0.6 % SOLN OTC.  1 drop six times a day into both eyes     • ramelteon (ROZEREM) 8 mg tablet Take 8 mg by mouth daily Rx per Sleep Med     • traZODone (DESYREL) 100 mg tablet Take 200 mg by mouth daily at bedtime Rx per Sleep Med     • warfarin (Jantoven) 2.5 mg tablet Take 2 tablets (5mg total) QHS unless otherwise directed. (Patient taking differently: Take 2 tablets (5mg total) QHS unless otherwise directed.  Rx per Cardio.) 60 tablet 2     No current facility-administered medications for this visit.       Objective:  /68   Pulse 102   Temp 98 °F (36.7 °C) (Temporal)   Resp 16   Ht 5' (1.524 m)   Wt 67.7 kg (149 lb 3.2 oz)   LMP 12/16/2014 (Approximate)   SpO2 95%   Breastfeeding No   BMI  29.14 kg/m²    Wt Readings from Last 3 Encounters:   12/16/24 67.7 kg (149 lb 3.2 oz)   12/05/24 67.6 kg (149 lb 1.6 oz)   12/02/24 66.7 kg (147 lb)      BP Readings from Last 3 Encounters:   12/16/24 110/61   12/16/24 118/68   12/10/24 126/80          Physical Exam  Vitals and nursing note reviewed.   Constitutional:       General: She is not in acute distress.     Appearance: Normal appearance. She is well-developed. She is not ill-appearing or toxic-appearing.   HENT:      Head: Normocephalic and atraumatic.      Right Ear: External ear normal. There is impacted cerumen.      Left Ear: External ear normal. There is impacted cerumen.      Nose: Nose normal.      Right Sinus: No maxillary sinus tenderness or frontal sinus tenderness.      Left Sinus: No maxillary sinus tenderness or frontal sinus tenderness.      Mouth/Throat:      Mouth: Mucous membranes are moist.      Pharynx: Oropharynx is clear. Uvula midline.      Tonsils: No tonsillar exudate.   Eyes:      Extraocular Movements: Extraocular movements intact.      Conjunctiva/sclera: Conjunctivae normal.      Pupils: Pupils are equal, round, and reactive to light.   Neck:      Vascular: No carotid bruit.   Cardiovascular:      Rate and Rhythm: Normal rate and regular rhythm.      Pulses: Normal pulses.      Heart sounds: Murmur heard.      Systolic murmur is present with a grade of 2/6.      Comments: +Mid-systolic cllick  Pulmonary:      Effort: Pulmonary effort is normal.      Breath sounds: Normal breath sounds.   Abdominal:      General: Bowel sounds are normal. There is no distension.      Palpations: Abdomen is soft. There is no mass.      Tenderness: There is no abdominal tenderness. There is no guarding or rebound.   Musculoskeletal:         General: No swelling or tenderness.      Cervical back: Neck supple.      Right lower leg: No edema.      Left lower leg: No edema.   Lymphadenopathy:      Cervical: No cervical adenopathy.   Skin:     Findings:  "No rash.   Neurological:      General: No focal deficit present.      Mental Status: She is alert and oriented to person, place, and time.      Cranial Nerves: No cranial nerve deficit.      Sensory: Sensory deficit (Decreased sensation to light touch Left lateral thigh) present.      Motor: No weakness.      Coordination: Coordination normal.      Deep Tendon Reflexes: Reflexes normal.      Comments: +Idalou Hallpike B/L - L > R   Psychiatric:         Behavior: Behavior normal.         Thought Content: Thought content normal.         Judgment: Judgment normal.         Lab Results:      Lab Results   Component Value Date    WBC 9.85 11/20/2024    HGB 11.1 (L) 11/20/2024    HCT 33.7 (L) 11/20/2024     (L) 11/20/2024    CHOL 217 03/19/2015    TRIG 130 10/24/2024    HDL 47 (L) 10/24/2024    ALT 18 10/24/2024    AST 21 10/24/2024     03/19/2015    K 3.8 11/20/2024     11/20/2024    CREATININE 0.71 11/20/2024    BUN 26 (H) 11/20/2024    CO2 29 11/20/2024    TSH 1.06 10/19/2023    INR 2.10 (A) 12/16/2024    GLUF 90 10/30/2024    HGBA1C 5.9 (H) 10/24/2024     No results found for: \"URICACID\"  Invalid input(s): \"BASENAME\" Vitamin D    KWAME intraop interventional w/realtime guidance of cardiac procedures  Result Date: 11/18/2024  Narrative: This order contains the linked images for the KWAME that was performed by the Anesthesiologist.  Please see the  CARDIAC KWAME ANESTHESIA procedure for results.    XR chest portable  Result Date: 11/16/2024  Narrative: XR CHEST PORTABLE INDICATION: MVR 11/15/2024. COMPARISON: CXR 11/15/2024, chest CT 10/23/2024. FINDINGS: Right jugular catheter in right atrium. Right jugular pulmonary artery catheter in main pulmonary artery. Mild pulmonary venous congestion. Left greater than right bibasilar atelectasis. No pneumothorax or pleural effusion. Mild cardiomegaly. MVR, sternal wires well aligned. Left atrial appendage ligation. Two mediastinal drains. Temporary epicardial pacemaker " wires. Bones are unremarkable for age. Normal upper abdomen.     Impression: Status post MVR with mild pulmonary venous congestion and bibasilar atelectasis. Workstation performed: [id number]     US bedside procedure  Result Date: 11/15/2024  Narrative: [id number]    XR chest portable ICU  Result Date: 11/15/2024  Narrative: XR CHEST PORTABLE ICU INDICATION: S/P open heart. COMPARISON: Compared to 10/5/2022 FINDINGS: Endotracheal tube just above the paula. Feeding tube looped in the stomach. [place] catheter in the left main pulmonary artery. Pericardial tube in place. Prosthetic aortic valve. Poor inspiration. Mild prominent markings. No pneumothorax or pleural effusion. Normal cardiomediastinal silhouette. Bones are unremarkable for age. Normal upper abdomen.     Impression: No acute cardiopulmonary disease. Workstation performed: [id number]     KWAME Anesthesia  Result Date: 11/15/2024  Narrative: Karli Ibarra MD     11/15/2024 12:31 PM Procedure Performed: KWAME Anesthesia Start Time:  11/15/2024 7:59 AM Preanesthesia Checklist Patient identified, IV assessed, risks and benefits discussed, monitors and equipment assessed, procedure being performed at surgeon's request and anesthesia consent obtained. Procedure Diagnostic Indications for KWAME:  assessment of ascending aorta, assessment of surgical repair and hemodynamic monitoring. Type of KWAME: complete KWAME with interpretation. Images Saved: ultrasound permanent image saved. Physician Requesting Echo: Parker Reddy MD.  Location performed: OR. Intubated.  Heart visualized. Insertion of KWAME Probe:  Easy. Probe Type:  Epiaortic and multiplane. Modalities:  Color flow mapping, 3D, pulse wave Doppler and continuous wave Doppler. Echocardiographic and Doppler Measurements PREPROCEDURE LEFT VENTRICLE: Systolic Function: normal. Ejection Fraction: 50-55%. Cavity size: normal.   Regional Wall Motion Abnormalities: none. RIGHT VENTRICLE:  Systolic Function: normal.  Cavity size normal. No hypertrophy  AORTIC VALVE: Leaflets: normal and trileaflet. Leaflet motions normal and normal. Stenosis: none.     Regurgitation: none.  MITRAL VALVE: Leaflets: myxomatous and thickened. Leaflet Motions: prolapse and Bileaflet prolapse. Worse in A3, P2 and P3. Regurgitation: severe and Centrally directed.   Stenosis: none.   TRICUSPID VALVE: Leaflets: normal. Leaflet Motions: normal. Stenosis: none. Regurgitation: mild. PULMONIC VALVE: Leaflets: normal. Regurgitation: none. Stenosis: none. ASCENDING AORTA: Size:  normal.  Dissection not present.  AORTIC ARCH: Size:  normal.  dissection not present. Grade 2: severe intimal thickening without protruding atheroma. DESCENDING AORTA: Size: normal.  Dissection not present. Grade 3: atheroma protruding < 0.5 cm into lumen. RIGHT ATRIUM: Size:  dilated. No spontaneous echo contrast. LEFT ATRIUM: Size: dilated. No spontaneous echo contrast. LEFT ATRIAL APPENDAGE: Size: dilated. No spontaneous echo contrast ATRIAL SEPTUM: Intra-atrial septal morphology: normal.  VENTRICULAR SEPTUM: Intra-ventricular septum morphology: normal. EPIAORTIC: Plaque Thickness: 0-5 mm. OTHER FINDINGS: Pericardium:  pericardial effusion and Trace. Pleural Effusion:  none. POSTPROCEDURE LEFT VENTRICLE: Unchanged .    RIGHT VENTRICLE: Unchanged .  AORTIC VALVE: Unchanged .      MITRAL VALVE: Leaflets: bioprosthetic. Regurgitation: none. Stenosis: none. Mean Gradient: 2.Valve/Ring Size: 29 mm. TRICUSPID VALVE: Unchanged .   PULMONIC VALVE: Unchanged   ATRIA: Left Atrial Appendage Ligate: Yes. Residual Flow in Left Atrial Appendage by Color Flow Doppler: No.  AORTA: Unchanged . REMOVAL: Probe Removal: atraumatic.         POCT Labs        Depression Screening and Follow-up Plan: Patient was screened for depression during today's encounter. They screened negative with a PHQ-2 score of 1.            5 minutes spent on chart prep, 35 minutes spent with  patient counseling/educating on their diagnoses, tests completed and any new tests ordered, any referrals placed, treatment options, and documentation of above today.   In prescribing new medications, or changing doses, we reviewed the risks and benefits and side effects of these medications along with other treatment options if appropriate.

## 2024-12-16 ENCOUNTER — ANTICOAG VISIT (OUTPATIENT)
Dept: CARDIOLOGY CLINIC | Facility: CLINIC | Age: 69
End: 2024-12-16

## 2024-12-16 ENCOUNTER — HOME CARE VISIT (OUTPATIENT)
Dept: HOME HEALTH SERVICES | Facility: HOME HEALTHCARE | Age: 69
End: 2024-12-16
Payer: COMMERCIAL

## 2024-12-16 ENCOUNTER — RESULTS FOLLOW-UP (OUTPATIENT)
Dept: FAMILY MEDICINE CLINIC | Facility: CLINIC | Age: 69
End: 2024-12-16

## 2024-12-16 ENCOUNTER — OFFICE VISIT (OUTPATIENT)
Dept: FAMILY MEDICINE CLINIC | Facility: CLINIC | Age: 69
End: 2024-12-16
Payer: COMMERCIAL

## 2024-12-16 ENCOUNTER — CLINICAL SUPPORT (OUTPATIENT)
Dept: CARDIOLOGY CLINIC | Facility: CLINIC | Age: 69
End: 2024-12-16
Payer: COMMERCIAL

## 2024-12-16 ENCOUNTER — TELEPHONE (OUTPATIENT)
Dept: ADMINISTRATIVE | Facility: OTHER | Age: 69
End: 2024-12-16

## 2024-12-16 VITALS
HEIGHT: 60 IN | RESPIRATION RATE: 16 BRPM | BODY MASS INDEX: 29.29 KG/M2 | OXYGEN SATURATION: 95 % | WEIGHT: 149.2 LBS | HEART RATE: 102 BPM | DIASTOLIC BLOOD PRESSURE: 68 MMHG | SYSTOLIC BLOOD PRESSURE: 118 MMHG | TEMPERATURE: 98 F

## 2024-12-16 VITALS
OXYGEN SATURATION: 94 % | HEART RATE: 83 BPM | DIASTOLIC BLOOD PRESSURE: 61 MMHG | TEMPERATURE: 97 F | RESPIRATION RATE: 18 BRPM | SYSTOLIC BLOOD PRESSURE: 110 MMHG

## 2024-12-16 DIAGNOSIS — E66.3 OVERWEIGHT: ICD-10-CM

## 2024-12-16 DIAGNOSIS — I48.3 TYPICAL ATRIAL FLUTTER (HCC): ICD-10-CM

## 2024-12-16 DIAGNOSIS — I48.92 ATRIAL FLUTTER, UNSPECIFIED TYPE (HCC): ICD-10-CM

## 2024-12-16 DIAGNOSIS — I48.0 PAF (PAROXYSMAL ATRIAL FIBRILLATION) (HCC): Primary | ICD-10-CM

## 2024-12-16 DIAGNOSIS — M54.32 SCIATICA OF LEFT SIDE: ICD-10-CM

## 2024-12-16 DIAGNOSIS — E78.5 DYSLIPIDEMIA: ICD-10-CM

## 2024-12-16 DIAGNOSIS — F51.04 CHRONIC INSOMNIA: ICD-10-CM

## 2024-12-16 DIAGNOSIS — Z86.0100 HISTORY OF COLON POLYPS: ICD-10-CM

## 2024-12-16 DIAGNOSIS — F41.9 ANXIETY: ICD-10-CM

## 2024-12-16 DIAGNOSIS — R73.01 IFG (IMPAIRED FASTING GLUCOSE): Primary | ICD-10-CM

## 2024-12-16 DIAGNOSIS — I49.5 SICK SINUS SYNDROME (HCC): ICD-10-CM

## 2024-12-16 DIAGNOSIS — I10 BENIGN ESSENTIAL HYPERTENSION: ICD-10-CM

## 2024-12-16 DIAGNOSIS — I34.1 MVP (MITRAL VALVE PROLAPSE): ICD-10-CM

## 2024-12-16 DIAGNOSIS — H81.13 BENIGN PAROXYSMAL POSITIONAL VERTIGO DUE TO BILATERAL VESTIBULAR DISORDER: ICD-10-CM

## 2024-12-16 DIAGNOSIS — M85.80 OSTEOPENIA, UNSPECIFIED LOCATION: ICD-10-CM

## 2024-12-16 DIAGNOSIS — M79.10 MYALGIA: ICD-10-CM

## 2024-12-16 DIAGNOSIS — I48.0 PAF (PAROXYSMAL ATRIAL FIBRILLATION) (HCC): ICD-10-CM

## 2024-12-16 DIAGNOSIS — Z12.31 BREAST CANCER SCREENING BY MAMMOGRAM: ICD-10-CM

## 2024-12-16 PROBLEM — E78.49 OTHER HYPERLIPIDEMIA: Status: ACTIVE | Noted: 2024-12-16

## 2024-12-16 PROBLEM — N18.30 KIDNEY DISEASE, CHRONIC, STAGE III (GFR 30-59 ML/MIN) (HCC): Chronic | Status: RESOLVED | Noted: 2020-06-11 | Resolved: 2024-12-16

## 2024-12-16 PROBLEM — M80.00XA AGE-RELATED OSTEOPOROSIS WITH CURRENT PATHOLOGICAL FRACTURE: Status: RESOLVED | Noted: 2024-05-09 | Resolved: 2024-12-16

## 2024-12-16 PROBLEM — E78.49 OTHER HYPERLIPIDEMIA: Status: RESOLVED | Noted: 2024-12-16 | Resolved: 2024-12-16

## 2024-12-16 PROBLEM — F41.0 PANIC DISORDER (EPISODIC PAROXYSMAL ANXIETY): Status: RESOLVED | Noted: 2022-05-05 | Resolved: 2024-12-16

## 2024-12-16 PROBLEM — L98.9 NODULAR LESION ON SURFACE OF SKIN: Status: RESOLVED | Noted: 2024-05-09 | Resolved: 2024-12-16

## 2024-12-16 PROBLEM — K59.00 CONSTIPATION: Status: RESOLVED | Noted: 2024-08-22 | Resolved: 2024-12-16

## 2024-12-16 LAB — INR PPP: 2.1 (ref 0.85–1.19)

## 2024-12-16 PROCEDURE — G0299 HHS/HOSPICE OF RN EA 15 MIN: HCPCS

## 2024-12-16 PROCEDURE — 99215 OFFICE O/P EST HI 40 MIN: CPT | Performed by: FAMILY MEDICINE

## 2024-12-16 PROCEDURE — G2211 COMPLEX E/M VISIT ADD ON: HCPCS | Performed by: FAMILY MEDICINE

## 2024-12-16 PROCEDURE — 93228 REMOTE 30 DAY ECG REV/REPORT: CPT

## 2024-12-16 RX ORDER — ATENOLOL 25 MG/1
25 TABLET ORAL DAILY
COMMUNITY
Start: 2024-12-07

## 2024-12-16 RX ORDER — CALCIUM CARBONATE 300MG(750)
1 TABLET,CHEWABLE ORAL DAILY
COMMUNITY

## 2024-12-16 RX ORDER — CHLORAL HYDRATE 500 MG
1000 CAPSULE ORAL DAILY
COMMUNITY
End: 2024-12-23

## 2024-12-16 NOTE — RESULT ENCOUNTER NOTE
Forwarded to Cardio Dr. Quintero for management.  Thank you.      Message sent to patient via [email or web address] patient portal.

## 2024-12-16 NOTE — ASSESSMENT & PLAN NOTE
Pending labs.  To consider Metformin XR?  Recommend lifestyle modifications.      Orders:  •  Comprehensive metabolic panel; Future  •  Hemoglobin A1C; Future

## 2024-12-16 NOTE — ASSESSMENT & PLAN NOTE
Management per Sleep Medicine.   On Trazodone 100 mg q.h.s., Rozerem 8 mg q.h.s., Xanax ER 1 mg QHS, Atarax 25mg TID PRN.   Sleep Med advises weaning Xanax ER 0.5mg QHS.  s/p CBT.     Orders:  •  TSH, 3rd generation with Free T4 reflex; Future

## 2024-12-16 NOTE — ASSESSMENT & PLAN NOTE
Last Dexa 1/8/24.  On Boniva 150mg q30 days.    Orders:  •  TSH, 3rd generation with Free T4 reflex; Future  •  Vitamin D 25 hydroxy; Future

## 2024-12-16 NOTE — ASSESSMENT & PLAN NOTE
Stable.  Management per Cardio.  Check blood pressure outside of office.  Recommend lifestyle modifications.    Orders:  •  CBC and differential; Future  •  Comprehensive metabolic panel; Future

## 2024-12-16 NOTE — TELEPHONE ENCOUNTER
----- Message from Genesis Cervantes DO sent at 12/13/2024  3:59 PM EST -----  Regarding: Hep C, HIV 3/6/09 @ [facility]  12/13/24 3:59 PM    Hello, our patient Carley Bonilla has had Hepatitis C and HIV completed/performed. Please assist in updating the patient chart by pulling the Care Everywhere (CE) document. The date of service is .    Hep C, HIV 3/6/09 @ [facility]      Thank you,  Genesis Cervantes DO  [facility]  DON

## 2024-12-16 NOTE — ASSESSMENT & PLAN NOTE
Stable.  Management per Sleep Medicine.      Orders:  •  TSH, 3rd generation with Free T4 reflex; Future

## 2024-12-16 NOTE — ASSESSMENT & PLAN NOTE
Management per Cardio.   S/p Mitral Valve Bioprosthestic Replacement and Left Atrial Appendage Ligation 11/15/24.  Does not need A/C for valvular surgery reasons.  Pending Cardiac Rehab 12/24.

## 2024-12-16 NOTE — ASSESSMENT & PLAN NOTE
Pending labs.  On Pravachol 10mg QHS.  Recommend lifestyle modifications.      Orders:  •  CBC and differential; Future  •  Comprehensive metabolic panel; Future  •  LDL cholesterol, direct; Future

## 2024-12-16 NOTE — RESULT ENCOUNTER NOTE
atient had a min HR of 39 bpm, max HR of 214 bpm, and avg HR of 93 bpm. Predominant underlying rhythm was Sinus Rhythm. Slight P wave morphology changes were noted. 7 Ventricular Tachycardia runs occurred, the run with the fastest interval lasting 11 beats with a max rate of 214 bpm (avg 193 bpm); the run with the fastest interval was also the longest. 6 Supraventricular Tachycardia runs occurred, the run with the fastest interval lasting 7 beats with a max rate of 188 bpm, the longest lasting 6 beats with an avg rate of 119 bpm. Atrial Fibrillation/Flutter occurred (20% burden), ranging from  bpm (avg of 112 bpm), the longest lasting 1 day 1 hour with an avg rate of 109 bpm. Atrial Fibrillation/Flutter was detected within +/- 45 seconds of symptomatic patient event(s). Isolated SVEs were rare (<1.0%), SVE Couplets were rare (<1.0%), and SVE Triplets were rare (<1.0%). Isolated VEs were rare (<1.0%, 7260), VE Couplets were rare (<1.0%, 353), and VE Triplets were rare (<1.0%, 36). Ventricular Bigeminy and Trigeminy were present. Some VE(s) may be SVE(s) conducted with possible aberrancy    Reviewed strips and agree with above. Afib/flutter was Days 1-8, Seems rest of montior only short episodes so improving.   Appt with me next week.

## 2024-12-16 NOTE — ASSESSMENT & PLAN NOTE
Stable.  Management per Cardio.  On Coumadin per Cardio.     Orders:  •  TSH, 3rd generation with Free T4 reflex; Future

## 2024-12-17 NOTE — TELEPHONE ENCOUNTER
Upon review of the In Basket request we were able to locate, review, and update the patient chart as requested for Hepatitis C  and HIV.    Any additional questions or concerns should be emailed to the Practice Liaisons via the appropriate education email address, please do not reply via In Basket.    Thank you  Taty Tom MA   [facility]

## 2024-12-20 ENCOUNTER — CLINICAL SUPPORT (OUTPATIENT)
Dept: CARDIAC REHAB | Facility: CLINIC | Age: 69
End: 2024-12-20
Payer: COMMERCIAL

## 2024-12-20 ENCOUNTER — TRANSCRIBE ORDERS (OUTPATIENT)
Dept: CARDIAC REHAB | Facility: CLINIC | Age: 69
End: 2024-12-20

## 2024-12-20 DIAGNOSIS — Z95.2 S/P MVR (MITRAL VALVE REPLACEMENT): Primary | ICD-10-CM

## 2024-12-20 DIAGNOSIS — Z98.890 S/P MITRAL VALVE REPAIR: ICD-10-CM

## 2024-12-20 PROCEDURE — 93797 PHYS/QHP OP CAR RHAB WO ECG: CPT

## 2024-12-20 NOTE — PROGRESS NOTES
CARDIAC REHABILITATION   ASSESSMENT AND INDIVIDUALIZED TREATMENT PLAN  INITIAL           Today's date: 2024   # of Exercise Sessions Completed: 1- initial care plan  Patient name: Carley Bonilla      : 1955  Age: 69 y.o.       MRN: [id number]  Referring Physician: Parker Reddy MD   Cardiologist: Hal Tracey MD  Provider: Garry  Clinician: Amie Doe MS, CEP, EPC        Treatment is tailored to this patient's individual needs.  The ITP was reviewed with the patient and all questions were answered to their satisfaction.  Additional ITP documentation can be found electronically including daily and monthly exercise summaries, daily session notes with ECG summaries, education notes, daily medication reconciliation, and daily physician supervision.      Comments: reports being a little out of breath still, generally feeling a little better everyday        Dx:   Encounter Diagnosis   Name Primary?    S/P mitral valve repair        Description of Diagnosis: MV prolapse s/p mitral valve replacement and left atrial appendage ligation  Date of onset: 11/15/24  Other Cardiac History: SSS noted post-op with evidence of junctional rhythm, Wenckebach, PAF/Aflutter; no prior cardiac issues        ASSESSMENT    Medical History:   Past Medical History:   Diagnosis Date    Anxiety     Benign essential hypertension 2014    Chronic insomnia 2023    Dyslipidemia 2014    History of colon polyps 2024    IFG (impaired fasting glucose) 2024    MVP (mitral valve prolapse) 2008    Formatting of this note might be different from the original.   Required antibiotic prophylaxis      Osteopenia 2024    Overweight 2024    PAF (paroxysmal atrial fibrillation) (HCC) 2024    Sick sinus syndrome (HCC) 2024       Family History:  Family History   Problem Relation Age of Onset    Heart failure Mother     Hypertension Mother     Heart failure Father     No Known Problems  Brother     No Known Problems Daughter     No Known Problems Maternal Grandmother     No Known Problems Paternal Grandmother     No Known Problems Maternal Aunt     No Known Problems Paternal Aunt     Breast cancer Neg Hx        Allergies:   Morphine and codeine, Bee venom, Codeine, Penicillins, and Sulfa antibiotics    Current Medications:   Current Outpatient Medications   Medication Sig Dispense Refill    acetaminophen (TYLENOL) 325 mg tablet Take 1-2 tablets Q4-6 hours PRN pain. Do not take more than 4 grams in 24 hours.      ALPRAZolam ER (XANAX XR) 1 MG 24 hr tablet Take 1 mg by mouth daily at bedtime Rx per Sleep Medicine      aspirin (ECOTRIN LOW STRENGTH) 81 mg EC tablet Take 1 tablet (81 mg total) by mouth daily (Patient taking differently: Take 81 mg by mouth daily Advised per Cardio) 30 tablet 2    atenolol (TENORMIN) 25 mg tablet Take 25 mg by mouth daily Rx per Cardio      hydrOXYzine HCL (ATARAX) 25 mg tablet Take 25 mg by mouth 3 (three) times a day as needed for anxiety (Insomnia) Take one tablet by mouth 3 times per day as needed for anxiety or sleep.  Rx per Sleep Medicine.      ibandronate (BONIVA) 150 MG tablet Take 1 tablet (150 mg total) by mouth every 30 (thirty) days 3 tablet 3    Omega-3 Fatty Acids (fish oil) 1,000 mg Take 1,000 mg by mouth daily      Pediatric Vitamins (Multivitamin Gummies Childrens) CHEW Chew 1 tablet in the morning      pravastatin (PRAVACHOL) 10 mg tablet TAKE 1 TABLET BY MOUTH EVERY DAY (Patient taking differently: Take 10 mg by mouth daily at bedtime) 90 tablet 1    Propylene Glycol (Systane Balance) 0.6 % SOLN OTC.  1 drop six times a day into both eyes      ramelteon (ROZEREM) 8 mg tablet Take 8 mg by mouth daily Rx per Sleep Med      traZODone (DESYREL) 100 mg tablet Take 200 mg by mouth daily at bedtime Rx per Sleep Med      warfarin (Jantoven) 2.5 mg tablet Take 2 tablets (5mg total) QHS unless otherwise directed. (Patient taking differently: Take 2 tablets  (5mg total) QHS unless otherwise directed.  Rx per Cardio.) 60 tablet 2     No current facility-administered medications for this visit.       Medication compliance: Yes   Comments: Pt reports to be compliant with medications    Physical Limitations: broke right wrist and right ankle 2x    Fall Risk: Low   Comments: Ambulates with a steady gait with no assist device    Cultural needs: none      CAD Risk Factors:  Cholesterol: Yes  HTN: Yes  DM: impaired fasting glucose   Obesity: Yes   Inactivity: Yes      EXERCISE ASSESSMENT:     Initial Fitness Assessment: Submaximal TM ETT:  Resting:  BP: 112/74  HR: 79, Exercise:  BP: 132/64  HR: 96, METs:  3.1, and Test terminated at:  RPE 6      ECG INTERPRETATION:  NSR    Current Functional Status:  Occupation: retired  Recreation/Physical Activity: [employer]   ADL’s:Capable of performing light to moderate ADLs  Wright: resumed all ADLs within sternal precautions  Home exercise:  some walking around the stores  Other Comments: was previously doing a lot of walking      SMART Exercise Goals:   10% improvement in functional capacity based on max METs achieved in initial fitness assessment  improved DASI score by 10%  increased exercise capacity by 40% based on peak METs tolerated in cardiac rehab exercise session  maintain > 150 minutes per week of moderate intensity exercise    Patient Specific EXERCISE GOALS:       Return to walking   Return to [employer] league   Decrease SOB  Start using TM at home    Functional Capacity Screening Tool:  [facility] Activity Status Index:  5.38 METs      PSYCHOSOCIAL ASSESSMENT:    Date of last Assessment:  12/20/24  Depression screening:  PHQ-9 = 2    Interpretation:  1-4 = Minimal Depression  Anxiety screening:  AMANUEL-7 = 8    Interpretation: 5-9 = Mild anxiety    Pt self-report of depression and anxiety   Patient has a history of depression  Patient has a history of anxiety     Self-reported stress level:  7   Stressors:  surgery, just a high  "stress person  Stress Management Tools: practice Relaxation Techniques, exercise, keep a positive mindset, and enjoy a hobby    SMART Psychosocial Goals:     Physical Fitness in [facility] Score < 3, Daily Activity in [facility] Score < 3, Pain in [facility] Score < 3, and control or stop worrying    Patient Specific PSYCHOCOSOCIAL GOALS:    Continue with medical therapy   Reduce stress level  Determine relaxation techniques that work for her    Quality of Life Screen:  (Higher score indicates disease impact on QOL)  [facility] COOP score: 18/45     Social Support:   significant other  Community/Social Activities: bowling     Psychosocial Assessment as it relates to rehabilitation:   Patient denies issues with his/her family or home life that may affect their rehabilitation efforts.       NUTRITION ASSESSMENT:    Initial Weight:  148.4  Current Weight:     Height:   Ht Readings from Last 1 Encounters:   12/16/24 5' (1.524 m)       Rate Your Plate Score: 56/81    Diabetes: IFG  A1c: 5.9    last measured: 10/24/24    Lipid management: Discussed diet and lipid management and Last lipid profile 10/24/24  Chol 188    HDL 47      Current Dietary Habits:  Watching sodium  Drinking water  Eats out rarely  Glass of wine 1x/week    SMART Nutrition Goals:   Improved Rate Your Plate score  >64, eat 6 or more servings of grain products per day, eat whole grain breads, brown rice and whole grain cereals, eat 5 or more servings of fruits and vegetables a day, eat 2 or more servings of low fat milk or yogurt a day, eat no more than 6oz of meat per day, eat red meat once a week or less, choose lean beef or rarely eat beef, choose 1% or skim milk, rarely eat frozen desserts or choose fruit or fat-free sweets, Do not eat fried foods, use \"light\" tub margarine on bread, potatoes and vegetables, choose healthy desserts and sweets such as rian food cake or  fruit, choose low sodium canned, frozen/packaged foods or rarely/never " eat, rarely/never eat salty snacks, and choose low sugar desserts and sweets    Patient Specific NUTRITION GOALS:     1. Decrease sweets   2. Weight loss goal of 20 lbs   3. Decrease fried foods    Drug/Alcohol Use:   ~ once week wine      OTHER CORE COMPONENT ASSESSMENT:    Tobacco Use:     N/A:  Patient is a non-smoker     Anginal Symptoms:  None   NTG use: No prescription    SMART Goals:   consistent, controlled resting BP < 130/80 and medication compliance    Patient Specific CORE COMPONENT GOALS:    Look into getting BP cuff  Medication compliance      INDIVIDUALIZED TREATMENT PLAN      EXERCISE GOALS and PLAN      Progress toward Exercise goals:   Reviewed Pt goals and determined plan of care    Exercise Plan:    education on home exercise guidelines, home exercise 30+ mins 2 days opposite CR, Group class: Risk Factors for Heart Disease, Patient education:   Exercise After Cardiac Rehabilitation, and After discharge patient will continue to follow a regular exercise regimen with the goal of a minimum of 150 minutes per week of moderate intensity exercise.      The patient was counseled on exercise guidelines to achieve a minimum of 150 mins/wk of moderate intensity (RPE 4-6)   exercise and encouraged to add 1-2 days of exercise on opposite days of cardiac rehab as tolerated.       PHYSICIAN PRESCRIBED EXERCISE:    Current Aerobic Exercise Prescription:      Frequency: 3 days/week   Supplement with home exercise 2+ days/wk as tolerated       Minutes: 20 - 40         METS: 2-3            HR: RHR +30-40bpm   RPE: 4-6         Modalities: Treadmill, UBE, NuStep, and Recumbent bike     Exercise workloads will be progressed gradually as tolerated, within limits of patient's ability, and according to the patient's   response to the exercise program.      Aerobic Exercise Prescription Plan for Progression   Frequency: 3 days/week of cardiac rehab       Supplement with home exercise 2+ days/wk as tolerated    Minutes:  "40       >150 mins/wk of moderate intensity exercise   METS: 3-4   HR: RHR +30-40bpm     RPE: 4-6   Modalities: Treadmill, NuStep, and Recumbent bike    Strength trainin-3 days / week  12-15 repetitions  1-2 sets per modality   Will be added following 2-3 weeks of monitored exercise sessions   Modalities: Chest Press, Pull Downs, Arm Curl, Seated Row, and Sit to Stands    Home Exercise: none    Exercise Education: benefit of exercise for CAD risk factors, home exercise guidelines, AHA guidelines to achieve >150 mins/wk of moderate exercise, and RPE scale     Readiness to change: Preparation:  (Getting ready to change)       NUTRITION GOALS AND PLAN      Nutritional   Reviewed patient's Rate your Plate. Discussed key elements of heart healthy eating. Reviewed patient goals for dietary modifications and their clinical implications.  Reviewed most recent lipid profile.     Patient's progress toward Nutrition goals:    Reviewed Pt goals and determined plan of care      Nutrition Plan:   group class: Reading Food Labels, group Class: Heart Healthy Eating, increase intake of whole grains, replace refined flours with whole grains, increase daily intake of fruits and vegetables, increase daily intake of low fat dairy, limit meat intake to less than 6oz per day, eat red meat once a week or less, choose lean red meat, drink/use 1%  low fat or skim  milk, rarely eat frozen desserts, never/rarely eat fried foods, use \"light\" tub margarine, choose desserts such as fruit, rian food cake, low-fat or fat-free sweets, choose low sodium canned, frozen, packaged foods or rarely eat these foods, rarely/never eat salty snacks, and choose low sugar desserts and sweets    Measurable goals were based Rate Your Plate Dietary Self-Assessment. These are the areas in which the patient could score higher on the assessment.  Goals include recommendations for a heart healthy diet based on American Heart Association.    Nutrition Education: "   heart healthy eating principles  weight loss and management strategies  low sodium diet  maintaining hydration  nutrition for  lipid management    Readiness to change: Contemplation:  (Acknowledging that there is a problem but not yet ready or sure of wanting to make a change)      PSYCHOSOCIAL GOALS AND PLAN    Psychosocial Assessment as it relates to rehabilitation:   Patient denies issues with his/her family or home life that may affect their rehabilitation efforts.     Patient's progress toward Psychosocial goals:    Reviewed Pt goals and determined plan of care, continue with meds    Psychosocial Intervention/plan:   Class: Stress and Your Health, Class: Relaxation, Practice relaxation techniques, Exercise, Keep a positive mindset, Enjoy family, and Return to previous social activity    Psychosocial Education: signs/sxs of depression, benefits of a positive support system, and stress management techniques    Information to utilize [employer] was provided as well as contact information for counseling through  Behavioral Health and group psychotherapy groups available.    Readiness to change: Contemplation:  (Acknowledging that there is a problem but not yet ready or sure of wanting to make a change)      OTHER CORE COMPONENTS GOALS and PLAN  Blood Pressure  Restin/74  Exercise: 132/64  Recovery: 128/80    Blood Pressure will be monitored throughout the program and cardiologist will be notified of elevated trends.    Pt will be encouraged to monitor home BP if advised by cardiologist.    Tobacco Plan:   N/A:  Pt is a non-smoker    Progress toward Core Component goals:   Reviewed Pt goals and determined plan of care    Other Core Components Intervention:   group class: Understanding Heart Disease, group class: Common Heart Medications, medication compliance, avoid processed foods, engage in regular exercise, check labels for sodium content, and monitor home BP    Group and Individual Education:   understanding high blood pressure and it's relationship to CAD and components of blood pressure management    Readiness to change: Preparation:  (Getting ready to change)

## 2024-12-23 ENCOUNTER — OFFICE VISIT (OUTPATIENT)
Dept: CARDIOLOGY CLINIC | Facility: CLINIC | Age: 69
End: 2024-12-23
Payer: COMMERCIAL

## 2024-12-23 ENCOUNTER — TELEPHONE (OUTPATIENT)
Age: 69
End: 2024-12-23

## 2024-12-23 VITALS
HEIGHT: 60 IN | WEIGHT: 147.4 LBS | DIASTOLIC BLOOD PRESSURE: 70 MMHG | BODY MASS INDEX: 28.94 KG/M2 | HEART RATE: 86 BPM | SYSTOLIC BLOOD PRESSURE: 122 MMHG

## 2024-12-23 DIAGNOSIS — I48.0 PAF (PAROXYSMAL ATRIAL FIBRILLATION) (HCC): Primary | ICD-10-CM

## 2024-12-23 PROCEDURE — 99204 OFFICE O/P NEW MOD 45 MIN: CPT | Performed by: INTERNAL MEDICINE

## 2024-12-23 PROCEDURE — 93000 ELECTROCARDIOGRAM COMPLETE: CPT | Performed by: INTERNAL MEDICINE

## 2024-12-23 NOTE — TELEPHONE ENCOUNTER
Pt called stating that she was seen by Dr Kay's today and it was discussed that she was going to have a loop recorder placed. She asked if I could explain this more. Advised that a loop recorder will monitor her heart rhythm and detect when she goes into afib. Pt verbalized understanding and had no further questions.

## 2024-12-23 NOTE — PROGRESS NOTES
HEART AND VASCULAR  CARDIAC ELECTROPHYSIOLOGY   HEART RHYTHM CENTER  [facility]    Outpatient  Follow-up  Today's Date: 12/23/24        Patient name: Carley Bonilla  YOB: 1955  Sex: female         Chief Complaint: f/u afib, junctional gila      ASSESSMENT:  Problem List Items Addressed This Visit          Cardiovascular and Mediastinum    PAF (paroxysmal atrial fibrillation) (HCC) - Primary    Relevant Orders    POCT ECG     68 yo female  1) Post MV Replacement Tissue valve and appendage ligation  Nov 14 2024. She had post op juncitnal gila that resolved. Had post op afib/flutter paroxysmal and started on warfarin. No rate control started and amio stopped due to bradycardia.      Zio strips reviewed 20% afib/flutter burden but was mostly first week, resolved during second week of Zio. No significant bradycardia events  Asymptomatic entire time.    Doing well postop.   PLAN:  1> Recommend Loop recorder- long term monitoring. IF she remains arrhythmia free can likely DC Warfarin, especially lower risk since appendage clip. IF she has recurrent afib/flutter would consider ablation .       Follow up in: 3 months  F/u w DR Tracey as well.     Orders Placed This Encounter   Procedures    POCT ECG     Medications Discontinued During This Encounter   Medication Reason    Omega-3 Fatty Acids (fish oil) 1,000 mg          .............................................................................................    HPI/Subjective:     68 yo female  1) Post MV Replacement Tissue valve and appendage ligation  Nov 14 2024. She had post op juncitnal gila that resolved. Had post op afib/flutter paroxysmal and started on warfarin. No rate control started and amio stopped due to bradycardia.      Zio strips reviewed 20% afib/flutter burden but was mostly first week, resolved during second week of Zio. No significant bradycardia events  Asymptomatic entire time.    Doing well postop.         Past  Medical History:   Diagnosis Date    Anxiety     Benign essential hypertension 01/22/2014    Chronic insomnia 02/08/2023    Dyslipidemia 05/27/2014    History of colon polyps 12/16/2024    IFG (impaired fasting glucose) 12/16/2024    MVP (mitral valve prolapse) 01/09/2008    Formatting of this note might be different from the original.   Required antibiotic prophylaxis      Osteopenia 12/16/2024    Overweight 12/16/2024    PAF (paroxysmal atrial fibrillation) ([facility]) 11/20/2024    Sick sinus syndrome ([facility]) 11/18/2024       Allergies   Allergen Reactions    Morphine And Codeine Other (See Comments)     hallucinations      Bee Venom Hives    Codeine Other (See Comments)     Hallucinations    Penicillins Hives    Sulfa Antibiotics Other (See Comments)     Yeast infections     I reviewed the Home Medication list and Allergies in the chart.   Scheduled Meds:  Current Outpatient Medications   Medication Sig Dispense Refill    acetaminophen (TYLENOL) 325 mg tablet Take 1-2 tablets Q4-6 hours PRN pain. Do not take more than 4 grams in 24 hours.      ALPRAZolam ER (XANAX XR) 1 MG 24 hr tablet Take 1 mg by mouth daily at bedtime Rx per Sleep Medicine      aspirin (ECOTRIN LOW STRENGTH) 81 mg EC tablet Take 1 tablet (81 mg total) by mouth daily (Patient taking differently: Take 81 mg by mouth daily Advised per Cardio) 30 tablet 2    atenolol (TENORMIN) 25 mg tablet Take 25 mg by mouth daily Rx per Cardio      hydrOXYzine HCL (ATARAX) 25 mg tablet Take 25 mg by mouth 3 (three) times a day as needed for anxiety (Insomnia) Take one tablet by mouth 3 times per day as needed for anxiety or sleep.  Rx per Sleep Medicine.      ibandronate (BONIVA) 150 MG tablet Take 1 tablet (150 mg total) by mouth every 30 (thirty) days 3 tablet 3    Pediatric Vitamins (Multivitamin Gummies Childrens) CHEW Chew 1 tablet in the morning      pravastatin (PRAVACHOL) 10 mg tablet TAKE 1 TABLET BY MOUTH EVERY DAY (Patient taking differently: Take 10 mg by  mouth daily at bedtime) 90 tablet 1    Propylene Glycol (Systane Balance) 0.6 % SOLN OTC.  1 drop six times a day into both eyes      ramelteon (ROZEREM) 8 mg tablet Take 8 mg by mouth daily Rx per Sleep Med      traZODone (DESYREL) 100 mg tablet Take 200 mg by mouth daily at bedtime Rx per Sleep Med      warfarin (Jantoven) 2.5 mg tablet Take 2 tablets (5mg total) QHS unless otherwise directed. (Patient taking differently: Take 2 tablets (5mg total) QHS unless otherwise directed.  Rx per Cardio.) 60 tablet 2     No current facility-administered medications for this visit.     PRN Meds:.        Family History   Problem Relation Age of Onset    Heart failure Mother     Hypertension Mother     Heart failure Father     No Known Problems Brother     No Known Problems Daughter     No Known Problems Maternal Grandmother     No Known Problems Paternal Grandmother     No Known Problems Maternal Aunt     No Known Problems Paternal Aunt     Breast cancer Neg Hx        Social History     Socioeconomic History    Marital status:      Spouse name: Not on file    Number of children: 1    Years of education: Not on file    Highest education level: Not on file   Occupational History     Comment: Retired - Manager of [employer]   Tobacco Use    Smoking status: Never     Passive exposure: Current    Smokeless tobacco: Never   Vaping Use    Vaping status: Never Used   Substance and Sexual Activity    Alcohol use: Not Currently     Comment: occ    Drug use: No    Sexual activity: Yes     Partners: Male     Birth control/protection: Post-menopausal     Comment: boy friend   Other Topics Concern    Not on file   Social History Narrative        Lives with boyfriend Octaviano    1 Child - 1 Daughter    Retired - Manager of [employer]     Social Drivers of Health     Financial Resource Strain: Not on file   Food Insecurity: Unknown (6/13/2024)    Nursing - Inadequate Food Risk Classification     Worried About Running Out  of Food in the Last Year: Never true     Ran Out of Food in the Last Year: Patient declined     Ran Out of Food in the Last Year: Not on file   Transportation Needs: No Transportation Needs (12/16/2024)    OASIS : Transportation     Lack of Transportation (Medical): No     Lack of Transportation (Non-Medical): No     Patient Unable or Declines to Respond: No   Physical Activity: Not on file   Stress: Not on file   Social Connections: Not on file   Intimate Partner Violence: Not on file   Housing Stability: Unknown (6/13/2024)    Housing Stability Vital Sign     Unable to Pay for Housing in the Last Year: No     Number of Times Moved in the Last Year: Not on file     Homeless in the Last Year: No         OBJECTIVE:    /70 (BP Location: Right arm, Patient Position: Sitting, Cuff Size: Standard)   Pulse 86   Ht 5' (1.524 m)   Wt 66.9 kg (147 lb 6.4 oz)   LMP 12/16/2014 (Approximate)   BMI 28.79 kg/m²   Vitals:    12/23/24 1111   Weight: 66.9 kg (147 lb 6.4 oz)     GEN: No acute distress, Alert and oriented, well appearing  HEENT:External ears normal, oral pharynx clear, mucous membranes moist  EYES: Pupils equal, sclera anicteric, midline, normal conjuctiva  NECK: No JVD, supple, no obvious masses or thryomegaly or goiter  CARDIOVASCULAR:  RRR, No murmur, rub, gallops S1,S2 sternotomy well healing.   LUNGS: Clear To auscultation bilaterally, normal effort, no rales, rhonchi, crackles   ABDOMEN:  nondistended,  without obvious organomegaly or ascites  EXTREMITIES/VASCULAR:  No edema. warm an well perfused.  PSYCH: Normal Affect,  linear speech pattern without evidence of psychosis.   NEURO: Grossly intact, moving all extremiteis equal, face symmetric, alert and responsive, no obvious focal defecits   GAIT:  Ambulates normally without difficulty  HEME: No bleeding, bruising, petechia, purpura SKIN: No significant rashes on visibile skin, warm, no diaphoresis or pallor.     Lab Results:       LABS:       "Chemistry        Component Value Date/Time     2015 0839    K 3.8 2024 0532    K 5.0 10/19/2023 0940     2024 0532     10/19/2023 0940    CO2 29 2024 0532    CO2 19 (L) 11/15/2024 1321    CO2 27 10/19/2023 0940    BUN 26 (H) 2024 0532    BUN 22 (H) 10/19/2023 0940    CREATININE 0.71 2024 0532    CREATININE 1.0 10/19/2023 0940        Component Value Date/Time    CALCIUM 7.6 (L) 2024 0532    CALCIUM 9.6 10/19/2023 0940    ALKPHOS 56 10/24/2024 0855    ALKPHOS 59 10/19/2023 0940    AST 21 10/24/2024 0855    AST 24 10/19/2023 0940    ALT 18 10/24/2024 0855    ALT 21 10/19/2023 0940    BILITOT 0.52 2015 0839            Lab Results   Component Value Date    CHOL 217 2015    CHOL 203 2014    CHOL 228 2014     Lab Results   Component Value Date    HDL 47 (L) 10/24/2024    HDL 52 05/10/2024    HDL 49 2017     Lab Results   Component Value Date    LDLCALC 115 (H) 10/24/2024    LDLCALC 108 (H) 05/10/2024    LDLCALC 136 (H) 2017     Lab Results   Component Value Date    TRIG 130 10/24/2024    TRIG 138 05/10/2024    TRIG 137 2017     No results found for: \"CHOLHDL\"    IMAGING: No results found.     Cardiac testing:   Results for orders placed during the hospital encounter of 17    Echo complete with contrast if indicated    [facility]  [address]  [place] [address] [phone]    Transthoracic Echocardiogram  2D, M-mode, Doppler, and Color Doppler    Study date:  2017    Patient: BRENNA PUGH  MR number: [id number]  Account number: [id number]  : 1955  Age: 61 years  Gender: Female  Status: Outpatient  Location: [facility]  Height: 60 in  Weight: 101 lb  BP: 127/ 71 mmHg    Indications: MV disorder.    Diagnoses: I34.9 - Nonrheumatic mitral valve disorder, unspecified    Sonographer:  Josesito Paniagua, MED  Primary Physician:  Parker Zuluaga, " MD  Referring Physician:  Patricio Choi MD  Group:  [facility]  Interpreting Physician:  Patricio Choi MD    SUMMARY    LEFT VENTRICLE:  There were no regional wall motion abnormalities.  Doppler parameters were consistent with high ventricular filling pressure.    LEFT ATRIUM:  The atrium was moderately dilated.    MITRAL VALVE:  redundant valve with marked prolapse of the posterior leaflet and severe  regurgitation    TRICUSPID VALVE:  There was trace regurgitation.    SUMMARY MEASUREMENTS  Unspecified Scan Mode measurements:  Aortic Valve:   HR was 68 /min.  Left Ventricle:   HR was 73 .    COMPARISONS:  left ventricular sized has increased slightly since 2013    HISTORY: PRIOR HISTORY: Risk factors: hypertension and a family history of  coronary artery disease.    PROCEDURE: The study was performed in the [facility]. This  was a routine study. The transthoracic approach was used. The study included  complete 2D imaging, M-mode, complete spectral Doppler, and color Doppler.  Image quality was adequate.    LEFT VENTRICLE: Size was normal. Systolic function was by visual assessment.  Ejection fraction was estimated to be 65 %. There were no regional wall motion  abnormalities. Wall thickness was normal. DOPPLER: Doppler parameters were  consistent with high ventricular filling pressure.    RIGHT VENTRICLE: The size was normal. Systolic function was normal. Wall  thickness was normal.    LEFT ATRIUM: The atrium was moderately dilated.    RIGHT ATRIUM: Size was normal.    MITRAL VALVE: redundant valve with marked prolapse of the posterior leaflet and  severe regurgitation    AORTIC VALVE: The valve was trileaflet. Leaflets exhibited normal thickness and  normal cuspal separation. DOPPLER: Transaortic velocity was within the normal  range. There was no evidence for stenosis. There was no regurgitation.    TRICUSPID VALVE: DOPPLER: There was trace  regurgitation.    PERICARDIUM: There was no pericardial effusion. The pericardium was normal in  appearance.    AORTA: The root exhibited normal size.    PULMONARY ARTERY: DOPPLER: Systolic pressure was within the normal range.    SYSTEM MEASUREMENT TABLES    Apical four chamber  4 chamber Left Atrium Volume Index; Planimetry; End Systole; Apical four  chamber;: 24 cm2 Left Ventricular End Diastolic Volume; Method of Disks, Single  Plane; Apical four chamber;: 77.1 ml Left Ventricular End Systolic Volume;  Method of Disks, Single Plane; Apical four chamber;: 21.3 ml Right Atrium  Systolic Area; Planimetry; End Systole; Apical four chamber;: 11.52 cm2 Right  Ventricular Internal Diastolic Dimension; End Diastole; Apical four chamber;:  33.6 mm  TAPSE: 27.4 mm    Unspecified Scan Mode  Aortic Root Diameter; End Systole;: 24.2 mm  Gradient Pressure, Peak; Simplified Bernoulli; Antegrade Flow; Systole;: 10.5  mm[Hg] Gradient pressure, average; Simplified Bernoulli; Antegrade Flow;  Systole;: 5.5 mm[Hg]  HR: 68 /min  Left atrial diameter; End Diastole;: 41.7 mm  Cardiac Output; Teichholz; Systole;: 6.76 L/min  HR: 73 min  Interventricular Septum Diastolic Thickness; Teichholz; End Diastole;: 8.7 mm  Left Ventricle Internal End Diastolic Dimension; Teichholz;: 48.2 mm  Left Ventricle Internal Systolic Dimension; Teichholz; End Systole;: 24.3 mm  Left Ventricle Mass; Mass AVCube with Teichholz; End Diastole;: 137 g  Left Ventricle Posterior Wall Diastolic Thickness; Teichholz; End Diastole;:  8.2 mm  Left Ventricular Ejection Fraction; Teichholz;: 80.8 %  Left Ventricular End Diastolic Volume; Teichholz;: 108.6 ml  Left Ventricular End Systolic Volume; Teichholz;: 20.8 ml  Stroke volume; Teichholz; Systole;: 87.8 ml  Mitral Valve Area; Area by Pressure Half-Time; Systole;: 2.89 cm2  Mitral Valve E to A Ratio; Systole;: 1.72  Maximum Tricuspid valve regurgitation pressure gradient; Regurgitant Flow;  Systole;: 20.6  mm[Hg]    [facility] Accredited Echocardiography Laboratory    Prepared and electronically signed by    Patricio Choi MD  Signed 2017 13:30:45    Results for orders placed during the hospital encounter of 17    [facility]    Narrative  [address] [address]  [phone]    Transesophageal Echocardiogram  2D, Doppler, and Color Doppler    Study date:  2017    Patient: BRENNA PUGH  MR number: [id number]  Account number: [id number]  : 1955  Age: 61 years  Gender: Female  Status: Outpatient  Location: Cath lab  Height: 60 in  Weight: 103 lb  BP: 138/ 64 mmHg    Indications: Mitral Valve Disease.    Diagnoses: I34.1 - Nonrheumatic mitral (valve) prolapse    Sonographer:  Leon RCS  Interpreting Physician:  Annemarie Weiner MD  Referring Physician:  Patricio Choi MD  Group:  [facility]    SUMMARY    LEFT VENTRICLE:  Ejection fraction was estimated to be 60 %.  There were no regional wall motion abnormalities.    ATRIAL SEPTUM:  No defect or patent foramen ovale was identified.  Contrast injection was performed. There was no right-to-left shunt, in the baseline state.    MITRAL VALVE:  There was a mild to moderate prolapse of the posterior leaflet. 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  There was moderate regurgitation.    findings d/w  and family.    HISTORY: PRIOR HISTORY: History of moderate regurgitation and prolapse of the mitral valve.    PROCEDURE: The procedure was performed in the catheterization laboratory. This was a routine study. The risks and alternatives of the procedure were explained to the patient and informed consent was obtained. The transesophageal approach  was used. The study included complete 2D imaging, complete spectral Doppler, color Doppler, and probe insertion (without interpretation). The heart rate was 65 bpm, at the start of the study. An adult omniplane probe  was inserted.  Intubated with ease. One intubation attempt(s). There was no blood detected on the probe. Image quality was good. There were no complications during the procedure. MEDICATIONS: Anesthesia administered by anesthesia team.    LEFT VENTRICLE: Size was normal. Ejection fraction was estimated to be 60 %. There were no regional wall motion abnormalities. Wall thickness was normal.    RIGHT VENTRICLE: The size was normal. Systolic function was normal. Wall thickness was normal.    LEFT ATRIUM: Size was normal. No thrombus was identified.    ATRIAL SEPTUM: No defect or patent foramen ovale was identified. Contrast injection was performed. There was no right-to-left shunt, in the baseline state.    RIGHT ATRIUM: Size was normal. No thrombus was identified.    MITRAL VALVE: There was a mild to moderate prolapse of the posterior leaflet. 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 DOPPLER: There was moderate regurgitation.    findings d/w  and family.    AORTIC VALVE: The valve was trileaflet. Leaflets exhibited normal thickness and normal cuspal separation. There was no echocardiographic evidence of vegetation. DOPPLER: There was no regurgitation.    TRICUSPID VALVE: The valve structure was normal. There was normal leaflet separation. There was no echocardiographic evidence of vegetation. DOPPLER: There was no regurgitation.    PULMONIC VALVE: Leaflets exhibited normal thickness, no calcification, and normal cuspal separation. There was no echocardiographic evidence of vegetation.    PERICARDIUM: There was no pericardial effusion. The pericardium was normal in appearance.    AORTA: The root exhibited normal size. There was no atheroma. There was no evidence for dissection. There was no evidence for aneurysm.    [employer] Accredited Echocardiography Laboratory    Prepared and electronically signed by    Annemarie Weiner MD  Signed 19-Jan-2017 10:58:59    No results found for this or any previous visit.    No  results found for this or any previous visit.          I reviewed and interpreted the following LABS/EKG/TELE/IMAGING and below is summary of my interpretation (if data available):  Current EKG and Rhythm Strip: NSR    HOLTER/EVENT Monitor:see discussion

## 2024-12-30 ENCOUNTER — APPOINTMENT (OUTPATIENT)
Dept: LAB | Facility: CLINIC | Age: 69
End: 2024-12-30
Payer: COMMERCIAL

## 2024-12-30 ENCOUNTER — ANTICOAG VISIT (OUTPATIENT)
Dept: CARDIOLOGY CLINIC | Facility: CLINIC | Age: 69
End: 2024-12-30

## 2024-12-30 ENCOUNTER — TELEPHONE (OUTPATIENT)
Age: 69
End: 2024-12-30

## 2024-12-30 DIAGNOSIS — I48.0 PAF (PAROXYSMAL ATRIAL FIBRILLATION) (HCC): Primary | ICD-10-CM

## 2024-12-30 DIAGNOSIS — I48.0 PAF (PAROXYSMAL ATRIAL FIBRILLATION) (HCC): ICD-10-CM

## 2024-12-30 DIAGNOSIS — I48.3 TYPICAL ATRIAL FLUTTER (HCC): ICD-10-CM

## 2024-12-30 LAB
INR PPP: 2.41 (ref 0.85–1.19)
PROTHROMBIN TIME: 26.2 SECONDS (ref 12.3–15)

## 2024-12-30 PROCEDURE — 36415 COLL VENOUS BLD VENIPUNCTURE: CPT

## 2024-12-30 PROCEDURE — 85610 PROTHROMBIN TIME: CPT

## 2024-12-30 NOTE — TELEPHONE ENCOUNTER
Caller: Patient     Doctor: Dr. Quintero     Reason for call: Pt called & stated she was calling to schedule her Loop recorder placement. Pt is requesting a call back to schedule procedure.     Call back#: [phone]

## 2024-12-31 ENCOUNTER — PREP FOR PROCEDURE (OUTPATIENT)
Dept: CARDIOLOGY CLINIC | Facility: CLINIC | Age: 69
End: 2024-12-31

## 2024-12-31 DIAGNOSIS — I48.0 PAF (PAROXYSMAL ATRIAL FIBRILLATION) (HCC): Primary | ICD-10-CM

## 2024-12-31 NOTE — TELEPHONE ENCOUNTER
"Patient is scheduled for LOOP Recorder Implant on 2/26/25 at [facility] with .     Offered sooner date at [place] but patient declined and wants to do at [place].    Patient aware of all general instructions.    Instructions sent to patient through [employer].      Medication holds:   N/A    Blood Thinners:   Jantoven - Keep taking and do not stop    Blood work to be done on 2/19/25:  INR  (Patient did BMP / CBC on 11/20/24)      Thank you,  Saima \"Linda\" Terrance      "

## 2025-01-03 ENCOUNTER — CLINICAL SUPPORT (OUTPATIENT)
Dept: CARDIAC REHAB | Facility: CLINIC | Age: 70
End: 2025-01-03
Payer: COMMERCIAL

## 2025-01-03 DIAGNOSIS — Z95.2 S/P MVR (MITRAL VALVE REPLACEMENT): Primary | ICD-10-CM

## 2025-01-03 PROCEDURE — 93798 PHYS/QHP OP CAR RHAB W/ECG: CPT

## 2025-01-07 ENCOUNTER — CLINICAL SUPPORT (OUTPATIENT)
Dept: CARDIAC REHAB | Facility: CLINIC | Age: 70
End: 2025-01-07
Payer: COMMERCIAL

## 2025-01-07 ENCOUNTER — TELEPHONE (OUTPATIENT)
Age: 70
End: 2025-01-07

## 2025-01-07 DIAGNOSIS — F41.9 ANXIETY: Primary | ICD-10-CM

## 2025-01-07 DIAGNOSIS — Z95.2 S/P MVR (MITRAL VALVE REPLACEMENT): Primary | ICD-10-CM

## 2025-01-07 DIAGNOSIS — F51.04 CHRONIC INSOMNIA: ICD-10-CM

## 2025-01-07 PROCEDURE — 93798 PHYS/QHP OP CAR RHAB W/ECG: CPT

## 2025-01-07 NOTE — TELEPHONE ENCOUNTER
Patient has a sleep doctor who is a hour away. She would like to be referred to someone locally for her insomnia.

## 2025-01-07 NOTE — TELEPHONE ENCOUNTER
Patient called in and requested a referral from Dr. Vasquez to a sleep Doctor, would like to go to a sleep soon. Please advise

## 2025-01-08 NOTE — TELEPHONE ENCOUNTER
Carley has appointment with new sleep doctor next week. Also says she is already reach out to [facility] psychiatry for an appointment. (Jean Marie alvin)

## 2025-01-08 NOTE — TELEPHONE ENCOUNTER
See orders.  I would recommend that patient maintain her current relationship with Sleep Medicine to continue medication management until she is seen by new Sleep Medicine doctor and Psych.  Her new Sleep Medicine doctor may also wish that she been seen by Psychiatry, which also may take some time.  As [facility] has a long waiting list, I would recommend -     Dr. Sebastián Tom- Psychiatry    Contact Information  Tel: [phone]  Fax: [phone]  Address: [address] [place] [address]       [place] [address]  Website: [email or web address]

## 2025-01-09 ENCOUNTER — APPOINTMENT (OUTPATIENT)
Dept: CARDIAC REHAB | Facility: CLINIC | Age: 70
End: 2025-01-09
Payer: COMMERCIAL

## 2025-01-10 ENCOUNTER — CLINICAL SUPPORT (OUTPATIENT)
Dept: CARDIAC REHAB | Facility: CLINIC | Age: 70
End: 2025-01-10
Payer: COMMERCIAL

## 2025-01-10 DIAGNOSIS — Z95.2 S/P MVR (MITRAL VALVE REPLACEMENT): Primary | ICD-10-CM

## 2025-01-10 PROCEDURE — 93798 PHYS/QHP OP CAR RHAB W/ECG: CPT

## 2025-01-14 ENCOUNTER — CLINICAL SUPPORT (OUTPATIENT)
Dept: CARDIAC REHAB | Facility: CLINIC | Age: 70
End: 2025-01-14
Payer: COMMERCIAL

## 2025-01-14 DIAGNOSIS — Z95.2 S/P MVR (MITRAL VALVE REPLACEMENT): Primary | ICD-10-CM

## 2025-01-14 PROCEDURE — 93798 PHYS/QHP OP CAR RHAB W/ECG: CPT

## 2025-01-16 ENCOUNTER — CLINICAL SUPPORT (OUTPATIENT)
Dept: CARDIAC REHAB | Facility: CLINIC | Age: 70
End: 2025-01-16
Payer: COMMERCIAL

## 2025-01-16 ENCOUNTER — OFFICE VISIT (OUTPATIENT)
Dept: SLEEP CENTER | Facility: CLINIC | Age: 70
End: 2025-01-16
Payer: COMMERCIAL

## 2025-01-16 VITALS
HEART RATE: 84 BPM | HEIGHT: 60 IN | SYSTOLIC BLOOD PRESSURE: 110 MMHG | BODY MASS INDEX: 29.06 KG/M2 | DIASTOLIC BLOOD PRESSURE: 78 MMHG | OXYGEN SATURATION: 95 % | WEIGHT: 148 LBS

## 2025-01-16 DIAGNOSIS — I48.0 PAF (PAROXYSMAL ATRIAL FIBRILLATION) (HCC): ICD-10-CM

## 2025-01-16 DIAGNOSIS — F32.A DEPRESSION, UNSPECIFIED DEPRESSION TYPE: ICD-10-CM

## 2025-01-16 DIAGNOSIS — F45.8 BRUXISM: ICD-10-CM

## 2025-01-16 DIAGNOSIS — G47.30 SLEEP-DISORDERED BREATHING: ICD-10-CM

## 2025-01-16 DIAGNOSIS — E66.3 OVERWEIGHT (BMI 25.0-29.9): ICD-10-CM

## 2025-01-16 DIAGNOSIS — I10 BENIGN ESSENTIAL HYPERTENSION: ICD-10-CM

## 2025-01-16 DIAGNOSIS — Z95.2 S/P MVR (MITRAL VALVE REPLACEMENT): Primary | ICD-10-CM

## 2025-01-16 DIAGNOSIS — F43.9 STRESS AT HOME: ICD-10-CM

## 2025-01-16 DIAGNOSIS — I48.3 TYPICAL ATRIAL FLUTTER (HCC): ICD-10-CM

## 2025-01-16 DIAGNOSIS — Z95.3 S/P MITRAL VALVE REPLACEMENT WITH BIOPROSTHETIC VALVE: ICD-10-CM

## 2025-01-16 DIAGNOSIS — J34.2 DEVIATED NASAL SEPTUM: ICD-10-CM

## 2025-01-16 DIAGNOSIS — I49.5 SICK SINUS SYNDROME (HCC): ICD-10-CM

## 2025-01-16 DIAGNOSIS — F51.04 CHRONIC INSOMNIA: Primary | ICD-10-CM

## 2025-01-16 DIAGNOSIS — F41.9 ANXIETY: ICD-10-CM

## 2025-01-16 PROCEDURE — 93798 PHYS/QHP OP CAR RHAB W/ECG: CPT

## 2025-01-16 PROCEDURE — 99204 OFFICE O/P NEW MOD 45 MIN: CPT | Performed by: INTERNAL MEDICINE

## 2025-01-16 NOTE — PROGRESS NOTES
Name: Carley Bonilla      : 1955      MRN: [id number]  Encounter Provider: Tobin Knowles MD  Encounter Date: 2025   Encounter department: [facility]  :  Assessment & Plan  Chronic insomnia    Orders:    Ambulatory Referral to Sleep Medicine    Diagnostic Sleep Study; Future    Sleep-disordered breathing    Orders:    Diagnostic Sleep Study; Future    Deviated nasal septum         Bruxism         Stress at home         Anxiety         Depression, unspecified depression type         PAF (paroxysmal atrial fibrillation) (HCC)    Orders:    Diagnostic Sleep Study; Future    Sick sinus syndrome (HCC)         Benign essential hypertension         Typical atrial flutter (HCC)         S/P mitral valve replacement with bioprosthetic valve         Overweight (BMI 25.0-29.9)           With respect to above conditions, comprehensive counseling provided on pathophysiology; effects on symptoms and comorbidities, diagnostic strategies & limitations; treatment options; risks or no treament; risks & benefits of the various therapeutic options; costs and insurance aspects.     I advised avoiding sleeping supine, using alcohol or sedating medications close to bed time and on safe driving practices.    Further evaluation is indicated and a sleep study will be scheduled.   Patient is unwilling to try Positive airway pressure therapy and will be scheduled for a titration study if indicated.  She is on multiple medications for sleep.  I advised weaning off of Xanax that is not indicated for sleep nor as a long-term medication for anxiety.  She has ongoing feelings of anxiety and depression and spite of recent increase of trazodone.  She will likely benefit from an SSRI/SNRI and should follow-up with PCP/psychiatry in this regard.   With these strategies, she may be able to wean off hydroxyzine and reduced dose of trazodone.  Follow-up to be scheduled after testing to review results, further details of  "treatment options and to adjust therapy.       History of Present Illness   HPI  Patient accompanied at this vist by :        Consultation - Sleep Center   Carley Bonilla  69 y.o. female  :1955  MRN:[id number]  DOS:2025    Physician Requesting Consult: Genesis Cervantes DO             Reason for Consult : At your kind request I saw Carley Bonilla for initial sleep evaluation today. Patient is here for a complaint of insomnia.  She has been seeing a sleep specialist in [facility] and since moving locally he is here to establish care.    PFSH, Problem List, Medications & Allergies were reviewed in EMR.    Carley  has a past medical history of Anxiety, Benign essential hypertension (2014), Chronic insomnia (2023), Dyslipidemia (2014), History of colon polyps (2024), IFG (impaired fasting glucose) (2024), MVP (mitral valve prolapse) (2008), Osteopenia (2024), Overweight (2024), PAF (paroxysmal atrial fibrillation) (HCC) (2024), and Sick sinus syndrome (HCC) (2024).      She has a current medication list which includes the following prescription(s): acetaminophen, alprazolam er, aspirin, atenolol, hydroxyzine hcl, ibandronate, multivitamin gummies childrens, pravastatin, systane balance, ramelteon, trazodone, and warfarin.      HPI: Currently she is using a combination of trazodone 200 mg, ramelteon 8 mg and Xanax 1 mg as sleep aids.  She is also using hydroxyzine 25 mg \"for allergies \".  She has stress and anxiety caused by her daughter.  She has seen a psychiatrist in the past and states medications as outlined were recommended.  Carley is un aware of snoring or breathing difficulties during sleep.  Other complaints: She had mitral valve replacement about 2 months ago and since then found to be in atrial fibrillation.  She is on anticoagulation with warfarin... Restless Leg Syndrome: Reports no suggestive symptoms.  .  Parasomnia: No features " "reported    Sleep Routine (averaged): Typical Bedtime: 10 PM.  Gets OOB: 6-7 AM. TIB:8-9 hrs.   Sleep latency:< 15 minutes; Sleep Interruptions: Infrequent,. Awakens: Spontaneously  Upon awakening: feels refreshed.  She estimates getting  hrs sleep.  Daytime Function:Carley denies excessive daytime sleepiness or napping. She rated herself at Total score: 0 /24 on the [place] Sleepiness Scale.     Habits:   reports that she has never smoked. She has been exposed to tobacco smoke. She has never used smokeless tobacco.;  reports that she does not currently use alcohol.; Reports no history of drug use.;  E-Cigarette/Vaping  Never User; Caffeine use:none; Exercise routine: regular presently in cardiac rehab or otherwise walks daily.    Occupation:   [employer] License:    Family History: Negative for sleep disturbance.  ROS: Significant for weight has been stable.  She is reporting no nasal, respiratory or cardiac symptoms..     EXAM:  /78   Pulse 84   Ht 5' (1.524 m)   Wt 67.1 kg (148 lb)   LMP 12/16/2014 (Approximate)   SpO2 95%   BMI 28.90 kg/m²    General: Well groomed female, well appearing, in no apparent distress.   Neurological: Alert and orientated; cooperative; Cranial nerves intact;    Psychiatric: Speech: Clear and coherent; appears anxious and has a constricted affect  Skin: Warm and dry; Color& Hydration good; no facial rashes or lesions   HEENT:  Craniofacial anatomy: normal Sinuses: Non-tender. TMJ: Normal    Eyes: EOM's intact; conjunctiva/corneas clear   Ears: Appear normal     Nasal Airway: assymetric nares Septum: Intact; Turbinates: Normal; Rhinorrhea: None  Mouth: Lips: Normal posture; Dentition: worn down. Mucosa: Moist; Hard Palate:narrow and long    Oropharryx: crowdedTongue: Mallampati:Class IV and MobileSoft Palate:  redundant  Tonsils: absent  Neck:; neck Circumference: 12.5 \"; supple; no abnormal masses; Thyroid: Normal. Trachea: Central.   Lymph: No cervical Lymhadenopathy  Heart: S1,S2 " "normal; RRR; no gallop; no murmur   Lungs: Respiratory Effort: Normal. Air entry good bilaterally.  No wheezes.  No rales  Abdomen:  Soft & non-tender    Extremities: No pedal edema.  No clubbing or cyanosis.    Musculoskeletal:  Motor normal; Gait: Normal.       Sincerely,      Authenticated electronically on 01/16/25   Board Certified Specialist     Portions of the record may have been created with voice recognition software. Occasional wrong word or \"sound a like\" substitutions may have occurred due to the inherent limitations of voice recognition software. There may also be notations and random deletions of words or characters from malfunctioning software. Read the chart carefully and recognize, using context, where substitutions/deletions have occurred.     Sitting and reading: Would never doze  Watching TV: Would never doze  Sitting, inactive in a public place (e.g. a theatre or a meeting): Would never doze  As a passenger in a car for an hour without a break: Would never doze  Lying down to rest in the afternoon when circumstances permit: Would never doze  Sitting and talking to someone: Would never doze  Sitting quietly after a lunch without alcohol: Would never doze  In a car, while stopped for a few minutes in traffic: Would never doze  Total score: 0     Review of Systems  Pertinent positives/negatives included in HPI and also as noted:       Objective   /78   Pulse 84   Ht 5' (1.524 m)   Wt 67.1 kg (148 lb)   LMP 12/16/2014 (Approximate)   SpO2 95%   BMI 28.90 kg/m²     Neck Circumference: 12.5  Physical Exam    Data  Lab Results   Component Value Date    HGB 11.1 (L) 11/20/2024    HCT 33.7 (L) 11/20/2024    MCV 90 11/20/2024      Lab Results   Component Value Date    GLUCOSE 137 11/15/2024    CALCIUM 7.6 (L) 11/20/2024     03/19/2015    K 3.8 11/20/2024    CO2 29 11/20/2024     11/20/2024    BUN 26 (H) 11/20/2024    CREATININE 0.71 11/20/2024     No results found for: \"IRON\", " "\"TIBC\", \"FERRITIN\"  Lab Results   Component Value Date    AST 21 10/24/2024    ALT 18 10/24/2024         "

## 2025-01-16 NOTE — PATIENT INSTRUCTIONS
What you can do to improve your sleep: (Sleep Hygiene) Basic rules for a good night's sleep  Create a regular sleep schedule. This will help you form a sleep routine. Keep a record of your sleep patterns, and any sleeping problems you have. Bring the record to follow-up visits with healthcare providers.  Avoid prolonged use of light-emitting screens before bedtime or watching TV in bed.  Avoid forcing sleep.  Do not take naps. Naps could make it hard for you to fall asleep at bedtime.  Deal with your worries before bedtime.  Keep your bedroom cool, quiet, and dark. Turn on white noise, such as a fan, to help you relax. Do not use your bed for any activity that will keep you awake. Do not read, exercise, eat, or watch TV in your bedroom.  Get up if you do not fall asleep within 20 minutes. Move to another room and do something relaxing until you become sleepy.  Limit caffeine, alcohol, nicotine and food to earlier in the day. Only drink caffeine in the morning. Do not drink alcohol within 6 hours of bedtime. Do not eat a heavy meal right before you go to bed. Avoid smoking, especially in the evening.  Exercise regularly. Daily exercise will help you sleep better. Do not exercise within 4 hours of bedtime.  Stimulus control therapy rules  1. Go to bed only when sleepy.  2. Do not watch television, read, eat, or worry while in bed. Use bed only for sleep and sex.  3. Get out of bed if unable to fall asleep within 20 minutes and go to another room. Return to bed only when sleepy. Repeat this step as many times as necessary throughout the night.  4. Set an alarm clock to wake up at a fixed time each morning, including weekends.  5. Do not take a nap during the day.  Data from: New RR, Pamela ML. Nonpharmacologic treatments of insomnia. J Clin Psychiatry 1992; 53:37.  Go to AASM website for more information: Sleepeducation.org  Recommended Reading: Book by authors Bud De Anda   No More sleepless nights    What is  LANCE?   Obstructive sleep apnea is a common and serious sleep disorder that causes you to stop breathing during sleep. The airway repeatedly becomes blocked, limiting the amount of air that reaches your lungs. When this happens, you may snore loudly or making choking noises as you try to breathe. Your brain and body becomes oxygen deprived and you may wake up. This may happen a few times a night, or in more severe cases, several hundred times a night.   Sleep apnea can make you wake up in the morning feeling tired or unrefreshed even though you have had a full night of sleep. During the day, you may feel fatigued, have difficulty concentrating or you may even unintentionally fall asleep. This is because your body is waking up numerous times throughout the night, even though you might not be conscious of each awakening.  The lack of oxygen your body receives can have negative long-term consequences for your health. This includes:  High blood pressure  Heart disease  Irregular heart rhythms  Stroke  Pre-diabetes and diabetes  Depression  Testing  An objective evaluation of your sleep may be needed before your board certified sleep physician can make a diagnosis. Options include:   In-lab overnight sleep study  This type of sleep study requires you to stay overnight at a sleep center, in a bed that may resemble a hotel room. You will sleep with sensors hooked up to various parts of your body. These sensors record your brain waves, heartbeat, breathing and movement. An overnight sleep study also provides your doctor with the most complete information about your sleep. Learn more about an overnight sleep study.   Home sleep apnea test  Some patients with high risk factors for obstructive sleep apnea and no other medical disorders may be candidates for a home sleep apnea test. The testing equipment differs in that it is less complicated than what is used in an overnight sleep study. As such, does not give all the data an  in-lab will and if negative, may not mean you do not have the problem.  Treatment for sleep apnea includes using a continuous positive airway pressure (CPAP) machine to keep your airway open during sleep. A mask is placed over your nose and mouth, or just your nose. The mask is hooked to the CPAP machine that blows a gentle stream of air into the mask when you breathe. This helps keep your airway open so you can breathe more regularly. Extra oxygen may be given to you through the machine. You may be given a mouth device. It looks like a mouth guard or dental retainer and stops your tongue and mouth tissues from blocking your throat while you sleep. Surgery may be needed to remove extra tissues that block your mouth, throat, or nose.  Manage sleep apnea:   Do not smoke. Nicotine and other chemicals in cigarettes and cigars can cause lung damage. Ask your healthcare provider for information if you currently smoke and need help to quit. E-cigarettes or smokeless tobacco still contain nicotine. Talk to your healthcare provider before you use these products.  Do not drink alcohol or take sedative medicine before you go to sleep. Alcohol and sedatives can relax the muscles and tissues around your throat. This can block the airflow to your lungs.  Maintain a healthy weight. Excess tissue around your throat may restrict your breathing. Ask your healthcare provider for information if you need to lose weight.  Sleep on your side or use pillows designed to prevent sleep apnea. This prevents your tongue or other tissues from blocking your throat. You can also raise the head of your bed.  Driving Safety. Refrain from driving when drowsy.   Follow up with your healthcare provider as directed: Write down your questions so you remember to ask them during your visits.  Go to AASM website for more information: Sleepeducation.org  What is LANCE?   Obstructive sleep apnea is a common and serious sleep disorder that causes you to stop  breathing during sleep. The airway repeatedly becomes blocked, limiting the amount of air that reaches your lungs. When this happens, you may snore loudly or making choking noises as you try to breathe. Your brain and body becomes oxygen deprived and you may wake up. This may happen a few times a night, or in more severe cases, several hundred times a night.   Sleep apnea can make you wake up in the morning feeling tired or unrefreshed even though you have had a full night of sleep. During the day, you may feel fatigued, have difficulty concentrating or you may even unintentionally fall asleep. This is because your body is waking up numerous times throughout the night, even though you might not be conscious of each awakening.  The lack of oxygen your body receives can have negative long-term consequences for your health. This includes:  High blood pressure  Heart disease  Irregular heart rhythms  Stroke  Pre-diabetes and diabetes  Depression  Testing  An objective evaluation of your sleep may be needed before your board certified sleep physician can make a diagnosis. Options include:   In-lab overnight sleep study  This type of sleep study requires you to stay overnight at a sleep center, in a bed that may resemble a hotel room. You will sleep with sensors hooked up to various parts of your body. These sensors record your brain waves, heartbeat, breathing and movement. An overnight sleep study also provides your doctor with the most complete information about your sleep. Learn more about an overnight sleep study.   Home sleep apnea test  Some patients with high risk factors for obstructive sleep apnea and no other medical disorders may be candidates for a home sleep apnea test. The testing equipment differs in that it is less complicated than what is used in an overnight sleep study. As such, does not give all the data an in-lab will and if negative, may not mean you do not have the problem.  Treatment for sleep  apnea includes using a continuous positive airway pressure (CPAP) machine to keep your airway open during sleep. A mask is placed over your nose and mouth, or just your nose. The mask is hooked to the CPAP machine that blows a gentle stream of air into the mask when you breathe. This helps keep your airway open so you can breathe more regularly. Extra oxygen may be given to you through the machine. You may be given a mouth device. It looks like a mouth guard or dental retainer and stops your tongue and mouth tissues from blocking your throat while you sleep. Surgery may be needed to remove extra tissues that block your mouth, throat, or nose.  Manage sleep apnea:   Do not smoke. Nicotine and other chemicals in cigarettes and cigars can cause lung damage. Ask your healthcare provider for information if you currently smoke and need help to quit. E-cigarettes or smokeless tobacco still contain nicotine. Talk to your healthcare provider before you use these products.  Do not drink alcohol or take sedative medicine before you go to sleep. Alcohol and sedatives can relax the muscles and tissues around your throat. This can block the airflow to your lungs.  Maintain a healthy weight. Excess tissue around your throat may restrict your breathing. Ask your healthcare provider for information if you need to lose weight.  Sleep on your side or use pillows designed to prevent sleep apnea. This prevents your tongue or other tissues from blocking your throat. You can also raise the head of your bed.  Driving Safety. Refrain from driving when drowsy.   Follow up with your healthcare provider as directed: Write down your questions so you remember to ask them during your visits.  Go to AASM website for more information: Sleepeducation.org    Nursing Support:  When: Monday through Friday 7:30A-4:30PM except holidays  Where: Our direct line is [phone]  *3  *1.      If you are having a true emergency please call 911.  In the event  that the line is busy or it is after hours please leave a voice message and we will return your call.  Please speak clearly, leaving your full name, birth date, best number to reach you and the reason for your call.   Medication refills: We will need the name of the medication, the dosage, the ordering provider, whether you get a 30 or 90 day refill, and the pharmacy name and address.  Medications will be ordered by the provider only.  Nurses cannot call in prescriptions.  Please allow 7 days for medication refills.  Physician requested updates: If your provider requested that you call with an update after starting medication, please be ready to provide us the medication and dosage, what time you take your medication, the time you attempt to fall asleep, time you fall asleep, when you wake up, and what time you get out of bed.  Sleep Study Results: We will contact you with sleep study results and/or next steps after the physician has reviewed your testing.

## 2025-01-17 ENCOUNTER — CLINICAL SUPPORT (OUTPATIENT)
Dept: CARDIAC REHAB | Facility: CLINIC | Age: 70
End: 2025-01-17
Payer: COMMERCIAL

## 2025-01-17 DIAGNOSIS — Z95.2 S/P MVR (MITRAL VALVE REPLACEMENT): Primary | ICD-10-CM

## 2025-01-17 PROCEDURE — 93798 PHYS/QHP OP CAR RHAB W/ECG: CPT

## 2025-01-17 NOTE — PROGRESS NOTES
CARDIAC REHABILITATION   ASSESSMENT AND INDIVIDUALIZED TREATMENT PLAN  30 DAY          Today's date: 2025   # of Exercise Sessions Completed: 7  Patient name: Carley Bonilla      : 1955  Age: 69 y.o.       MRN: [id number]  Referring Physician: Parker Reddy MD   Cardiologist: Hal Tracey MD  Provider: Garry  Clinician: Amie Doe MS, CEP, EPC        Treatment is tailored to this patient's individual needs.  The ITP was reviewed with the patient and all questions were answered to their satisfaction.  Additional ITP documentation can be found electronically including daily and monthly exercise summaries, daily session notes with ECG summaries, education notes, daily medication reconciliation, and daily physician supervision.      Comments: 25: Carley has attended 6 exercise sessions in the past 30 days.   She tolerates 35-40 mins at 2.85 - 3.18 METs. A light strength training component has not been added to their exercise program. and will be added in a future exercise session. She is tolerating progression of intensity levels to maintain RPE 4-6. Resting /70 - 122/72 with  sometimes appropriate  response to exercise reaching 102/66- 132/72. NSR on tele observed. RHR 86 - 93 with  flat  response to exercise reaching 103 - 111. No cardiac complaints. Occ knee pain. Her exercise program will be progressed as tolerated to maintain RPE 4-6.  They will continue to be educated on lifestyle modification and encouraged to supplement with a home exercise program as tolerated.    24: reports being a little out of breath still, generally feeling a little better everyday        Dx:   Encounter Diagnosis   Name Primary?    S/P MVR (mitral valve replacement) Yes       Description of Diagnosis: MV prolapse s/p mitral valve replacement and left atrial appendage ligation  Date of onset: 11/15/24  Other Cardiac History: SSS noted post-op with evidence of junctional rhythm, Manjeet  PAF/Aflutter; no prior cardiac issues        ASSESSMENT    Medical History:   Past Medical History:   Diagnosis Date    Anxiety     Benign essential hypertension 01/22/2014    Chronic insomnia 02/08/2023    Dyslipidemia 05/27/2014    History of colon polyps 12/16/2024    IFG (impaired fasting glucose) 12/16/2024    MVP (mitral valve prolapse) 01/09/2008    Formatting of this note might be different from the original.   Required antibiotic prophylaxis      Osteopenia 12/16/2024    Overweight 12/16/2024    PAF (paroxysmal atrial fibrillation) ([facility]) 11/20/2024    Sick sinus syndrome ([facility]) 11/18/2024       Family History:  Family History   Problem Relation Age of Onset    Heart failure Mother     Hypertension Mother     Heart failure Father     No Known Problems Brother     No Known Problems Daughter     No Known Problems Maternal Grandmother     No Known Problems Paternal Grandmother     No Known Problems Maternal Aunt     No Known Problems Paternal Aunt     Breast cancer Neg Hx        Allergies:   Morphine and codeine, Bee venom, Codeine, Penicillins, and Sulfa antibiotics    Current Medications:   Current Outpatient Medications   Medication Sig Dispense Refill    acetaminophen (TYLENOL) 325 mg tablet Take 1-2 tablets Q4-6 hours PRN pain. Do not take more than 4 grams in 24 hours.      ALPRAZolam ER (XANAX XR) 1 MG 24 hr tablet Take 1 mg by mouth daily at bedtime Rx per Sleep Medicine      aspirin (ECOTRIN LOW STRENGTH) 81 mg EC tablet Take 1 tablet (81 mg total) by mouth daily 30 tablet 2    atenolol (TENORMIN) 25 mg tablet Take 25 mg by mouth daily Rx per Cardio      hydrOXYzine HCL (ATARAX) 25 mg tablet Take 25 mg by mouth 3 (three) times a day as needed for anxiety (Insomnia) Take one tablet by mouth 3 times per day as needed for anxiety or sleep.  Rx per Sleep Medicine.      ibandronate (BONIVA) 150 MG tablet Take 1 tablet (150 mg total) by mouth every 30 (thirty) days 3 tablet 3    Pediatric Vitamins  (Multivitamin Gummies Childrens) CHEW Chew 1 tablet in the morning      pravastatin (PRAVACHOL) 10 mg tablet TAKE 1 TABLET BY MOUTH EVERY DAY 90 tablet 1    Propylene Glycol (Systane Balance) 0.6 % SOLN OTC.  1 drop six times a day into both eyes      ramelteon (ROZEREM) 8 mg tablet Take 8 mg by mouth daily Rx per Sleep Med      traZODone (DESYREL) 100 mg tablet Take 200 mg by mouth daily at bedtime Rx per Sleep Med      warfarin (Jantoven) 2.5 mg tablet Take 2 tablets (5mg total) QHS unless otherwise directed. 60 tablet 2     No current facility-administered medications for this visit.       Medication compliance: Yes   Comments: Pt reports to be compliant with medications    Physical Limitations: broke right wrist and right ankle 2x    Fall Risk: Low   Comments: Ambulates with a steady gait with no assist device    Cultural needs: none      CAD Risk Factors:  Cholesterol: Yes  HTN: Yes  DM: impaired fasting glucose   Obesity: Yes   Inactivity: no- attending CR      EXERCISE ASSESSMENT:     Initial Fitness Assessment: Submaximal TM ETT:  Resting:  BP: 112/74  HR: 79, Exercise:  BP: 132/64  HR: 96, METs:  3.1, and Test terminated at:  RPE 6      ECG INTERPRETATION:  NSR    Current Functional Status:  Occupation: retired  Recreation/Physical Activity: [employer]   ADL’s:Capable of performing light to moderate ADLs  [place]: resumed all ADLs within sternal precautions  Home exercise: none  Other Comments: was previously doing a lot of walking      SMART Exercise Goals:   10% improvement in functional capacity based on max METs achieved in initial fitness assessment  improved DASI score by 10%  increased exercise capacity by 40% based on peak METs tolerated in cardiac rehab exercise session  maintain > 150 minutes per week of moderate intensity exercise    Patient Specific EXERCISE GOALS:       Return to walking   Return to [employer]   Decrease SOB  Start using TM at home    Functional Capacity Screening  Tool:  [facility] Activity Status Index:  5.38 METs      PSYCHOSOCIAL ASSESSMENT:    Date of last Assessment:  12/20/24  Depression screening:  PHQ-9 = 0    Interpretation:  0 =No Depression  Anxiety screening:  AMANUEL-7 = 3  Interpretation: 0-4  = Not anxious    Pt self-report of depression and anxiety   Patient has a history of depression  Patient has a history of anxiety     Self-reported stress level:  7   Stressors:  surgery, just a high stress person  Stress Management Tools: practice Relaxation Techniques, exercise, keep a positive mindset, and enjoy a hobby    SMART Psychosocial Goals:     Physical Fitness in [facility] Score < 3, Daily Activity in [facility] Score < 3, Pain in [facility] Score < 3, and control or stop worrying    Patient Specific PSYCHOCOSOCIAL GOALS:    Continue with medical therapy   Reduce stress level  Determine relaxation techniques that work for her    Quality of Life Screen:  (Higher score indicates disease impact on QOL)  [facility] COOP score: 18/45     Social Support:   significant other  Community/Social Activities: bowling     Psychosocial Assessment as it relates to rehabilitation:   Patient denies issues with his/her family or home life that may affect their rehabilitation efforts.       NUTRITION ASSESSMENT:    Initial Weight:  148.4  Current Weight: 146.4    Height:   Ht Readings from Last 1 Encounters:   01/16/25 5' (1.524 m)       Rate Your Plate Score: 56/81    Diabetes: IFG  A1c: 5.9    last measured: 10/24/24    Lipid management: Discussed diet and lipid management and Last lipid profile 10/24/24  Chol 188    HDL 47      Current Dietary Habits:  Watching sodium  Drinking water  Eats out rarely  Glass of wine 1x/week    SMART Nutrition Goals:   Improved Rate Your Plate score  >64, eat 6 or more servings of grain products per day, eat whole grain breads, brown rice and whole grain cereals, eat 5 or more servings of fruits and vegetables a day, eat 2 or more servings of  "low fat milk or yogurt a day, eat no more than 6oz of meat per day, eat red meat once a week or less, choose lean beef or rarely eat beef, choose 1% or skim milk, rarely eat frozen desserts or choose fruit or fat-free sweets, Do not eat fried foods, use \"light\" tub margarine on bread, potatoes and vegetables, choose healthy desserts and sweets such as rian food cake or  fruit, choose low sodium canned, frozen/packaged foods or rarely/never eat, rarely/never eat salty snacks, and choose low sugar desserts and sweets    Patient Specific NUTRITION GOALS:     1. Decrease sweets   2. Weight loss goal of 20 lbs   3. Decrease fried foods    Drug/Alcohol Use:   ~ once week wine      OTHER CORE COMPONENT ASSESSMENT:    Tobacco Use:     N/A:  Patient is a non-smoker     Anginal Symptoms:  None   NTG use: No prescription    SMART Goals:   consistent, controlled resting BP < 130/80 and medication compliance    Patient Specific CORE COMPONENT GOALS:    Look into getting BP cuff  Medication compliance      INDIVIDUALIZED TREATMENT PLAN      EXERCISE GOALS and PLAN      Progress toward Exercise goals:   Pt is progressing and showing improvement  toward the following goals:  tolerating 35-40 mins of exercise at 2.85-3.18 METs, increasing exercise tolerance.  , Pt has not made progress toward the following goals: home exercise . , Will continue to educate and progress as tolerated. Will add walking on TM over the weekend    Exercise Plan:    education on home exercise guidelines, home exercise 30+ mins 2 days opposite CR, Patient education:   Exercise After Cardiac Rehabilitation, and After discharge patient will continue to follow a regular exercise regimen with the goal of a minimum of 150 minutes per week of moderate intensity exercise.      The patient was counseled on exercise guidelines to achieve a minimum of 150 mins/wk of moderate intensity (RPE 4-6)   exercise and encouraged to add 1-2 days of exercise on opposite days of " cardiac rehab as tolerated.       PHYSICIAN PRESCRIBED EXERCISE:    Current Aerobic Exercise Prescription:      Frequency: 3 days/week   Supplement with home exercise 2+ days/wk as tolerated       Minutes: 35-40         METS: 2.85- 3.18            HR:  103-111   RPE: 4-6         Modalities: Treadmill, UBE, NuStep, and Recumbent bike     Exercise workloads will be progressed gradually as tolerated, within limits of patient's ability, and according to the patient's   response to the exercise program.      Aerobic Exercise Prescription Plan for Progression   Frequency: 3 days/week of cardiac rehab       Supplement with home exercise 2+ days/wk as tolerated    Minutes: 40       >150 mins/wk of moderate intensity exercise   METS: 3-4   HR: RHR +30-40bpm     RPE: 4-6   Modalities: Treadmill, NuStep, and Recumbent bike    Strength trainin-3 days / week  12-15 repetitions  1-2 sets per modality   Will be added following 2-3 weeks of monitored exercise sessions   Modalities: Chest Press, Pull Downs, Arm Curl, Seated Row, and Sit to Stands    Home Exercise: none    Exercise Education: benefit of exercise for CAD risk factors, home exercise guidelines, AHA guidelines to achieve >150 mins/wk of moderate exercise, RPE scale, and Group class: Risk Factors for Heart Disease     Readiness to change: Action:  (Changing behavior)      NUTRITION GOALS AND PLAN      Nutritional   Reviewed patient's Rate your Plate. Discussed key elements of heart healthy eating. Reviewed patient goals for dietary modifications and their clinical implications.  Reviewed most recent lipid profile.     Patient's progress toward Nutrition goals:    Pt is progressing and showing improvement  toward the following goals:  weight loss of 2 lbs.  , Will continue to educate and progress as tolerated.      Nutrition Plan:   group class: Reading Food Labels, group Class: Heart Healthy Eating, increase intake of whole grains, replace refined flours with whole  "grains, increase daily intake of fruits and vegetables, increase daily intake of low fat dairy, limit meat intake to less than 6oz per day, eat red meat once a week or less, choose lean red meat, drink/use 1%  low fat or skim  milk, rarely eat frozen desserts, never/rarely eat fried foods, use \"light\" tub margarine, choose desserts such as fruit, rian food cake, low-fat or fat-free sweets, choose low sodium canned, frozen, packaged foods or rarely eat these foods, rarely/never eat salty snacks, and choose low sugar desserts and sweets    Measurable goals were based Rate Your Plate Dietary Self-Assessment. These are the areas in which the patient could score higher on the assessment.  Goals include recommendations for a heart healthy diet based on American Heart Association.    Nutrition Education:   heart healthy eating principles  weight loss and management strategies  low sodium diet  maintaining hydration  nutrition for  lipid management    Readiness to change: Preparation:  (Getting ready to change)       PSYCHOSOCIAL GOALS AND PLAN    Psychosocial Assessment as it relates to rehabilitation:   Patient denies issues with his/her family or home life that may affect their rehabilitation efforts.     Patient's progress toward Psychosocial goals:    Pt is progressing and showing improvement  toward the following goals:  improved PHQ-9 and AMANUEL-7 scores, compliant with medical therapy.  , Will continue to educate and progress as tolerated.  Psychosocial Intervention/plan:   Class: Stress and Your Health, Class: Relaxation, Practice relaxation techniques, Exercise, Keep a positive mindset, Enjoy family, and Return to previous social activity    Psychosocial Education: signs/sxs of depression, benefits of a positive support system, and stress management techniques    Information to utilize [employer] was provided as well as contact information for counseling through  Behavioral Health and group psychotherapy groups " available.    Readiness to change: Action:  (Changing behavior)      OTHER CORE COMPONENTS GOALS and PLAN  Blood Pressure  Restin/70- 122/72  Exercise: 102/66- 132/72  Recovery: 102/64- 118/70    Blood Pressure will be monitored throughout the program and cardiologist will be notified of elevated trends.    Pt will be encouraged to monitor home BP if advised by cardiologist.    Tobacco Plan:   N/A:  Pt is a non-smoker    Progress toward Core Component goals:   Pt is progressing and showing improvement  toward the following goals:  med compliance, BP stable .  , Will continue to educate and progress as tolerated.    Other Core Components Intervention:   group class: Understanding Heart Disease, group class: Common Heart Medications, medication compliance, avoid processed foods, engage in regular exercise, check labels for sodium content, and monitor home BP    Group and Individual Education:  understanding high blood pressure and it's relationship to CAD and components of blood pressure management    Readiness to change: Action:  (Changing behavior)

## 2025-01-21 ENCOUNTER — CLINICAL SUPPORT (OUTPATIENT)
Dept: CARDIAC REHAB | Facility: CLINIC | Age: 70
End: 2025-01-21
Payer: COMMERCIAL

## 2025-01-21 DIAGNOSIS — Z95.2 S/P MVR (MITRAL VALVE REPLACEMENT): Primary | ICD-10-CM

## 2025-01-21 PROCEDURE — 93798 PHYS/QHP OP CAR RHAB W/ECG: CPT

## 2025-01-23 ENCOUNTER — TELEPHONE (OUTPATIENT)
Age: 70
End: 2025-01-23

## 2025-01-23 ENCOUNTER — CLINICAL SUPPORT (OUTPATIENT)
Dept: CARDIAC REHAB | Facility: CLINIC | Age: 70
End: 2025-01-23
Payer: COMMERCIAL

## 2025-01-23 DIAGNOSIS — Z95.2 S/P MVR (MITRAL VALVE REPLACEMENT): Primary | ICD-10-CM

## 2025-01-23 PROCEDURE — 93798 PHYS/QHP OP CAR RHAB W/ECG: CPT

## 2025-01-24 ENCOUNTER — APPOINTMENT (OUTPATIENT)
Dept: LAB | Facility: CLINIC | Age: 70
End: 2025-01-24
Payer: COMMERCIAL

## 2025-01-24 ENCOUNTER — ANTICOAG VISIT (OUTPATIENT)
Dept: CARDIOLOGY CLINIC | Facility: CLINIC | Age: 70
End: 2025-01-24

## 2025-01-24 ENCOUNTER — RESULTS FOLLOW-UP (OUTPATIENT)
Dept: FAMILY MEDICINE CLINIC | Facility: CLINIC | Age: 70
End: 2025-01-24

## 2025-01-24 ENCOUNTER — CLINICAL SUPPORT (OUTPATIENT)
Dept: CARDIAC REHAB | Facility: CLINIC | Age: 70
End: 2025-01-24
Payer: COMMERCIAL

## 2025-01-24 DIAGNOSIS — F51.04 CHRONIC INSOMNIA: ICD-10-CM

## 2025-01-24 DIAGNOSIS — M79.10 MYALGIA: ICD-10-CM

## 2025-01-24 DIAGNOSIS — I48.0 PAF (PAROXYSMAL ATRIAL FIBRILLATION) (HCC): ICD-10-CM

## 2025-01-24 DIAGNOSIS — M85.80 OSTEOPENIA, UNSPECIFIED LOCATION: ICD-10-CM

## 2025-01-24 DIAGNOSIS — I48.3 TYPICAL ATRIAL FLUTTER (HCC): ICD-10-CM

## 2025-01-24 DIAGNOSIS — E78.5 DYSLIPIDEMIA: ICD-10-CM

## 2025-01-24 DIAGNOSIS — Z95.2 S/P MVR (MITRAL VALVE REPLACEMENT): Primary | ICD-10-CM

## 2025-01-24 DIAGNOSIS — I10 BENIGN ESSENTIAL HYPERTENSION: ICD-10-CM

## 2025-01-24 DIAGNOSIS — R73.01 IFG (IMPAIRED FASTING GLUCOSE): ICD-10-CM

## 2025-01-24 DIAGNOSIS — I48.0 PAF (PAROXYSMAL ATRIAL FIBRILLATION) (HCC): Primary | ICD-10-CM

## 2025-01-24 DIAGNOSIS — F41.9 ANXIETY: ICD-10-CM

## 2025-01-24 DIAGNOSIS — E78.5 HYPERLIPIDEMIA, UNSPECIFIED HYPERLIPIDEMIA TYPE: ICD-10-CM

## 2025-01-24 LAB
25(OH)D3 SERPL-MCNC: 42.6 NG/ML (ref 30–100)
ALBUMIN SERPL BCG-MCNC: 4 G/DL (ref 3.5–5)
ALP SERPL-CCNC: 98 U/L (ref 34–104)
ALT SERPL W P-5'-P-CCNC: 18 U/L (ref 7–52)
ANION GAP SERPL CALCULATED.3IONS-SCNC: 6 MMOL/L (ref 4–13)
AST SERPL W P-5'-P-CCNC: 21 U/L (ref 13–39)
BASOPHILS # BLD AUTO: 0.05 THOUSANDS/ΜL (ref 0–0.1)
BASOPHILS NFR BLD AUTO: 1 % (ref 0–1)
BILIRUB SERPL-MCNC: 0.39 MG/DL (ref 0.2–1)
BUN SERPL-MCNC: 19 MG/DL (ref 5–25)
CALCIUM SERPL-MCNC: 9.4 MG/DL (ref 8.4–10.2)
CHLORIDE SERPL-SCNC: 103 MMOL/L (ref 96–108)
CO2 SERPL-SCNC: 30 MMOL/L (ref 21–32)
CREAT SERPL-MCNC: 0.87 MG/DL (ref 0.6–1.3)
EOSINOPHIL # BLD AUTO: 0.15 THOUSAND/ΜL (ref 0–0.61)
EOSINOPHIL NFR BLD AUTO: 2 % (ref 0–6)
ERYTHROCYTE [DISTWIDTH] IN BLOOD BY AUTOMATED COUNT: 13.3 % (ref 11.6–15.1)
EST. AVERAGE GLUCOSE BLD GHB EST-MCNC: 108 MG/DL
GFR SERPL CREATININE-BSD FRML MDRD: 68 ML/MIN/1.73SQ M
GLUCOSE P FAST SERPL-MCNC: 90 MG/DL (ref 65–99)
HBA1C MFR BLD: 5.4 %
HCT VFR BLD AUTO: 42.2 % (ref 34.8–46.1)
HGB BLD-MCNC: 13.8 G/DL (ref 11.5–15.4)
IMM GRANULOCYTES # BLD AUTO: 0.03 THOUSAND/UL (ref 0–0.2)
IMM GRANULOCYTES NFR BLD AUTO: 0 % (ref 0–2)
INR PPP: 1.77 (ref 0.85–1.19)
LDLC SERPL DIRECT ASSAY-MCNC: 112 MG/DL (ref 0–100)
LYMPHOCYTES # BLD AUTO: 2.01 THOUSANDS/ΜL (ref 0.6–4.47)
LYMPHOCYTES NFR BLD AUTO: 29 % (ref 14–44)
MCH RBC QN AUTO: 29.2 PG (ref 26.8–34.3)
MCHC RBC AUTO-ENTMCNC: 32.7 G/DL (ref 31.4–37.4)
MCV RBC AUTO: 89 FL (ref 82–98)
MONOCYTES # BLD AUTO: 0.52 THOUSAND/ΜL (ref 0.17–1.22)
MONOCYTES NFR BLD AUTO: 7 % (ref 4–12)
NEUTROPHILS # BLD AUTO: 4.26 THOUSANDS/ΜL (ref 1.85–7.62)
NEUTS SEG NFR BLD AUTO: 61 % (ref 43–75)
NRBC BLD AUTO-RTO: 0 /100 WBCS
PLATELET # BLD AUTO: 231 THOUSANDS/UL (ref 149–390)
PMV BLD AUTO: 10.4 FL (ref 8.9–12.7)
POTASSIUM SERPL-SCNC: 3.8 MMOL/L (ref 3.5–5.3)
PROT SERPL-MCNC: 7.9 G/DL (ref 6.4–8.4)
PROTHROMBIN TIME: 21.4 SECONDS (ref 12.3–15)
RBC # BLD AUTO: 4.72 MILLION/UL (ref 3.81–5.12)
SODIUM SERPL-SCNC: 139 MMOL/L (ref 135–147)
TSH SERPL DL<=0.05 MIU/L-ACNC: 1.7 UIU/ML (ref 0.45–4.5)
WBC # BLD AUTO: 7.02 THOUSAND/UL (ref 4.31–10.16)

## 2025-01-24 PROCEDURE — 83721 ASSAY OF BLOOD LIPOPROTEIN: CPT

## 2025-01-24 PROCEDURE — 85025 COMPLETE CBC W/AUTO DIFF WBC: CPT

## 2025-01-24 PROCEDURE — 93798 PHYS/QHP OP CAR RHAB W/ECG: CPT

## 2025-01-24 PROCEDURE — 82306 VITAMIN D 25 HYDROXY: CPT

## 2025-01-24 PROCEDURE — 80053 COMPREHEN METABOLIC PANEL: CPT

## 2025-01-24 PROCEDURE — 84443 ASSAY THYROID STIM HORMONE: CPT

## 2025-01-24 PROCEDURE — 83036 HEMOGLOBIN GLYCOSYLATED A1C: CPT

## 2025-01-24 NOTE — RESULT ENCOUNTER NOTE
Stable labs - will review with patient at upcoming appointment.    Hyperlipidemia - Recommend lifestyle modifications.

## 2025-01-28 ENCOUNTER — CLINICAL SUPPORT (OUTPATIENT)
Dept: CARDIAC REHAB | Facility: CLINIC | Age: 70
End: 2025-01-28
Payer: COMMERCIAL

## 2025-01-28 DIAGNOSIS — Z95.2 S/P MVR (MITRAL VALVE REPLACEMENT): Primary | ICD-10-CM

## 2025-01-28 PROCEDURE — 93798 PHYS/QHP OP CAR RHAB W/ECG: CPT

## 2025-01-30 ENCOUNTER — APPOINTMENT (OUTPATIENT)
Dept: CARDIAC REHAB | Facility: CLINIC | Age: 70
End: 2025-01-30
Payer: COMMERCIAL

## 2025-01-31 ENCOUNTER — CLINICAL SUPPORT (OUTPATIENT)
Dept: CARDIAC REHAB | Facility: CLINIC | Age: 70
End: 2025-01-31
Payer: COMMERCIAL

## 2025-01-31 DIAGNOSIS — Z95.2 S/P MVR (MITRAL VALVE REPLACEMENT): Primary | ICD-10-CM

## 2025-01-31 PROCEDURE — 93798 PHYS/QHP OP CAR RHAB W/ECG: CPT

## 2025-02-03 ENCOUNTER — TELEPHONE (OUTPATIENT)
Dept: FAMILY MEDICINE CLINIC | Facility: CLINIC | Age: 70
End: 2025-02-03

## 2025-02-04 ENCOUNTER — CLINICAL SUPPORT (OUTPATIENT)
Dept: CARDIAC REHAB | Facility: CLINIC | Age: 70
End: 2025-02-04
Payer: COMMERCIAL

## 2025-02-04 ENCOUNTER — OFFICE VISIT (OUTPATIENT)
Dept: FAMILY MEDICINE CLINIC | Facility: CLINIC | Age: 70
End: 2025-02-04
Payer: COMMERCIAL

## 2025-02-04 VITALS
HEIGHT: 60 IN | HEART RATE: 52 BPM | BODY MASS INDEX: 28.62 KG/M2 | SYSTOLIC BLOOD PRESSURE: 102 MMHG | TEMPERATURE: 98.3 F | DIASTOLIC BLOOD PRESSURE: 60 MMHG | RESPIRATION RATE: 14 BRPM | WEIGHT: 145.8 LBS | OXYGEN SATURATION: 96 %

## 2025-02-04 DIAGNOSIS — Z95.2 S/P MVR (MITRAL VALVE REPLACEMENT): Primary | ICD-10-CM

## 2025-02-04 DIAGNOSIS — T78.2XXA ANAPHYLAXIS, INITIAL ENCOUNTER: ICD-10-CM

## 2025-02-04 DIAGNOSIS — F51.04 CHRONIC INSOMNIA: ICD-10-CM

## 2025-02-04 DIAGNOSIS — I48.0 PAF (PAROXYSMAL ATRIAL FIBRILLATION) (HCC): ICD-10-CM

## 2025-02-04 DIAGNOSIS — R73.01 IFG (IMPAIRED FASTING GLUCOSE): Primary | ICD-10-CM

## 2025-02-04 DIAGNOSIS — E78.5 DYSLIPIDEMIA: ICD-10-CM

## 2025-02-04 DIAGNOSIS — I49.5 SICK SINUS SYNDROME (HCC): ICD-10-CM

## 2025-02-04 DIAGNOSIS — I10 BENIGN ESSENTIAL HYPERTENSION: ICD-10-CM

## 2025-02-04 DIAGNOSIS — F41.9 ANXIETY: ICD-10-CM

## 2025-02-04 DIAGNOSIS — E66.3 OVERWEIGHT: ICD-10-CM

## 2025-02-04 DIAGNOSIS — M85.80 OSTEOPENIA, UNSPECIFIED LOCATION: ICD-10-CM

## 2025-02-04 DIAGNOSIS — I34.1 MVP (MITRAL VALVE PROLAPSE): ICD-10-CM

## 2025-02-04 PROBLEM — D69.6 THROMBOCYTOPENIA (HCC): Status: RESOLVED | Noted: 2024-11-19 | Resolved: 2025-02-04

## 2025-02-04 PROCEDURE — 93798 PHYS/QHP OP CAR RHAB W/ECG: CPT

## 2025-02-04 PROCEDURE — G2211 COMPLEX E/M VISIT ADD ON: HCPCS | Performed by: FAMILY MEDICINE

## 2025-02-04 PROCEDURE — 99214 OFFICE O/P EST MOD 30 MIN: CPT | Performed by: FAMILY MEDICINE

## 2025-02-04 RX ORDER — PRAVASTATIN SODIUM 20 MG
20 TABLET ORAL
Qty: 30 TABLET | Refills: 8 | Status: SHIPPED | OUTPATIENT
Start: 2025-02-04

## 2025-02-04 RX ORDER — EPINEPHRINE 0.3 MG/.3ML
0.3 INJECTION SUBCUTANEOUS ONCE
Qty: 0.6 ML | Refills: 0 | Status: SHIPPED | OUTPATIENT
Start: 2025-02-04 | End: 2025-02-15 | Stop reason: SDUPTHER

## 2025-02-04 RX ORDER — IBANDRONATE SODIUM 150 MG/1
150 TABLET, FILM COATED ORAL
Qty: 3 TABLET | Refills: 2 | Status: SHIPPED | OUTPATIENT
Start: 2025-02-04

## 2025-02-04 NOTE — ASSESSMENT & PLAN NOTE
Uncontrolled.  Increase Pravachol 20mg QHS.  Recommend lifestyle modifications.     Orders:  •  pravastatin (PRAVACHOL) 20 mg tablet; Take 1 tablet (20 mg total) by mouth daily at bedtime  •  Comprehensive metabolic panel; Future  •  TSH, 3rd generation with Free T4 reflex; Future  •  LDL cholesterol, direct; Future

## 2025-02-04 NOTE — ASSESSMENT & PLAN NOTE
Stable.  Recommend lifestyle modifications.    Orders:  •  Comprehensive metabolic panel; Future  •  Hemoglobin A1C; Future

## 2025-02-04 NOTE — PATIENT INSTRUCTIONS
"    Dr. Sebastián Tom- Psychiatry    Contact Information  Tel: [phone]  Fax: [phone]  Address: [address] [place] [address]  Website: [email or web address]    Please contact your insurance if you are uncertain of coverage for plan of care items.    Apps and Websites for Counseling, Anxiety/Depression, Chronic Pain, Lifestyle Change, Problem-solving, Self-Esteem, Anger Management, Coping with Uncertainty    (Prices current as of 9/5/19)    Mood Kit - Available in Apple Edward Store for $4.99.  Supports a person's success in specific situations, includes thought , mood tracker, and journal.  Can be accessed in an unstructured way and used as an unguided self-help edward.      2.  Moodnotes - Available in Apple Edward Store for $4.99.  Focuses on healthier thinking habits and identifying \"traps\" in thinking.  Tracks mood over a period of time and identifies factors that influence mood.      3.  MoodMission - Available in Apple Edward Store and Google Play for free.  Includes five \"missions\" to promote confidence in handling stressors and coping in specific situations.  The edward personalizes its style and techniques based on when the user engages it most frequently.  In-edward rewards are used to motivate, increase fun and self-confidence.  Helpful for patients who need improvement in mood or a decrease in anxiety and depression symptoms.      4.   What's Up - Available in Apple Edward Store and Google Play for free.  Recognizes negative thinking patterns and methods to overcome them.  Uses helpful metaphors, a catastrophe scale, grounding techniques, and breathing exercises.  Has the ability to sync data across multiple devices and protect this information with a unique pass code.  Has the capability to be active in forums where people can discuss similar feelings and strategies that have been helpful.      5.  Moodpath - Available in Apple Edward Store and Google Play for free.  Uses " daily screenings to create a better understanding of thoughts, feelings, and emotions.  When needed, it provides a discussion guide to talking with a medical professional based on the responses to daily screenings.  Includes over 150 psychological exercises and videos to promote and strengthen overall mental health.    6.  MindShift - Available in Apple Edward Store and Google Play for free.  Helpful for youth and young adults in coping with anxiety.  Creates an individualized toolbox to help users deal with test anxiety, perfectionism, social anxiety, worry, panic, and conflict.  Includes directions on how to construct “belief experiments” to trial common beliefs that feed anxiety, guided relaxation, as well as tools and tips to help establish and accomplish goals.  Differentiates between helpful and unhelpful anxiety, and explains how to overcome fears by gradually facing them in manageable steps.    7.  CBT-I  - Available in Apple Edward Store and Google Play for free.  For insomnia.  Educates about sleep, developing positive sleep routines, and improved sleep environment.  Encourages user to change behaviors which will provide confidence for better sleep on a regular basis.    8.  MyGoGames.uk - Free website.  Provides self-help and therapy resources that encourages change to combat negative and destructive thought patterns.  Includes access to numerous handouts on a variety of symptoms related to anxiety, depression, low self-esteem, panic attacks, social disorders, and more.  The solution section has interventions that can be printed and saved for future use.    9.  Woebot - Available in Apple Edward store and Google Play for free.  Recommended for age 17+.  Friendly self-care  that helps you think through situations with step-by-step guidance using counseling tools, learn about yourself with intelligent mood tracking, and master skills to reduce stress and live happier through 100+ evidence-based  "stories from clinicians.  Chat as often or as little as you'd like, whenever you'd like to.      10.  Wysa:  AI  CBT DBT Chatbot - Available in Apple appsstore and Google Play for free, has in-edward purchases.  Recommended for 12+.  Psychologist support at $30/month, 50% off to start.  AI  / chatbot is free.  Uses techniques of CBT, DBT, yoga, and meditation to support your needs regarding stress, anxiety, sleep, loss, and a full range of other mental health and wellness issues.      11.  Curable Pain Relief - Available in Apple Edward store and Google Play for free, has in-edward purchases.   Teaches about the chronic pain cycle and how to reverse it, while retraining your brain and nervous system for long-term results.  Smart  Tammy guides user through updates in pain science to identify, target, eliminate the factors that are keeping user \"stuck\" in the pain cycle.      12.  Talkspace Online Therapy - Available in Apple Edward store and Google Play for a fee.  Subscription service, starting at $59/week (billed monthly).  All plans include unlimited messaging, and add live video sessions if desired.  Unlimited messaging therapy for teens ages 13-17 and special services for couples therapy.  You can change therapists or stop subscription renewal at any time.  Recommended for 13+.  User is matched with a licensed therapist in your state and communicate on your device by text, audio, and video from anywhere, at any time.  User will hear back at least once a day, 5 days per week.      Counseling services:    Go to Dasdak to find a therapist near you. You can sort by gender, insurance, issues, etc.       [email or web address]   [facility] [place], [place]   [phone]    [employer], Ecelles Carson  [address], [address], [place] [address]  [email or web address]  [phone]    [facility]   [address] [place] [address], [address]  [place], [place] " [address]  [phone]  Appointment - [facility] ([email or web address])     [facility]   [address], [address], [place] [address]  [phone]    [facility], [facility]   [address], [place] [address]  [phone]    [facility], [facility], Laina Rodriguez M.S., [address], [address]  [place] [address]  [email or web address]  [phone]    [facility]   [email or web address]  [address] [place] [address]  [phone]    [facility] (they have equine therapy too)  [address], [address],  [place] [address]  [address],  [place] [address]  [phone]     [address]; [place], [place] [address]   [phone]     [facility]  [address], [address]  [place], [place] [address] [phone]     [facility]   [address] [place] [address]     [phone]       [facility]  [address] [place] [address]  [phone]    [facility], [facility]   [address]   [place] [address]  [email or web address]    LUKE Mckeon, LSW  (patients age 11-adult)   [address] [place], [place] [id number] [phone]     Scot Simmons  [address]   [place], [place] [id number] [phone]     Emma Lynn LCSW  [address], [address], [place] [address] [phone]     Manju Johnson [facility] (specializing in addiction)  [address] [place] [id number] [phone]    Leia Wells  [address], [place], [place] [address]   [phone]      Alicja Delatorre, MS, LPC, NCC  [address], [place] [address]  [phone]     Brisa Cabral PsyD  [address] [place] [address]   [phone]    Carley Jha, LPC   [address] [address], [place] [address] [phone]      Hotlines:   Please program these numbers into your phone in case you or someone you know needs them. All services are free.     985  People who call, text, or chat with 988 will be directly connected to the National Suicide Prevention Lifeline. The existing Lifeline phone number (1-473.761.1706) will remain available. Callers can also connect with the [facility] Crisis Line or assistance in Hebrew. 988 can be used by anyone, any time, at no cost. Trained crisis response professionals can support individuals considering suicide, self-harm, or any behavioral, substance abuse or misuse or mental health need for themselves or people looking for help for a loved one experiencing a mental health crisis. Lifeline services are available 24 hours a day, seven days a week at no cost to the caller.  Crisis Text Line: text 262364     You are connected to a Crisis Counselor to bring a hot moment to a cool calm through active listening and collaborative problem solving.   WarmLine:  362.310.5013 or 294-556-4293   They provide a supportive listening ear if you need it. They also can also provide information about mental health concerns and services.   Crisis Line:  [facility] [place] [phone],  [facility] [phone], [facility] and [facility]: [phone]   24/7 crisis counseling, on-site counseling and walk-in counseling services available.   National Suicide Prevention Lifeline:  1-880.200.9216.  En Espanol Nacional de Prevencion del Suicidio 0-397-903-8644   This is free, confidential, and available 24/7.   [place]: [phone]   For those facing or having survived abuse 24 hour confidential help line, emergency safe house, counseling, advocacy and education.    If you or someone your know are feeling as though you are going to hurt yourself, do not wait - GET HELP  RIGHT AWAY.  Go to the closest Emergency Room, call 911, or call someone you trust, family member or friend to be with you until you can get some help.    Additional Counseling Resources:       - [facility] in [place], [place]  Phone:  [phone]  Website:  [email or web address]     - [facility] in [place]  Phone:  [phone]  Website:  [email or web address]

## 2025-02-04 NOTE — ASSESSMENT & PLAN NOTE
Stable.  Management per Cardio.  Check blood pressure outside of office.  Recommend lifestyle modifications.   Orders:  •  Comprehensive metabolic panel; Future

## 2025-02-04 NOTE — ASSESSMENT & PLAN NOTE
Management per Sleep Medicine.   On Trazodone 200 mg q.h.s., Rozerem 8 mg q.h.s., weaning Xanax ER 1 mg 1/2 tab QHS, Atarax 25mg TID PRN.   s/p CBT.   Orders:  •  Ambulatory referral to Psych Services; Future

## 2025-02-04 NOTE — ASSESSMENT & PLAN NOTE
Management per Cardio.           PMI and heart sounds localize heart on left side of chest; murmurs absent; pulse with normal variation, frequency and intensity (amplitude or strength) with equal intensity on upper and lower extremities; blood pressure value(s) are adequate.

## 2025-02-04 NOTE — ASSESSMENT & PLAN NOTE
Stable. Management per Sleep Medicine.   Pending Psych consult.    Orders:  •  Ambulatory referral to Psych Services; Future

## 2025-02-04 NOTE — PROGRESS NOTES
Assessment/Plan:  Assessment & Plan  IFG (impaired fasting glucose)  Stable.  Recommend lifestyle modifications.    Orders:  •  Comprehensive metabolic panel; Future  •  Hemoglobin A1C; Future    Benign essential hypertension  Stable.  Management per Cardio.  Check blood pressure outside of office.  Recommend lifestyle modifications.   Orders:  •  Comprehensive metabolic panel; Future    Chronic insomnia  Management per Sleep Medicine.   On Trazodone 200 mg q.h.s., Rozerem 8 mg q.h.s., weaning Xanax ER 1 mg 1/2 tab QHS, Atarax 25mg TID PRN.   s/p CBT.   Orders:  •  Ambulatory referral to Psych Services; Future    Sick sinus syndrome (HCC)     Management per Cardio.            Anaphylaxis, initial encounter  Stable.  Use Epi Pen PRN..    Orders:  •  EPINEPHrine (EPIPEN) 0.3 mg/0.3 mL SOAJ; Inject 0.3 mL (0.3 mg total) into a muscle once for 1 dose    Osteopenia, unspecified location  Last Dexa 1/8/24.  On Boniva 150mg q30 days.     Orders:  •  ibandronate (BONIVA) 150 MG tablet; Take 1 tablet (150 mg total) by mouth every 30 (thirty) days  •  TSH, 3rd generation with Free T4 reflex; Future    Anxiety  Stable. Management per Sleep Medicine.   Pending Psych consult.    Orders:  •  Ambulatory referral to Psych Services; Future    Dyslipidemia  Uncontrolled.  Increase Pravachol 20mg QHS.  Recommend lifestyle modifications.     Orders:  •  pravastatin (PRAVACHOL) 20 mg tablet; Take 1 tablet (20 mg total) by mouth daily at bedtime  •  Comprehensive metabolic panel; Future  •  TSH, 3rd generation with Free T4 reflex; Future  •  LDL cholesterol, direct; Future    Overweight  Stable.  Recommend lifestyle modifications.         MVP (mitral valve prolapse)  Management per Cardio.   S/p Mitral Valve Bioprosthestic Replacement and Left Atrial Appendage Ligation 11/15/24.  Does not need A/C for valvular surgery reasons.  Continue Cardiac Rehab.        PAF (paroxysmal atrial fibrillation) (HCC)  Stable.  Management per Cardio.  On  Coumadin per Cardio.              Return in about 4 months (around 6/16/2025) for AWV / 4mo - IFG, HTN, HL, Osteopenia, Labs.      Future Appointments   Date Time Provider Department Center   2/6/2025 10:00 AM AN CARDIAC EXERCISE [facility]   2/7/2025 10:00 AM AN CARDIAC EXERCISE [facility]   2/11/2025 10:00 AM AN C[facility]IAC EX[facility]ISE [facility]   2/13/2025 10:00 AM AN CARDIAC EXERCISE [facility]   2/14/2025 10:00 AM AN CARDIAC EXERCISE RM [facility]   2/18/2025 10:00 AM AN CARDIAC EXERCISE RM [facility]   2/20/2025 10:00 AM AN CARDIAC EXERCISE [facility]   2/21/2025 10:00 AM AN CARDIAC EXERCISE RM [facility]   2/25/2025 10:00 AM AN CARDIAC EXERCISE RM [facility]   2/27/2025 10:00 AM AN CARDIAC EXERCISE RM [facility]   2/28/2025 10:00 AM AN CARDIAC EXERCISE RM [facility]   3/4/2025 10:00 AM AN CARDIAC EXERCISE RM [facility]   3/6/2025 10:00 AM AN CARDIAC EXERCISE RM [facility]   3/7/2025 10:00 AM AN CARDIAC EXERCISE RM [facility]   3/11/2025  9:00 AM DEVICE IN PERSON [facility]   3/11/2025 10:00 AM AN CARDIAC EXERCISE RM [facility]   3/13/2025 10:00 AM AN CARDIAC EXERCISE RM [facility]   3/14/2025 10:00 AM AN CARDIAC EXERCISE RM [facility]   3/18/2025 10:00 AM AN CARDIAC EXERCISE RM [facility]   3/20/2025 10:00 AM AN CARDIAC EXERCISE RM [facility]   3/21/2025 10:00 AM AN CARDIAC EXERCISE RM [facility]   3/25/2025 10:00 AM AN CARDIAC EXERCISE RM [facility]   3/26/2025  2:20 PM Hal Tracey MD [facility]   4/3/2025  4:00 PM AN MAMMO 1 [facility]   6/6/2025 10:20 AM Genesis Cervantes DO [facility]   6/13/2025 12:15 PM DEVICE REMOTE LOOP [place] [facility]   9/12/2025 12:15 PM  DEVICE REMOTE LOOP [place] [facility]   12/12/2025 12:15 PM DEVICE REMOTE LOOP [place] [facility]        Subjective:     Carley is a 69 y.o. female who presents today for a follow-up on her chronic medical conditions.        HPI:  Chief Complaint   Patient presents with   • Follow-up     F/U IFG, HTN, HL, Osteopenia, Labs. No new problems or concerns at this time. No longer seeing sleep medicine. Last visit was two weeks ago. Pt cancelled f/u as she no longer wants to see them      -- Above per clinical staff and reviewed. --      HPI      Today:    Return in about 8 weeks (around 2/10/2025) for F/U IFG, HTN, HL, Osteopenia, Labs     PTO magui boyfrernielinh Octaviano     Controlled Substance Review     [place] or [place]  reviewed: No red flags were identified; safe to proceed with prescription..         01/22/2025 12/20/2024 2 Alprazolam Er 1 Mg Tablet 30.00 30 Ch Gid [id number] Pen ([id number]) 0 2.00 [place]       Rx per   René Arevalo [facility] [place] [address] [place] [address]         Left lateral thigh numbness - Since surgery 11/15/24, unchanged.  Sometimes feels numbness, sometimes feels like pins and needs.  Constant.  She denies truama or motor dysfunction.       Overweight - Watching diet.  She would like to eat less sweets.  No exercise - pending Cardiac Rehab for 1 hour, 3 times per week; Previously walking for 30-40 minutes, 6-7 per week.       IFG - No Pre-DM meds previously.       Hypertension - Management per Cardio Dr. Tracey - Next appt 3/25.  Stable s meds.  No BP check outside of office.        Hyperlipidemia - On Pravachol 10mg QHS.  No higher dose or other statin previously.        Sick Sinus Syndrome / pAfib - Management per EP Cardio Dr. Quintero - Next appt 3/25. Pending Cardiac loop recorder implant 2/26/25.  On Coumadin per Cardio.       Mitral Valve Prolapse - Management per Cardio?  S/p Mitral Valve Bioprosthestic Replacement and Left Atrial Appendage  Ligation 11/15/24 per CTS Dr. Reddy - Next appt PRN.  Does not need A/C for valvular surgery reasons.  Attending Cardiac Rehab .       Osteopenia - Last Dexa 24.  On Boniva 150mg q30 days.  D/C Fosamax due to HA.        Chronic Insomnia / Anxiety - [facility] - Next appt s/p diagnosis sleep study - not scheduled - patient refuses PAP.  Management per Sleep Medicine Dr. René Arevalo - Next appt 25, 25.  On Trazodone 200 mg q.h.s., Rozerem 8 mg q.h.s., Xanax ER 1 mg QHS, Atarax 25mg TID PRN.   Sleep Med advises weaning Xanax ER 0.5mg QHS - started 25 -1/2 pill QHS x 1 week, then 1/2 pill QHS x 1 week, then off.  s/p CBT.  She is was previously referred to [facility]'s Psych and notified of long wait list, as well as private Psych.  Good social supports.  No SI/HI/AH/VH.  She is anxious due to not being on speaking terms c her daughter.  No other mood meds previously.  Last counseling  - helpful.          PHQ-2/9 Depression Screening    Little interest or pleasure in doing things: 0 - not at all  Feeling down, depressed, or hopeless: 0 - not at all  Trouble falling or staying asleep, or sleeping too much: 0 - not at all  Feeling tired or having little energy: 0 - not at all  Poor appetite or overeatin - not at all  Feeling bad about yourself - or that you are a failure or have let yourself or your family down: 0 - not at all  Trouble concentrating on things, such as reading the newspaper or watching television: 0 - not at all  Moving or speaking so slowly that other people could have noticed. Or the opposite - being so fidgety or restless that you have been moving around a lot more than usual: 0 - not at all  Thoughts that you would be better off dead, or of hurting yourself in some way: 0 - not at all  PHQ-2 Score: 0  PHQ-2 Interpretation: Negative depression screen  PHQ-9 Score: 0  PHQ-9 Interpretation: No or Minimal depression       AMANUEL-7  Flowsheet Screening    Flowsheet Row Most Recent Value   Over the last two weeks, how often have you been bothered by the following problems?     Feeling nervous, anxious, or on edge 1   Not being able to stop or control worrying 1   Worrying too much about different things 1   Trouble relaxing  1   Being so restless that it's hard to sit still 0   Becoming easily annoyed or irritable  0   Feeling afraid as if something awful might happen 0   How difficult have these problems made it for you to do your work, take care of things at home, or get along with other people?  Not difficult at all   AMANUEL Score  4                    From previous note:    PTO c boyfriend Octaviano     Cont[place] Sub[place] Review     [place]  reviewed: No red flags were identified; safe to proceed with prescription..     11/19/2024 10/19/2024 2 Alprazolam Xr 1 Mg Tablet 30.00 30 Ch Gid [id number] Pen ([id number]) 0      Rx per   René Areavlo [facility] [place] [address] [place] [address]         Left lateral thigh numbness - Since surgery 11/15/24, unchanged.  Sometimes feels numbness, sometimes feels like pins and needs.  Constant.  She denies truama or motor dysfunction.       Overweight - Trying to watch diet.  She would like to eat less sweets.  No exercise - pending Cardiac Rehab 12/13/24.  Previously walking for 30-40 minutes, 6-7 per week.       IFG - HgA1C 5.9 on 10/24/24.       Hypertension - Management per Cardio Dr. Tracey - Next appt 3/25.  Stable s meds.  No BP check outside of office.        Hyperlipidemia - On Pravachol 10mg QHS.  No higher dose or other statin previously.        Sick Sinus Syndrome / pAfib - Management per EP Cardio Dr. Quintero - Next appt 12/24.  On Coumadin per Cardio.       Mitral Valve Prolapse - Management per Cardio?  S/p Mitral Valve Bioprosthestic Replacement and Left Atrial Appendage Ligation 11/15/24 per CTS Dr. Reddy - Next appt PRN.  Does not need A/C for valvular surgery  reasons.  Pending Cardiac Rehab .       Osteopenia - Last Dexa 24.  On Boniva 150mg q30 days.  D/C Fosamax due to HA.        Chronic Insomnia / Anxiety - Management per Sleep Medicine Dr. René Arevalo - Next appt 25, 25.  On Trazodone 100 mg q.h.s., Rozerem 8 mg q.h.s., Xanax ER 1 mg QHS, Atarax 25mg TID PRN.   Sleep Med advises weaning Xanax ER 0.5mg QHS.  s/p CBT.      PHQ-2/9 Depression Screening      Little interest or pleasure in doing things: 0 - not at all  Feeling down, depressed, or hopeless: 1 - several days  Trouble falling or staying asleep, or sleeping too much: 1 - several days  Feeling tired or having little energy: 0 - not at all  Poor appetite or overeatin - not at all  Feeling bad about yourself - or that you are a failure or have let yourself or your family down: 0 - not at all  Trouble concentrating on things, such as reading the newspaper or watching television: 0 - not at all  Moving or speaking so slowly that other people could have noticed. Or the opposite - being so fidgety or restless that you have been moving around a lot more than usual: 0 - not at all  Thoughts that you would be better off dead, or of hurting yourself in some way: 0 - not at all  PHQ-2 Score: 1  PHQ-2 Interpretation: Negative depression screen  PHQ-9 Score: 2  PHQ-9 Interpretation: No or Minimal depression                AMANUEL-7 Flowsheet Screening    Flowsheet Row Most Recent Value   Over the last two weeks, how often have you been bothered by the following problems?      Feeling nervous, anxious, or on edge 1   Not being able to stop or control worrying 1   Worrying too much about different things 1   Trouble relaxing  0   Being so restless that it's hard to sit still 0   Becoming easily annoyed or irritable  0   Feeling afraid as if something awful might happen 0   How difficult have these problems made it for you to do your work, take care of things at home, or get along with  other people?  Not difficult at all   AMANUEL Score  3          MDQ:   1, No Problem     Seborrheic Keratosis - Management per Derm Dr. Campos - Next appt PRN.     H/O Colon Polyps - [place] 2022? [place] unsure of  / Location) - states repeat due in 5 years - 2027..        Reviewed:  Labs 1/24/25, [facility] 8/22/24, [facility] 1/16/25, CTS 12/5/24. Cardio 12/2/24. EP Cardio 12/23/24, Derm, 6/18/24    The following portions of the patient's history were reviewed and updated as appropriate: allergies, current medications, past family history, past medical history, past social history, past surgical history and problem list.      Review of Systems   Constitutional:  Negative for appetite change, chills, diaphoresis, fatigue and fever.   Respiratory:  Negative for chest tightness and shortness of breath.    Cardiovascular:  Negative for chest pain and palpitations.   Gastrointestinal:  Negative for abdominal pain, blood in stool, diarrhea, nausea and vomiting.   Genitourinary:  Negative for dysuria.        Current Outpatient Medications   Medication Sig Dispense Refill   • acetaminophen (TYLENOL) 325 mg tablet Take 1-2 tablets Q4-6 hours PRN pain. Do not take more than 4 grams in 24 hours.     • ALPRAZolam ER (XANAX XR) 1 MG 24 hr tablet Take 1 mg by mouth daily at bedtime Rx per Sleep Medicine (Patient taking differently: Take 0.5 mg by mouth daily at bedtime Rx per Sleep Medicine)     • aspirin (ECOTRIN LOW STRENGTH) 81 mg EC tablet Take 1 tablet (81 mg total) by mouth daily 30 tablet 2   • atenolol (TENORMIN) 25 mg tablet Take 25 mg by mouth daily Rx per Cardio     • EPINEPHrine (EPIPEN) 0.3 mg/0.3 mL SOAJ Inject 0.3 mL (0.3 mg total) into a muscle once for 1 dose 0.6 mL 0   • hydrOXYzine HCL (ATARAX) 25 mg tablet Take 25 mg by mouth 3 (three) times a day as needed for anxiety (Insomnia) Take one tablet by mouth 3 times per day as needed for anxiety or sleep.  Rx per Sleep  Medicine.     • ibandronate (BONIVA) 150 MG tablet Take 1 tablet (150 mg total) by mouth every 30 (thirty) days 3 tablet 2   • Pediatric Vitamins (Multivitamin Gummies Childrens) CHEW Chew 1 tablet in the morning     • pravastatin (PRAVACHOL) 20 mg tablet Take 1 tablet (20 mg total) by mouth daily at bedtime 30 tablet 8   • Propylene Glycol (Systane Balance) 0.6 % SOLN OTC.  1 drop six times a day into both eyes     • ramelteon (ROZEREM) 8 mg tablet Take 8 mg by mouth daily Rx per Sleep Med     • traZODone (DESYREL) 100 mg tablet Take 200 mg by mouth daily at bedtime Rx per Sleep Med     • warfarin (Jantoven) 2.5 mg tablet Take 2 tablets (5mg total) QHS unless otherwise directed. 60 tablet 2     No current facility-administered medications for this visit.       Objective:  /60   Pulse (!) 52   Temp 98.3 °F (36.8 °C) (Temporal)   Resp 14   Ht 5' (1.524 m)   Wt 66.1 kg (145 lb 12.8 oz)   LMP 12/16/2014 (Approximate)   SpO2 96%   BMI 28.47 kg/m²    Wt Readings from Last 3 Encounters:   02/04/25 66.1 kg (145 lb 12.8 oz)   01/16/25 67.1 kg (148 lb)   12/23/24 66.9 kg (147 lb 6.4 oz)      BP Readings from Last 3 Encounters:   02/04/25 102/60   01/16/25 110/78   12/23/24 122/70          Physical Exam  Vitals and nursing note reviewed.   Constitutional:       General: She is not in acute distress.     Appearance: Normal appearance. She is well-developed. She is not ill-appearing or toxic-appearing.   HENT:      Head: Normocephalic and atraumatic.   Eyes:      Conjunctiva/sclera: Conjunctivae normal.   Neck:      Thyroid: No thyromegaly.   Cardiovascular:      Rate and Rhythm: Normal rate and regular rhythm.      Pulses: Normal pulses.      Heart sounds: Normal heart sounds.   Pulmonary:      Effort: Pulmonary effort is normal.      Breath sounds: Normal breath sounds.   Musculoskeletal:         General: No swelling or tenderness.      Cervical back: Neck supple.      Right lower leg: No edema.      Left lower  "leg: No edema.   Lymphadenopathy:      Cervical: No cervical adenopathy.   Neurological:      General: No focal deficit present.      Mental Status: She is alert and oriented to person, place, and time. Mental status is at baseline.   Psychiatric:         Mood and Affect: Mood normal.         Behavior: Behavior normal.         Thought Content: Thought content normal.         Judgment: Judgment normal.         Lab Results:      Lab Results   Component Value Date    WBC 7.02 01/24/2025    HGB 13.8 01/24/2025    HCT 42.2 01/24/2025     01/24/2025    CHOL 217 03/19/2015    TRIG 130 10/24/2024    HDL 47 (L) 10/24/2024    LDLDIRECT 112 (H) 01/24/2025    ALT 18 01/24/2025    AST 21 01/24/2025     03/19/2015    K 3.8 01/24/2025     01/24/2025    CREATININE 0.87 01/24/2025    BUN 19 01/24/2025    CO2 30 01/24/2025    TSH 1.06 10/19/2023    INR 1.77 (H) 01/24/2025    GLUF 90 01/24/2025    HGBA1C 5.4 01/24/2025     No results found for: \"URICACID\"  Invalid input(s): \"BASENAME\" Vitamin D    No results found.     POCT Labs        Depression Screening and Follow-up Plan: Patient was screened for depression during today's encounter. They screened negative with a PHQ-2 score of 0.                    "

## 2025-02-04 NOTE — ASSESSMENT & PLAN NOTE
Last Dexa 1/8/24.  On Boniva 150mg q30 days.     Orders:  •  ibandronate (BONIVA) 150 MG tablet; Take 1 tablet (150 mg total) by mouth every 30 (thirty) days  •  TSH, 3rd generation with Free T4 reflex; Future

## 2025-02-06 ENCOUNTER — CLINICAL SUPPORT (OUTPATIENT)
Dept: CARDIAC REHAB | Facility: CLINIC | Age: 70
End: 2025-02-06
Payer: COMMERCIAL

## 2025-02-06 DIAGNOSIS — Z95.2 S/P MVR (MITRAL VALVE REPLACEMENT): Primary | ICD-10-CM

## 2025-02-06 PROCEDURE — 93798 PHYS/QHP OP CAR RHAB W/ECG: CPT

## 2025-02-06 NOTE — ASSESSMENT & PLAN NOTE
Management per Cardio.   S/p Mitral Valve Bioprosthestic Replacement and Left Atrial Appendage Ligation 11/15/24.  Does not need A/C for valvular surgery reasons.  Continue Cardiac Rehab.

## 2025-02-07 ENCOUNTER — APPOINTMENT (OUTPATIENT)
Dept: LAB | Facility: CLINIC | Age: 70
End: 2025-02-07
Payer: COMMERCIAL

## 2025-02-07 ENCOUNTER — ANTICOAG VISIT (OUTPATIENT)
Dept: CARDIOLOGY CLINIC | Facility: CLINIC | Age: 70
End: 2025-02-07

## 2025-02-07 ENCOUNTER — CLINICAL SUPPORT (OUTPATIENT)
Dept: CARDIAC REHAB | Facility: CLINIC | Age: 70
End: 2025-02-07
Payer: COMMERCIAL

## 2025-02-07 DIAGNOSIS — I48.0 PAF (PAROXYSMAL ATRIAL FIBRILLATION) (HCC): Primary | ICD-10-CM

## 2025-02-07 DIAGNOSIS — I48.0 PAF (PAROXYSMAL ATRIAL FIBRILLATION) (HCC): ICD-10-CM

## 2025-02-07 DIAGNOSIS — I48.3 TYPICAL ATRIAL FLUTTER (HCC): ICD-10-CM

## 2025-02-07 DIAGNOSIS — Z95.2 S/P MVR (MITRAL VALVE REPLACEMENT): Primary | ICD-10-CM

## 2025-02-07 LAB
INR PPP: 2.01 (ref 0.85–1.19)
PROTHROMBIN TIME: 23.5 SECONDS (ref 12.3–15)

## 2025-02-07 PROCEDURE — 36415 COLL VENOUS BLD VENIPUNCTURE: CPT

## 2025-02-07 PROCEDURE — 85610 PROTHROMBIN TIME: CPT

## 2025-02-07 PROCEDURE — 93798 PHYS/QHP OP CAR RHAB W/ECG: CPT

## 2025-02-10 ENCOUNTER — CLINICAL SUPPORT (OUTPATIENT)
Dept: CARDIAC REHAB | Facility: CLINIC | Age: 70
End: 2025-02-10
Payer: COMMERCIAL

## 2025-02-10 DIAGNOSIS — Z95.2 S/P MVR (MITRAL VALVE REPLACEMENT): Primary | ICD-10-CM

## 2025-02-10 PROCEDURE — 93798 PHYS/QHP OP CAR RHAB W/ECG: CPT

## 2025-02-11 ENCOUNTER — APPOINTMENT (OUTPATIENT)
Dept: CARDIAC REHAB | Facility: CLINIC | Age: 70
End: 2025-02-11
Payer: COMMERCIAL

## 2025-02-13 ENCOUNTER — CLINICAL SUPPORT (OUTPATIENT)
Dept: CARDIAC REHAB | Facility: CLINIC | Age: 70
End: 2025-02-13
Payer: COMMERCIAL

## 2025-02-13 DIAGNOSIS — Z95.2 S/P MVR (MITRAL VALVE REPLACEMENT): Primary | ICD-10-CM

## 2025-02-13 PROCEDURE — 93798 PHYS/QHP OP CAR RHAB W/ECG: CPT

## 2025-02-14 ENCOUNTER — CLINICAL SUPPORT (OUTPATIENT)
Dept: CARDIAC REHAB | Facility: CLINIC | Age: 70
End: 2025-02-14
Payer: COMMERCIAL

## 2025-02-14 DIAGNOSIS — Z95.2 S/P MVR (MITRAL VALVE REPLACEMENT): Primary | ICD-10-CM

## 2025-02-14 PROCEDURE — 93798 PHYS/QHP OP CAR RHAB W/ECG: CPT

## 2025-02-14 NOTE — PROGRESS NOTES
CARDIAC REHABILITATION   ASSESSMENT AND INDIVIDUALIZED TREATMENT PLAN  60 DAY          Today's date: 2025   # of Exercise Sessions Completed: 18  Patient name: Carley Bonilla      : 1955  Age: 69 y.o.       MRN: [id number]  Referring Physician: Parker Reddy MD   Cardiologist: Hal Tracey MD  Provider: [facility]  Clinician: Amie Doe MS, CEP, EPC        Treatment is tailored to this patient's individual needs.  The ITP was reviewed with the patient and all questions were answered to their satisfaction.  Additional ITP documentation can be found electronically including daily and monthly exercise summaries, daily session notes with ECG summaries, education notes, daily medication reconciliation, and daily physician supervision.      Comments: 25: SUMMARY 2025    Resting BP  98/80 - 124/70,  HR 78 - 90  Exercise /64- 130/74.  HR 97 - 121  Exercise session details:  43-45 minutes,  3.3-3.4 METs  Telemetry:  NSR  Symptoms: none  Home exercise/ADLs: walking on TM 3 days/week. Completing light-moderate ADLs. Planning to add free weights at home  Patient's subjective report of progress: feels well overall, increasing exercise tolerance    25: Carley has attended 6 exercise sessions in the past 30 days.   She tolerates 35-40 mins at 2.85 - 3.18 METs. A light strength training component has not been added to their exercise program. and will be added in a future exercise session. She is tolerating progression of intensity levels to maintain RPE 4-6. Resting /70 - 122/72 with  sometimes appropriate  response to exercise reaching 102/66- 132/72. NSR on tele observed. RHR 86 - 93 with  flat  response to exercise reaching 103 - 111. No cardiac complaints. Occ knee pain. Her exercise program will be progressed as tolerated to maintain RPE 4-6.  They will continue to be educated on lifestyle modification and encouraged to supplement with a home exercise program as  tolerated.    12/20/24: reports being a little out of breath still, generally feeling a little better everyday        Dx:   Encounter Diagnosis   Name Primary?    S/P MVR (mitral valve replacement) Yes       Description of Diagnosis: MV prolapse s/p mitral valve replacement and left atrial appendage ligation  Date of onset: 11/15/24  Other Cardiac History: SSS noted post-op with evidence of junctional rhythm, Wenckebach, PAF/Aflutter; no prior cardiac issues        ASSESSMENT    Medical History:   Past Medical History:   Diagnosis Date    Anxiety     Benign essential hypertension 01/22/2014    Chronic insomnia 02/08/2023    Dyslipidemia 05/27/2014    History of colon polyps 12/16/2024    IFG (impaired fasting glucose) 12/16/2024    MVP (mitral valve prolapse) 01/09/2008    Formatting of this note might be different from the original.   Required antibiotic prophylaxis      Osteopenia 12/16/2024    Overweight 12/16/2024    PAF (paroxysmal atrial fibrillation) ([facility]) 11/20/2024    Sick sinus syndrome (HCC) 11/18/2024       Family History:  Family History   Problem Relation Age of Onset    Heart failure Mother     Hypertension Mother     Heart failure Father     No Known Problems Brother     No Known Problems Daughter     No Known Problems Maternal Grandmother     No Known Problems Paternal Grandmother     No Known Problems Maternal Aunt     No Known Problems Paternal Aunt     Breast cancer Neg Hx        Allergies:   Bee venom, Morphine and codeine, Codeine, Penicillins, and Sulfa antibiotics    Current Medications:   Current Outpatient Medications   Medication Sig Dispense Refill    acetaminophen (TYLENOL) 325 mg tablet Take 1-2 tablets Q4-6 hours PRN pain. Do not take more than 4 grams in 24 hours.      ALPRAZolam ER (XANAX XR) 1 MG 24 hr tablet Take 1 mg by mouth daily at bedtime Rx per Sleep Medicine (Patient taking differently: Take 0.5 mg by mouth daily at bedtime Rx per Sleep Medicine)      aspirin (ECOTRIN LOW  STRENGTH) 81 mg EC tablet Take 1 tablet (81 mg total) by mouth daily 30 tablet 2    atenolol (TENORMIN) 25 mg tablet Take 25 mg by mouth daily Rx per Cardio      EPINEPHrine (EPIPEN) 0.3 mg/0.3 mL SOAJ Inject 0.3 mL (0.3 mg total) into a muscle once for 1 dose 0.6 mL 0    hydrOXYzine HCL (ATARAX) 25 mg tablet Take 25 mg by mouth 3 (three) times a day as needed for anxiety (Insomnia) Take one tablet by mouth 3 times per day as needed for anxiety or sleep.  Rx per Sleep Medicine.      ibandronate (BONIVA) 150 MG tablet Take 1 tablet (150 mg total) by mouth every 30 (thirty) days 3 tablet 2    Pediatric Vitamins (Multivitamin Gummies Childrens) CHEW Chew 1 tablet in the morning      pravastatin (PRAVACHOL) 20 mg tablet Take 1 tablet (20 mg total) by mouth daily at bedtime 30 tablet 8    Propylene Glycol (Systane Balance) 0.6 % SOLN OTC.  1 drop six times a day into both eyes      ramelteon (ROZEREM) 8 mg tablet Take 8 mg by mouth daily Rx per Sleep Med      traZODone (DESYREL) 100 mg tablet Take 200 mg by mouth daily at bedtime Rx per Sleep Med      warfarin (Jantoven) 2.5 mg tablet Take 2 tablets (5mg total) QHS unless otherwise directed. 60 tablet 2     No current facility-administered medications for this visit.       Medication compliance: Yes   Comments: Pt reports to be compliant with medications    Physical Limitations: broke right wrist and right ankle 2x    Fall Risk: Low   Comments: Ambulates with a steady gait with no assist device    Cultural needs: none      CAD Risk Factors:  Cholesterol: Yes  HTN: Yes  DM: impaired fasting glucose   Obesity: Yes   Inactivity: no    EXERCISE ASSESSMENT:     Initial Fitness Assessment: Submaximal TM ETT:  Resting:  BP: 112/74  HR: 79, Exercise:  BP: 132/64  HR: 96, METs:  3.1, and Test terminated at:  RPE 6      ECG INTERPRETATION:  NSR    Current Functional Status:  Occupation: retired  Recreation/Physical Activity: bowling   ADL’s:Capable of performing light to moderate  ADLs  [place]: resumed all ADLs within sternal precautions  Home exercise: none  Other Comments: was previously doing a lot of walking      SMART Exercise Goals:   10% improvement in functional capacity based on max METs achieved in initial fitness assessment  improved DASI score by 10%  increased exercise capacity by 40% based on peak METs tolerated in cardiac rehab exercise session  maintain > 150 minutes per week of moderate intensity exercise    Patient Specific EXERCISE GOALS:       Return to walking- goal met  Return to bowling league   Decrease SOB- goal met  Start using TM at home- goal met    Functional Capacity Screening Tool:  [facility] Activity Status Index:  5.38 METs      PSYCHOSOCIAL ASSESSMENT:    Date of last Assessment:  12/20/24  Depression screening:  PHQ-9 = 0    Interpretation:  0 =No Depression  Anxiety screening:  AMANUEL-7 = 3  Interpretation: 0-4  = Not anxious    Pt self-report of depression and anxiety   Patient has a history of depression  Patient has a history of anxiety     Self-reported stress level:  5   Stressors:  surgery, just a high stress person  Stress Management Tools: practice Relaxation Techniques, exercise, keep a positive mindset, and enjoy a hobby    SMART Psychosocial Goals:     Physical Fitness in [facility] Score < 3, Daily Activity in [place] Score < 3, Pain in [place] Score < 3, and control or stop worrying    Patient Specific PSYCHOCOSOCIAL GOALS:    Continue with medical therapy- goal met  Reduce stress level- working on stress reduction  Determine relaxation techniques that work for her    Quality of Life Screen:  (Higher score indicates disease impact on QOL)  [facility] COOP score: 18/45     Social Support:   significant other  Community/Social Activities: [facility] / [facility]     Psychosocial Assessment as it relates to rehabilitation:   Patient denies issues with his/her family or home life that may affect their rehabilitation efforts.       NUTRITION ASSESSMENT:    Initial  "Weight:  148.4  Current Weight: 147    Height:   Ht Readings from Last 1 Encounters:   02/04/25 5' (1.524 m)       Rate Your Plate Score: 56/81    Diabetes: IFG  A1c: 5.4    last measured: 1/24/25    Lipid management: Discussed diet and lipid management and Last lipid profile 10/24/24  Chol 188    HDL 47      Current Dietary Habits:  Watching sodium  Drinking water  Eats out rarely  Glass of wine 1x/week    SMART Nutrition Goals:   Improved Rate Your Plate score  >64, eat 6 or more servings of grain products per day, eat whole grain breads, brown rice and whole grain cereals, eat 5 or more servings of fruits and vegetables a day, eat 2 or more servings of low fat milk or yogurt a day, eat no more than 6oz of meat per day, eat red meat once a week or less, choose lean beef or rarely eat beef, choose 1% or skim milk, rarely eat frozen desserts or choose fruit or fat-free sweets, Do not eat fried foods, use \"light\" tub margarine on bread, potatoes and vegetables, choose healthy desserts and sweets such as rian food cake or  fruit, choose low sodium canned, frozen/packaged foods or rarely/never eat, rarely/never eat salty snacks, and choose low sugar desserts and sweets    Patient Specific NUTRITION GOALS:     1. Decrease sweets   2. Weight loss goal of 20 lbs   3. Decrease fried foods    Drug/Alcohol Use:   ~ once week wine      OTHER CORE COMPONENT ASSESSMENT:    Tobacco Use:     N/A:  Patient is a non-smoker     Anginal Symptoms:  None   NTG use: No prescription    SMART Goals:   consistent, controlled resting BP < 130/80 and medication compliance    Patient Specific CORE COMPONENT GOALS:    Look into getting BP cuff  Medication compliance- goal met      INDIVIDUALIZED TREATMENT PLAN      EXERCISE GOALS and PLAN      Progress toward Exercise goals:   Pt is progressing and showing improvement  toward the following goals:  tolerating 43-45 mins of exercise at 3.3-3.4 METs, increasing exercise tolerance, " using TM at home 3 days/week for 15-20 mins.  , Will continue to educate and progress as tolerated. Going to add in free weights at home    Exercise Plan:    education on home exercise guidelines, home exercise 30+ mins 2 days opposite CR, and After discharge patient will continue to follow a regular exercise regimen with the goal of a minimum of 150 minutes per week of moderate intensity exercise.      The patient was counseled on exercise guidelines to achieve a minimum of 150 mins/wk of moderate intensity (RPE 4-6)   exercise and encouraged to add 1-2 days of exercise on opposite days of cardiac rehab as tolerated.       PHYSICIAN PRESCRIBED EXERCISE:    Current Aerobic Exercise Prescription:      Frequency: 3 days/week   Supplement with home exercise 2+ days/wk as tolerated       Minutes: 43-45         METS: 3.3-3.4            HR:     RPE: 4-6         Modalities: Treadmill, UBE, and Recumbent bike     Exercise workloads will be progressed gradually as tolerated, within limits of patient's ability, and according to the patient's   response to the exercise program.      Aerobic Exercise Prescription Plan for Progression   Frequency: 3 days/week of cardiac rehab       Supplement with home exercise 2+ days/wk as tolerated    Minutes: 45       >150 mins/wk of moderate intensity exercise   METS: 3-4   HR: RHR +30-40bpm     RPE: 4-6   Modalities: Treadmill, NuStep, and Recumbent bike    Strength trainin-3 days / week  12-15 repetitions  1-2 sets per modality   Will be added following 2-3 weeks of monitored exercise sessions   Modalities: Chest Press, Pull Downs, Arm Curl, Seated Row, and Sit to Stands    Home Exercise: Type: TM, Frequency: 3 days/week, Duration: 15-20 mins    Exercise Education: benefit of exercise for CAD risk factors, home exercise guidelines, AHA guidelines to achieve >150 mins/wk of moderate exercise, RPE scale, and Group class: Risk Factors for Heart Disease     Readiness to change:  "Action:  (Changing behavior)      NUTRITION GOALS AND PLAN      Nutritional   Reviewed patient's Rate your Plate. Discussed key elements of heart healthy eating. Reviewed patient goals for dietary modifications and their clinical implications.  Reviewed most recent lipid profile.     Patient's progress toward Nutrition goals:    Pt is progressing and showing improvement  toward the following goals:  decreasing fried foods.  , Pt has not made progress toward the following goals: weight loss. , Will continue to educate and progress as tolerated.      Nutrition Plan:   group class: Reading Food Labels, increase intake of whole grains, replace refined flours with whole grains, increase daily intake of fruits and vegetables, increase daily intake of low fat dairy, limit meat intake to less than 6oz per day, eat red meat once a week or less, choose lean red meat, drink/use 1%  low fat or skim  milk, rarely eat frozen desserts, never/rarely eat fried foods, use \"light\" tub margarine, choose desserts such as fruit, rian food cake, low-fat or fat-free sweets, choose low sodium canned, frozen, packaged foods or rarely eat these foods, rarely/never eat salty snacks, and choose low sugar desserts and sweets    Measurable goals were based Rate Your Plate Dietary Self-Assessment. These are the areas in which the patient could score higher on the assessment.  Goals include recommendations for a heart healthy diet based on American Heart Association.    Nutrition Education:   heart healthy eating principles  weight loss and management strategies  low sodium diet  maintaining hydration  nutrition for  lipid management  group class: Heart Healthy Eating    Readiness to change: Action:  (Changing behavior)      PSYCHOSOCIAL GOALS AND PLAN    Psychosocial Assessment as it relates to rehabilitation:   Patient denies issues with his/her family or home life that may affect their rehabilitation efforts.     Patient's progress toward " Psychosocial goals:    Pt is progressing and showing improvement  toward the following goals:  improved PHQ-9 and AMANUEL-7 scores, compliant with medical therapy, reducing stress.  , Will continue to educate and progress as tolerated.  Psychosocial Intervention/plan:   Class: Stress and Your Health, Class: Relaxation, Practice relaxation techniques, Exercise, Keep a positive mindset, Enjoy family, and Return to previous social activity    Psychosocial Education: signs/sxs of depression, benefits of a positive support system, and stress management techniques    Information to utilize [employer] was provided as well as contact information for counseling through  Behavioral Health and group psychotherapy groups available.    Readiness to change: Action:  (Changing behavior)      OTHER CORE COMPONENTS GOALS and PLAN  Blood Pressure  Restin/80- 124/70  Exercise: 110/64- 130/74  Recovery: 90//60    Blood Pressure will be monitored throughout the program and cardiologist will be notified of elevated trends.    Pt will be encouraged to monitor home BP if advised by cardiologist.    Tobacco Plan:   N/A:  Pt is a non-smoker    Progress toward Core Component goals:   Pt is progressing and showing improvement  toward the following goals:  med compliance, BP stable .  , Will continue to educate and progress as tolerated.    Other Core Components Intervention:   group class: Common Heart Medications, medication compliance, avoid processed foods, engage in regular exercise, check labels for sodium content, and monitor home BP    Group and Individual Education:  understanding high blood pressure and it's relationship to CAD, components of blood pressure management, and group class: Understanding Heart Disease    Readiness to change: Action:  (Changing behavior)

## 2025-02-18 ENCOUNTER — CLINICAL SUPPORT (OUTPATIENT)
Dept: CARDIAC REHAB | Facility: CLINIC | Age: 70
End: 2025-02-18
Payer: COMMERCIAL

## 2025-02-18 DIAGNOSIS — Z95.2 S/P MVR (MITRAL VALVE REPLACEMENT): Primary | ICD-10-CM

## 2025-02-18 PROCEDURE — 93798 PHYS/QHP OP CAR RHAB W/ECG: CPT

## 2025-02-19 ENCOUNTER — TELEPHONE (OUTPATIENT)
Age: 70
End: 2025-02-19

## 2025-02-19 NOTE — TELEPHONE ENCOUNTER
Writer attempted to contact pt regarding referral for [facility] to verify services needed to place her on [facility] wait list. Lvm to call writer back.

## 2025-02-20 ENCOUNTER — CLINICAL SUPPORT (OUTPATIENT)
Dept: CARDIAC REHAB | Facility: CLINIC | Age: 70
End: 2025-02-20
Payer: COMMERCIAL

## 2025-02-20 ENCOUNTER — TELEPHONE (OUTPATIENT)
Age: 70
End: 2025-02-20

## 2025-02-20 DIAGNOSIS — Z95.3 S/P MITRAL VALVE REPLACEMENT WITH BIOPROSTHETIC VALVE: Primary | ICD-10-CM

## 2025-02-20 DIAGNOSIS — Z95.2 S/P MVR (MITRAL VALVE REPLACEMENT): Primary | ICD-10-CM

## 2025-02-20 PROCEDURE — 93798 PHYS/QHP OP CAR RHAB W/ECG: CPT

## 2025-02-20 RX ORDER — AZITHROMYCIN 500 MG/1
500 TABLET, FILM COATED ORAL ONCE
Qty: 1 TABLET | Refills: 0 | Status: SHIPPED | OUTPATIENT
Start: 2025-02-20 | End: 2025-02-20

## 2025-02-20 NOTE — TELEPHONE ENCOUNTER
Patient had mitral valve repair on 11/15.  She scheduled a routine dental visit for a cleaning in March.    She asked if ok for the dental visit or if any concerns or precautions.

## 2025-02-21 ENCOUNTER — APPOINTMENT (OUTPATIENT)
Dept: LAB | Facility: CLINIC | Age: 70
End: 2025-02-21
Payer: COMMERCIAL

## 2025-02-21 ENCOUNTER — CLINICAL SUPPORT (OUTPATIENT)
Dept: CARDIAC REHAB | Facility: CLINIC | Age: 70
End: 2025-02-21
Payer: COMMERCIAL

## 2025-02-21 ENCOUNTER — ANTICOAG VISIT (OUTPATIENT)
Dept: CARDIOLOGY CLINIC | Facility: CLINIC | Age: 70
End: 2025-02-21

## 2025-02-21 DIAGNOSIS — I48.0 PAF (PAROXYSMAL ATRIAL FIBRILLATION) (HCC): ICD-10-CM

## 2025-02-21 DIAGNOSIS — I48.3 TYPICAL ATRIAL FLUTTER (HCC): ICD-10-CM

## 2025-02-21 DIAGNOSIS — Z95.2 S/P MVR (MITRAL VALVE REPLACEMENT): Primary | ICD-10-CM

## 2025-02-21 DIAGNOSIS — I48.0 PAF (PAROXYSMAL ATRIAL FIBRILLATION) (HCC): Primary | ICD-10-CM

## 2025-02-21 LAB
INR PPP: 2.14 (ref 0.85–1.19)
PROTHROMBIN TIME: 24.6 SECONDS (ref 12.3–15)

## 2025-02-21 PROCEDURE — 36415 COLL VENOUS BLD VENIPUNCTURE: CPT

## 2025-02-21 PROCEDURE — 93798 PHYS/QHP OP CAR RHAB W/ECG: CPT

## 2025-02-21 PROCEDURE — 85610 PROTHROMBIN TIME: CPT

## 2025-02-24 NOTE — TELEPHONE ENCOUNTER
Writer contacted pt regarding referral for [facility] to verify services needed and explained to her that there is no opening available at this time. Pt agree to be place on [facility] wait list. Referral closed.

## 2025-02-25 ENCOUNTER — CLINICAL SUPPORT (OUTPATIENT)
Dept: CARDIAC REHAB | Facility: CLINIC | Age: 70
End: 2025-02-25
Payer: COMMERCIAL

## 2025-02-25 DIAGNOSIS — Z95.2 S/P MVR (MITRAL VALVE REPLACEMENT): Primary | ICD-10-CM

## 2025-02-25 PROCEDURE — 93798 PHYS/QHP OP CAR RHAB W/ECG: CPT

## 2025-02-26 ENCOUNTER — HOSPITAL ENCOUNTER (OUTPATIENT)
Facility: HOSPITAL | Age: 70
Setting detail: OUTPATIENT SURGERY
Discharge: HOME | End: 2025-02-26
Attending: INTERNAL MEDICINE | Admitting: INTERNAL MEDICINE
Payer: COMMERCIAL

## 2025-02-26 VITALS
WEIGHT: 145 LBS | HEIGHT: 60 IN | BODY MASS INDEX: 28.47 KG/M2 | SYSTOLIC BLOOD PRESSURE: 118 MMHG | HEART RATE: 80 BPM | DIASTOLIC BLOOD PRESSURE: 56 MMHG | TEMPERATURE: 98 F | RESPIRATION RATE: 16 BRPM | OXYGEN SATURATION: 96 %

## 2025-02-26 DIAGNOSIS — I48.0 PAF (PAROXYSMAL ATRIAL FIBRILLATION) (HCC): ICD-10-CM

## 2025-02-26 PROCEDURE — C1764 EVENT RECORDER, CARDIAC: HCPCS | Performed by: INTERNAL MEDICINE

## 2025-02-26 PROCEDURE — 33285 INSJ SUBQ CAR RHYTHM MNTR: CPT | Performed by: INTERNAL MEDICINE

## 2025-02-26 DEVICE — EL+ INSERTABLE CARDIAC MONITOR
Type: IMPLANTABLE DEVICE | Status: FUNCTIONAL
Brand: ASSERT-IQ™

## 2025-02-26 RX ORDER — LIDOCAINE HYDROCHLORIDE 10 MG/ML
INJECTION, SOLUTION EPIDURAL; INFILTRATION; INTRACAUDAL; PERINEURAL CODE/TRAUMA/SEDATION MEDICATION
Status: DISCONTINUED | OUTPATIENT
Start: 2025-02-26 | End: 2025-02-26 | Stop reason: HOSPADM

## 2025-02-26 RX ORDER — LIDOCAINE HYDROCHLORIDE 10 MG/ML
INJECTION, SOLUTION EPIDURAL; INFILTRATION; INTRACAUDAL; PERINEURAL
Status: DISCONTINUED
Start: 2025-02-26 | End: 2025-02-26 | Stop reason: HOSPADM

## 2025-02-26 NOTE — H&P
Patient presents to [facility] for loop recorder implantation.  She has history of postoperative atrial fibrillation after MV replacement in November 14, 2024.  She had been started on warfarin.  Zio monitor with 20% A-fib burden but mostly within the first couple weeks after procedure.  Loop recorder will be placed for long-term monitoring to determine ability to take off of warfarin.

## 2025-02-26 NOTE — DISCHARGE INSTR - AVS FIRST PAGE
Post Procedure Care instructions:     Pain management   Tylenol (acetaminophen) and ice        First 7 days:  Bandage must remain on wound for first 48 hours then you may take it off  Do not use lotions, powders, creams, or ointments on incision  Bathing:    Place waterproof bandage over top when showering   Avoid water directly hitting bandage   Do not soak incision   After 7 days:  If glue is present:  Avoid picking at the glue or peeling it off, the glue will come off on its own  If stiches present:   Leave in place, they will be removed at your 2 week follow up appointment      When to call clinic:    Redness or swelling at incision site   Opening and/or drainage from the incision   Temperature >100.4 F     If you have any questions:   [phone] 8:30am-5:30pm  [phone] After hours  [phone] Appointments     Information about your device:     WHAT YOU SHOULD KNOW:    A cardiac loop recorder is a device used to diagnose heart rhythm problems, such as a fast or irregular heartbeat. It is implanted in your left chest, just under the skin. The device records a pattern of your heart's rhythm, called an EKG. Your device records automatic EKGs, depending on how your caregiver programs it. You may also receive a handheld controller. You press a button on the controller when you have symptoms, such as dizziness, lightheadedness, or palpitations. The device will record an EKG at that moment. The recording can help your caregiver see if your symptoms may be caused by heart rhythm problems. Your caregiver will remove the device after it has collected enough data. You may need the device for up to 3 years. The procedure to remove the device is similar to the procedure used to implant it.

## 2025-02-27 ENCOUNTER — CLINICAL SUPPORT (OUTPATIENT)
Dept: CARDIAC REHAB | Facility: CLINIC | Age: 70
End: 2025-02-27
Payer: COMMERCIAL

## 2025-02-27 DIAGNOSIS — E78.5 DYSLIPIDEMIA: ICD-10-CM

## 2025-02-27 DIAGNOSIS — Z95.2 S/P MVR (MITRAL VALVE REPLACEMENT): Primary | ICD-10-CM

## 2025-02-27 PROCEDURE — 93798 PHYS/QHP OP CAR RHAB W/ECG: CPT

## 2025-02-28 ENCOUNTER — CLINICAL SUPPORT (OUTPATIENT)
Dept: CARDIAC REHAB | Facility: CLINIC | Age: 70
End: 2025-02-28
Payer: COMMERCIAL

## 2025-02-28 DIAGNOSIS — Z95.2 S/P MVR (MITRAL VALVE REPLACEMENT): Primary | ICD-10-CM

## 2025-02-28 PROCEDURE — 93798 PHYS/QHP OP CAR RHAB W/ECG: CPT

## 2025-02-28 RX ORDER — PRAVASTATIN SODIUM 20 MG
20 TABLET ORAL
Qty: 90 TABLET | Refills: 1 | Status: SHIPPED | OUTPATIENT
Start: 2025-02-28

## 2025-03-04 ENCOUNTER — CLINICAL SUPPORT (OUTPATIENT)
Dept: CARDIAC REHAB | Facility: CLINIC | Age: 70
End: 2025-03-04
Payer: COMMERCIAL

## 2025-03-04 DIAGNOSIS — Z95.2 S/P MVR (MITRAL VALVE REPLACEMENT): Primary | ICD-10-CM

## 2025-03-04 PROCEDURE — 93798 PHYS/QHP OP CAR RHAB W/ECG: CPT

## 2025-03-06 ENCOUNTER — CLINICAL SUPPORT (OUTPATIENT)
Dept: CARDIAC REHAB | Facility: CLINIC | Age: 70
End: 2025-03-06
Payer: COMMERCIAL

## 2025-03-06 DIAGNOSIS — Z95.2 S/P MVR (MITRAL VALVE REPLACEMENT): Primary | ICD-10-CM

## 2025-03-06 PROCEDURE — 93798 PHYS/QHP OP CAR RHAB W/ECG: CPT

## 2025-03-07 ENCOUNTER — CLINICAL SUPPORT (OUTPATIENT)
Dept: CARDIAC REHAB | Facility: CLINIC | Age: 70
End: 2025-03-07
Payer: COMMERCIAL

## 2025-03-07 DIAGNOSIS — Z95.2 S/P MVR (MITRAL VALVE REPLACEMENT): Primary | ICD-10-CM

## 2025-03-07 PROCEDURE — 93798 PHYS/QHP OP CAR RHAB W/ECG: CPT

## 2025-03-11 ENCOUNTER — IN-CLINIC DEVICE VISIT (OUTPATIENT)
Dept: CARDIOLOGY CLINIC | Facility: CLINIC | Age: 70
End: 2025-03-11
Payer: COMMERCIAL

## 2025-03-11 ENCOUNTER — CLINICAL SUPPORT (OUTPATIENT)
Dept: CARDIAC REHAB | Facility: CLINIC | Age: 70
End: 2025-03-11
Payer: COMMERCIAL

## 2025-03-11 DIAGNOSIS — I48.0 PAF (PAROXYSMAL ATRIAL FIBRILLATION) (HCC): Primary | ICD-10-CM

## 2025-03-11 DIAGNOSIS — Z95.2 S/P MVR (MITRAL VALVE REPLACEMENT): Primary | ICD-10-CM

## 2025-03-11 PROCEDURE — 93291 INTERROG DEV EVAL SCRMS IP: CPT | Performed by: INTERNAL MEDICINE

## 2025-03-11 PROCEDURE — 93798 PHYS/QHP OP CAR RHAB W/ECG: CPT

## 2025-03-11 NOTE — PROGRESS NOTES
Results for orders placed or performed in visit on 03/11/25   Cardiac EP device report    Narrative    DINO [id number] ILR/ACTIVE SYSTEM IS MRI CONDITIONAL  DEVICE INTERROGATED IN THE [place] OFFICE: PRESENTING RHYTHM NSR @ 71 BPM. R WAVES MEASURE 0.80 mV. NO PATIENT OR DEVICE ACTIVATED EPISODES. WOUND CHECK: INCISION CLEAN AND DRY WITH EDGES APPROXIMATED - ECCHYMOSIS NOTED DISTAL TO INCISION SITE; WOUND CARE AND RESTRICTIONS REVIEWED WITH PATIENT & FAMILY (PIC IN MEDIA). NORMAL DEVICE FUNCTION. [facility]/ES

## 2025-03-13 ENCOUNTER — RESULTS FOLLOW-UP (OUTPATIENT)
Dept: CARDIOLOGY CLINIC | Facility: CLINIC | Age: 70
End: 2025-03-13

## 2025-03-13 ENCOUNTER — CLINICAL SUPPORT (OUTPATIENT)
Dept: CARDIAC REHAB | Facility: CLINIC | Age: 70
End: 2025-03-13
Payer: COMMERCIAL

## 2025-03-13 DIAGNOSIS — Z95.2 S/P MVR (MITRAL VALVE REPLACEMENT): Primary | ICD-10-CM

## 2025-03-13 PROCEDURE — 93798 PHYS/QHP OP CAR RHAB W/ECG: CPT

## 2025-03-14 ENCOUNTER — ANTICOAG VISIT (OUTPATIENT)
Dept: CARDIOLOGY CLINIC | Facility: CLINIC | Age: 70
End: 2025-03-14

## 2025-03-14 ENCOUNTER — APPOINTMENT (OUTPATIENT)
Dept: LAB | Facility: CLINIC | Age: 70
End: 2025-03-14
Payer: COMMERCIAL

## 2025-03-14 ENCOUNTER — TELEPHONE (OUTPATIENT)
Age: 70
End: 2025-03-14

## 2025-03-14 ENCOUNTER — CLINICAL SUPPORT (OUTPATIENT)
Dept: CARDIAC REHAB | Facility: CLINIC | Age: 70
End: 2025-03-14
Payer: COMMERCIAL

## 2025-03-14 DIAGNOSIS — Z98.890 S/P MITRAL VALVE REPAIR: ICD-10-CM

## 2025-03-14 DIAGNOSIS — Z95.2 S/P MVR (MITRAL VALVE REPLACEMENT): Primary | ICD-10-CM

## 2025-03-14 DIAGNOSIS — I48.0 PAF (PAROXYSMAL ATRIAL FIBRILLATION) (HCC): ICD-10-CM

## 2025-03-14 DIAGNOSIS — I48.0 PAF (PAROXYSMAL ATRIAL FIBRILLATION) (HCC): Primary | ICD-10-CM

## 2025-03-14 DIAGNOSIS — I48.3 TYPICAL ATRIAL FLUTTER (HCC): ICD-10-CM

## 2025-03-14 LAB
INR PPP: 2.6 (ref 0.85–1.19)
PROTHROMBIN TIME: 28.5 SECONDS (ref 12.3–15)

## 2025-03-14 PROCEDURE — 85610 PROTHROMBIN TIME: CPT

## 2025-03-14 PROCEDURE — 36415 COLL VENOUS BLD VENIPUNCTURE: CPT

## 2025-03-14 PROCEDURE — 93798 PHYS/QHP OP CAR RHAB W/ECG: CPT

## 2025-03-14 RX ORDER — WARFARIN SODIUM 2.5 MG/1
TABLET ORAL
Qty: 60 TABLET | Refills: 2 | Status: SHIPPED | OUTPATIENT
Start: 2025-03-14

## 2025-03-14 NOTE — TELEPHONE ENCOUNTER
Pt is looking for results of INR. She said she is running out of Warfarin and would like to know asap.

## 2025-03-14 NOTE — PROGRESS NOTES
CARDIAC REHABILITATION   ASSESSMENT AND INDIVIDUALIZED TREATMENT PLAN  90 DAY          Today's date: 3/14/2025   # of Exercise Sessions Completed: 30  Patient name: Carley Bonilla      : 1955  Age: 69 y.o.       MRN: [id number]  Referring Physician: Parker Reddy MD   Cardiologist: Hal Tracey MD  Provider: Garry  Clinician: Amie Doe MS, CEP, EPC        Treatment is tailored to this patient's individual needs.  The ITP was reviewed with the patient and all questions were answered to their satisfaction.  Additional ITP documentation can be found electronically including daily and monthly exercise summaries, daily session notes with ECG summaries, education notes, daily medication reconciliation, and daily physician supervision.      Comments: SUMMARY 2025    Resting BP  102/64 - 118/76,  HR 74 - 87  Exercise /64- 134/74.   - 125  Exercise session details:  45 minutes,  3.3-3.7 METs  Telemetry:  NSR  Symptoms: none  Home exercise/ADLs: using TM 3 day/week for 15-20 mins and free weights  Patient's subjective report of progress: excited to be finishing cardiac rehab. Plans to keep walking at home and bowl  Clinical Comments: progressing well    25: SUMMARY 2025    Resting BP  98/80 - 124/70,  HR 78 - 90  Exercise /64- 130/74.  HR 97 - 121  Exercise session details:  43-45 minutes,  3.3-3.4 METs  Telemetry:  NSR  Symptoms: none  Home exercise/ADLs: walking on TM 3 days/week. Completing light-moderate ADLs. Planning to add free weights at home  Patient's subjective report of progress: feels well overall, increasing exercise tolerance  Had loop recorder placed    25: Carley has attended 6 exercise sessions in the past 30 days.   She tolerates 35-40 mins at 2.85 - 3.18 METs. A light strength training component has not been added to their exercise program. and will be added in a future exercise session. She is tolerating progression of intensity levels to  maintain RPE 4-6. Resting /70 - 122/72 with  sometimes appropriate  response to exercise reaching 102/66- 132/72. NSR on tele observed. RHR 86 - 93 with  flat  response to exercise reaching 103 - 111. No cardiac complaints. Occ knee pain. Her exercise program will be progressed as tolerated to maintain RPE 4-6.  They will continue to be educated on lifestyle modification and encouraged to supplement with a home exercise program as tolerated.    12/20/24: reports being a little out of breath still, generally feeling a little better everyday        Dx:   Encounter Diagnosis   Name Primary?    S/P MVR (mitral valve replacement) Yes       Description of Diagnosis: MV prolapse s/p mitral valve replacement and left atrial appendage ligation  Date of onset: 11/15/24  Other Cardiac History: SSS noted post-op with evidence of junctional rhythm, Wenckebach, PAF/Aflutter; no prior cardiac issues        ASSESSMENT    Medical History:   Past Medical History:   Diagnosis Date    Anxiety     Benign essential hypertension 01/22/2014    Chronic insomnia 02/08/2023    Dyslipidemia 05/27/2014    History of colon polyps 12/16/2024    IFG (impaired fasting glucose) 12/16/2024    MVP (mitral valve prolapse) 01/09/2008    Formatting of this note might be different from the original.   Required antibiotic prophylaxis      Osteopenia 12/16/2024    Overweight 12/16/2024    PAF (paroxysmal atrial fibrillation) (HCC) 11/20/2024    Sick sinus syndrome (HCC) 11/18/2024       Family History:  Family History   Problem Relation Age of Onset    Heart failure Mother     Hypertension Mother     Heart failure Father     No Known Problems Brother     No Known Problems Daughter     No Known Problems Maternal Grandmother     No Known Problems Paternal Grandmother     No Known Problems Maternal Aunt     No Known Problems Paternal Aunt     Breast cancer Neg Hx        Allergies:   Bee venom, Morphine and codeine, Codeine, Penicillins, and Sulfa  antibiotics    Current Medications:   Current Outpatient Medications   Medication Sig Dispense Refill    acetaminophen (TYLENOL) 325 mg tablet Take 1-2 tablets Q4-6 hours PRN pain. Do not take more than 4 grams in 24 hours.      ALPRAZolam ER (XANAX XR) 1 MG 24 hr tablet Take 1 mg by mouth daily at bedtime Rx per Sleep Medicine (Patient taking differently: Take 0.5 mg by mouth daily at bedtime Rx per Sleep Medicine)      aspirin (ECOTRIN LOW STRENGTH) 81 mg EC tablet Take 1 tablet (81 mg total) by mouth daily 30 tablet 2    atenolol (TENORMIN) 25 mg tablet Take 25 mg by mouth daily Rx per Cardio      EPINEPHrine (EPIPEN) 0.3 mg/0.3 mL SOAJ Inject 0.3 mL (0.3 mg total) into a muscle once for 1 dose 0.6 mL 0    hydrOXYzine HCL (ATARAX) 25 mg tablet Take 25 mg by mouth 3 (three) times a day as needed for anxiety (Insomnia) Take one tablet by mouth 3 times per day as needed for anxiety or sleep.  Rx per Sleep Medicine.      ibandronate (BONIVA) 150 MG tablet Take 1 tablet (150 mg total) by mouth every 30 (thirty) days 3 tablet 2    Pediatric Vitamins (Multivitamin Gummies Childrens) CHEW Chew 1 tablet in the morning      pravastatin (PRAVACHOL) 20 mg tablet TAKE 1 TABLET BY MOUTH DAILY AT BEDTIME 90 tablet 1    Propylene Glycol (Systane Balance) 0.6 % SOLN OTC.  1 drop six times a day into both eyes      ramelteon (ROZEREM) 8 mg tablet Take 8 mg by mouth daily Rx per Sleep Med      traZODone (DESYREL) 100 mg tablet Take 200 mg by mouth daily at bedtime Rx per Sleep Med      warfarin (Jantoven) 2.5 mg tablet Take 2 tablets (5mg total) QHS unless otherwise directed. 60 tablet 2     No current facility-administered medications for this visit.       Medication compliance: Yes   Comments: Pt reports to be compliant with medications    Physical Limitations: broke right wrist and right ankle 2x    Fall Risk: Low   Comments: Ambulates with a steady gait with no assist device    Cultural needs: none      CAD Risk  Factors:  Cholesterol: Yes  HTN: Yes  DM: impaired fasting glucose   Obesity: Yes   Inactivity: no    EXERCISE ASSESSMENT:     Initial Fitness Assessment: Submaximal TM ETT:  Resting:  BP: 112/74  HR: 79, Exercise:  BP: 132/64  HR: 96, METs:  3.1, and Test terminated at:  RPE 6      ECG INTERPRETATION:  NSR    Current Functional Status:  Occupation: retired  Recreation/Physical Activity: [employer]   ADL’s: performing moderate ADLs  [place]: no limitations   Home exercise: none      SMART Exercise Goals:   10% improvement in functional capacity based on max METs achieved in initial fitness assessment  improved DASI score by 10%  increased exercise capacity by 40% based on peak METs tolerated in cardiac rehab exercise session  maintain > 150 minutes per week of moderate intensity exercise    Patient Specific EXERCISE GOALS:       Return to walking- goal met  Return to [employer] league   Decrease SOB- goal met  Start using TM at home- goal met    Functional Capacity Screening Tool:  [facility] Activity Status Index:  5.38 METs      PSYCHOSOCIAL ASSESSMENT:    Date of last Assessment:  12/20/24  Depression screening:  PHQ-9 = 0    Interpretation:  0 =No Depression  Anxiety screening:  AMANUEL-7 = 3  Interpretation: 0-4  = Not anxious    Pt self-report of depression and anxiety   Patient has a history of depression  Patient has a history of anxiety     Self-reported stress level:  5   Stressors:  surgery, just a high stress person  Stress Management Tools: practice Relaxation Techniques, exercise, keep a positive mindset, and enjoy a hobby    SMART Psychosocial Goals:     Physical Fitness in [place] Score < 3, Daily Activity in [place] Score < 3, Pain in [place] Score < 3, and control or stop worrying    Patient Specific PSYCHOCOSOCIAL GOALS:    Continue with medical therapy- goal met  Reduce stress level- working on stress reduction  Determine relaxation techniques that work for her    Quality of Life Screen:  (Higher  "score indicates disease impact on QOL)  [facility] COOP score: 18/45     Social Support:   significant other  Community/Social Activities: bowling     Psychosocial Assessment as it relates to rehabilitation:   Patient denies issues with his/her family or home life that may affect their rehabilitation efforts.       NUTRITION ASSESSMENT:    Initial Weight:  148.4  Current Weight: 147    Height:   Ht Readings from Last 1 Encounters:   02/26/25 5' (1.524 m)       Rate Your Plate Score: 56/81    Diabetes: IFG  A1c: 5.4    last measured: 1/24/25    Lipid management: Discussed diet and lipid management and Last lipid profile 10/24/24  Chol 188    HDL 47      Current Dietary Habits:  Watching sodium  Drinking water  Eats out rarely  Glass of wine 1x/week    SMART Nutrition Goals:   Improved Rate Your Plate score  >64, eat 6 or more servings of grain products per day, eat whole grain breads, brown rice and whole grain cereals, eat 5 or more servings of fruits and vegetables a day, eat 2 or more servings of low fat milk or yogurt a day, eat no more than 6oz of meat per day, eat red meat once a week or less, choose lean beef or rarely eat beef, choose 1% or skim milk, rarely eat frozen desserts or choose fruit or fat-free sweets, Do not eat fried foods, use \"light\" tub margarine on bread, potatoes and vegetables, choose healthy desserts and sweets such as rian food cake or  fruit, choose low sodium canned, frozen/packaged foods or rarely/never eat, rarely/never eat salty snacks, and choose low sugar desserts and sweets    Patient Specific NUTRITION GOALS:     1. Decrease sweets   2. Weight loss goal of 20 lbs- goal not met   3. Decrease fried foods    Drug/Alcohol Use:   ~ once week wine      OTHER CORE COMPONENT ASSESSMENT:    Tobacco Use:     N/A:  Patient is a non-smoker     Anginal Symptoms:  None   NTG use: No prescription    SMART Goals:   consistent, controlled resting BP < 130/80 and medication " compliance    Patient Specific CORE COMPONENT GOALS:    Look into getting BP cuff  Medication compliance- goal met      INDIVIDUALIZED TREATMENT PLAN      EXERCISE GOALS and PLAN      Progress toward Exercise goals:   Pt is progressing and showing improvement  toward the following goals:  tolerating 43-45 mins of exercise at 3.3-3.7 METs, preparing for discharge .  , Goals met: using TM at home 3 days/week for 15-20 mins and free weights, return to bowling., Patient will be encouraged to focus on lifestyle modifications following discharge.     Exercise Plan:    education on home exercise guidelines, home exercise 30+ mins 2 days opposite CR, and After discharge patient will continue to follow a regular exercise regimen with the goal of a minimum of 150 minutes per week of moderate intensity exercise.      The patient was counseled on exercise guidelines to achieve a minimum of 150 mins/wk of moderate intensity (RPE 4-6)   exercise and encouraged to add 1-2 days of exercise on opposite days of cardiac rehab as tolerated.       PHYSICIAN PRESCRIBED EXERCISE:    Current Aerobic Exercise Prescription:      Frequency: 3 days/week   Supplement with home exercise 2+ days/wk as tolerated       Minutes: 45         METS: 3.3-3.7           HR:  102-125   RPE: 4-6         Modalities: Treadmill, UBE, and Recumbent bike     Exercise workloads will be progressed gradually as tolerated, within limits of patient's ability, and according to the patient's   response to the exercise program.      Aerobic Exercise Prescription Plan for Progression   Frequency: 3 days/week of cardiac rehab       Supplement with home exercise 2+ days/wk as tolerated    Minutes: 45       >150 mins/wk of moderate intensity exercise   METS: 3.5-4.0   HR: RHR +30-40bpm     RPE: 4-6   Modalities: Treadmill, NuStep, and Recumbent bike    Strength trainin-3 days / week  12-15 repetitions  1-2 sets per modality   Will be added following 2-3 weeks of  "monitored exercise sessions   Modalities: Chest Press, Pull Downs, Arm Curl, Seated Row, and Sit to Stands    Home Exercise: Type: TM, Frequency: 3 days/week, Duration: 15-20 mins and free weights    Exercise Education: benefit of exercise for CAD risk factors, home exercise guidelines, AHA guidelines to achieve >150 mins/wk of moderate exercise, RPE scale, Group class: Risk Factors for Heart Disease, and physical activity/exercise in extreme weather conditions     Readiness to change: Action:  (Changing behavior)      NUTRITION GOALS AND PLAN      Nutritional   Reviewed patient's Rate your Plate. Discussed key elements of heart healthy eating. Reviewed patient goals for dietary modifications and their clinical implications.  Reviewed most recent lipid profile.     Patient's progress toward Nutrition goals:    Pt is progressing and showing improvement  toward the following goals:  decreasing fried foods.  , Pt has not made progress toward the following goals: weight loss. , Will continue to educate and progress as tolerated.      Nutrition Plan:   increase intake of whole grains, replace refined flours with whole grains, increase daily intake of fruits and vegetables, increase daily intake of low fat dairy, limit meat intake to less than 6oz per day, eat red meat once a week or less, choose lean red meat, drink/use 1%  low fat or skim  milk, rarely eat frozen desserts, never/rarely eat fried foods, use \"light\" tub margarine, choose desserts such as fruit, rian food cake, low-fat or fat-free sweets, choose low sodium canned, frozen, packaged foods or rarely eat these foods, rarely/never eat salty snacks, and choose low sugar desserts and sweets    Measurable goals were based Rate Your Plate Dietary Self-Assessment. These are the areas in which the patient could score higher on the assessment.  Goals include recommendations for a heart healthy diet based on American Heart Association.    Nutrition Education:   heart " healthy eating principles  weight loss and management strategies  low sodium diet  maintaining hydration  nutrition for  lipid management  group class: Heart Healthy Eating  group class:  Label Reading    Readiness to change: Action:  (Changing behavior)      PSYCHOSOCIAL GOALS AND PLAN    Psychosocial Assessment as it relates to rehabilitation:   Patient denies issues with his/her family or home life that may affect their rehabilitation efforts.     Patient's progress toward Psychosocial goals:    Pt is progressing and showing improvement  toward the following goals:  improved PHQ-9 and AMANUEL-7 scores, compliant with medical therapy, reducing stress.  , Will continue to educate and progress as tolerated.  Psychosocial Intervention/plan:   Practice relaxation techniques, Exercise, Keep a positive mindset, Enjoy family, and Return to previous social activity    Psychosocial Education: signs/sxs of depression, benefits of a positive support system, stress management techniques, class:  Relaxation, and class:  Stress and Your Health     Information to utilize [employer] was provided as well as contact information for counseling through  Behavioral Health and group psychotherapy groups available.    Readiness to change: Action:  (Changing behavior)      OTHER CORE COMPONENTS GOALS and PLAN  Blood Pressure will be monitored throughout the program and cardiologist will be notified of elevated trends.    Pt will be encouraged to monitor home BP if advised by cardiologist.    Tobacco Plan:   N/A:  Pt is a non-smoker    Progress toward Core Component goals:   Pt is progressing and showing improvement  toward the following goals:  med compliance, BP stable .  , Will continue to educate and progress as tolerated.    Other Core Components Intervention:   group class: Common Heart Medications, medication compliance, avoid processed foods, engage in regular exercise, check labels for sodium content, and monitor home BP    Group  and Individual Education:  understanding high blood pressure and it's relationship to CAD, components of blood pressure management, and group class: Understanding Heart Disease    Readiness to change: Action:  (Changing behavior)

## 2025-03-15 ENCOUNTER — RESULTS FOLLOW-UP (OUTPATIENT)
Dept: CARDIOLOGY CLINIC | Facility: CLINIC | Age: 70
End: 2025-03-15

## 2025-03-18 ENCOUNTER — CLINICAL SUPPORT (OUTPATIENT)
Dept: CARDIAC REHAB | Facility: CLINIC | Age: 70
End: 2025-03-18
Payer: COMMERCIAL

## 2025-03-18 DIAGNOSIS — Z95.2 S/P MVR (MITRAL VALVE REPLACEMENT): Primary | ICD-10-CM

## 2025-03-18 PROCEDURE — 93798 PHYS/QHP OP CAR RHAB W/ECG: CPT

## 2025-03-20 ENCOUNTER — CLINICAL SUPPORT (OUTPATIENT)
Dept: CARDIAC REHAB | Facility: CLINIC | Age: 70
End: 2025-03-20
Payer: COMMERCIAL

## 2025-03-20 DIAGNOSIS — Z95.2 S/P MVR (MITRAL VALVE REPLACEMENT): Primary | ICD-10-CM

## 2025-03-20 PROCEDURE — 93798 PHYS/QHP OP CAR RHAB W/ECG: CPT

## 2025-03-21 ENCOUNTER — CLINICAL SUPPORT (OUTPATIENT)
Dept: CARDIAC REHAB | Facility: CLINIC | Age: 70
End: 2025-03-21
Payer: COMMERCIAL

## 2025-03-21 DIAGNOSIS — Z95.2 S/P MVR (MITRAL VALVE REPLACEMENT): Primary | ICD-10-CM

## 2025-03-21 PROCEDURE — 93798 PHYS/QHP OP CAR RHAB W/ECG: CPT

## 2025-03-25 ENCOUNTER — CLINICAL SUPPORT (OUTPATIENT)
Dept: CARDIAC REHAB | Facility: CLINIC | Age: 70
End: 2025-03-25
Payer: COMMERCIAL

## 2025-03-25 DIAGNOSIS — Z95.2 S/P MVR (MITRAL VALVE REPLACEMENT): Primary | ICD-10-CM

## 2025-03-25 PROCEDURE — 93798 PHYS/QHP OP CAR RHAB W/ECG: CPT

## 2025-03-26 ENCOUNTER — RA CDI HCC (OUTPATIENT)
Dept: OTHER | Facility: HOSPITAL | Age: 70
End: 2025-03-26

## 2025-03-26 ENCOUNTER — OFFICE VISIT (OUTPATIENT)
Dept: CARDIOLOGY CLINIC | Facility: CLINIC | Age: 70
End: 2025-03-26
Payer: COMMERCIAL

## 2025-03-26 VITALS
WEIGHT: 145 LBS | SYSTOLIC BLOOD PRESSURE: 132 MMHG | OXYGEN SATURATION: 94 % | TEMPERATURE: 97.1 F | BODY MASS INDEX: 28.47 KG/M2 | HEIGHT: 60 IN | HEART RATE: 70 BPM | DIASTOLIC BLOOD PRESSURE: 80 MMHG

## 2025-03-26 DIAGNOSIS — I34.1 MVP (MITRAL VALVE PROLAPSE): Primary | ICD-10-CM

## 2025-03-26 DIAGNOSIS — E78.5 DYSLIPIDEMIA: ICD-10-CM

## 2025-03-26 DIAGNOSIS — I10 BENIGN ESSENTIAL HYPERTENSION: ICD-10-CM

## 2025-03-26 DIAGNOSIS — Z98.890 S/P LEFT ATRIAL APPENDAGE LIGATION: ICD-10-CM

## 2025-03-26 DIAGNOSIS — Z95.3 S/P MITRAL VALVE REPLACEMENT WITH BIOPROSTHETIC VALVE: ICD-10-CM

## 2025-03-26 DIAGNOSIS — I49.5 SICK SINUS SYNDROME (HCC): ICD-10-CM

## 2025-03-26 DIAGNOSIS — I48.0 PAF (PAROXYSMAL ATRIAL FIBRILLATION) (HCC): ICD-10-CM

## 2025-03-26 PROCEDURE — 99214 OFFICE O/P EST MOD 30 MIN: CPT | Performed by: INTERNAL MEDICINE

## 2025-03-26 PROCEDURE — 93000 ELECTROCARDIOGRAM COMPLETE: CPT | Performed by: INTERNAL MEDICINE

## 2025-03-26 NOTE — PROGRESS NOTES
Cardiology Follow Up     Carley Bonilla  [id number]  1955  [facility] [place]  [address] [address] [address] [phone]      1. MVP (mitral valve prolapse)  POCT ECG      2. S/P mitral valve replacement with bioprosthetic valve  POCT ECG      3. S/P left atrial appendage ligation  POCT ECG      4. Dyslipidemia  POCT ECG      5. PAF (paroxysmal atrial fibrillation) (HCC)  POCT ECG      6. Sick sinus syndrome (HCC)  POCT ECG      7. Benign essential hypertension  POCT ECG          Discussion/Summary:  Mitral valve prolapse:  Status post mitral valve replacement. 29 mm valve. Repeat echo next year. Continue aspirin. Antibiotics prior to dental work. This will be lifelong and she understands this. She has an allergy to penicillin therefore was given azithromycin.     Paroxysmal atrial fibrillation:  Currently in sinus rhythm. Loop recorder recently placed. For now she is on warfarin. Her next (first) device check will be in June. She will contact me after that. If there is no atrial fibrillation, we will stop the warfarin continue aspirin. From there if there is recurrence of the atrial fibrillation we can resume anticoagulation. Left atrial appendage was ligated. She is on atenolol.    Dyslipidemia:  Was on pravastatin prior to her mitral valve surgery. There was no significant CAD on her preoperative cardiac catheterization.    Hypertension,  Blood pressure is controlled on atenolol.          History of Present Illness:  69-year-old female.    History of mitral valve prolapse. She said it was diagnosed in her 20s by a dermatologist. She was followed with surveillance since that time.    Established with me in August 2024 at which point she was noting some symptoms and I referred her for KWAME which confirmed severe mitral regurgitation. She was referred for surgical evaluation. Preoperative cardiac catheterization showed  no significant CAD.    Her surgery was performed in November 2024. Initial attempt at repair, but this was converted to replacement with a 29 mm Resilia valve. Her left atrial appendage was clipped.    Postoperatively, she did have paroxysmal atrial fibrillation. She was seen by electrophysiology and had Zio patch after discharge. There was improvement in the atrial fibrillation. She now has a loop recorder implanted just recently and will be watching this for surveillance. At this point she is on both warfarin and aspirin. Plan is to discontinue the warfarin in the future and monitor for any recurrence of the atrial fibrillation.    She is completing cardiac rehab she has just 2 sessions left.    Pravastatin which she takes for dyslipidemia predates her surgery.    She is noting improvement in her symptoms overall. Comes with her  once again today and all questions were answered.    Patient Active Problem List   Diagnosis    Anxiety    Benign essential hypertension    Dyslipidemia    MVP (mitral valve prolapse)    Chronic insomnia    Murmur, cardiac    Mitral valve insufficiency    PONV (postoperative nausea and vomiting)    S/P left atrial appendage ligation    S/P mitral valve replacement with bioprosthetic valve    Sick sinus syndrome (HCC)    Anemia    Hypocalcemia    Prediabetes    Anticoagulation goal of INR 2 to 3    PAF (paroxysmal atrial fibrillation) ([facility])    Atrial flutter (HCC)    Encounter for postoperative care    IFG (impaired fasting glucose)    Osteopenia    History of colon polyps    Overweight     Past Medical History:   Diagnosis Date    Anxiety     Benign essential hypertension 01/22/2014    Chronic insomnia 02/08/2023    Dyslipidemia 05/27/2014    History of colon polyps 12/16/2024    IFG (impaired fasting glucose) 12/16/2024    MVP (mitral valve prolapse) 01/09/2008    Formatting of this note might be different from the original.   Required antibiotic prophylaxis      Osteopenia  12/16/2024    Overweight 12/16/2024    PAF (paroxysmal atrial fibrillation) ([facility]) 11/20/2024    Sick sinus syndrome ([facility]) 11/18/2024     Social History     Tobacco Use    Smoking status: Never     Passive exposure: Current    Smokeless tobacco: Never   Vaping Use    Vaping status: Never Used   Substance Use Topics    Alcohol use: Not Currently     Comment: occ    Drug use: No      Family History   Problem Relation Age of Onset    Heart failure Mother     Hypertension Mother     Heart failure Father     No Known Problems Brother     No Known Problems Daughter     No Known Problems Maternal Grandmother     No Known Problems Paternal Grandmother     No Known Problems Maternal Aunt     No Known Problems Paternal Aunt     Breast cancer Neg Hx      Past Surgical History:   Procedure Laterality Date    ANKLE FRACTURE SURGERY Right     x 2; No Hardware    CARDIAC CATHETERIZATION Left 10/30/2024    Procedure: Cardiac Left Heart Cath;  Surgeon: René England DO;  Location: [facility];  Service: Cardiology    CARDIAC ELECTROPHYSIOLOGY PROCEDURE N/A 2/26/2025    Procedure: Cardiac loop recorder implant;  Surgeon: Rahul Quintero MD;  Location: [facility];  Service: Cardiology    ORIF WRIST FRACTURE Right 11/21/2016    Procedure: DISTAL RADIUS O/R I/F;  Surgeon: Chuckie Nova MD;  Location: [facility];  Service:     NJ REPLACEMENT MITRAL VALVE W/CARDIOPULMONARY BYP N/A 11/15/2024    Procedure: MITRAL VALVE REPAIR WITH BETH 4D ANNULOPLASTYN RING, CONVERTED TO MITRAL VALVE REPLACEMENT WITH MITRIS RESILIA 29 MM.  LEFT ATRIAL APPENDAGE CLIPPING,KWAME;  Surgeon: Parker Reddy MD;  Location: [facility];  Service: Cardiac Surgery    WISDOM TOOTH EXTRACTION      WRIST SURGERY Right     Harware in place (2 total wrist surgeries, including 2016)       Current Outpatient Medications:     acetaminophen (TYLENOL) 325 mg tablet, Take 1-2 tablets Q4-6 hours PRN pain. Do not take more than 4 grams in 24 hours., Disp:  , Rfl:     ALPRAZolam ER (XANAX XR) 1 MG 24 hr tablet, Take 1 mg by mouth daily at bedtime Rx per Sleep Medicine, Disp: , Rfl:     aspirin (ECOTRIN LOW STRENGTH) 81 mg EC tablet, Take 1 tablet (81 mg total) by mouth daily, Disp: 30 tablet, Rfl: 2    atenolol (TENORMIN) 25 mg tablet, Take 25 mg by mouth daily Rx per Cardio, Disp: , Rfl:     EPINEPHrine (EPIPEN) 0.3 mg/0.3 mL SOAJ, Inject 0.3 mL (0.3 mg total) into a muscle once for 1 dose, Disp: 0.6 mL, Rfl: 0    hydrOXYzine HCL (ATARAX) 25 mg tablet, Take 25 mg by mouth 3 (three) times a day as needed for anxiety (Insomnia) Take one tablet by mouth 3 times per day as needed for anxiety or sleep.  Rx per Sleep Medicine., Disp: , Rfl:     ibandronate (BONIVA) 150 MG tablet, Take 1 tablet (150 mg total) by mouth every 30 (thirty) days, Disp: 3 tablet, Rfl: 2    Pediatric Vitamins (Multivitamin Gummies Childrens) CHEW, Chew 1 tablet in the morning, Disp: , Rfl:     pravastatin (PRAVACHOL) 20 mg tablet, TAKE 1 TABLET BY MOUTH DAILY AT BEDTIME, Disp: 90 tablet, Rfl: 1    Propylene Glycol (Systane Balance) 0.6 % SOLN, OTC.  1 drop six times a day into both eyes, Disp: , Rfl:     ramelteon (ROZEREM) 8 mg tablet, Take 8 mg by mouth daily Rx per Sleep Med, Disp: , Rfl:     traZODone (DESYREL) 100 mg tablet, Take 200 mg by mouth daily at bedtime Rx per Sleep Med, Disp: , Rfl:     warfarin (Jantoven) 2.5 mg tablet, Take 1 to 2 tablets daily by mouth in the evening., Disp: 60 tablet, Rfl: 2  Allergies   Allergen Reactions    Bee Venom Anaphylaxis and Hives    Morphine And Codeine Other (See Comments)     hallucinations      Codeine Other (See Comments)     Hallucinations    Penicillins Hives    Sulfa Antibiotics Other (See Comments)     Yeast infections       Vitals:    03/26/25 1406   BP: 132/80   BP Location: Left arm   Patient Position: Sitting   Cuff Size: Standard   Pulse: 70   Temp: (!) 97.1 °F (36.2 °C)   TempSrc: Tympanic   SpO2: 94%   Weight: 65.8 kg (145 lb)   Height:  5' (1.524 m)     Vitals:    03/26/25 1406   Weight: 65.8 kg (145 lb)      Height: 5' (152.4 cm)   Body mass index is 28.32 kg/m².    Physical Exam:  GEN: Carley Bonilla appears well, alert and oriented x 3, pleasant and cooperative   HEENT: pupils equal, round, and reactive to light; extraocular muscles intact  NECK: supple, no carotid bruits   HEART: regular, bioprosthetic MVR sound.  LUNGS: clear to auscultation bilaterally; no wheezes, rales, or rhonchi   ABDOMEN: normal bowel sounds, soft, no tenderness, no distention  EXTREMITIES: peripheral pulses normal; no clubbing, cyanosis, or edema  NEURO: no focal findings   SKIN: normal without suspicious lesions on exposed skin      ROS:  Positive for shortness of breath.  Except as noted in HPI, is otherwise reviewed in detail and a 12 point review of systems is negative.    Labs:  Lab Results   Component Value Date    SODIUM 139 01/24/2025    K 3.8 01/24/2025     01/24/2025    CREATININE 0.87 01/24/2025    BUN 19 01/24/2025    CO2 30 01/24/2025    ALT 18 01/24/2025    AST 21 01/24/2025    TSH 1.06 10/19/2023    INR 2.60 (H) 03/14/2025    GLUF 90 01/24/2025    HGBA1C 5.4 01/24/2025    WBC 7.02 01/24/2025    HGB 13.8 01/24/2025    HCT 42.2 01/24/2025     01/24/2025       Lab Results   Component Value Date    CHOL 217 03/19/2015    CHOL 203 05/29/2014    CHOL 228 01/08/2014     Lab Results   Component Value Date    HDL 47 (L) 10/24/2024    HDL 52 05/10/2024    HDL 49 06/20/2017     Lab Results   Component Value Date    LDLCALC 115 (H) 10/24/2024    LDLCALC 108 (H) 05/10/2024    LDLCALC 136 (H) 06/20/2017     Lab Results   Component Value Date    TRIG 130 10/24/2024    TRIG 138 05/10/2024    TRIG 137 06/20/2017     Testing:    Janno 12/2024  atient had a min HR of 39 bpm, max HR of 214 bpm, and avg HR of 93 bpm. Predominant underlying rhythm was Sinus Rhythm. Slight P wave morphology changes were noted. 7 Ventricular Tachycardia runs occurred, the run with the  fastest interval lasting 11 beats with a max rate of 214 bpm (avg 193 bpm); the run with the fastest interval was also the longest. 6 Supraventricular Tachycardia runs occurred, the run with the fastest interval lasting 7 beats with a max rate of 188 bpm, the longest lasting 6 beats with an avg rate of 119 bpm. Atrial Fibrillation/Flutter occurred (20% burden), ranging from  bpm (avg of 112 bpm), the longest lasting 1 day 1 hour with an avg rate of 109 bpm. Atrial Fibrillation/Flutter was detected within +/- 45 seconds of symptomatic patient event(s). Isolated SVEs were rare (<1.0%), SVE Couplets were rare (<1.0%), and SVE Triplets were rare (<1.0%). Isolated VEs were rare (<1.0%, 7260), VE Couplets were rare (<1.0%, 353), and VE Triplets were rare (<1.0%, 36). Ventricular Bigeminy and Trigeminy were present. Some VE(s) may be SVE(s) conducted with possible aberrancy     Reviewed strips and agree with above. Afib/flutter was Days 1-8, Seems rest of montior only short episodes so improving.    Cath 10/2024  No CAD    KWAME 10/2024  Left Ventricle: Left ventricular cavity size is normal. Wall thickness is normal. The left ventricular ejection fraction is 65%. Systolic function is vigorous. Wall motion is normal.    Left Atrium: The atrium is severely dilated.    Atrial Septum: No patent foramen ovale detected, confirmed at rest using color doppler.    Left Atrial Appendage: There is normal function. There is no thrombus.    Mitral Valve: The valve is myxomatous. There is moderate diffuse thickening. There is bileaflet prolapse. There is severe regurgitation.    Tricuspid Valve: There is mild regurgitation.     3D was performed for further investigation, better visualization, and additional quantification of the mitral valve. Results from the utilization of 3D are listed in the report below.    EKG: Sinus rhythm. 70 bpm. Left atrial enlargement. Nonspecific T wave abnormality.

## 2025-03-27 ENCOUNTER — CLINICAL SUPPORT (OUTPATIENT)
Dept: CARDIAC REHAB | Facility: CLINIC | Age: 70
End: 2025-03-27
Payer: COMMERCIAL

## 2025-03-27 DIAGNOSIS — Z95.2 S/P MVR (MITRAL VALVE REPLACEMENT): Primary | ICD-10-CM

## 2025-03-27 PROCEDURE — 93798 PHYS/QHP OP CAR RHAB W/ECG: CPT

## 2025-03-27 NOTE — PROGRESS NOTES
HCC coding opportunities    I12.9 for [facility]     Chart Reviewed number of suggestions sent to Provider: 1     Patients Insurance     Medicare Insurance: [employer] Advantage

## 2025-03-28 ENCOUNTER — CLINICAL SUPPORT (OUTPATIENT)
Dept: CARDIAC REHAB | Facility: CLINIC | Age: 70
End: 2025-03-28
Payer: COMMERCIAL

## 2025-03-28 DIAGNOSIS — Z95.2 S/P MVR (MITRAL VALVE REPLACEMENT): Primary | ICD-10-CM

## 2025-03-28 PROCEDURE — 93798 PHYS/QHP OP CAR RHAB W/ECG: CPT

## 2025-03-28 NOTE — PROGRESS NOTES
CARDIAC REHABILITATION   ASSESSMENT AND INDIVIDUALIZED TREATMENT PLAN  120 DAY and DISCHARGE            Today's date: 3/28/2025   # of Exercise Sessions Completed: 36  Patient name: Carley Bonilla      : 1955  Age: 69 y.o.       MRN: [id number]  Referring Physician: Parker Reddy MD   Cardiologist: Hal Tracey MD  Provider: Garry  Clinician: Alberto Dong MS, CEP         Treatment is tailored to this patient's individual needs.  The ITP was reviewed with the patient and all questions were answered to their satisfaction.  Additional ITP documentation can be found electronically including daily and monthly exercise summaries, daily session notes with ECG summaries, education notes, daily medication reconciliation, and daily physician supervision.      Comments:     DISCHARGE SUMMARY  2025    Completed 36/36 exercise sessions  Functional capacity:   Pre: 3.1 METs, Post: 7.5 METs  Max METs tolerated in cardiac rehab:  Pre: 2.4,  Post: 3.3  [facility] QOL:  Pre: 18, Post: 12  PHQ-9:  Pre: 2, Post: 0  Weight:  Pre: 148.4 lbs,  Post: 149.6 lbs  Exercise session details:  35-45 minutes,  3.3 METs  Resting BP  98/80 - 122/72,  HR 73 - 98  Exercise /64- 156/70.  HR 94 - 125  Telemetry:  NSR   Symptoms: No cardiac complaints   Discharge Plans: She plans on walking with her  as well as continue to go bowling.   Patient's subjective report of progress: She reports she did very well with the program. She has no concerns to note. She recently saw her cardiologist.   Clinical Comments: She improved overall with the program. She has a plan for continuation of exercise. She recently had a loop recorder placed. See outcomes report attached.         Dx:   Encounter Diagnosis   Name Primary?    S/P MVR (mitral valve replacement) Yes       Description of Diagnosis: MV prolapse s/p mitral valve replacement and left atrial appendage ligation  Date of onset: 11/15/24  Other Cardiac History: SSS noted  post-op with evidence of junctional rhythm, Wenckebach, PAF/Aflutter; no prior cardiac issues        ASSESSMENT    Medical History:   Past Medical History:   Diagnosis Date    Anxiety     Benign essential hypertension 01/22/2014    Chronic insomnia 02/08/2023    Dyslipidemia 05/27/2014    History of colon polyps 12/16/2024    IFG (impaired fasting glucose) 12/16/2024    MVP (mitral valve prolapse) 01/09/2008    Formatting of this note might be different from the original.   Required antibiotic prophylaxis      Osteopenia 12/16/2024    Overweight 12/16/2024    PAF (paroxysmal atrial fibrillation) ([facility]) 11/20/2024    Sick sinus syndrome ([facility]) 11/18/2024       Family History:  Family History   Problem Relation Age of Onset    Heart failure Mother     Hypertension Mother     Heart failure Father     No Known Problems Brother     No Known Problems Daughter     No Known Problems Maternal Grandmother     No Known Problems Paternal Grandmother     No Known Problems Maternal Aunt     No Known Problems Paternal Aunt     Breast cancer Neg Hx        Allergies:   Bee venom, Morphine and codeine, Codeine, Penicillins, and Sulfa antibiotics    Current Medications:   Current Outpatient Medications   Medication Sig Dispense Refill    acetaminophen (TYLENOL) 325 mg tablet Take 1-2 tablets Q4-6 hours PRN pain. Do not take more than 4 grams in 24 hours.      ALPRAZolam ER (XANAX XR) 1 MG 24 hr tablet Take 1 mg by mouth daily at bedtime Rx per Sleep Medicine      aspirin (ECOTRIN LOW STRENGTH) 81 mg EC tablet Take 1 tablet (81 mg total) by mouth daily 30 tablet 2    atenolol (TENORMIN) 25 mg tablet Take 25 mg by mouth daily Rx per Cardio      EPINEPHrine (EPIPEN) 0.3 mg/0.3 mL SOAJ Inject 0.3 mL (0.3 mg total) into a muscle once for 1 dose 0.6 mL 0    hydrOXYzine HCL (ATARAX) 25 mg tablet Take 25 mg by mouth 3 (three) times a day as needed for anxiety (Insomnia) Take one tablet by mouth 3 times per day as needed for anxiety or sleep.   Rx per Sleep Medicine.      ibandronate (BONIVA) 150 MG tablet Take 1 tablet (150 mg total) by mouth every 30 (thirty) days 3 tablet 2    Pediatric Vitamins (Multivitamin Gummies Childrens) CHEW Chew 1 tablet in the morning      pravastatin (PRAVACHOL) 20 mg tablet TAKE 1 TABLET BY MOUTH DAILY AT BEDTIME 90 tablet 1    Propylene Glycol (Systane Balance) 0.6 % SOLN OTC.  1 drop six times a day into both eyes      ramelteon (ROZEREM) 8 mg tablet Take 8 mg by mouth daily Rx per Sleep Med      traZODone (DESYREL) 100 mg tablet Take 200 mg by mouth daily at bedtime Rx per Sleep Med      warfarin (Jantoven) 2.5 mg tablet Take 1 to 2 tablets daily by mouth in the evening. 60 tablet 2     No current facility-administered medications for this visit.       Medication compliance: Yes   Comments: Pt reports to be compliant with medications    Physical Limitations: broke right wrist and right ankle 2x    Fall Risk: Low   Comments: Ambulates with a steady gait with no assist device    Cultural needs: none      CAD Risk Factors:  Cholesterol: Yes  HTN: Yes  DM: impaired fasting glucose   Obesity: Yes   Inactivity: no    EXERCISE ASSESSMENT:    Post Fitness Assessment: Submaximal TM ETT:  Resting:  BP: 112/68  HR: 73  Exercise:  BP: 120//70  HR: 113-140  METs:  7.5  ECG Summary: NSR   Test terminated at:  RPE 6  Comments: Tolerated well. Improved from initial       ECG INTERPRETATION:  NSR    Current Functional Status:  Occupation: retired  Recreation/Physical Activity: bowling   ADL’s: performing moderate ADLs  [place]: no limitations   Home exercise: Type: Walking, TRM, and free weights, Frequency: 3-5 days/week, Duration: 30-50 mins      SMART Exercise Goals:   10% improvement in functional capacity based on max METs achieved in initial fitness assessment  improved DASI score by 10%  increased exercise capacity by 40% based on peak METs tolerated in cardiac rehab exercise session  maintain > 150 minutes per week of  moderate intensity exercise    Patient Specific EXERCISE GOALS:       Return to walking- MET  Return to [employer] league - MET   Decrease SOB- MET  Start using TM at home- MET    Functional Capacity Screening Tool:  [facility] Activity Status Index:  7.01 METs      PSYCHOSOCIAL ASSESSMENT:    Date of last Assessment:  03/25/2025  Depression screening:  PHQ-9 = 0    Interpretation:  0 =No Depression  Anxiety screening:  AMANUEL-7 =0  Interpretation: 0-4  = Not anxious    Pt self-report of depression and anxiety  Patient has a history of depression  Patient has a history of anxiety     Self-reported stress level:  5   Stressors:  surgery, just a high stress person  Stress Management Tools: practice Relaxation Techniques, exercise, keep a positive mindset, and enjoy a hobby    SMART Psychosocial Goals:     Physical Fitness in [facility] Score < 3, Daily Activity in [facility] Score < 3, Pain in [facility] Score < 3, and control or stop worrying    Patient Specific PSYCHOCOSOCIAL GOALS:    Continue with medical therapy- MET  Reduce stress level- working on stress reduction  Determine relaxation techniques that work for her - working on this     Quality of Life Screen:  (Higher score indicates disease impact on QOL)  [facility] COOP score: 12/45     Social Support:   significant other  Community/Social Activities: [employer]     Psychosocial Assessment as it relates to rehabilitation:   Patient denies issues with his/her family or home life that may affect their rehabilitation efforts.       NUTRITION ASSESSMENT:    Initial Weight:  148.4 lbs   Current Weight: 149.6 lbs     Height:   Ht Readings from Last 1 Encounters:   03/26/25 5' (1.524 m)       Rate Your Plate Score: 59/81    Diabetes: IFG  A1c: 5.4    last measured: 1/24/25    Lipid management: Discussed diet and lipid management and Last lipid profile 10/24/24  Chol 188    HDL 47      Current Dietary Habits:  Watching sodium  Drinking water  Eats out rarely  [place] of  "wine 1x/week    SMART Nutrition Goals:   Improved Rate Your Plate score  >64, eat 6 or more servings of grain products per day, eat whole grain breads, brown rice and whole grain cereals, eat 5 or more servings of fruits and vegetables a day, eat 2 or more servings of low fat milk or yogurt a day, eat no more than 6oz of meat per day, eat red meat once a week or less, choose lean beef or rarely eat beef, choose 1% or skim milk, rarely eat frozen desserts or choose fruit or fat-free sweets, Do not eat fried foods, use \"light\" tub margarine on bread, potatoes and vegetables, choose healthy desserts and sweets such as rian food cake or  fruit, choose low sodium canned, frozen/packaged foods or rarely/never eat, rarely/never eat salty snacks, and choose low sugar desserts and sweets    Patient Specific NUTRITION GOALS:     1. Decrease sweets - MET    2. Weight loss goal of 20 lbs- goal not met   3. Decrease fried foods - MET    Drug/Alcohol Use:   ~ once week wine      OTHER CORE COMPONENT ASSESSMENT:    Tobacco Use:     N/A:  Patient is a non-smoker     Anginal Symptoms:  None   NTG use: No prescription    SMART Goals:   consistent, controlled resting BP < 130/80 and medication compliance    Patient Specific CORE COMPONENT GOALS:    Look into getting BP cuff - MET  Medication compliance- MET       INDIVIDUALIZED TREATMENT PLAN      EXERCISE GOALS and PLAN      Progress toward Exercise goals:   Goals met: using TM at home 3 days/week for 15-20 mins and free weights plus will be adding in walking outside for continuation, returned to [facility], increased submaxx 141.9%, increased overall functional METs 37.5%,and attended all 36 visits regularly ., Patient will be encouraged to focus on lifestyle modifications following discharge.     Exercise Plan:    After discharge patient will continue to follow a regular exercise regimen with the goal of a minimum of 150 minutes per week of moderate intensity exercise.      The " patient was counseled on exercise guidelines to achieve a minimum of 150 mins/wk of moderate intensity (RPE 4-6)   exercise and encouraged to add 1-2 days of exercise on opposite days of cardiac rehab as tolerated.       PHYSICIAN PRESCRIBED EXERCISE:    Current Aerobic Exercise Prescription:      Frequency: 3 days/week   Supplement with home exercise 2+ days/wk as tolerated       Minutes: 45-50        METS: 3.3-3.7           HR:  102-125   RPE: 4-6         Modalities: Treadmill, UBE, and Recumbent bike     Exercise workloads will be progressed gradually as tolerated, within limits of patient's ability, and according to the patient's   response to the exercise program.      Exercise Progression after Discharge:    Frequency: 3-5 days of gym or home exercise   Minutes: 30-50  >150 mins/wk of moderate intensity exercise   METS: 2.0 - 7.0+   HR: 90 - 125    RPE: 4-6   Modalities: Treadmill and walking, free weights      Strength training:   completing free weights at home   Modalities: Lateral Raise, Arm Curl, Sit to Stands, and Upright Rows    Home Exercise: Type: TM and walking, Frequency: 3-5 days/week, Duration: 30-50 mins, plus free weights      Exercise Education: benefit of exercise for CAD risk factors, home exercise guidelines, AHA guidelines to achieve >150 mins/wk of moderate exercise, RPE scale, Group class: Risk Factors for Heart Disease, exercise instructions/guidelines for discharge , and physical activity/exercise in extreme weather conditions     Readiness to change: Action:  (Changing behavior)      NUTRITION GOALS AND PLAN      Nutritional   Reviewed patient's Rate your Plate. Discussed key elements of heart healthy eating. Reviewed patient goals for dietary modifications and their clinical implications.  Reviewed most recent lipid profile.     Patient's progress toward Nutrition goals:    Goals not met: weight loss - maintained weight.  , Goals met: decreased fried foods and sweets, increased RYP  score, and improved Fasting BS and A1C ., Patient will be encouraged to focus on lifestyle modifications following discharge.      Nutrition Plan:   After discharge patient will continue to maintain healthy lifestyle habits and focus on risk factor modification.    Measurable goals were based Rate Your Plate Dietary Self-Assessment. These are the areas in which the patient could score higher on the assessment.  Goals include recommendations for a heart healthy diet based on American Heart Association.    Nutrition Education:   heart healthy eating principles  weight loss and management strategies  low sodium diet  maintaining hydration  nutrition for  lipid management  group class: Heart Healthy Eating  group class:  Label Reading    Readiness to change: Action:  (Changing behavior)      PSYCHOSOCIAL GOALS AND PLAN    Psychosocial Assessment as it relates to rehabilitation:   Patient denies issues with his/her family or home life that may affect their rehabilitation efforts.     Patient's progress toward Psychosocial goals:    Goals met: medication compliance, improved PHQ-9 scores, overall [place] score improved, and great support from ., Patient will be encouraged to focus on lifestyle modifications following discharge.    Psychosocial Intervention/plan:   After discharge patient will continue to maintain healthy lifestyle habits and focus on risk factor modification.    Psychosocial Education: signs/sxs of depression, benefits of a positive support system, stress management techniques, class:  Relaxation, and class:  Stress and Your Health     Information to utilize [employer] was provided as well as contact information for counseling through  Behavioral Health and group psychotherapy groups available.    Readiness to change: Action:  (Changing behavior)      OTHER CORE COMPONENTS GOALS and PLAN  Blood Pressure will be monitored throughout the program and cardiologist will be notified of elevated  trends.    Pt will be encouraged to monitor home BP if advised by cardiologist.    Tobacco Plan:   N/A:  Pt is a non-smoker    Progress toward Core Component goals:   Goals met: medication compliance, blood pressures stable at rest and exercise, and monitoring BP., Patient will be encouraged to focus on lifestyle modifications following discharge.    Other Core Components Intervention:   After discharge patient will continue to maintain healthy lifestyle habits and focus on risk factor modification.    Group and Individual Education:  understanding high blood pressure and it's relationship to CAD, components of blood pressure management, and group class: Understanding Heart Disease    Readiness to change: Action:  (Changing behavior)

## 2025-03-31 ENCOUNTER — VBI (OUTPATIENT)
Dept: ADMINISTRATIVE | Facility: OTHER | Age: 70
End: 2025-03-31

## 2025-03-31 NOTE — TELEPHONE ENCOUNTER
03/31/25 10:11 AM     Chart reviewed for CRC: Colonoscopy was/were not submitted to the patient's insurance.     Taty Tom MA   [facility]

## 2025-04-04 ENCOUNTER — ANTICOAG VISIT (OUTPATIENT)
Dept: CARDIOLOGY CLINIC | Facility: CLINIC | Age: 70
End: 2025-04-04

## 2025-04-04 ENCOUNTER — APPOINTMENT (OUTPATIENT)
Dept: LAB | Facility: CLINIC | Age: 70
End: 2025-04-04
Payer: COMMERCIAL

## 2025-04-04 DIAGNOSIS — I48.0 PAF (PAROXYSMAL ATRIAL FIBRILLATION) (HCC): Primary | ICD-10-CM

## 2025-04-04 DIAGNOSIS — I48.3 TYPICAL ATRIAL FLUTTER (HCC): ICD-10-CM

## 2025-04-04 DIAGNOSIS — I48.0 PAF (PAROXYSMAL ATRIAL FIBRILLATION) (HCC): ICD-10-CM

## 2025-04-04 LAB
INR PPP: 2.59 (ref 0.85–1.19)
PROTHROMBIN TIME: 27.6 SECONDS (ref 12.3–15)

## 2025-04-04 PROCEDURE — 85610 PROTHROMBIN TIME: CPT

## 2025-04-04 PROCEDURE — 36415 COLL VENOUS BLD VENIPUNCTURE: CPT

## 2025-04-06 DIAGNOSIS — Z98.890 S/P MITRAL VALVE REPAIR: ICD-10-CM

## 2025-04-07 RX ORDER — WARFARIN SODIUM 2.5 MG/1
TABLET ORAL
Qty: 170 TABLET | Refills: 2 | Status: SHIPPED | OUTPATIENT
Start: 2025-04-07

## 2025-05-02 ENCOUNTER — ANTICOAG VISIT (OUTPATIENT)
Dept: CARDIOLOGY CLINIC | Facility: CLINIC | Age: 70
End: 2025-05-02

## 2025-05-02 ENCOUNTER — APPOINTMENT (OUTPATIENT)
Dept: LAB | Facility: CLINIC | Age: 70
End: 2025-05-02
Payer: COMMERCIAL

## 2025-05-02 DIAGNOSIS — I48.0 PAF (PAROXYSMAL ATRIAL FIBRILLATION) (HCC): Primary | ICD-10-CM

## 2025-05-02 DIAGNOSIS — I48.0 PAF (PAROXYSMAL ATRIAL FIBRILLATION) (HCC): ICD-10-CM

## 2025-05-02 LAB
INR PPP: 2.21 (ref 0.85–1.19)
PROTHROMBIN TIME: 24.5 SECONDS (ref 12.3–15)

## 2025-05-02 PROCEDURE — 85610 PROTHROMBIN TIME: CPT

## 2025-05-02 PROCEDURE — 36415 COLL VENOUS BLD VENIPUNCTURE: CPT

## 2025-05-09 DIAGNOSIS — F41.9 ANXIETY: Primary | ICD-10-CM

## 2025-05-12 RX ORDER — ALPRAZOLAM 1 MG/1
1 TABLET, EXTENDED RELEASE ORAL
Refills: 0 | OUTPATIENT
Start: 2025-05-12

## 2025-05-12 NOTE — TELEPHONE ENCOUNTER
Refill denied.  I do not Rx this med.      ALPRAZolam ER (XANAX XR) 1 MG 24 hr tablet Take 1 mg by mouth daily at bedtime Rx per Sleep Medicine

## 2025-05-30 ENCOUNTER — ANTICOAG VISIT (OUTPATIENT)
Dept: CARDIOLOGY CLINIC | Facility: CLINIC | Age: 70
End: 2025-05-30

## 2025-05-30 ENCOUNTER — TELEPHONE (OUTPATIENT)
Age: 70
End: 2025-05-30

## 2025-05-30 ENCOUNTER — RESULTS FOLLOW-UP (OUTPATIENT)
Dept: FAMILY MEDICINE CLINIC | Facility: CLINIC | Age: 70
End: 2025-05-30

## 2025-05-30 ENCOUNTER — APPOINTMENT (OUTPATIENT)
Dept: LAB | Facility: CLINIC | Age: 70
End: 2025-05-30
Payer: COMMERCIAL

## 2025-05-30 DIAGNOSIS — I48.0 PAF (PAROXYSMAL ATRIAL FIBRILLATION) (HCC): ICD-10-CM

## 2025-05-30 DIAGNOSIS — I48.0 PAF (PAROXYSMAL ATRIAL FIBRILLATION) (HCC): Primary | ICD-10-CM

## 2025-05-30 DIAGNOSIS — I48.92 ATRIAL FLUTTER, UNSPECIFIED TYPE (HCC): ICD-10-CM

## 2025-05-30 LAB
ALBUMIN SERPL BCG-MCNC: 3.9 G/DL (ref 3.5–5)
ALP SERPL-CCNC: 78 U/L (ref 34–104)
ALT SERPL W P-5'-P-CCNC: 16 U/L (ref 7–52)
ANION GAP SERPL CALCULATED.3IONS-SCNC: 7 MMOL/L (ref 4–13)
AST SERPL W P-5'-P-CCNC: 21 U/L (ref 13–39)
BILIRUB SERPL-MCNC: 0.46 MG/DL (ref 0.2–1)
BUN SERPL-MCNC: 24 MG/DL (ref 5–25)
CALCIUM SERPL-MCNC: 9.3 MG/DL (ref 8.4–10.2)
CHLORIDE SERPL-SCNC: 103 MMOL/L (ref 96–108)
CHOLEST SERPL-MCNC: 168 MG/DL (ref ?–200)
CO2 SERPL-SCNC: 30 MMOL/L (ref 21–32)
CREAT SERPL-MCNC: 1.01 MG/DL (ref 0.6–1.3)
EST. AVERAGE GLUCOSE BLD GHB EST-MCNC: 123 MG/DL
GFR SERPL CREATININE-BSD FRML MDRD: 57 ML/MIN/1.73SQ M
GLUCOSE P FAST SERPL-MCNC: 99 MG/DL (ref 65–99)
HBA1C MFR BLD: 5.9 %
HDLC SERPL-MCNC: 37 MG/DL
INR PPP: 2.5 (ref 0.85–1.19)
LDLC SERPL CALC-MCNC: 92 MG/DL (ref 0–100)
LDLC SERPL DIRECT ASSAY-MCNC: 105 MG/DL (ref 0–100)
NONHDLC SERPL-MCNC: 131 MG/DL
POTASSIUM SERPL-SCNC: 4 MMOL/L (ref 3.5–5.3)
PROT SERPL-MCNC: 7.4 G/DL (ref 6.4–8.4)
PROTHROMBIN TIME: 26.9 SECONDS (ref 12.3–15)
SODIUM SERPL-SCNC: 140 MMOL/L (ref 135–147)
TRIGL SERPL-MCNC: 194 MG/DL (ref ?–150)
TSH SERPL DL<=0.05 MIU/L-ACNC: 1.82 UIU/ML (ref 0.45–4.5)

## 2025-05-30 PROCEDURE — 85610 PROTHROMBIN TIME: CPT

## 2025-05-30 NOTE — TELEPHONE ENCOUNTER
Maura from [facility] lab called in stating the pt is currently there to complete her ordered lab work but states the expected date on the scripts are 6/4. Maura states the pt advised her that her PCP wanted to have the pt complete these labs prior to her upcoming appt/expected lab date. Maura is requesting if pt's PCP can change the expected by date on the scripts to today so the pt is able to have the labs drawn.    Please advise if possible.

## 2025-06-02 ENCOUNTER — RESULTS FOLLOW-UP (OUTPATIENT)
Dept: FAMILY MEDICINE CLINIC | Facility: CLINIC | Age: 70
End: 2025-06-02

## 2025-06-02 RX ORDER — ATENOLOL 25 MG/1
25 TABLET ORAL DAILY
Qty: 90 TABLET | Refills: 1 | OUTPATIENT
Start: 2025-06-02

## 2025-06-02 NOTE — TELEPHONE ENCOUNTER
atenolol (TENORMIN) 25 mg tablet Take 25 mg by mouth daily Rx per Cardio       I do not Rx this med.  Please advise patient to address c Cardio.

## 2025-06-04 ENCOUNTER — TELEPHONE (OUTPATIENT)
Dept: FAMILY MEDICINE CLINIC | Facility: CLINIC | Age: 70
End: 2025-06-04

## 2025-06-06 ENCOUNTER — APPOINTMENT (OUTPATIENT)
Dept: LAB | Facility: CLINIC | Age: 70
End: 2025-06-06
Payer: COMMERCIAL

## 2025-06-06 ENCOUNTER — OFFICE VISIT (OUTPATIENT)
Dept: FAMILY MEDICINE CLINIC | Facility: CLINIC | Age: 70
End: 2025-06-06
Payer: COMMERCIAL

## 2025-06-06 ENCOUNTER — RESULTS FOLLOW-UP (OUTPATIENT)
Dept: FAMILY MEDICINE CLINIC | Facility: CLINIC | Age: 70
End: 2025-06-06

## 2025-06-06 VITALS
HEIGHT: 60 IN | DIASTOLIC BLOOD PRESSURE: 68 MMHG | HEART RATE: 71 BPM | RESPIRATION RATE: 16 BRPM | BODY MASS INDEX: 29.25 KG/M2 | WEIGHT: 149 LBS | TEMPERATURE: 97.6 F | OXYGEN SATURATION: 94 % | SYSTOLIC BLOOD PRESSURE: 120 MMHG

## 2025-06-06 DIAGNOSIS — M85.80 OSTEOPENIA, UNSPECIFIED LOCATION: ICD-10-CM

## 2025-06-06 DIAGNOSIS — R20.2 LEFT LEG PARESTHESIAS: ICD-10-CM

## 2025-06-06 DIAGNOSIS — F51.04 CHRONIC INSOMNIA: ICD-10-CM

## 2025-06-06 DIAGNOSIS — R05.1 ACUTE COUGH: ICD-10-CM

## 2025-06-06 DIAGNOSIS — I48.0 PAF (PAROXYSMAL ATRIAL FIBRILLATION) (HCC): ICD-10-CM

## 2025-06-06 DIAGNOSIS — I49.5 SICK SINUS SYNDROME (HCC): ICD-10-CM

## 2025-06-06 DIAGNOSIS — E66.3 OVERWEIGHT: ICD-10-CM

## 2025-06-06 DIAGNOSIS — F41.9 ANXIETY: ICD-10-CM

## 2025-06-06 DIAGNOSIS — E53.8 VITAMIN B12 DEFICIENCY: Primary | ICD-10-CM

## 2025-06-06 DIAGNOSIS — Z86.0100 HISTORY OF COLON POLYPS: ICD-10-CM

## 2025-06-06 DIAGNOSIS — E78.5 DYSLIPIDEMIA: ICD-10-CM

## 2025-06-06 DIAGNOSIS — R73.01 IFG (IMPAIRED FASTING GLUCOSE): Primary | ICD-10-CM

## 2025-06-06 DIAGNOSIS — I10 BENIGN ESSENTIAL HYPERTENSION: ICD-10-CM

## 2025-06-06 DIAGNOSIS — J20.9 ACUTE BRONCHITIS, UNSPECIFIED ORGANISM: ICD-10-CM

## 2025-06-06 DIAGNOSIS — I34.1 MVP (MITRAL VALVE PROLAPSE): ICD-10-CM

## 2025-06-06 LAB
ALBUMIN SERPL BCG-MCNC: 4 G/DL (ref 3.5–5)
ALP SERPL-CCNC: 81 U/L (ref 34–104)
ALT SERPL W P-5'-P-CCNC: 16 U/L (ref 7–52)
ANION GAP SERPL CALCULATED.3IONS-SCNC: 5 MMOL/L (ref 4–13)
AST SERPL W P-5'-P-CCNC: 21 U/L (ref 13–39)
BASOPHILS # BLD AUTO: 0.04 THOUSANDS/ÂΜL (ref 0–0.1)
BASOPHILS NFR BLD AUTO: 1 % (ref 0–1)
BILIRUB SERPL-MCNC: 0.3 MG/DL (ref 0.2–1)
BUN SERPL-MCNC: 22 MG/DL (ref 5–25)
CALCIUM SERPL-MCNC: 9.1 MG/DL (ref 8.4–10.2)
CHLORIDE SERPL-SCNC: 106 MMOL/L (ref 96–108)
CO2 SERPL-SCNC: 28 MMOL/L (ref 21–32)
CREAT SERPL-MCNC: 0.86 MG/DL (ref 0.6–1.3)
EOSINOPHIL # BLD AUTO: 0.24 THOUSAND/ÂΜL (ref 0–0.61)
EOSINOPHIL NFR BLD AUTO: 3 % (ref 0–6)
ERYTHROCYTE [DISTWIDTH] IN BLOOD BY AUTOMATED COUNT: 14 % (ref 11.6–15.1)
FOLATE SERPL-MCNC: >22.3 NG/ML
GFR SERPL CREATININE-BSD FRML MDRD: 69 ML/MIN/1.73SQ M
GLUCOSE SERPL-MCNC: 111 MG/DL (ref 65–140)
HCT VFR BLD AUTO: 39.6 % (ref 34.8–46.1)
HGB BLD-MCNC: 12.9 G/DL (ref 11.5–15.4)
IMM GRANULOCYTES # BLD AUTO: 0.03 THOUSAND/UL (ref 0–0.2)
IMM GRANULOCYTES NFR BLD AUTO: 0 % (ref 0–2)
LYMPHOCYTES # BLD AUTO: 1.91 THOUSANDS/ÂΜL (ref 0.6–4.47)
LYMPHOCYTES NFR BLD AUTO: 22 % (ref 14–44)
MAGNESIUM SERPL-MCNC: 2 MG/DL (ref 1.9–2.7)
MCH RBC QN AUTO: 29.7 PG (ref 26.8–34.3)
MCHC RBC AUTO-ENTMCNC: 32.6 G/DL (ref 31.4–37.4)
MCV RBC AUTO: 91 FL (ref 82–98)
MONOCYTES # BLD AUTO: 0.76 THOUSAND/ÂΜL (ref 0.17–1.22)
MONOCYTES NFR BLD AUTO: 9 % (ref 4–12)
NEUTROPHILS # BLD AUTO: 5.58 THOUSANDS/ÂΜL (ref 1.85–7.62)
NEUTS SEG NFR BLD AUTO: 65 % (ref 43–75)
NRBC BLD AUTO-RTO: 0 /100 WBCS
PLATELET # BLD AUTO: 205 THOUSANDS/UL (ref 149–390)
PMV BLD AUTO: 10.8 FL (ref 8.9–12.7)
POTASSIUM SERPL-SCNC: 4.1 MMOL/L (ref 3.5–5.3)
PROT SERPL-MCNC: 6.9 G/DL (ref 6.4–8.4)
RBC # BLD AUTO: 4.35 MILLION/UL (ref 3.81–5.12)
SODIUM SERPL-SCNC: 139 MMOL/L (ref 135–147)
VIT B12 SERPL-MCNC: 240 PG/ML (ref 180–914)
WBC # BLD AUTO: 8.56 THOUSAND/UL (ref 4.31–10.16)

## 2025-06-06 PROCEDURE — 36415 COLL VENOUS BLD VENIPUNCTURE: CPT

## 2025-06-06 PROCEDURE — 82746 ASSAY OF FOLIC ACID SERUM: CPT

## 2025-06-06 PROCEDURE — 83735 ASSAY OF MAGNESIUM: CPT

## 2025-06-06 PROCEDURE — 99215 OFFICE O/P EST HI 40 MIN: CPT | Performed by: FAMILY MEDICINE

## 2025-06-06 PROCEDURE — G2211 COMPLEX E/M VISIT ADD ON: HCPCS | Performed by: FAMILY MEDICINE

## 2025-06-06 PROCEDURE — 80053 COMPREHEN METABOLIC PANEL: CPT

## 2025-06-06 PROCEDURE — 85025 COMPLETE CBC W/AUTO DIFF WBC: CPT

## 2025-06-06 PROCEDURE — 82607 VITAMIN B-12: CPT

## 2025-06-06 RX ORDER — BENZONATATE 100 MG/1
100 CAPSULE ORAL 3 TIMES DAILY PRN
Qty: 30 CAPSULE | Refills: 0 | Status: SHIPPED | OUTPATIENT
Start: 2025-06-06 | End: 2025-06-16

## 2025-06-06 RX ORDER — PRAVASTATIN SODIUM 40 MG
40 TABLET ORAL
Qty: 30 TABLET | Refills: 8 | Status: SHIPPED | OUTPATIENT
Start: 2025-06-06

## 2025-06-06 RX ORDER — IBANDRONATE SODIUM 150 MG/1
150 TABLET, FILM COATED ORAL
Qty: 3 TABLET | Refills: 2 | Status: SHIPPED | OUTPATIENT
Start: 2025-06-06

## 2025-06-06 NOTE — ASSESSMENT & PLAN NOTE
Worsening.  Recommend lifestyle modifications.      Orders:    TSH, 3rd generation with Free T4 reflex; Future

## 2025-06-06 NOTE — PROGRESS NOTES
Assessment/Plan:  Assessment & Plan  IFG (impaired fasting glucose)  Worsening.  Patient declines Metformin XR.  Recommend lifestyle modifications.      Orders:    Hemoglobin A1C; Future     Benign essential hypertension    Stable.  Management per Cardio.  Check blood pressure outside of office.  Recommend lifestyle modifications.      Orders:    Comprehensive metabolic panel; Future     Dyslipidemia  Uncontrolled.  Increase Pravachol 40mg QHS.  Recommend lifestyle modifications.       Orders:    pravastatin (PRAVACHOL) 40 mg tablet; Take 1 tablet (40 mg total) by mouth daily at bedtime    TSH, 3rd generation with Free T4 reflex; Future    LDL cholesterol, direct; Future     Anxiety  Stable. Management per Sleep Medicine.   Pending Psych consult.      Orders:    TSH, 3rd generation with Free T4 reflex; Future     MVP (mitral valve prolapse)  Management per Cardio.   S/p Mitral Valve Bioprosthestic Replacement and Left Atrial Appendage Ligation 11/15/24.  Does not need A/C for valvular surgery reasons.  S/p Cardiac Rehab.           Chronic insomnia  Management per Sleep Medicine.   On Trazodone 200 mg q.h.s., Rozerem 8 mg q.h.s., weaning Xanax ER 1 mg 1/2 tab QHS, Atarax 25mg TID PRN.   s/p CBT.     Orders:    TSH, 3rd generation with Free T4 reflex; Future     Sick sinus syndrome (HCC)     Management per Cardio.         Orders:    TSH, 3rd generation with Free T4 reflex; Future     Overweight  Worsening.  Recommend lifestyle modifications.      Orders:    TSH, 3rd generation with Free T4 reflex; Future    History of colon polyps    Colonoscopy is up to date.            PAF (paroxysmal atrial fibrillation) (HCC)  Stable.  Management per Cardio.  On Coumadin per Cardio.           Osteopenia, unspecified location  Last Dexa 1/8/24.  On Boniva 150mg q30 days.       Orders:    ibandronate (BONIVA) 150 MG tablet; Take 1 tablet (150 mg total) by mouth every 30 (thirty) days    TSH, 3rd generation with Free T4 reflex;  Future     Left leg paresthesias  F/U Surgeon.  Start PT for suspected radiculopathy.      Orders:    Ambulatory Referral to Physical Therapy; Future    Magnesium; Future    CBC and differential; Future    Vitamin B12; Future    Folate; Future    Comprehensive metabolic panel; Future    Magnesium; Future    Acute bronchitis, unspecified organism  Likely viral.  Start Tessalon Perles 100mg TID PRN.      Advise Flaco med sinus rinse kit, Mucinex, Claritin/Zyrtec/Allegra/Xyzal, Flonase / Nasacort nasal spray.  Avoid decongestants if you have high blood pressure.      Orders:    benzonatate (TESSALON PERLES) 100 mg capsule; Take 1 capsule (100 mg total) by mouth 3 (three) times a day as needed for cough for up to 10 days    Acute cough  Likely viral.  Start Tessalon Perles 100mg TID PRN.      Advise Flaco med sinus rinse kit, Mucinex, Claritin/Zyrtec/Allegra/Xyzal, Flonase / Nasacort nasal spray.  Avoid decongestants if you have high blood pressure.    Orders:    benzonatate (TESSALON PERLES) 100 mg capsule; Take 1 capsule (100 mg total) by mouth 3 (three) times a day as needed for cough for up to 10 days          Return in about 6 months (around 12/6/2025) for AWV / 6mo - IFG, HTN, HL, Osteopenia, Labs.      Future Appointments   Date Time Provider Department Center   6/13/2025 12:15 PM DEVICE REMOTE LOOP [place] [facility]   7/21/2025 10:30 AM AN [address]   9/12/2025 12:15 PM DEVICE REMOTE LOOP [place] [facility]   12/9/2025 11:30 AM Genesis Cervantes DO  [facility]   12/12/2025 12:15 PM DEVICE REMOTE LOOP [place] [facility]   2/24/2026  3:40 PM Hal Tracey MD [facility]        Subjective:     Carley is a 69 y.o. female who presents today for a follow-up on her chronic medical conditions.        HPI:  Chief Complaint   Patient presents with    Follow-up     HTN    Leg Pain     Left leg numbness x 8 months  Cough x 1week      -- Above per  clinical staff and reviewed. --      HPI      Today:      Return in about 4 months (around 6/16/2025) for AWV / 4mo - IFG, HTN, HL, Osteopenia, Labs.        PTO magui [place]     Controlled Substance Review     [place] PDMP or [place]  reviewed: No red flags were identified; safe to proceed with prescription..        Rx per   René Arevalo [place] [address] [facility] [place] [address]        Cough - Symptoms x 1.5 week.  Dry cough.  Not using OTC meds.         Left anterior thigh numbness - Since surgery 11/15/24, unchanged.  She was previously advised to F/U c her surgeon, but has not.  Constant numbness.  She denies truama or motor dysfunction.       Overweight - Watching diet.  She would like to eat less sweets.  +Exercise - Walking for 50 minutes, 7 per week.       IFG - Declines Metformin XR.  No Pre-DM meds previously.       Hypertension - Management per Cardio Dr. Tracey - Next appt 3/25.  Stable s meds.  No BP check outside of office.        Hyperlipidemia - On Pravachol 20mg QHS x 4 months.  No higher dose or other statin previously.        Sick Sinus Syndrome / pAfib - Management per [facility] Cardio Dr. Quintero - Next appt 3/25?. S/p Cardiac loop recorder implant 2/26/25.  On Coumadin per Cardio.       Mitral Valve Prolapse - Management per Cardio Dr. Tracey - Next appt 9/25, 2/26.  S/p Mitral Valve Bioprosthestic Replacement and Left Atrial Appendage Ligation 11/15/24 per [facility] Dr. Reddy - Next appt PRN.  Does not need A/C for valvular surgery reasons.  S/p Cardiac Rehab 12/24.       Osteopenia - Last Dexa 1/8/24.  On Boniva 150mg q30 days.  D/C Fosamax due to HA.        Chronic Insomnia / Anxiety - Management per [facility] Dr. Knowles - Next appt s/p diagnosis sleep study - not scheduled - patient refuses PAP.  Management per Sleep Medicine Dr. René Arevalo - Next appt 8/28/25.  On Trazodone 200 mg q.h.s., Rozerem 8 mg q.h.s., Xanax ER 0.5 mg QHS, Atarax 25mg TID PRN.   Sleep Med  advises weaning Xanax ER 0.5mg QHS - started 2/2/25 -1/2 pill QHS x 1 week, then 1/2 pill QHS x 1 week, then off.  s/p CBT.  She is was previously referred to [facility] and notified of long wait list, as well as private Psych.  She is anxious due to not being on speaking terms c her daughter.  No other mood meds previously.  Last counseling 2024 - helpful.          PHQ-2/9 Depression Screening    Little interest or pleasure in doing things: 0 - not at all  Feeling down, depressed, or hopeless: 0 - not at all  PHQ-2 Score: 0  PHQ-2 Interpretation: Negative depression screen       AMANUEL-7 Flowsheet Screening      Flowsheet Row Most Recent Value   Over the last two weeks, how often have you been bothered by the following problems?     Feeling nervous, anxious, or on edge 1   Not being able to stop or control worrying 1   Worrying too much about different things 1   Trouble relaxing  0   Being so restless that it's hard to sit still 0   Becoming easily annoyed or irritable  0   Feeling afraid as if something awful might happen 0   How difficult have these problems made it for you to do your work, take care of things at home, or get along with other people?  Not difficult at all   AMANUEL Score  3               Seborrheic Keratosis - Management per Y[facility] Campos - Next appt PRN.     H/O Colon Polyps - Last colonoscopy 2022? [place] (patient unsure of  / Location) - states repeat due in 5 years - 2027.        Reviewed:  Labs 5/30/25, [facility] 8/22/24, [facility] 1/16/25, [facility] 12/5/24. Cardio 3/26/25. EP Cardio 12/23/24, Derm, 6/18/24    The following portions of the patient's history were reviewed and updated as appropriate: allergies, current medications, past family history, past medical history, past social history, past surgical history and problem list.      Review of Systems   Constitutional:  Positive for diaphoresis (Intermittent night sweats). Negative for appetite change, chills, fatigue  and fever.   Respiratory:  Positive for cough (Dry). Negative for chest tightness, shortness of breath and wheezing.    Cardiovascular:  Negative for chest pain and palpitations.   Gastrointestinal:  Negative for abdominal pain, blood in stool, diarrhea, nausea and vomiting.   Genitourinary:  Negative for dysuria.   Psychiatric/Behavioral:  Positive for sleep disturbance.         Current Outpatient Medications   Medication Sig Dispense Refill    ALPRAZolam ER (XANAX XR) 1 MG 24 hr tablet Take 0.5 mg by mouth daily at bedtime Rx per Sleep Medicine      aspirin (ECOTRIN LOW STRENGTH) 81 mg EC tablet Take 1 tablet (81 mg total) by mouth daily 30 tablet 2    atenolol (TENORMIN) 25 mg tablet Take 25 mg by mouth in the morning. Rx per Cardio.      benzonatate (TESSALON PERLES) 100 mg capsule Take 1 capsule (100 mg total) by mouth 3 (three) times a day as needed for cough for up to 10 days 30 capsule 0    hydrOXYzine HCL (ATARAX) 25 mg tablet Take 25 mg by mouth as needed in the morning and 25 mg as needed at noon and 25 mg as needed in the evening for anxiety (Insomnia). Take one tablet by mouth 3 times per day as needed for anxiety or sleep.  Rx per Sleep Medicine.      ibandronate (BONIVA) 150 MG tablet Take 1 tablet (150 mg total) by mouth every 30 (thirty) days 3 tablet 2    Pediatric Vitamins (Multivitamin Gummies Childrens) CHEW Chew 1 tablet in the morning      pravastatin (PRAVACHOL) 40 mg tablet Take 1 tablet (40 mg total) by mouth daily at bedtime 30 tablet 8    Propylene Glycol (Systane Balance) 0.6 % SOLN OTC.  1 drop six times a day into both eyes      ramelteon (ROZEREM) 8 mg tablet Take 8 mg by mouth in the morning. Rx per Sleep Med  .      traZODone (DESYREL) 100 mg tablet Take 200 mg by mouth daily at bedtime Rx per Sleep Med      warfarin (COUMADIN) 2.5 mg tablet Take 1 to 2 tablets daily by mouth in the evening. 170 tablet 2    EPINEPHrine (EPIPEN) 0.3 mg/0.3 mL SOAJ Inject 0.3 mL (0.3 mg total) into a  muscle once for 1 dose (Patient not taking: Reported on 6/6/2025) 0.6 mL 0     No current facility-administered medications for this visit.       Objective:  /68 (Cuff Size: Standard)   Pulse 71   Temp 97.6 °F (36.4 °C)   Resp 16   Ht 5' (1.524 m)   Wt 67.6 kg (149 lb)   LMP 12/16/2014 (Approximate)   SpO2 94%   BMI 29.10 kg/m²    Wt Readings from Last 3 Encounters:   06/06/25 67.6 kg (149 lb)   03/26/25 65.8 kg (145 lb)   02/26/25 65.8 kg (145 lb)      BP Readings from Last 3 Encounters:   06/06/25 120/68   03/26/25 132/80   02/26/25 118/56          Physical Exam  Vitals and nursing note reviewed.   Constitutional:       General: She is not in acute distress.     Appearance: Normal appearance. She is well-developed. She is not ill-appearing or toxic-appearing.   HENT:      Head: Normocephalic and atraumatic.      Right Ear: External ear normal. There is impacted cerumen.      Left Ear: External ear normal. There is impacted cerumen.      Nose: Nose normal.      Right Sinus: No maxillary sinus tenderness or frontal sinus tenderness.      Left Sinus: No maxillary sinus tenderness or frontal sinus tenderness.      Mouth/Throat:      Mouth: Mucous membranes are moist.      Pharynx: Oropharynx is clear. Uvula midline.      Tonsils: No tonsillar exudate.     Eyes:      Extraocular Movements: Extraocular movements intact.      Conjunctiva/sclera: Conjunctivae normal.     Neck:      Vascular: No carotid bruit.     Cardiovascular:      Rate and Rhythm: Normal rate and regular rhythm.      Pulses: Normal pulses.      Heart sounds: Normal heart sounds.   Pulmonary:      Effort: Pulmonary effort is normal.      Breath sounds: Normal breath sounds.   Abdominal:      General: Bowel sounds are normal. There is no distension.      Palpations: Abdomen is soft. There is no mass.      Tenderness: There is no abdominal tenderness. There is no guarding or rebound.     Musculoskeletal:         General: No swelling or  "tenderness.      Cervical back: Neck supple.      Right lower leg: No edema.      Left lower leg: No edema.   Lymphadenopathy:      Cervical: No cervical adenopathy.     Skin:     Findings: No rash.     Neurological:      General: No focal deficit present.      Mental Status: She is alert and oriented to person, place, and time. Mental status is at baseline.      Cranial Nerves: No cranial nerve deficit.      Sensory: No sensory deficit.      Motor: No weakness.      Coordination: Coordination normal.      Deep Tendon Reflexes: Reflexes normal.     Psychiatric:         Mood and Affect: Mood normal.         Behavior: Behavior normal.         Thought Content: Thought content normal.         Judgment: Judgment normal.         Lab Results:      Lab Results   Component Value Date    WBC 7.02 01/24/2025    HGB 13.8 01/24/2025    HCT 42.2 01/24/2025     01/24/2025    CHOL 217 03/19/2015    TRIG 194 (H) 05/30/2025    HDL 37 (L) 05/30/2025    LDLDIRECT 105 (H) 05/30/2025    ALT 16 05/30/2025    AST 21 05/30/2025     03/19/2015    K 4.0 05/30/2025     05/30/2025    CREATININE 1.01 05/30/2025    BUN 24 05/30/2025    CO2 30 05/30/2025    TSH 1.06 10/19/2023    INR 2.50 (H) 05/30/2025    GLUF 99 05/30/2025    HGBA1C 5.9 (H) 05/30/2025     No results found for: \"URICACID\"  Invalid input(s): \"BASENAME\" Vitamin D    No results found.     POCT Labs        Depression Screening and Follow-up Plan: Patient was screened for depression during today's encounter. They screened negative with a PHQ-2 score of 0.              5 minutes spent on chart prep, 35 minutes spent with patient counseling/educating on their diagnoses, tests completed and any new tests ordered, any referrals placed, treatment options, and documentation of above today.   In prescribing new medications, or changing doses, we reviewed the risks and benefits and side effects of these medications along with other treatment options if appropriate. "

## 2025-06-06 NOTE — ASSESSMENT & PLAN NOTE
Management per Cardio.   S/p Mitral Valve Bioprosthestic Replacement and Left Atrial Appendage Ligation 11/15/24.  Does not need A/C for valvular surgery reasons.  S/p Cardiac Rehab.

## 2025-06-06 NOTE — ASSESSMENT & PLAN NOTE
Stable.  Management per Cardio.  Check blood pressure outside of office.  Recommend lifestyle modifications.      Orders:    Comprehensive metabolic panel; Future

## 2025-06-06 NOTE — ASSESSMENT & PLAN NOTE
Last Dexa 1/8/24.  On Boniva 150mg q30 days.       Orders:    ibandronate (BONIVA) 150 MG tablet; Take 1 tablet (150 mg total) by mouth every 30 (thirty) days    TSH, 3rd generation with Free T4 reflex; Future      no

## 2025-06-06 NOTE — RESULT ENCOUNTER NOTE
Resulted labs are stable.  Some are still pending.      Message sent to patient via [facility] patient portal.

## 2025-06-06 NOTE — PATIENT INSTRUCTIONS
Advise Flaco med sinus rinse kit, Mucinex, Claritin/Zyrtec/Allegra/Xyzal, Flonase / Nasacort nasal spray.  Avoid decongestants if you have high blood pressure.

## 2025-06-06 NOTE — ASSESSMENT & PLAN NOTE
Worsening.  Patient declines Metformin XR.  Recommend lifestyle modifications.      Orders:    Hemoglobin A1C; Future

## 2025-06-06 NOTE — ASSESSMENT & PLAN NOTE
Stable. Management per Sleep Medicine.   Pending Psych consult.      Orders:    TSH, 3rd generation with Free T4 reflex; Future

## 2025-06-06 NOTE — ASSESSMENT & PLAN NOTE
Management per Sleep Medicine.   On Trazodone 200 mg q.h.s., Rozerem 8 mg q.h.s., weaning Xanax ER 1 mg 1/2 tab QHS, Atarax 25mg TID PRN.   s/p CBT.     Orders:    TSH, 3rd generation with Free T4 reflex; Future

## 2025-06-06 NOTE — ASSESSMENT & PLAN NOTE
Uncontrolled.  Increase Pravachol 40mg QHS.  Recommend lifestyle modifications.       Orders:    pravastatin (PRAVACHOL) 40 mg tablet; Take 1 tablet (40 mg total) by mouth daily at bedtime    TSH, 3rd generation with Free T4 reflex; Future    LDL cholesterol, direct; Future

## 2025-06-09 PROBLEM — E53.8 VITAMIN B12 DEFICIENCY: Status: ACTIVE | Noted: 2025-06-09

## 2025-06-09 NOTE — RESULT ENCOUNTER NOTE
Unstable labs - will review with patient at upcoming appointment.    Vitamin B12 Deficiency - Schedule for nurse visits for injections of Vitamin B12 1000mcg IM/SQ daily x 1 week, then weekly x 1 month, then start over-the-counter vitamin B12 1000mcg daily - take on an empty stomach.   Nurse to see orders to check CBC c Diff, Vitamin B12, and Folate with labs 12/6/25.      Normal Folic acid and CBC.      Message sent to patient via [email or web address] patient portal.    Nurse to also call patient.

## 2025-06-12 ENCOUNTER — CLINICAL SUPPORT (OUTPATIENT)
Dept: FAMILY MEDICINE CLINIC | Facility: CLINIC | Age: 70
End: 2025-06-12
Payer: COMMERCIAL

## 2025-06-12 DIAGNOSIS — E53.8 VITAMIN B12 DEFICIENCY: Primary | ICD-10-CM

## 2025-06-12 PROCEDURE — 96372 THER/PROPH/DIAG INJ SC/IM: CPT | Performed by: FAMILY MEDICINE

## 2025-06-12 RX ORDER — CYANOCOBALAMIN 1000 UG/ML
1000 INJECTION, SOLUTION INTRAMUSCULAR; SUBCUTANEOUS DAILY
Status: COMPLETED | OUTPATIENT
Start: 2025-06-16 | End: 2025-06-19

## 2025-06-12 RX ORDER — CYANOCOBALAMIN 1000 UG/ML
1000 INJECTION, SOLUTION INTRAMUSCULAR; SUBCUTANEOUS WEEKLY
Status: SHIPPED | OUTPATIENT
Start: 2025-06-26 | End: 2025-07-24

## 2025-06-12 RX ORDER — CYANOCOBALAMIN 1000 UG/ML
1000 INJECTION, SOLUTION INTRAMUSCULAR; SUBCUTANEOUS DAILY
Status: COMPLETED | OUTPATIENT
Start: 2025-06-12 | End: 2025-06-13

## 2025-06-12 RX ADMIN — CYANOCOBALAMIN 1000 MCG: 1000 INJECTION, SOLUTION INTRAMUSCULAR; SUBCUTANEOUS at 10:09

## 2025-06-13 ENCOUNTER — REMOTE DEVICE CLINIC VISIT (OUTPATIENT)
Dept: CARDIOLOGY CLINIC | Facility: CLINIC | Age: 70
End: 2025-06-13
Payer: COMMERCIAL

## 2025-06-13 ENCOUNTER — CLINICAL SUPPORT (OUTPATIENT)
Dept: FAMILY MEDICINE CLINIC | Facility: CLINIC | Age: 70
End: 2025-06-13
Payer: COMMERCIAL

## 2025-06-13 DIAGNOSIS — I48.0 PAF (PAROXYSMAL ATRIAL FIBRILLATION) (HCC): Primary | ICD-10-CM

## 2025-06-13 DIAGNOSIS — E53.8 VITAMIN B12 DEFICIENCY: Primary | ICD-10-CM

## 2025-06-13 PROCEDURE — 93298 REM INTERROG DEV EVAL SCRMS: CPT | Performed by: INTERNAL MEDICINE

## 2025-06-13 PROCEDURE — 96372 THER/PROPH/DIAG INJ SC/IM: CPT

## 2025-06-13 RX ADMIN — CYANOCOBALAMIN 1000 MCG: 1000 INJECTION, SOLUTION INTRAMUSCULAR; SUBCUTANEOUS at 10:10

## 2025-06-13 NOTE — PROGRESS NOTES
"[facility] [id number] ILR/ACTIVE SYSTEM IS MRI CONDITIONAL   [facility] TRANSMISSION: LOOP RECORDER. PRESENTING RHYTHM NSR W/ PVC @ 64 BPM. BATTERY STATUS \"OK.\" 1 PT ACTIVATED EPISODE ONLY USED TO TRANSMIT REPORT. NO REAL SYMPTOMS. NO PATIENT OR DEVICE ACTIVATED EPISODES. NORMAL DEVICE FUNCTION. DL   "

## 2025-06-14 ENCOUNTER — RESULTS FOLLOW-UP (OUTPATIENT)
Dept: CARDIOLOGY CLINIC | Facility: CLINIC | Age: 70
End: 2025-06-14

## 2025-06-16 ENCOUNTER — CLINICAL SUPPORT (OUTPATIENT)
Dept: FAMILY MEDICINE CLINIC | Facility: CLINIC | Age: 70
End: 2025-06-16
Payer: COMMERCIAL

## 2025-06-16 ENCOUNTER — TELEPHONE (OUTPATIENT)
Age: 70
End: 2025-06-16

## 2025-06-16 DIAGNOSIS — E53.8 VITAMIN B12 DEFICIENCY: Primary | ICD-10-CM

## 2025-06-16 PROCEDURE — NC001 PR NO CHARGE: Performed by: FAMILY MEDICINE

## 2025-06-16 PROCEDURE — 96372 THER/PROPH/DIAG INJ SC/IM: CPT | Performed by: FAMILY MEDICINE

## 2025-06-16 RX ORDER — CYANOCOBALAMIN 1000 UG/ML
1000 INJECTION, SOLUTION INTRAMUSCULAR; SUBCUTANEOUS
Status: CANCELLED | OUTPATIENT
Start: 2025-06-16

## 2025-06-16 RX ADMIN — CYANOCOBALAMIN 1000 MCG: 1000 INJECTION, SOLUTION INTRAMUSCULAR; SUBCUTANEOUS at 15:18

## 2025-06-16 NOTE — TELEPHONE ENCOUNTER
Patient calling to follow up on earlier call regarding continuing or stopping Warfarin.    Loop report done on 6/13/25 shows no Afib, last OV notes states can stop Warfarin & continue aspirin if no Afib; relayed info to patient but advised need an order from Dr Tracey to stop.    Advised will ask if she can stop the Warfarin & when.

## 2025-06-16 NOTE — TELEPHONE ENCOUNTER
"Received call from pt asking if she should continue taking her prescribed Warfarin. Pt was last seen on 3/26/2025 by Dr. Hong who stated, \"Currently in sinus rhythm. Loop recorder recently placed. For now she is on warfarin. Her next (first) device check will be in June. She will contact me after that. If there is no atrial fibrillation, we will stop the warfarin continue aspirin. From there if there is recurrence of the atrial fibrillation we can resume anticoagulation. Left atrial appendage was ligated. She is on atenolol.\"    Pt had a device check on 6/13. Please review and advise.      "

## 2025-06-17 NOTE — TELEPHONE ENCOUNTER
Pt called back to f/u on warfarin - she is asking if she can d/c it. Advised message was sent to Dr. Tracey, awaiting his response.

## 2025-06-18 ENCOUNTER — ANTICOAG VISIT (OUTPATIENT)
Dept: CARDIOLOGY CLINIC | Facility: CLINIC | Age: 70
End: 2025-06-18

## 2025-06-18 ENCOUNTER — CLINICAL SUPPORT (OUTPATIENT)
Dept: FAMILY MEDICINE CLINIC | Facility: CLINIC | Age: 70
End: 2025-06-18
Payer: COMMERCIAL

## 2025-06-18 DIAGNOSIS — I48.0 PAF (PAROXYSMAL ATRIAL FIBRILLATION) (HCC): Primary | ICD-10-CM

## 2025-06-18 DIAGNOSIS — E53.8 VITAMIN B12 DEFICIENCY: Primary | ICD-10-CM

## 2025-06-18 DIAGNOSIS — I48.92 ATRIAL FLUTTER, UNSPECIFIED TYPE (HCC): ICD-10-CM

## 2025-06-18 PROCEDURE — 96372 THER/PROPH/DIAG INJ SC/IM: CPT | Performed by: FAMILY MEDICINE

## 2025-06-18 RX ADMIN — CYANOCOBALAMIN 1000 MCG: 1000 INJECTION, SOLUTION INTRAMUSCULAR; SUBCUTANEOUS at 14:55

## 2025-06-18 NOTE — TELEPHONE ENCOUNTER
Pt returned call. Informed her of message from Dr. Hong. She voiced understanding. No further questions.

## 2025-06-19 ENCOUNTER — CLINICAL SUPPORT (OUTPATIENT)
Dept: FAMILY MEDICINE CLINIC | Facility: CLINIC | Age: 70
End: 2025-06-19
Payer: COMMERCIAL

## 2025-06-19 DIAGNOSIS — E53.8 VITAMIN B12 DEFICIENCY: Primary | ICD-10-CM

## 2025-06-19 PROCEDURE — NC001 PR NO CHARGE: Performed by: FAMILY MEDICINE

## 2025-06-19 PROCEDURE — 96372 THER/PROPH/DIAG INJ SC/IM: CPT | Performed by: FAMILY MEDICINE

## 2025-06-19 RX ADMIN — CYANOCOBALAMIN 1000 MCG: 1000 INJECTION, SOLUTION INTRAMUSCULAR; SUBCUTANEOUS at 10:45

## 2025-06-25 ENCOUNTER — CLINICAL SUPPORT (OUTPATIENT)
Dept: FAMILY MEDICINE CLINIC | Facility: CLINIC | Age: 70
End: 2025-06-25
Payer: COMMERCIAL

## 2025-06-25 DIAGNOSIS — E53.8 VITAMIN B12 DEFICIENCY: Primary | ICD-10-CM

## 2025-06-25 PROCEDURE — 96372 THER/PROPH/DIAG INJ SC/IM: CPT | Performed by: FAMILY MEDICINE

## 2025-06-25 RX ORDER — CYANOCOBALAMIN 1000 UG/ML
1000 INJECTION, SOLUTION INTRAMUSCULAR; SUBCUTANEOUS WEEKLY
Status: SHIPPED | OUTPATIENT
Start: 2025-06-25 | End: 2025-07-23

## 2025-06-25 RX ADMIN — CYANOCOBALAMIN 1000 MCG: 1000 INJECTION, SOLUTION INTRAMUSCULAR; SUBCUTANEOUS at 11:21

## 2025-06-29 DIAGNOSIS — E78.5 DYSLIPIDEMIA: ICD-10-CM

## 2025-07-01 RX ORDER — PRAVASTATIN SODIUM 40 MG
40 TABLET ORAL
Qty: 90 TABLET | Refills: 3 | OUTPATIENT
Start: 2025-07-01

## 2025-07-02 ENCOUNTER — CLINICAL SUPPORT (OUTPATIENT)
Dept: FAMILY MEDICINE CLINIC | Facility: CLINIC | Age: 70
End: 2025-07-02
Payer: COMMERCIAL

## 2025-07-02 DIAGNOSIS — E53.8 VITAMIN B12 DEFICIENCY: Primary | ICD-10-CM

## 2025-07-02 PROCEDURE — 96372 THER/PROPH/DIAG INJ SC/IM: CPT

## 2025-07-02 RX ADMIN — CYANOCOBALAMIN 1000 MCG: 1000 INJECTION, SOLUTION INTRAMUSCULAR; SUBCUTANEOUS at 14:42

## 2025-07-09 ENCOUNTER — CLINICAL SUPPORT (OUTPATIENT)
Dept: FAMILY MEDICINE CLINIC | Facility: CLINIC | Age: 70
End: 2025-07-09
Payer: COMMERCIAL

## 2025-07-09 DIAGNOSIS — E53.8 VITAMIN B12 DEFICIENCY: Primary | ICD-10-CM

## 2025-07-09 PROCEDURE — 96372 THER/PROPH/DIAG INJ SC/IM: CPT | Performed by: FAMILY MEDICINE

## 2025-07-09 RX ADMIN — CYANOCOBALAMIN 1000 MCG: 1000 INJECTION, SOLUTION INTRAMUSCULAR; SUBCUTANEOUS at 15:17

## 2025-07-16 ENCOUNTER — CLINICAL SUPPORT (OUTPATIENT)
Dept: FAMILY MEDICINE CLINIC | Facility: CLINIC | Age: 70
End: 2025-07-16
Payer: COMMERCIAL

## 2025-07-16 DIAGNOSIS — E53.8 VITAMIN B12 DEFICIENCY: Primary | ICD-10-CM

## 2025-07-16 PROCEDURE — 96372 THER/PROPH/DIAG INJ SC/IM: CPT | Performed by: FAMILY MEDICINE

## 2025-07-16 RX ADMIN — CYANOCOBALAMIN 1000 MCG: 1000 INJECTION, SOLUTION INTRAMUSCULAR; SUBCUTANEOUS at 14:34

## (undated) DEVICE — SILVER-COATED ANTIBACTERIAL BARRIER DRESSING: Brand: ACTICOAT SURGIC 10X25CM 5PK US

## (undated) DEVICE — SUT PDS PLUS 1 CTB 36 IN PDPB359T

## (undated) DEVICE — PVC URETHRAL CATHETER: Brand: DOVER

## (undated) DEVICE — PACK CUSTOM PERFUSION PLEG PK

## (undated) DEVICE — TR BAND RADIAL ARTERY COMPRESSION DEVICE: Brand: TR BAND

## (undated) DEVICE — CATH URET 16FR RED RUBBER

## (undated) DEVICE — IMPLANTABLE DEVICE
Type: IMPLANTABLE DEVICE | Site: HEART | Status: NON-FUNCTIONAL
Removed: 2024-11-15

## (undated) DEVICE — AIRLIFE™ TRI-FLO™ SUCTION CATHETER: Brand: AIRLIFE™

## (undated) DEVICE — 3M™ TEGADERM™ TRANSPARENT FILM DRESSING FRAME STYLE, 1628, 6 IN X 8 IN (15 CM X 20 CM), 10/CT 8CT/CASE: Brand: 3M™ TEGADERM™

## (undated) DEVICE — OASIS DRAIN, SINGLE, INLINE & ATS COMPATIBLE: Brand: OASIS

## (undated) DEVICE — GLOVE INDICATOR PI UNDERGLOVE SZ 8 BLUE

## (undated) DEVICE — DRESSING JUMPSTART CONTACT LAYER 1.5 X 10IN

## (undated) DEVICE — PLEDGET CARDIO PTFE 9.5 X 4.8 SOFT LF (6EA/PK)

## (undated) DEVICE — 32 FR RIGHT ANGLE – SOFT PVC CATHETER: Brand: PVC THORACIC CATHETERS

## (undated) DEVICE — TRAY FOLEY 16FR SURESTEP TEMP SENS URIMETER STAT LOK

## (undated) DEVICE — 32 FR STRAIGHT – SOFT PVC CATHETER: Brand: PVC THORACIC CATHETERS

## (undated) DEVICE — GAUZE SPONGES,16 PLY: Brand: CURITY

## (undated) DEVICE — DRESSING ALLEVYN LIFE HEEL 25 X 25.2CM

## (undated) DEVICE — SUT PROLENE 4-0 RB-1/RB-1 36 IN 8557H

## (undated) DEVICE — RECIP.STERNUM SAW BLADE 34/7.5/0.7MM: Brand: AESCULAP

## (undated) DEVICE — PLUMEPEN PRO 10FT

## (undated) DEVICE — 1840 FOAM BLOCK NEEDLE COUNTER: Brand: DEVON

## (undated) DEVICE — DGW .035 FC J3MM 260CM TEF: Brand: EMERALD

## (undated) DEVICE — SUT PROLENE 4-0 SH 36 IN 8521H

## (undated) DEVICE — GUIDEWIRE WHOLEY HI TORQUE INTERM MOD J .035 145CM

## (undated) DEVICE — ELECTRODE BLADE E-Z CLEAN 2.75IN 7CM -0012A

## (undated) DEVICE — PACK VALVE PBDS

## (undated) DEVICE — BONE WAX WHITE: Brand: BONE WAX WHITE

## (undated) DEVICE — SUT MONOCRYL PLUS 3-0 PS-2 27 IN MCP427H

## (undated) DEVICE — RADIFOCUS OPTITORQUE ANGIOGRAPHIC CATHETER: Brand: OPTITORQUE

## (undated) DEVICE — SUTURE GUIDE

## (undated) DEVICE — GLIDESHEATH SLENDER STAINLESS STEEL KIT: Brand: GLIDESHEATH SLENDER

## (undated) DEVICE — FILTER SMOKE EVAC VIROSAFE

## (undated) DEVICE — 3000CC GUARDIAN II: Brand: GUARDIAN

## (undated) DEVICE — BLANKET HYPOTHERMIA ADULT GAYMAR

## (undated) DEVICE — SUT SILK 2 60 IN SA8H

## (undated) DEVICE — INTENDED FOR TISSUE SEPARATION, AND OTHER PROCEDURES THAT REQUIRE A SHARP SURGICAL BLADE TO PUNCTURE OR CUT.: Brand: BARD-PARKER ® CARBON RIB-BACK BLADES

## (undated) DEVICE — LIGHT HANDLE COVER SLEEVE DISP BLUE STELLAR

## (undated) DEVICE — 40601 PROLONGED POSITIONING SYSTEM: Brand: 40601 PROLONGED POSITIONING SYSTEM

## (undated) DEVICE — PACK CUSTOM PERFUSION ANH

## (undated) DEVICE — SUT SILK 0 CT-1 30 IN 424H

## (undated) DEVICE — CATHETER PLUG WITH CAP: Brand: DOVER

## (undated) DEVICE — PAD GROUNDING DUAL ADULT

## (undated) DEVICE — PUMP TUBING FUSION PACK

## (undated) DEVICE — STERNAL WIRE

## (undated) DEVICE — SUT ETHIBOND 2-0 SH-1/SH-1 30 IN X763H

## (undated) DEVICE — SUT SILK 3-0 SH CR/8 18 IN C013D

## (undated) DEVICE — UMBILICAL TAPE: Brand: DEROYAL

## (undated) DEVICE — EVERGRIP INSERT SET 61MM: Brand: FOGARTY EVERGRIP

## (undated) DEVICE — GLOVE SRG BIOGEL ECLIPSE 7.5

## (undated) DEVICE — THERMOFLECT BLANKET, L, 25EA                               TS THERMOFLECT BLANKET, 48" X 84", SILVER, 5/BG, 5 BG/CS NW: Brand: THERMOFLECT